# Patient Record
Sex: FEMALE | Race: WHITE | Employment: OTHER | URBAN - METROPOLITAN AREA
[De-identification: names, ages, dates, MRNs, and addresses within clinical notes are randomized per-mention and may not be internally consistent; named-entity substitution may affect disease eponyms.]

---

## 2017-01-05 PROBLEM — L97.912 NON-PRESSURE ULCER OF RIGHT LOWER EXTREMITY WITH FAT LAYER EXPOSED (HCC): Status: ACTIVE | Noted: 2017-01-05

## 2017-02-08 ENCOUNTER — HOSPITAL ENCOUNTER (OUTPATIENT)
Dept: WOUND CARE | Age: 82
Discharge: HOME | End: 2017-02-08
Payer: MEDICARE

## 2017-02-08 ENCOUNTER — HOSPITAL ENCOUNTER (OUTPATIENT)
Age: 82
Setting detail: SPECIMEN
Discharge: HOME | End: 2017-02-08
Payer: MEDICARE

## 2017-02-08 LAB
INR BLD: 6.1
PROTHROMBIN TIME: 63.3 SEC (ref 9.4–12.6)

## 2017-02-08 PROCEDURE — 11045 DBRDMT SUBQ TISS EACH ADDL: CPT

## 2017-02-08 PROCEDURE — 36415 COLL VENOUS BLD VENIPUNCTURE: CPT

## 2017-02-08 PROCEDURE — 2500000003 HC RX 250 WO HCPCS: Performed by: SURGERY

## 2017-02-08 PROCEDURE — 11042 DBRDMT SUBQ TIS 1ST 20SQCM/<: CPT

## 2017-02-08 PROCEDURE — 85610 PROTHROMBIN TIME: CPT

## 2017-02-08 RX ORDER — LIDOCAINE HYDROCHLORIDE 40 MG/ML
SOLUTION TOPICAL ONCE
Status: COMPLETED | OUTPATIENT
Start: 2017-02-08 | End: 2017-02-08

## 2017-02-08 RX ORDER — NIFEDIPINE 10 MG/1
10 CAPSULE ORAL 2 TIMES DAILY
COMMUNITY
End: 2017-04-10 | Stop reason: SDUPTHER

## 2017-02-08 RX ORDER — NIFEDIPINE 10 MG/1
10 CAPSULE ORAL 2 TIMES DAILY
Qty: 30 CAPSULE | Refills: 5 | Status: ON HOLD | OUTPATIENT
Start: 2017-02-08 | End: 2017-04-28 | Stop reason: HOSPADM

## 2017-02-08 RX ORDER — LIDOCAINE HYDROCHLORIDE 40 MG/ML
4 SOLUTION TOPICAL ONCE
Status: DISCONTINUED | OUTPATIENT
Start: 2017-02-08 | End: 2017-02-09 | Stop reason: HOSPADM

## 2017-02-08 RX ADMIN — LIDOCAINE HYDROCHLORIDE: 40 SOLUTION TOPICAL at 10:05

## 2017-02-13 ENCOUNTER — PRE-PROCEDURE TELEPHONE (OUTPATIENT)
Dept: WOUND CARE | Age: 82
End: 2017-02-13

## 2017-02-17 ENCOUNTER — PRE-PROCEDURE TELEPHONE (OUTPATIENT)
Dept: WOUND CARE | Age: 82
End: 2017-02-17

## 2017-02-17 ENCOUNTER — HOSPITAL ENCOUNTER (OUTPATIENT)
Dept: WOUND CARE | Age: 82
Discharge: HOME | End: 2017-02-17
Payer: MEDICARE

## 2017-02-17 VITALS — SYSTOLIC BLOOD PRESSURE: 132 MMHG | DIASTOLIC BLOOD PRESSURE: 71 MMHG | RESPIRATION RATE: 18 BRPM | HEART RATE: 72 BPM

## 2017-02-17 PROCEDURE — 11045 DBRDMT SUBQ TISS EACH ADDL: CPT

## 2017-02-17 PROCEDURE — 11042 DBRDMT SUBQ TIS 1ST 20SQCM/<: CPT

## 2017-02-17 RX ORDER — CIPROFLOXACIN 500 MG/1
500 TABLET, FILM COATED ORAL 2 TIMES DAILY
Qty: 20 TABLET | Refills: 2 | Status: SHIPPED | OUTPATIENT
Start: 2017-02-17 | End: 2017-02-27

## 2017-02-24 ENCOUNTER — HOSPITAL ENCOUNTER (OUTPATIENT)
Dept: WOUND CARE | Age: 82
Discharge: HOME | End: 2017-02-24
Payer: MEDICARE

## 2017-02-24 VITALS
SYSTOLIC BLOOD PRESSURE: 127 MMHG | WEIGHT: 125 LBS | HEART RATE: 69 BPM | BODY MASS INDEX: 22.15 KG/M2 | HEIGHT: 63 IN | TEMPERATURE: 97.1 F | RESPIRATION RATE: 18 BRPM | DIASTOLIC BLOOD PRESSURE: 65 MMHG

## 2017-02-24 PROCEDURE — 99214 OFFICE O/P EST MOD 30 MIN: CPT

## 2017-02-24 PROCEDURE — 6370000000 HC RX 637 (ALT 250 FOR IP): Performed by: SURGERY

## 2017-02-24 RX ORDER — HYDROCODONE BITARTRATE AND ACETAMINOPHEN 5; 325 MG/1; MG/1
1 TABLET ORAL EVERY 6 HOURS PRN
Qty: 20 TABLET | Refills: 0 | Status: SHIPPED | OUTPATIENT
Start: 2017-02-24 | End: 2017-03-03

## 2017-02-24 RX ADMIN — LIDOCAINE HYDROCHLORIDE: 20 JELLY TOPICAL at 12:02

## 2017-02-24 ASSESSMENT — PAIN DESCRIPTION - PAIN TYPE: TYPE: CHRONIC PAIN

## 2017-02-24 ASSESSMENT — PAIN SCALES - GENERAL: PAINLEVEL_OUTOF10: 9

## 2017-02-24 ASSESSMENT — PAIN DESCRIPTION - LOCATION: LOCATION: ANKLE

## 2017-02-24 ASSESSMENT — PAIN DESCRIPTION - FREQUENCY: FREQUENCY: INTERMITTENT

## 2017-02-24 ASSESSMENT — PAIN DESCRIPTION - DESCRIPTORS: DESCRIPTORS: ACHING;BURNING;TENDER

## 2017-02-24 ASSESSMENT — PAIN DESCRIPTION - ONSET: ONSET: ON-GOING

## 2017-02-24 ASSESSMENT — PAIN DESCRIPTION - PROGRESSION: CLINICAL_PROGRESSION: NOT CHANGED

## 2017-02-24 ASSESSMENT — PAIN DESCRIPTION - ORIENTATION: ORIENTATION: RIGHT

## 2017-03-03 ENCOUNTER — HOSPITAL ENCOUNTER (OUTPATIENT)
Dept: WOUND CARE | Age: 82
Discharge: HOME | End: 2017-03-03
Payer: MEDICARE

## 2017-03-03 VITALS — HEART RATE: 66 BPM | SYSTOLIC BLOOD PRESSURE: 126 MMHG | RESPIRATION RATE: 18 BRPM | DIASTOLIC BLOOD PRESSURE: 66 MMHG

## 2017-03-03 PROCEDURE — 11042 DBRDMT SUBQ TIS 1ST 20SQCM/<: CPT | Performed by: SURGERY

## 2017-03-03 PROCEDURE — 11045 DBRDMT SUBQ TISS EACH ADDL: CPT

## 2017-03-03 PROCEDURE — 6370000000 HC RX 637 (ALT 250 FOR IP): Performed by: SURGERY

## 2017-03-03 PROCEDURE — G0463 HOSPITAL OUTPT CLINIC VISIT: HCPCS | Performed by: SURGERY

## 2017-03-03 RX ADMIN — LIDOCAINE HYDROCHLORIDE: 20 JELLY TOPICAL at 10:50

## 2017-03-07 ENCOUNTER — PRE-PROCEDURE TELEPHONE (OUTPATIENT)
Dept: WOUND CARE | Age: 82
End: 2017-03-07

## 2017-03-08 ENCOUNTER — ANESTHESIA EVENT (OUTPATIENT)
Dept: OPERATING ROOM | Age: 82
End: 2017-03-08
Payer: MEDICARE

## 2017-03-09 ENCOUNTER — HOSPITAL ENCOUNTER (OUTPATIENT)
Age: 82
Setting detail: OUTPATIENT SURGERY
Discharge: HOME | End: 2017-03-09
Attending: SURGERY | Admitting: SURGERY
Payer: MEDICARE

## 2017-03-09 ENCOUNTER — ANESTHESIA (OUTPATIENT)
Dept: OPERATING ROOM | Age: 82
End: 2017-03-09
Payer: MEDICARE

## 2017-03-09 VITALS
WEIGHT: 132 LBS | RESPIRATION RATE: 16 BRPM | HEART RATE: 70 BPM | OXYGEN SATURATION: 99 % | DIASTOLIC BLOOD PRESSURE: 77 MMHG | SYSTOLIC BLOOD PRESSURE: 145 MMHG | HEIGHT: 63 IN | BODY MASS INDEX: 23.39 KG/M2 | TEMPERATURE: 97.6 F

## 2017-03-09 VITALS — DIASTOLIC BLOOD PRESSURE: 51 MMHG | OXYGEN SATURATION: 100 % | SYSTOLIC BLOOD PRESSURE: 99 MMHG

## 2017-03-09 LAB
ANION GAP SERPL CALCULATED.3IONS-SCNC: 9 MMOL/L (ref 9–17)
BUN BLDV-MCNC: 24 MG/DL (ref 8–23)
BUN/CREAT BLD: ABNORMAL (ref 9–20)
CALCIUM SERPL-MCNC: 9.6 MG/DL (ref 8.6–10.4)
CHLORIDE BLD-SCNC: 103 MMOL/L (ref 98–107)
CO2: 32 MMOL/L (ref 20–31)
CREAT SERPL-MCNC: 0.87 MG/DL (ref 0.5–0.9)
GFR AFRICAN AMERICAN: >60 ML/MIN
GFR NON-AFRICAN AMERICAN: >60 ML/MIN
GFR SERPL CREATININE-BSD FRML MDRD: ABNORMAL ML/MIN/{1.73_M2}
GFR SERPL CREATININE-BSD FRML MDRD: ABNORMAL ML/MIN/{1.73_M2}
GLUCOSE BLD-MCNC: 116 MG/DL (ref 70–99)
HCT VFR BLD CALC: 35.2 % (ref 36–46)
HEMOGLOBIN: 11.6 G/DL (ref 12–16)
INR BLD: 1.2
MCH RBC QN AUTO: 29.7 PG (ref 26–34)
MCHC RBC AUTO-ENTMCNC: 32.9 G/DL (ref 31–37)
MCV RBC AUTO: 90.5 FL (ref 80–100)
PDW BLD-RTO: 15.9 % (ref 12.5–15.4)
PLATELET # BLD: 229 K/UL (ref 140–450)
PMV BLD AUTO: 8.5 FL (ref 6–12)
POTASSIUM SERPL-SCNC: 3.7 MMOL/L (ref 3.7–5.3)
PROTHROMBIN TIME: 13.5 SEC (ref 9.4–12.6)
RBC # BLD: 3.89 M/UL (ref 4–5.2)
SODIUM BLD-SCNC: 144 MMOL/L (ref 135–144)
WBC # BLD: 5.3 K/UL (ref 3.5–11)

## 2017-03-09 PROCEDURE — 6360000002 HC RX W HCPCS: Performed by: NURSE ANESTHETIST, CERTIFIED REGISTERED

## 2017-03-09 PROCEDURE — 3600000002 HC SURGERY LEVEL 2 BASE: Performed by: SURGERY

## 2017-03-09 PROCEDURE — 80048 BASIC METABOLIC PNL TOTAL CA: CPT

## 2017-03-09 PROCEDURE — 85610 PROTHROMBIN TIME: CPT

## 2017-03-09 PROCEDURE — 6360000002 HC RX W HCPCS: Performed by: SURGERY

## 2017-03-09 PROCEDURE — 2580000003 HC RX 258: Performed by: SURGERY

## 2017-03-09 PROCEDURE — 3700000001 HC ADD 15 MINUTES (ANESTHESIA): Performed by: SURGERY

## 2017-03-09 PROCEDURE — 7100000010 HC PHASE II RECOVERY - FIRST 15 MIN

## 2017-03-09 PROCEDURE — 2500000003 HC RX 250 WO HCPCS: Performed by: SURGERY

## 2017-03-09 PROCEDURE — 7100000011 HC PHASE II RECOVERY - ADDTL 15 MIN

## 2017-03-09 PROCEDURE — 2500000003 HC RX 250 WO HCPCS: Performed by: ANESTHESIOLOGY

## 2017-03-09 PROCEDURE — 6370000000 HC RX 637 (ALT 250 FOR IP): Performed by: SURGERY

## 2017-03-09 PROCEDURE — 93005 ELECTROCARDIOGRAM TRACING: CPT

## 2017-03-09 PROCEDURE — 85027 COMPLETE CBC AUTOMATED: CPT

## 2017-03-09 PROCEDURE — 2500000003 HC RX 250 WO HCPCS: Performed by: NURSE ANESTHETIST, CERTIFIED REGISTERED

## 2017-03-09 PROCEDURE — 3600000012 HC SURGERY LEVEL 2 ADDTL 15MIN: Performed by: SURGERY

## 2017-03-09 PROCEDURE — 3700000000 HC ANESTHESIA ATTENDED CARE: Performed by: SURGERY

## 2017-03-09 RX ORDER — LIDOCAINE HYDROCHLORIDE 10 MG/ML
INJECTION, SOLUTION EPIDURAL; INFILTRATION; INTRACAUDAL; PERINEURAL PRN
Status: DISCONTINUED | OUTPATIENT
Start: 2017-03-09 | End: 2017-03-10 | Stop reason: HOSPADM

## 2017-03-09 RX ORDER — MEPERIDINE HYDROCHLORIDE 50 MG/ML
12.5 INJECTION INTRAMUSCULAR; INTRAVENOUS; SUBCUTANEOUS EVERY 5 MIN PRN
Status: DISCONTINUED | OUTPATIENT
Start: 2017-03-09 | End: 2017-03-10 | Stop reason: HOSPADM

## 2017-03-09 RX ORDER — SODIUM CHLORIDE, SODIUM LACTATE, POTASSIUM CHLORIDE, CALCIUM CHLORIDE 600; 310; 30; 20 MG/100ML; MG/100ML; MG/100ML; MG/100ML
INJECTION, SOLUTION INTRAVENOUS CONTINUOUS
Status: DISCONTINUED | OUTPATIENT
Start: 2017-03-09 | End: 2017-03-10 | Stop reason: HOSPADM

## 2017-03-09 RX ORDER — PROPOFOL 10 MG/ML
INJECTION, EMULSION INTRAVENOUS PRN
Status: DISCONTINUED | OUTPATIENT
Start: 2017-03-09 | End: 2017-03-09 | Stop reason: SDUPTHER

## 2017-03-09 RX ORDER — HYDROCODONE BITARTRATE AND ACETAMINOPHEN 5; 325 MG/1; MG/1
1 TABLET ORAL EVERY 6 HOURS PRN
Qty: 20 TABLET | Refills: 0 | OUTPATIENT
Start: 2017-03-09 | End: 2017-03-16

## 2017-03-09 RX ORDER — FENTANYL CITRATE 50 UG/ML
INJECTION, SOLUTION INTRAMUSCULAR; INTRAVENOUS PRN
Status: DISCONTINUED | OUTPATIENT
Start: 2017-03-09 | End: 2017-03-09 | Stop reason: SDUPTHER

## 2017-03-09 RX ORDER — MIDAZOLAM HYDROCHLORIDE 1 MG/ML
INJECTION INTRAMUSCULAR; INTRAVENOUS PRN
Status: DISCONTINUED | OUTPATIENT
Start: 2017-03-09 | End: 2017-03-09 | Stop reason: SDUPTHER

## 2017-03-09 RX ORDER — SODIUM CHLORIDE 9 MG/ML
INJECTION, SOLUTION INTRAVENOUS CONTINUOUS
Status: DISCONTINUED | OUTPATIENT
Start: 2017-03-09 | End: 2017-03-10 | Stop reason: HOSPADM

## 2017-03-09 RX ORDER — HYDROCODONE BITARTRATE AND ACETAMINOPHEN 5; 325 MG/1; MG/1
1 TABLET ORAL EVERY 6 HOURS PRN
Qty: 20 TABLET | Refills: 0 | OUTPATIENT
Start: 2017-03-09 | End: 2017-03-09

## 2017-03-09 RX ORDER — LIDOCAINE HYDROCHLORIDE 10 MG/ML
1 INJECTION, SOLUTION EPIDURAL; INFILTRATION; INTRACAUDAL; PERINEURAL
Status: COMPLETED | OUTPATIENT
Start: 2017-03-09 | End: 2017-03-09

## 2017-03-09 RX ORDER — LIDOCAINE HYDROCHLORIDE 10 MG/ML
INJECTION, SOLUTION EPIDURAL; INFILTRATION; INTRACAUDAL; PERINEURAL PRN
Status: DISCONTINUED | OUTPATIENT
Start: 2017-03-09 | End: 2017-03-09 | Stop reason: SDUPTHER

## 2017-03-09 RX ADMIN — Medication 2 G: at 08:11

## 2017-03-09 RX ADMIN — SODIUM CHLORIDE: 9 INJECTION, SOLUTION INTRAVENOUS at 07:57

## 2017-03-09 RX ADMIN — SODIUM CHLORIDE: 9 INJECTION, SOLUTION INTRAVENOUS at 07:33

## 2017-03-09 RX ADMIN — LIDOCAINE HYDROCHLORIDE 0.4 ML: 10 INJECTION, SOLUTION INFILTRATION; PERINEURAL at 07:31

## 2017-03-09 RX ADMIN — LIDOCAINE HYDROCHLORIDE 40 MG: 10 INJECTION, SOLUTION EPIDURAL; INFILTRATION; INTRACAUDAL; PERINEURAL at 08:08

## 2017-03-09 RX ADMIN — PROPOFOL 120 MG: 10 INJECTION, EMULSION INTRAVENOUS at 08:08

## 2017-03-09 RX ADMIN — FENTANYL CITRATE 25 MCG: 50 INJECTION, SOLUTION INTRAMUSCULAR; INTRAVENOUS at 08:08

## 2017-03-09 RX ADMIN — FENTANYL CITRATE 12.5 MCG: 50 INJECTION, SOLUTION INTRAMUSCULAR; INTRAVENOUS at 08:21

## 2017-03-09 RX ADMIN — MIDAZOLAM 0.5 MG: 1 INJECTION INTRAMUSCULAR; INTRAVENOUS at 08:07

## 2017-03-10 ENCOUNTER — PRE-PROCEDURE TELEPHONE (OUTPATIENT)
Dept: WOUND CARE | Age: 82
End: 2017-03-10

## 2017-03-10 LAB
EKG ATRIAL RATE: 357 BPM
EKG Q-T INTERVAL: 406 MS
EKG QRS DURATION: 84 MS
EKG QTC CALCULATION (BAZETT): 438 MS
EKG R AXIS: 83 DEGREES
EKG T AXIS: 72 DEGREES
EKG VENTRICULAR RATE: 70 BPM

## 2017-03-16 ENCOUNTER — HOSPITAL ENCOUNTER (OUTPATIENT)
Dept: WOUND CARE | Age: 82
Discharge: HOME | End: 2017-03-16
Payer: MEDICARE

## 2017-03-17 ENCOUNTER — HOSPITAL ENCOUNTER (OUTPATIENT)
Age: 82
Discharge: HOME | End: 2017-03-17
Payer: MEDICARE

## 2017-03-17 ENCOUNTER — HOSPITAL ENCOUNTER (OUTPATIENT)
Dept: WOUND CARE | Age: 82
Discharge: HOME | End: 2017-03-17
Payer: MEDICARE

## 2017-03-17 VITALS
RESPIRATION RATE: 16 BRPM | SYSTOLIC BLOOD PRESSURE: 122 MMHG | DIASTOLIC BLOOD PRESSURE: 75 MMHG | TEMPERATURE: 97.7 F | HEART RATE: 72 BPM

## 2017-03-17 DIAGNOSIS — L97.912 NON-PRESSURE ULCER OF RIGHT LOWER EXTREMITY WITH FAT LAYER EXPOSED (HCC): Primary | ICD-10-CM

## 2017-03-17 DIAGNOSIS — L97.912 NON-PRESSURE ULCER OF RIGHT LOWER EXTREMITY WITH FAT LAYER EXPOSED (HCC): ICD-10-CM

## 2017-03-17 LAB
ALBUMIN SERPL-MCNC: 3.4 G/DL (ref 3.5–5.2)
ALBUMIN/GLOBULIN RATIO: 1 (ref 1–2.5)
ALP BLD-CCNC: 77 U/L (ref 35–104)
ALT SERPL-CCNC: 18 U/L (ref 5–33)
ANION GAP SERPL CALCULATED.3IONS-SCNC: 11 MMOL/L (ref 9–17)
AST SERPL-CCNC: 26 U/L
BILIRUB SERPL-MCNC: 0.43 MG/DL (ref 0.3–1.2)
BUN BLDV-MCNC: 18 MG/DL (ref 8–23)
BUN/CREAT BLD: ABNORMAL (ref 9–20)
CALCIUM SERPL-MCNC: 9.6 MG/DL (ref 8.6–10.4)
CHLORIDE BLD-SCNC: 101 MMOL/L (ref 98–107)
CO2: 29 MMOL/L (ref 20–31)
CREAT SERPL-MCNC: 0.77 MG/DL (ref 0.5–0.9)
ESTIMATED AVERAGE GLUCOSE: 131 MG/DL
GFR AFRICAN AMERICAN: >60 ML/MIN
GFR NON-AFRICAN AMERICAN: >60 ML/MIN
GFR SERPL CREATININE-BSD FRML MDRD: ABNORMAL ML/MIN/{1.73_M2}
GFR SERPL CREATININE-BSD FRML MDRD: ABNORMAL ML/MIN/{1.73_M2}
GLUCOSE BLD-MCNC: 78 MG/DL (ref 70–99)
HBA1C MFR BLD: 6.2 % (ref 4–6)
POTASSIUM SERPL-SCNC: 4.3 MMOL/L (ref 3.7–5.3)
PREALBUMIN: 15.9 MG/DL (ref 20–40)
SODIUM BLD-SCNC: 141 MMOL/L (ref 135–144)
TOTAL PROTEIN: 6.7 G/DL (ref 6.4–8.3)

## 2017-03-17 PROCEDURE — 99213 OFFICE O/P EST LOW 20 MIN: CPT

## 2017-03-17 PROCEDURE — 2500000003 HC RX 250 WO HCPCS: Performed by: SURGERY

## 2017-03-17 PROCEDURE — 80053 COMPREHEN METABOLIC PANEL: CPT

## 2017-03-17 PROCEDURE — 83036 HEMOGLOBIN GLYCOSYLATED A1C: CPT

## 2017-03-17 PROCEDURE — 36415 COLL VENOUS BLD VENIPUNCTURE: CPT

## 2017-03-17 PROCEDURE — 84134 ASSAY OF PREALBUMIN: CPT

## 2017-03-17 RX ORDER — LIDOCAINE HYDROCHLORIDE 40 MG/ML
SOLUTION TOPICAL ONCE
Status: COMPLETED | OUTPATIENT
Start: 2017-03-17 | End: 2017-03-17

## 2017-03-17 RX ADMIN — LIDOCAINE HYDROCHLORIDE: 40 SOLUTION TOPICAL at 12:07

## 2017-03-21 ENCOUNTER — PRE-PROCEDURE TELEPHONE (OUTPATIENT)
Dept: WOUND CARE | Age: 82
End: 2017-03-21

## 2017-03-22 ENCOUNTER — HOSPITAL ENCOUNTER (OUTPATIENT)
Dept: VASCULAR LAB | Age: 82
Discharge: HOME | End: 2017-03-22
Payer: MEDICARE

## 2017-03-22 DIAGNOSIS — L97.912 NON-PRESSURE ULCER OF RIGHT LOWER EXTREMITY WITH FAT LAYER EXPOSED (HCC): ICD-10-CM

## 2017-03-24 ENCOUNTER — HOSPITAL ENCOUNTER (OUTPATIENT)
Age: 82
Discharge: HOME | End: 2017-03-24
Payer: MEDICARE

## 2017-03-24 ENCOUNTER — HOSPITAL ENCOUNTER (OUTPATIENT)
Dept: WOUND CARE | Age: 82
Discharge: HOME | End: 2017-03-24
Payer: MEDICARE

## 2017-03-24 VITALS — TEMPERATURE: 97.9 F | HEART RATE: 74 BPM | DIASTOLIC BLOOD PRESSURE: 70 MMHG | SYSTOLIC BLOOD PRESSURE: 126 MMHG

## 2017-03-24 LAB
INR BLD: 3.6
PROTHROMBIN TIME: 38.5 SEC (ref 9.7–11.6)

## 2017-03-24 PROCEDURE — 36415 COLL VENOUS BLD VENIPUNCTURE: CPT

## 2017-03-24 PROCEDURE — 6370000000 HC RX 637 (ALT 250 FOR IP): Performed by: SURGERY

## 2017-03-24 PROCEDURE — 11042 DBRDMT SUBQ TIS 1ST 20SQCM/<: CPT

## 2017-03-24 PROCEDURE — 85610 PROTHROMBIN TIME: CPT

## 2017-03-24 RX ADMIN — LIDOCAINE HYDROCHLORIDE: 20 JELLY TOPICAL at 09:30

## 2017-03-24 ASSESSMENT — PAIN SCALES - GENERAL: PAINLEVEL_OUTOF10: 7

## 2017-03-30 ENCOUNTER — HOSPITAL ENCOUNTER (OUTPATIENT)
Dept: CARDIAC CATH/INVASIVE PROCEDURES | Age: 82
Discharge: HOME | End: 2017-03-30
Payer: MEDICARE

## 2017-03-30 VITALS
HEART RATE: 76 BPM | TEMPERATURE: 97.3 F | DIASTOLIC BLOOD PRESSURE: 54 MMHG | OXYGEN SATURATION: 95 % | HEIGHT: 63 IN | RESPIRATION RATE: 26 BRPM | WEIGHT: 125 LBS | BODY MASS INDEX: 22.15 KG/M2 | SYSTOLIC BLOOD PRESSURE: 128 MMHG

## 2017-03-30 LAB
HCT VFR BLD CALC: 39.2 % (ref 36–46)
HEMOGLOBIN: 12.2 G/DL (ref 12–16)
MCH RBC QN AUTO: 28.7 PG (ref 26–34)
MCHC RBC AUTO-ENTMCNC: 31.1 G/DL (ref 31–37)
MCV RBC AUTO: 92.2 FL (ref 80–100)
PDW BLD-RTO: 17 % (ref 12.5–15.4)
PLATELET # BLD: 212 K/UL (ref 140–450)
PMV BLD AUTO: 8.8 FL (ref 6–12)
POC INR: 1.2
PROTHROMBIN TIME, POC: 14.1 SEC (ref 10.4–14.2)
RBC # BLD: 4.25 M/UL (ref 4–5.2)
WBC # BLD: 7.1 K/UL (ref 3.5–11)

## 2017-03-30 PROCEDURE — 85610 PROTHROMBIN TIME: CPT

## 2017-03-30 PROCEDURE — 75710 ARTERY X-RAYS ARM/LEG: CPT

## 2017-03-30 PROCEDURE — 6370000000 HC RX 637 (ALT 250 FOR IP)

## 2017-03-30 PROCEDURE — C1894 INTRO/SHEATH, NON-LASER: HCPCS

## 2017-03-30 PROCEDURE — 7100000010 HC PHASE II RECOVERY - FIRST 15 MIN

## 2017-03-30 PROCEDURE — 2709999900 HC NON-CHARGEABLE SUPPLY

## 2017-03-30 PROCEDURE — 37224 HC FEM POP TERRITORY PLASTY: CPT

## 2017-03-30 PROCEDURE — C1725 CATH, TRANSLUMIN NON-LASER: HCPCS

## 2017-03-30 PROCEDURE — C1760 CLOSURE DEV, VASC: HCPCS

## 2017-03-30 PROCEDURE — 75625 CONTRAST EXAM ABDOMINL AORTA: CPT

## 2017-03-30 PROCEDURE — C1887 CATHETER, GUIDING: HCPCS

## 2017-03-30 PROCEDURE — C1769 GUIDE WIRE: HCPCS

## 2017-03-30 PROCEDURE — 85027 COMPLETE CBC AUTOMATED: CPT

## 2017-03-30 PROCEDURE — 7100000011 HC PHASE II RECOVERY - ADDTL 15 MIN

## 2017-03-30 PROCEDURE — 6360000002 HC RX W HCPCS

## 2017-03-30 RX ORDER — CLOPIDOGREL BISULFATE 75 MG/1
75 TABLET ORAL DAILY
Qty: 30 TABLET | Refills: 3 | Status: SHIPPED | OUTPATIENT
Start: 2017-03-30 | End: 2017-12-01 | Stop reason: ALTCHOICE

## 2017-03-30 RX ORDER — SODIUM CHLORIDE 9 MG/ML
INJECTION, SOLUTION INTRAVENOUS CONTINUOUS
Status: DISCONTINUED | OUTPATIENT
Start: 2017-03-30 | End: 2017-03-31 | Stop reason: HOSPADM

## 2017-03-30 RX ADMIN — SODIUM CHLORIDE: 9 INJECTION, SOLUTION INTRAVENOUS at 13:21

## 2017-03-30 ASSESSMENT — PAIN SCALES - GENERAL: PAINLEVEL_OUTOF10: 0

## 2017-03-31 ENCOUNTER — HOSPITAL ENCOUNTER (OUTPATIENT)
Dept: WOUND CARE | Age: 82
Discharge: HOME | End: 2017-03-31
Payer: MEDICARE

## 2017-03-31 PROCEDURE — 6360000002 HC RX W HCPCS

## 2017-04-03 ENCOUNTER — HOSPITAL ENCOUNTER (OUTPATIENT)
Age: 82
Discharge: HOME | End: 2017-04-03
Payer: MEDICARE

## 2017-04-03 LAB
INR BLD: 1.1
PROTHROMBIN TIME: 11.6 SEC (ref 9.7–11.6)

## 2017-04-03 PROCEDURE — 85610 PROTHROMBIN TIME: CPT

## 2017-04-03 PROCEDURE — 36415 COLL VENOUS BLD VENIPUNCTURE: CPT

## 2017-04-07 ENCOUNTER — HOSPITAL ENCOUNTER (OUTPATIENT)
Dept: WOUND CARE | Age: 82
Discharge: HOME | End: 2017-04-07
Payer: MEDICARE

## 2017-04-07 PROCEDURE — 6370000000 HC RX 637 (ALT 250 FOR IP)

## 2017-04-07 PROCEDURE — 11042 DBRDMT SUBQ TIS 1ST 20SQCM/<: CPT

## 2017-04-07 PROCEDURE — 11045 DBRDMT SUBQ TISS EACH ADDL: CPT

## 2017-04-07 RX ADMIN — LIDOCAINE HYDROCHLORIDE: 20 JELLY TOPICAL at 10:12

## 2017-04-10 ENCOUNTER — HOSPITAL ENCOUNTER (OUTPATIENT)
Dept: PHARMACY | Age: 82
Setting detail: THERAPIES SERIES
Discharge: HOME | End: 2017-04-10
Payer: MEDICARE

## 2017-04-10 DIAGNOSIS — I48.20 CHRONIC A-FIB (HCC): ICD-10-CM

## 2017-04-10 LAB
INR BLD: 2.5
PROTIME: 30.2 SECONDS

## 2017-04-10 PROCEDURE — 85610 PROTHROMBIN TIME: CPT

## 2017-04-10 PROCEDURE — G0463 HOSPITAL OUTPT CLINIC VISIT: HCPCS

## 2017-04-17 ENCOUNTER — APPOINTMENT (OUTPATIENT)
Dept: PHARMACY | Age: 82
End: 2017-04-17
Payer: MEDICARE

## 2017-04-20 ENCOUNTER — HOSPITAL ENCOUNTER (OUTPATIENT)
Dept: WOUND CARE | Age: 82
Discharge: HOME | End: 2017-04-20
Payer: MEDICARE

## 2017-04-20 VITALS
HEART RATE: 68 BPM | RESPIRATION RATE: 20 BRPM | TEMPERATURE: 99 F | SYSTOLIC BLOOD PRESSURE: 128 MMHG | DIASTOLIC BLOOD PRESSURE: 78 MMHG

## 2017-04-20 DIAGNOSIS — L97.912 NON-PRESSURE ULCER OF RIGHT LOWER EXTREMITY WITH FAT LAYER EXPOSED (HCC): Primary | ICD-10-CM

## 2017-04-20 PROCEDURE — 97597 DBRDMT OPN WND 1ST 20 CM/<: CPT | Performed by: SURGERY

## 2017-04-20 PROCEDURE — 97597 DBRDMT OPN WND 1ST 20 CM/<: CPT

## 2017-04-20 PROCEDURE — 97598 DBRDMT OPN WND ADDL 20CM/<: CPT

## 2017-04-20 PROCEDURE — 97598 DBRDMT OPN WND ADDL 20CM/<: CPT | Performed by: SURGERY

## 2017-04-20 PROCEDURE — 6370000000 HC RX 637 (ALT 250 FOR IP): Performed by: SURGERY

## 2017-04-20 RX ADMIN — LIDOCAINE HYDROCHLORIDE: 20 JELLY TOPICAL at 10:24

## 2017-04-25 ENCOUNTER — PRE-PROCEDURE TELEPHONE (OUTPATIENT)
Dept: WOUND CARE | Age: 82
End: 2017-04-25

## 2017-04-27 ENCOUNTER — ANESTHESIA EVENT (OUTPATIENT)
Dept: OPERATING ROOM | Age: 82
DRG: 581 | End: 2017-04-27
Payer: MEDICARE

## 2017-04-27 RX ORDER — PRAVASTATIN SODIUM 80 MG/1
20 TABLET ORAL DAILY
COMMUNITY

## 2017-04-27 RX ORDER — HYDROCODONE BITARTRATE AND ACETAMINOPHEN 5; 325 MG/1; MG/1
1 TABLET ORAL EVERY 6 HOURS PRN
COMMUNITY
End: 2017-08-25 | Stop reason: ALTCHOICE

## 2017-04-28 ENCOUNTER — HOSPITAL ENCOUNTER (INPATIENT)
Age: 82
LOS: 1 days | Discharge: HOME HEALTH | DRG: 581 | End: 2017-04-29
Attending: SURGERY | Admitting: SURGERY
Payer: MEDICARE

## 2017-04-28 ENCOUNTER — ANESTHESIA (OUTPATIENT)
Dept: OPERATING ROOM | Age: 82
DRG: 581 | End: 2017-04-28
Payer: MEDICARE

## 2017-04-28 VITALS — OXYGEN SATURATION: 100 % | SYSTOLIC BLOOD PRESSURE: 121 MMHG | DIASTOLIC BLOOD PRESSURE: 78 MMHG | TEMPERATURE: 94.6 F

## 2017-04-28 LAB
ANION GAP: 17 MMOL/L (ref 8–16)
GLUCOSE BLD-MCNC: 116 MG/DL (ref 74–106)
POC BUN: 17 MG/DL (ref 6–20)
POC CHLORIDE: 99 MMOL/L (ref 98–110)
POC CREATININE: 0.8 MG/DL (ref 0.6–1.4)
POC HEMATOCRIT: 41 % (ref 36–46)
POC HEMOGLOBIN: 13.9 GM/DL (ref 12–16)
POC INR: 1.2
POC IONIZED CALCIUM: 1.28 MMOL/L (ref 1.13–1.33)
POC POTASSIUM: 4.1 MMOL/L (ref 3.5–5.1)
POC SODIUM: 141 MMOL/L (ref 136–145)
POC TCO2: 30 MMOL/L (ref 20–31)
PROTHROMBIN TIME, POC: 14.7 SEC (ref 10.4–14.2)

## 2017-04-28 PROCEDURE — 3600000002 HC SURGERY LEVEL 2 BASE: Performed by: SURGERY

## 2017-04-28 PROCEDURE — 80047 BASIC METABLC PNL IONIZED CA: CPT

## 2017-04-28 PROCEDURE — 3700000000 HC ANESTHESIA ATTENDED CARE: Performed by: SURGERY

## 2017-04-28 PROCEDURE — 7100000001 HC PACU RECOVERY - ADDTL 15 MIN: Performed by: SURGERY

## 2017-04-28 PROCEDURE — 6360000002 HC RX W HCPCS: Performed by: ANESTHESIOLOGY

## 2017-04-28 PROCEDURE — 3600000012 HC SURGERY LEVEL 2 ADDTL 15MIN: Performed by: SURGERY

## 2017-04-28 PROCEDURE — 2580000003 HC RX 258: Performed by: ANESTHESIOLOGY

## 2017-04-28 PROCEDURE — 6360000002 HC RX W HCPCS: Performed by: NURSE ANESTHETIST, CERTIFIED REGISTERED

## 2017-04-28 PROCEDURE — A6550 NEG PRES WOUND THER DRSG SET: HCPCS | Performed by: SURGERY

## 2017-04-28 PROCEDURE — C1781 MESH (IMPLANTABLE): HCPCS | Performed by: SURGERY

## 2017-04-28 PROCEDURE — 85014 HEMATOCRIT: CPT

## 2017-04-28 PROCEDURE — 3700000001 HC ADD 15 MINUTES (ANESTHESIA): Performed by: SURGERY

## 2017-04-28 PROCEDURE — 2580000003 HC RX 258: Performed by: SURGERY

## 2017-04-28 PROCEDURE — 85610 PROTHROMBIN TIME: CPT

## 2017-04-28 PROCEDURE — 6370000000 HC RX 637 (ALT 250 FOR IP): Performed by: SURGERY

## 2017-04-28 PROCEDURE — 0JDN0ZZ EXTRACTION OF RIGHT LOWER LEG SUBCUTANEOUS TISSUE AND FASCIA, OPEN APPROACH: ICD-10-PCS | Performed by: SURGERY

## 2017-04-28 PROCEDURE — 2500000003 HC RX 250 WO HCPCS: Performed by: ANESTHESIOLOGY

## 2017-04-28 PROCEDURE — 2500000003 HC RX 250 WO HCPCS: Performed by: NURSE ANESTHETIST, CERTIFIED REGISTERED

## 2017-04-28 PROCEDURE — 1200000000 HC SEMI PRIVATE

## 2017-04-28 PROCEDURE — 7100000000 HC PACU RECOVERY - FIRST 15 MIN: Performed by: SURGERY

## 2017-04-28 DEVICE — INTEGRA® MESHED BILAYER WOUND MATRIX 4 IN*10 IN (10 CM*25 CM)
Type: IMPLANTABLE DEVICE | Site: LEG | Status: FUNCTIONAL
Brand: INTEGRA®

## 2017-04-28 RX ORDER — SODIUM CHLORIDE, SODIUM LACTATE, POTASSIUM CHLORIDE, CALCIUM CHLORIDE 600; 310; 30; 20 MG/100ML; MG/100ML; MG/100ML; MG/100ML
INJECTION, SOLUTION INTRAVENOUS CONTINUOUS
Status: DISCONTINUED | OUTPATIENT
Start: 2017-04-28 | End: 2017-04-28

## 2017-04-28 RX ORDER — ONDANSETRON 2 MG/ML
4 INJECTION INTRAMUSCULAR; INTRAVENOUS EVERY 6 HOURS PRN
Status: DISCONTINUED | OUTPATIENT
Start: 2017-04-28 | End: 2017-04-29 | Stop reason: HOSPADM

## 2017-04-28 RX ORDER — HYDROCODONE BITARTRATE AND ACETAMINOPHEN 5; 325 MG/1; MG/1
1 TABLET ORAL EVERY 4 HOURS PRN
Status: DISCONTINUED | OUTPATIENT
Start: 2017-04-28 | End: 2017-04-29 | Stop reason: HOSPADM

## 2017-04-28 RX ORDER — DOCUSATE SODIUM 100 MG/1
100 CAPSULE, LIQUID FILLED ORAL DAILY
Status: DISCONTINUED | OUTPATIENT
Start: 2017-04-28 | End: 2017-04-29 | Stop reason: HOSPADM

## 2017-04-28 RX ORDER — MAGNESIUM HYDROXIDE 1200 MG/15ML
LIQUID ORAL CONTINUOUS PRN
Status: DISCONTINUED | OUTPATIENT
Start: 2017-04-28 | End: 2017-04-28 | Stop reason: HOSPADM

## 2017-04-28 RX ORDER — CEFAZOLIN SODIUM 1 G/3ML
INJECTION, POWDER, FOR SOLUTION INTRAMUSCULAR; INTRAVENOUS PRN
Status: DISCONTINUED | OUTPATIENT
Start: 2017-04-28 | End: 2017-04-28 | Stop reason: SDUPTHER

## 2017-04-28 RX ORDER — LIDOCAINE HYDROCHLORIDE 10 MG/ML
INJECTION, SOLUTION EPIDURAL; INFILTRATION; INTRACAUDAL; PERINEURAL PRN
Status: DISCONTINUED | OUTPATIENT
Start: 2017-04-28 | End: 2017-04-28 | Stop reason: SDUPTHER

## 2017-04-28 RX ORDER — MORPHINE SULFATE 2 MG/ML
2 INJECTION, SOLUTION INTRAMUSCULAR; INTRAVENOUS
Status: DISCONTINUED | OUTPATIENT
Start: 2017-04-28 | End: 2017-04-29 | Stop reason: HOSPADM

## 2017-04-28 RX ORDER — FENTANYL CITRATE 50 UG/ML
INJECTION, SOLUTION INTRAMUSCULAR; INTRAVENOUS PRN
Status: DISCONTINUED | OUTPATIENT
Start: 2017-04-28 | End: 2017-04-28 | Stop reason: SDUPTHER

## 2017-04-28 RX ORDER — ACETAMINOPHEN 325 MG/1
650 TABLET ORAL EVERY 4 HOURS PRN
Status: DISCONTINUED | OUTPATIENT
Start: 2017-04-28 | End: 2017-04-29 | Stop reason: HOSPADM

## 2017-04-28 RX ORDER — ONDANSETRON 2 MG/ML
INJECTION INTRAMUSCULAR; INTRAVENOUS PRN
Status: DISCONTINUED | OUTPATIENT
Start: 2017-04-28 | End: 2017-04-28 | Stop reason: SDUPTHER

## 2017-04-28 RX ORDER — MORPHINE SULFATE 4 MG/ML
4 INJECTION, SOLUTION INTRAMUSCULAR; INTRAVENOUS
Status: DISCONTINUED | OUTPATIENT
Start: 2017-04-28 | End: 2017-04-29 | Stop reason: HOSPADM

## 2017-04-28 RX ORDER — DILTIAZEM HYDROCHLORIDE 180 MG/1
180 CAPSULE, COATED, EXTENDED RELEASE ORAL DAILY
Status: DISCONTINUED | OUTPATIENT
Start: 2017-04-28 | End: 2017-04-29 | Stop reason: HOSPADM

## 2017-04-28 RX ORDER — LISINOPRIL 20 MG/1
20 TABLET ORAL DAILY
Status: DISCONTINUED | OUTPATIENT
Start: 2017-04-28 | End: 2017-04-29 | Stop reason: HOSPADM

## 2017-04-28 RX ORDER — FENTANYL CITRATE 50 UG/ML
25 INJECTION, SOLUTION INTRAMUSCULAR; INTRAVENOUS EVERY 5 MIN PRN
Status: DISCONTINUED | OUTPATIENT
Start: 2017-04-28 | End: 2017-04-28 | Stop reason: HOSPADM

## 2017-04-28 RX ORDER — SODIUM CHLORIDE 0.9 % (FLUSH) 0.9 %
10 SYRINGE (ML) INJECTION PRN
Status: DISCONTINUED | OUTPATIENT
Start: 2017-04-28 | End: 2017-04-29 | Stop reason: HOSPADM

## 2017-04-28 RX ORDER — ONDANSETRON 2 MG/ML
4 INJECTION INTRAMUSCULAR; INTRAVENOUS
Status: DISCONTINUED | OUTPATIENT
Start: 2017-04-28 | End: 2017-04-28 | Stop reason: HOSPADM

## 2017-04-28 RX ORDER — PRAVASTATIN SODIUM 20 MG
80 TABLET ORAL NIGHTLY
Status: DISCONTINUED | OUTPATIENT
Start: 2017-04-28 | End: 2017-04-29 | Stop reason: HOSPADM

## 2017-04-28 RX ORDER — METOPROLOL TARTRATE 50 MG/1
50 TABLET, FILM COATED ORAL 2 TIMES DAILY
Status: DISCONTINUED | OUTPATIENT
Start: 2017-04-28 | End: 2017-04-29 | Stop reason: HOSPADM

## 2017-04-28 RX ORDER — OXYCODONE HYDROCHLORIDE AND ACETAMINOPHEN 5; 325 MG/1; MG/1
1 TABLET ORAL PRN
Status: DISCONTINUED | OUTPATIENT
Start: 2017-04-28 | End: 2017-04-28 | Stop reason: HOSPADM

## 2017-04-28 RX ORDER — LABETALOL HYDROCHLORIDE 5 MG/ML
5 INJECTION, SOLUTION INTRAVENOUS EVERY 10 MIN PRN
Status: DISCONTINUED | OUTPATIENT
Start: 2017-04-28 | End: 2017-04-28 | Stop reason: HOSPADM

## 2017-04-28 RX ORDER — LIDOCAINE HYDROCHLORIDE 10 MG/ML
1 INJECTION, SOLUTION EPIDURAL; INFILTRATION; INTRACAUDAL; PERINEURAL
Status: COMPLETED | OUTPATIENT
Start: 2017-04-28 | End: 2017-04-28

## 2017-04-28 RX ORDER — ASPIRIN 81 MG/1
81 TABLET, CHEWABLE ORAL DAILY
Status: DISCONTINUED | OUTPATIENT
Start: 2017-04-28 | End: 2017-04-29 | Stop reason: HOSPADM

## 2017-04-28 RX ORDER — SODIUM CHLORIDE, SODIUM LACTATE, POTASSIUM CHLORIDE, CALCIUM CHLORIDE 600; 310; 30; 20 MG/100ML; MG/100ML; MG/100ML; MG/100ML
INJECTION, SOLUTION INTRAVENOUS CONTINUOUS
Status: DISCONTINUED | OUTPATIENT
Start: 2017-04-28 | End: 2017-04-28 | Stop reason: SDUPTHER

## 2017-04-28 RX ORDER — OXYCODONE HYDROCHLORIDE AND ACETAMINOPHEN 5; 325 MG/1; MG/1
2 TABLET ORAL PRN
Status: DISCONTINUED | OUTPATIENT
Start: 2017-04-28 | End: 2017-04-28 | Stop reason: HOSPADM

## 2017-04-28 RX ORDER — SODIUM CHLORIDE 0.9 % (FLUSH) 0.9 %
10 SYRINGE (ML) INJECTION EVERY 12 HOURS SCHEDULED
Status: DISCONTINUED | OUTPATIENT
Start: 2017-04-28 | End: 2017-04-29 | Stop reason: HOSPADM

## 2017-04-28 RX ORDER — MORPHINE SULFATE 2 MG/ML
2 INJECTION, SOLUTION INTRAMUSCULAR; INTRAVENOUS EVERY 5 MIN PRN
Status: DISCONTINUED | OUTPATIENT
Start: 2017-04-28 | End: 2017-04-28 | Stop reason: HOSPADM

## 2017-04-28 RX ORDER — PROPOFOL 10 MG/ML
INJECTION, EMULSION INTRAVENOUS PRN
Status: DISCONTINUED | OUTPATIENT
Start: 2017-04-28 | End: 2017-04-28 | Stop reason: SDUPTHER

## 2017-04-28 RX ORDER — DIPHENHYDRAMINE HYDROCHLORIDE 50 MG/ML
12.5 INJECTION INTRAMUSCULAR; INTRAVENOUS
Status: DISCONTINUED | OUTPATIENT
Start: 2017-04-28 | End: 2017-04-28 | Stop reason: HOSPADM

## 2017-04-28 RX ORDER — HYDROCODONE BITARTRATE AND ACETAMINOPHEN 5; 325 MG/1; MG/1
2 TABLET ORAL EVERY 4 HOURS PRN
Status: DISCONTINUED | OUTPATIENT
Start: 2017-04-28 | End: 2017-04-29 | Stop reason: HOSPADM

## 2017-04-28 RX ORDER — LATANOPROST 50 UG/ML
1 SOLUTION/ DROPS OPHTHALMIC NIGHTLY
Status: DISCONTINUED | OUTPATIENT
Start: 2017-04-28 | End: 2017-04-29 | Stop reason: HOSPADM

## 2017-04-28 RX ORDER — WARFARIN SODIUM 5 MG/1
5 TABLET ORAL DAILY
Status: DISCONTINUED | OUTPATIENT
Start: 2017-04-28 | End: 2017-04-29 | Stop reason: HOSPADM

## 2017-04-28 RX ADMIN — WARFARIN SODIUM 5 MG: 5 TABLET ORAL at 21:19

## 2017-04-28 RX ADMIN — FENTANYL CITRATE 25 MCG: 50 INJECTION INTRAMUSCULAR; INTRAVENOUS at 15:07

## 2017-04-28 RX ADMIN — HYDROCODONE BITARTRATE AND ACETAMINOPHEN 1 TABLET: 5; 325 TABLET ORAL at 21:25

## 2017-04-28 RX ADMIN — Medication 10 ML: at 21:22

## 2017-04-28 RX ADMIN — LIDOCAINE HYDROCHLORIDE 0.4 ML: 10 INJECTION, SOLUTION INFILTRATION; PERINEURAL at 13:57

## 2017-04-28 RX ADMIN — FENTANYL CITRATE 25 MCG: 50 INJECTION INTRAMUSCULAR; INTRAVENOUS at 16:52

## 2017-04-28 RX ADMIN — DOCUSATE SODIUM 100 MG: 100 CAPSULE ORAL at 21:19

## 2017-04-28 RX ADMIN — CEFAZOLIN 2000 MG: 1 INJECTION, POWDER, FOR SOLUTION INTRAMUSCULAR; INTRAVENOUS at 14:59

## 2017-04-28 RX ADMIN — FENTANYL CITRATE 25 MCG: 50 INJECTION INTRAMUSCULAR; INTRAVENOUS at 15:02

## 2017-04-28 RX ADMIN — ASPIRIN 81 MG: 81 TABLET, CHEWABLE ORAL at 21:19

## 2017-04-28 RX ADMIN — SODIUM CHLORIDE, POTASSIUM CHLORIDE, SODIUM LACTATE AND CALCIUM CHLORIDE: 600; 310; 30; 20 INJECTION, SOLUTION INTRAVENOUS at 13:57

## 2017-04-28 RX ADMIN — PRAVASTATIN SODIUM 80 MG: 20 TABLET ORAL at 21:19

## 2017-04-28 RX ADMIN — LATANOPROST 1 DROP: 50 SOLUTION OPHTHALMIC at 21:20

## 2017-04-28 RX ADMIN — PROPOFOL 100 MG: 10 INJECTION, EMULSION INTRAVENOUS at 14:52

## 2017-04-28 RX ADMIN — LIDOCAINE HYDROCHLORIDE 50 MG: 10 INJECTION, SOLUTION EPIDURAL; INFILTRATION; INTRACAUDAL; PERINEURAL at 14:52

## 2017-04-28 RX ADMIN — METOPROLOL TARTRATE 50 MG: 50 TABLET, FILM COATED ORAL at 21:19

## 2017-04-28 RX ADMIN — SODIUM CHLORIDE, POTASSIUM CHLORIDE, SODIUM LACTATE AND CALCIUM CHLORIDE: 600; 310; 30; 20 INJECTION, SOLUTION INTRAVENOUS at 15:35

## 2017-04-28 RX ADMIN — ONDANSETRON 4 MG: 2 INJECTION, SOLUTION INTRAMUSCULAR; INTRAVENOUS at 15:46

## 2017-04-28 RX ADMIN — FENTANYL CITRATE 25 MCG: 50 INJECTION INTRAMUSCULAR; INTRAVENOUS at 16:47

## 2017-04-28 RX ADMIN — FENTANYL CITRATE 25 MCG: 50 INJECTION INTRAMUSCULAR; INTRAVENOUS at 15:50

## 2017-04-28 RX ADMIN — LISINOPRIL 20 MG: 20 TABLET ORAL at 21:19

## 2017-04-28 ASSESSMENT — PAIN DESCRIPTION - FREQUENCY: FREQUENCY: CONTINUOUS

## 2017-04-28 ASSESSMENT — PAIN SCALES - GENERAL
PAINLEVEL_OUTOF10: 0
PAINLEVEL_OUTOF10: 3
PAINLEVEL_OUTOF10: 4
PAINLEVEL_OUTOF10: 6
PAINLEVEL_OUTOF10: 5
PAINLEVEL_OUTOF10: 3
PAINLEVEL_OUTOF10: 4
PAINLEVEL_OUTOF10: 4

## 2017-04-28 ASSESSMENT — PAIN DESCRIPTION - ONSET: ONSET: ON-GOING

## 2017-04-28 ASSESSMENT — PAIN DESCRIPTION - ORIENTATION: ORIENTATION: RIGHT

## 2017-04-28 ASSESSMENT — PAIN DESCRIPTION - PROGRESSION: CLINICAL_PROGRESSION: NOT CHANGED

## 2017-04-28 ASSESSMENT — PAIN DESCRIPTION - DESCRIPTORS: DESCRIPTORS: SHARP;SORE;DISCOMFORT

## 2017-04-28 ASSESSMENT — PAIN SCALES - WONG BAKER
WONGBAKER_NUMERICALRESPONSE: 0

## 2017-04-28 ASSESSMENT — PAIN - FUNCTIONAL ASSESSMENT: PAIN_FUNCTIONAL_ASSESSMENT: 0-10

## 2017-04-28 ASSESSMENT — PAIN DESCRIPTION - PAIN TYPE: TYPE: ACUTE PAIN;SURGICAL PAIN

## 2017-04-28 ASSESSMENT — PAIN DESCRIPTION - LOCATION: LOCATION: LEG

## 2017-04-29 VITALS
DIASTOLIC BLOOD PRESSURE: 85 MMHG | SYSTOLIC BLOOD PRESSURE: 137 MMHG | OXYGEN SATURATION: 100 % | HEART RATE: 86 BPM | BODY MASS INDEX: 23.05 KG/M2 | HEIGHT: 63 IN | WEIGHT: 130.07 LBS | RESPIRATION RATE: 19 BRPM | TEMPERATURE: 97.6 F

## 2017-04-29 LAB
ABSOLUTE EOS #: 0.2 K/UL (ref 0–0.4)
ABSOLUTE LYMPH #: 1.3 K/UL (ref 1–4.8)
ABSOLUTE MONO #: 0.6 K/UL (ref 0.1–1.2)
ANION GAP SERPL CALCULATED.3IONS-SCNC: 7 MMOL/L (ref 9–17)
BASOPHILS # BLD: 1 %
BASOPHILS ABSOLUTE: 0 K/UL (ref 0–0.2)
BUN BLDV-MCNC: 14 MG/DL (ref 8–23)
BUN/CREAT BLD: ABNORMAL (ref 9–20)
CALCIUM SERPL-MCNC: 8.8 MG/DL (ref 8.6–10.4)
CHLORIDE BLD-SCNC: 103 MMOL/L (ref 98–107)
CO2: 30 MMOL/L (ref 20–31)
CREAT SERPL-MCNC: 0.61 MG/DL (ref 0.5–0.9)
DIFFERENTIAL TYPE: ABNORMAL
EOSINOPHILS RELATIVE PERCENT: 3 %
GFR AFRICAN AMERICAN: >60 ML/MIN
GFR NON-AFRICAN AMERICAN: >60 ML/MIN
GFR SERPL CREATININE-BSD FRML MDRD: ABNORMAL ML/MIN/{1.73_M2}
GFR SERPL CREATININE-BSD FRML MDRD: ABNORMAL ML/MIN/{1.73_M2}
GLUCOSE BLD-MCNC: 87 MG/DL (ref 70–99)
HCT VFR BLD CALC: 32.7 % (ref 36–46)
HEMOGLOBIN: 10.4 G/DL (ref 12–16)
LYMPHOCYTES # BLD: 26 %
MCH RBC QN AUTO: 28 PG (ref 26–34)
MCHC RBC AUTO-ENTMCNC: 31.6 G/DL (ref 31–37)
MCV RBC AUTO: 88.4 FL (ref 80–100)
MONOCYTES # BLD: 12 %
PDW BLD-RTO: 16.7 % (ref 12.5–15.4)
PLATELET # BLD: 184 K/UL (ref 140–450)
PLATELET ESTIMATE: ABNORMAL
PMV BLD AUTO: 8.1 FL (ref 6–12)
POTASSIUM SERPL-SCNC: 4.6 MMOL/L (ref 3.7–5.3)
RBC # BLD: 3.7 M/UL (ref 4–5.2)
RBC # BLD: ABNORMAL 10*6/UL
SEG NEUTROPHILS: 58 %
SEGMENTED NEUTROPHILS ABSOLUTE COUNT: 3.1 K/UL (ref 1.8–7.7)
SODIUM BLD-SCNC: 140 MMOL/L (ref 135–144)
WBC # BLD: 5.3 K/UL (ref 3.5–11)
WBC # BLD: ABNORMAL 10*3/UL

## 2017-04-29 PROCEDURE — 36415 COLL VENOUS BLD VENIPUNCTURE: CPT

## 2017-04-29 PROCEDURE — 2580000003 HC RX 258: Performed by: SURGERY

## 2017-04-29 PROCEDURE — 80048 BASIC METABOLIC PNL TOTAL CA: CPT

## 2017-04-29 PROCEDURE — 6370000000 HC RX 637 (ALT 250 FOR IP): Performed by: SURGERY

## 2017-04-29 PROCEDURE — 85025 COMPLETE CBC W/AUTO DIFF WBC: CPT

## 2017-04-29 RX ORDER — DOCUSATE SODIUM 100 MG/1
100 CAPSULE, LIQUID FILLED ORAL 2 TIMES DAILY PRN
Qty: 30 CAPSULE | Refills: 0 | Status: SHIPPED | OUTPATIENT
Start: 2017-04-29 | End: 2017-08-14 | Stop reason: ALTCHOICE

## 2017-04-29 RX ORDER — OXYCODONE HYDROCHLORIDE AND ACETAMINOPHEN 5; 325 MG/1; MG/1
TABLET ORAL
Qty: 20 TABLET | Refills: 0 | Status: SHIPPED | OUTPATIENT
Start: 2017-04-29 | End: 2017-08-14 | Stop reason: ALTCHOICE

## 2017-04-29 RX ADMIN — WARFARIN SODIUM 5 MG: 5 TABLET ORAL at 17:54

## 2017-04-29 RX ADMIN — DILTIAZEM HYDROCHLORIDE 180 MG: 180 CAPSULE, COATED, EXTENDED RELEASE ORAL at 09:23

## 2017-04-29 RX ADMIN — DOCUSATE SODIUM 100 MG: 100 CAPSULE ORAL at 09:23

## 2017-04-29 RX ADMIN — Medication 10 ML: at 09:23

## 2017-04-29 RX ADMIN — ASPIRIN 81 MG: 81 TABLET, CHEWABLE ORAL at 09:23

## 2017-04-29 RX ADMIN — METOPROLOL TARTRATE 50 MG: 50 TABLET, FILM COATED ORAL at 09:23

## 2017-04-29 RX ADMIN — LISINOPRIL 20 MG: 20 TABLET ORAL at 09:23

## 2017-04-29 ASSESSMENT — PAIN DESCRIPTION - PAIN TYPE: TYPE: ACUTE PAIN;SURGICAL PAIN

## 2017-04-29 ASSESSMENT — PAIN DESCRIPTION - PROGRESSION

## 2017-04-29 ASSESSMENT — PAIN DESCRIPTION - ONSET: ONSET: ON-GOING

## 2017-04-29 ASSESSMENT — PAIN DESCRIPTION - DESCRIPTORS: DESCRIPTORS: DISCOMFORT;SORE

## 2017-04-29 ASSESSMENT — PAIN DESCRIPTION - LOCATION: LOCATION: LEG

## 2017-04-29 ASSESSMENT — PAIN DESCRIPTION - ORIENTATION: ORIENTATION: RIGHT

## 2017-04-29 ASSESSMENT — PAIN DESCRIPTION - FREQUENCY: FREQUENCY: CONTINUOUS

## 2017-04-29 ASSESSMENT — PAIN SCALES - GENERAL: PAINLEVEL_OUTOF10: 6

## 2017-05-05 ENCOUNTER — HOSPITAL ENCOUNTER (OUTPATIENT)
Dept: WOUND CARE | Age: 82
Discharge: HOME | End: 2017-05-05
Payer: MEDICARE

## 2017-05-05 VITALS
HEART RATE: 77 BPM | RESPIRATION RATE: 16 BRPM | DIASTOLIC BLOOD PRESSURE: 77 MMHG | SYSTOLIC BLOOD PRESSURE: 118 MMHG | TEMPERATURE: 98.2 F

## 2017-05-05 PROCEDURE — 99214 OFFICE O/P EST MOD 30 MIN: CPT

## 2017-05-10 ENCOUNTER — PRE-PROCEDURE TELEPHONE (OUTPATIENT)
Dept: WOUND CARE | Age: 82
End: 2017-05-10

## 2017-05-12 ENCOUNTER — HOSPITAL ENCOUNTER (OUTPATIENT)
Dept: WOUND CARE | Age: 82
Discharge: HOME | End: 2017-05-12
Payer: MEDICARE

## 2017-05-12 VITALS
WEIGHT: 125 LBS | DIASTOLIC BLOOD PRESSURE: 76 MMHG | BODY MASS INDEX: 22.15 KG/M2 | HEIGHT: 63 IN | HEART RATE: 64 BPM | SYSTOLIC BLOOD PRESSURE: 127 MMHG | RESPIRATION RATE: 16 BRPM | TEMPERATURE: 87.1 F

## 2017-05-12 PROCEDURE — 99214 OFFICE O/P EST MOD 30 MIN: CPT

## 2017-05-12 PROCEDURE — 99204 OFFICE O/P NEW MOD 45 MIN: CPT

## 2017-05-12 ASSESSMENT — PAIN SCALES - GENERAL: PAINLEVEL_OUTOF10: 0

## 2017-05-12 ASSESSMENT — PAIN SCALES - WONG BAKER: WONGBAKER_NUMERICALRESPONSE: 0

## 2017-05-17 ENCOUNTER — APPOINTMENT (OUTPATIENT)
Dept: PHARMACY | Age: 82
End: 2017-05-17
Payer: MEDICARE

## 2017-05-18 ENCOUNTER — HOSPITAL ENCOUNTER (OUTPATIENT)
Dept: WOUND CARE | Age: 82
Discharge: HOME | End: 2017-05-18
Payer: MEDICARE

## 2017-05-18 VITALS
DIASTOLIC BLOOD PRESSURE: 78 MMHG | SYSTOLIC BLOOD PRESSURE: 136 MMHG | TEMPERATURE: 97.9 F | RESPIRATION RATE: 16 BRPM | HEART RATE: 77 BPM

## 2017-05-18 PROCEDURE — 99214 OFFICE O/P EST MOD 30 MIN: CPT

## 2017-05-19 ENCOUNTER — HOSPITAL ENCOUNTER (OUTPATIENT)
Dept: PHARMACY | Age: 82
Setting detail: THERAPIES SERIES
Discharge: HOME | End: 2017-05-19
Payer: MEDICARE

## 2017-05-19 DIAGNOSIS — I48.20 CHRONIC A-FIB (HCC): ICD-10-CM

## 2017-05-19 LAB
INR BLD: 1.5
PROTIME: 17.9 SECONDS

## 2017-05-19 PROCEDURE — 99211 OFF/OP EST MAY X REQ PHY/QHP: CPT

## 2017-05-19 PROCEDURE — 85610 PROTHROMBIN TIME: CPT

## 2017-05-26 ENCOUNTER — HOSPITAL ENCOUNTER (OUTPATIENT)
Dept: WOUND CARE | Age: 82
Discharge: HOME | End: 2017-05-26
Payer: MEDICARE

## 2017-05-26 VITALS
SYSTOLIC BLOOD PRESSURE: 115 MMHG | TEMPERATURE: 97.1 F | RESPIRATION RATE: 16 BRPM | HEART RATE: 75 BPM | DIASTOLIC BLOOD PRESSURE: 72 MMHG

## 2017-05-26 PROCEDURE — 97597 DBRDMT OPN WND 1ST 20 CM/<: CPT

## 2017-05-26 ASSESSMENT — PAIN SCALES - GENERAL: PAINLEVEL_OUTOF10: 0

## 2017-06-02 ENCOUNTER — HOSPITAL ENCOUNTER (OUTPATIENT)
Dept: WOUND CARE | Age: 82
Discharge: HOME | End: 2017-06-02
Payer: MEDICARE

## 2017-06-02 VITALS
DIASTOLIC BLOOD PRESSURE: 66 MMHG | RESPIRATION RATE: 14 BRPM | HEIGHT: 63 IN | SYSTOLIC BLOOD PRESSURE: 108 MMHG | WEIGHT: 125 LBS | HEART RATE: 76 BPM | TEMPERATURE: 96.8 F | BODY MASS INDEX: 22.15 KG/M2

## 2017-06-02 PROCEDURE — 99214 OFFICE O/P EST MOD 30 MIN: CPT

## 2017-06-02 PROCEDURE — 99204 OFFICE O/P NEW MOD 45 MIN: CPT

## 2017-06-02 ASSESSMENT — PAIN SCALES - GENERAL: PAINLEVEL_OUTOF10: 0

## 2017-06-02 ASSESSMENT — PAIN SCALES - WONG BAKER: WONGBAKER_NUMERICALRESPONSE: 0

## 2017-06-06 ENCOUNTER — PRE-PROCEDURE TELEPHONE (OUTPATIENT)
Dept: WOUND CARE | Age: 82
End: 2017-06-06

## 2017-06-09 ENCOUNTER — PRE-PROCEDURE TELEPHONE (OUTPATIENT)
Dept: WOUND CARE | Age: 82
End: 2017-06-09

## 2017-06-09 ENCOUNTER — HOSPITAL ENCOUNTER (OUTPATIENT)
Dept: WOUND CARE | Age: 82
Discharge: HOME | End: 2017-06-09
Payer: MEDICARE

## 2017-06-09 VITALS
TEMPERATURE: 97.4 F | SYSTOLIC BLOOD PRESSURE: 149 MMHG | HEART RATE: 95 BPM | DIASTOLIC BLOOD PRESSURE: 89 MMHG | BODY MASS INDEX: 22.15 KG/M2 | RESPIRATION RATE: 16 BRPM | HEIGHT: 63 IN | WEIGHT: 125 LBS

## 2017-06-09 PROCEDURE — 99214 OFFICE O/P EST MOD 30 MIN: CPT

## 2017-06-09 ASSESSMENT — PAIN SCALES - WONG BAKER: WONGBAKER_NUMERICALRESPONSE: 0

## 2017-06-09 ASSESSMENT — PAIN SCALES - GENERAL: PAINLEVEL_OUTOF10: 0

## 2017-06-16 ENCOUNTER — HOSPITAL ENCOUNTER (OUTPATIENT)
Dept: WOUND CARE | Age: 82
Discharge: HOME | End: 2017-06-16
Payer: MEDICARE

## 2017-06-16 PROCEDURE — 99214 OFFICE O/P EST MOD 30 MIN: CPT

## 2017-06-19 ENCOUNTER — PRE-PROCEDURE TELEPHONE (OUTPATIENT)
Dept: WOUND CARE | Age: 82
End: 2017-06-19

## 2017-06-23 ENCOUNTER — HOSPITAL ENCOUNTER (OUTPATIENT)
Dept: WOUND CARE | Age: 82
Discharge: HOME | End: 2017-06-23
Payer: MEDICARE

## 2017-06-23 VITALS
SYSTOLIC BLOOD PRESSURE: 147 MMHG | TEMPERATURE: 98.6 F | HEART RATE: 78 BPM | DIASTOLIC BLOOD PRESSURE: 80 MMHG | RESPIRATION RATE: 16 BRPM

## 2017-06-23 PROCEDURE — 99214 OFFICE O/P EST MOD 30 MIN: CPT

## 2017-07-03 ENCOUNTER — HOSPITAL ENCOUNTER (OUTPATIENT)
Dept: WOUND CARE | Age: 82
Discharge: HOME | End: 2017-07-03
Payer: MEDICARE

## 2017-07-03 ENCOUNTER — PRE-PROCEDURE TELEPHONE (OUTPATIENT)
Dept: WOUND CARE | Age: 82
End: 2017-07-03

## 2017-07-03 VITALS
DIASTOLIC BLOOD PRESSURE: 83 MMHG | TEMPERATURE: 97.7 F | BODY MASS INDEX: 22.15 KG/M2 | HEIGHT: 63 IN | RESPIRATION RATE: 16 BRPM | HEART RATE: 64 BPM | SYSTOLIC BLOOD PRESSURE: 136 MMHG | WEIGHT: 125 LBS

## 2017-07-03 PROCEDURE — 99213 OFFICE O/P EST LOW 20 MIN: CPT

## 2017-07-03 PROCEDURE — 6370000000 HC RX 637 (ALT 250 FOR IP): Performed by: SURGERY

## 2017-07-03 RX ORDER — OXYCODONE HYDROCHLORIDE AND ACETAMINOPHEN 5; 325 MG/1; MG/1
1 TABLET ORAL EVERY 6 HOURS PRN
Qty: 40 TABLET | Refills: 0 | Status: SHIPPED | OUTPATIENT
Start: 2017-07-03 | End: 2017-08-14 | Stop reason: ALTCHOICE

## 2017-07-03 RX ADMIN — LIDOCAINE HYDROCHLORIDE: 20 JELLY TOPICAL at 14:39

## 2017-07-03 ASSESSMENT — PAIN SCALES - GENERAL: PAINLEVEL_OUTOF10: 0

## 2017-07-03 ASSESSMENT — PAIN SCALES - WONG BAKER: WONGBAKER_NUMERICALRESPONSE: 0

## 2017-07-14 ENCOUNTER — HOSPITAL ENCOUNTER (OUTPATIENT)
Dept: WOUND CARE | Age: 82
Discharge: HOME | End: 2017-07-14
Payer: MEDICARE

## 2017-07-14 VITALS
SYSTOLIC BLOOD PRESSURE: 117 MMHG | DIASTOLIC BLOOD PRESSURE: 69 MMHG | RESPIRATION RATE: 16 BRPM | TEMPERATURE: 96.8 F | HEART RATE: 75 BPM

## 2017-07-14 PROCEDURE — 6370000000 HC RX 637 (ALT 250 FOR IP): Performed by: SURGERY

## 2017-07-14 PROCEDURE — 99213 OFFICE O/P EST LOW 20 MIN: CPT

## 2017-07-14 RX ADMIN — LIDOCAINE HYDROCHLORIDE 5 ML: 20 JELLY TOPICAL at 11:06

## 2017-07-14 ASSESSMENT — PAIN SCALES - GENERAL: PAINLEVEL_OUTOF10: 4

## 2017-07-21 ENCOUNTER — HOSPITAL ENCOUNTER (OUTPATIENT)
Dept: WOUND CARE | Age: 82
Discharge: HOME | End: 2017-07-21
Payer: MEDICARE

## 2017-07-21 VITALS
HEART RATE: 97 BPM | DIASTOLIC BLOOD PRESSURE: 74 MMHG | TEMPERATURE: 97 F | RESPIRATION RATE: 16 BRPM | SYSTOLIC BLOOD PRESSURE: 126 MMHG

## 2017-07-21 PROCEDURE — 6370000000 HC RX 637 (ALT 250 FOR IP): Performed by: SURGERY

## 2017-07-21 PROCEDURE — 11042 DBRDMT SUBQ TIS 1ST 20SQCM/<: CPT

## 2017-07-21 RX ADMIN — LIDOCAINE HYDROCHLORIDE 5 ML: 20 JELLY TOPICAL at 09:57

## 2017-07-21 ASSESSMENT — PAIN SCALES - GENERAL: PAINLEVEL_OUTOF10: 2

## 2017-08-02 ENCOUNTER — TELEPHONE (OUTPATIENT)
Dept: PHARMACY | Age: 82
End: 2017-08-02

## 2017-08-04 ENCOUNTER — HOSPITAL ENCOUNTER (OUTPATIENT)
Dept: WOUND CARE | Age: 82
Discharge: HOME | End: 2017-08-04
Payer: MEDICARE

## 2017-08-04 ENCOUNTER — PRE-PROCEDURE TELEPHONE (OUTPATIENT)
Dept: WOUND CARE | Age: 82
End: 2017-08-04

## 2017-08-04 VITALS
WEIGHT: 125 LBS | HEART RATE: 61 BPM | TEMPERATURE: 97.4 F | BODY MASS INDEX: 22.15 KG/M2 | DIASTOLIC BLOOD PRESSURE: 85 MMHG | RESPIRATION RATE: 18 BRPM | SYSTOLIC BLOOD PRESSURE: 144 MMHG | HEIGHT: 63 IN

## 2017-08-04 PROCEDURE — 99213 OFFICE O/P EST LOW 20 MIN: CPT

## 2017-08-04 PROCEDURE — 6370000000 HC RX 637 (ALT 250 FOR IP): Performed by: SURGERY

## 2017-08-04 RX ADMIN — LIDOCAINE HYDROCHLORIDE: 20 JELLY TOPICAL at 11:33

## 2017-08-04 ASSESSMENT — PAIN SCALES - GENERAL: PAINLEVEL_OUTOF10: 0

## 2017-08-07 ENCOUNTER — TELEPHONE (OUTPATIENT)
Dept: PHARMACY | Age: 82
End: 2017-08-07

## 2017-08-10 ENCOUNTER — PRE-PROCEDURE TELEPHONE (OUTPATIENT)
Dept: WOUND CARE | Age: 82
End: 2017-08-10

## 2017-08-14 ENCOUNTER — HOSPITAL ENCOUNTER (OUTPATIENT)
Dept: PHARMACY | Age: 82
Setting detail: THERAPIES SERIES
Discharge: HOME | End: 2017-08-14
Payer: MEDICARE

## 2017-08-14 DIAGNOSIS — I48.20 CHRONIC A-FIB (HCC): ICD-10-CM

## 2017-08-14 LAB
INR BLD: 3.7
PROTIME: 43.9 SECONDS

## 2017-08-14 PROCEDURE — 85610 PROTHROMBIN TIME: CPT

## 2017-08-14 PROCEDURE — 99211 OFF/OP EST MAY X REQ PHY/QHP: CPT

## 2017-08-14 RX ORDER — CALCIUM CARBONATE 500(1250)
500 TABLET ORAL DAILY
COMMUNITY
End: 2018-05-31

## 2017-08-14 RX ORDER — FUROSEMIDE 20 MG/1
20 TABLET ORAL DAILY PRN
Status: ON HOLD | COMMUNITY
End: 2018-12-11 | Stop reason: HOSPADM

## 2017-08-18 ENCOUNTER — HOSPITAL ENCOUNTER (OUTPATIENT)
Dept: WOUND CARE | Age: 82
Discharge: HOME | End: 2017-08-18
Payer: MEDICARE

## 2017-08-21 ENCOUNTER — HOSPITAL ENCOUNTER (OUTPATIENT)
Dept: PHARMACY | Age: 82
Setting detail: THERAPIES SERIES
Discharge: HOME | End: 2017-08-21
Payer: MEDICARE

## 2017-08-21 LAB
INR BLD: 2.5
PROTIME: 30.4 SECONDS

## 2017-08-21 PROCEDURE — 85610 PROTHROMBIN TIME: CPT

## 2017-08-21 PROCEDURE — 99211 OFF/OP EST MAY X REQ PHY/QHP: CPT

## 2017-08-25 ENCOUNTER — HOSPITAL ENCOUNTER (OUTPATIENT)
Dept: WOUND CARE | Age: 82
Discharge: HOME | End: 2017-08-25
Payer: MEDICARE

## 2017-08-25 VITALS
HEART RATE: 60 BPM | SYSTOLIC BLOOD PRESSURE: 151 MMHG | RESPIRATION RATE: 18 BRPM | DIASTOLIC BLOOD PRESSURE: 87 MMHG | TEMPERATURE: 96.6 F

## 2017-08-25 PROCEDURE — 6370000000 HC RX 637 (ALT 250 FOR IP): Performed by: SURGERY

## 2017-08-25 PROCEDURE — 99213 OFFICE O/P EST LOW 20 MIN: CPT

## 2017-08-25 RX ADMIN — LIDOCAINE HYDROCHLORIDE: 20 JELLY TOPICAL at 11:21

## 2017-08-25 ASSESSMENT — PAIN SCALES - GENERAL: PAINLEVEL_OUTOF10: 0

## 2017-09-05 ENCOUNTER — HOSPITAL ENCOUNTER (OUTPATIENT)
Dept: PHARMACY | Age: 82
Setting detail: THERAPIES SERIES
Discharge: HOME | End: 2017-09-05
Payer: MEDICARE

## 2017-09-05 DIAGNOSIS — I48.20 CHRONIC A-FIB (HCC): ICD-10-CM

## 2017-09-05 LAB
INR BLD: 2.8
PROTIME: 33.4 SECONDS

## 2017-09-05 PROCEDURE — 99211 OFF/OP EST MAY X REQ PHY/QHP: CPT

## 2017-09-05 PROCEDURE — 85610 PROTHROMBIN TIME: CPT

## 2017-09-08 ENCOUNTER — HOSPITAL ENCOUNTER (OUTPATIENT)
Dept: WOUND CARE | Age: 82
Discharge: HOME | End: 2017-09-08
Payer: MEDICARE

## 2017-09-08 VITALS
HEART RATE: 70 BPM | SYSTOLIC BLOOD PRESSURE: 140 MMHG | TEMPERATURE: 96.6 F | DIASTOLIC BLOOD PRESSURE: 78 MMHG | RESPIRATION RATE: 18 BRPM

## 2017-09-08 PROCEDURE — 6370000000 HC RX 637 (ALT 250 FOR IP): Performed by: SURGERY

## 2017-09-08 PROCEDURE — 99214 OFFICE O/P EST MOD 30 MIN: CPT

## 2017-09-08 RX ADMIN — LIDOCAINE HYDROCHLORIDE 5 ML: 20 JELLY TOPICAL at 11:32

## 2017-09-25 ENCOUNTER — PRE-PROCEDURE TELEPHONE (OUTPATIENT)
Dept: WOUND CARE | Age: 82
End: 2017-09-25

## 2017-09-25 ENCOUNTER — HOSPITAL ENCOUNTER (OUTPATIENT)
Dept: PHARMACY | Age: 82
Setting detail: THERAPIES SERIES
Discharge: HOME | End: 2017-09-25
Payer: MEDICARE

## 2017-09-25 LAB
INR BLD: 2.2
PROTIME: 26.5 SECONDS

## 2017-09-25 PROCEDURE — 85610 PROTHROMBIN TIME: CPT

## 2017-09-25 PROCEDURE — 99211 OFF/OP EST MAY X REQ PHY/QHP: CPT

## 2017-09-29 ENCOUNTER — HOSPITAL ENCOUNTER (OUTPATIENT)
Dept: WOUND CARE | Age: 82
Discharge: HOME | End: 2017-09-29
Payer: MEDICARE

## 2017-09-29 VITALS
WEIGHT: 125 LBS | SYSTOLIC BLOOD PRESSURE: 131 MMHG | TEMPERATURE: 96.8 F | DIASTOLIC BLOOD PRESSURE: 78 MMHG | RESPIRATION RATE: 16 BRPM | HEART RATE: 74 BPM | HEIGHT: 63 IN | BODY MASS INDEX: 22.15 KG/M2

## 2017-09-29 DIAGNOSIS — L97.912 NON-PRESSURE ULCER OF RIGHT LOWER EXTREMITY WITH FAT LAYER EXPOSED (HCC): Primary | ICD-10-CM

## 2017-09-29 PROCEDURE — 6370000000 HC RX 637 (ALT 250 FOR IP): Performed by: SURGERY

## 2017-09-29 PROCEDURE — 29580 STRAPPING UNNA BOOT: CPT

## 2017-09-29 RX ADMIN — LIDOCAINE HYDROCHLORIDE: 20 JELLY TOPICAL at 10:51

## 2017-09-29 ASSESSMENT — PAIN SCALES - GENERAL: PAINLEVEL_OUTOF10: 0

## 2017-10-06 ENCOUNTER — HOSPITAL ENCOUNTER (OUTPATIENT)
Dept: WOUND CARE | Age: 82
Discharge: HOME | End: 2017-10-06
Payer: MEDICARE

## 2017-10-06 VITALS
BODY MASS INDEX: 22.15 KG/M2 | DIASTOLIC BLOOD PRESSURE: 72 MMHG | HEIGHT: 63 IN | HEART RATE: 76 BPM | RESPIRATION RATE: 16 BRPM | TEMPERATURE: 95.2 F | WEIGHT: 125 LBS | SYSTOLIC BLOOD PRESSURE: 133 MMHG

## 2017-10-06 PROCEDURE — 11042 DBRDMT SUBQ TIS 1ST 20SQCM/<: CPT

## 2017-10-06 PROCEDURE — 6370000000 HC RX 637 (ALT 250 FOR IP): Performed by: SURGERY

## 2017-10-06 RX ADMIN — LIDOCAINE HYDROCHLORIDE: 20 JELLY TOPICAL at 10:29

## 2017-10-06 ASSESSMENT — PAIN SCALES - WONG BAKER: WONGBAKER_NUMERICALRESPONSE: 0

## 2017-10-06 ASSESSMENT — PAIN SCALES - GENERAL: PAINLEVEL_OUTOF10: 0

## 2017-10-06 NOTE — PLAN OF CARE
Problem: Skin Integrity:  Goal: Absence of new skin breakdown  Absence of new skin breakdown   Outcome: Ongoing  See lda's, wound continues with improvement. [place] unna boot applied and instructions reinforced.  Continue to assess skin integrity with each wound care visit

## 2017-10-06 NOTE — PROGRESS NOTES
9/29/2017 10:26 AM   Dressing/Treatment Other (Comment) 9/29/2017 10:26 AM   Wound Cleansed Wound cleanser 10/6/2017 10:19 AM   Wound Length (cm) 0 cm 10/6/2017 10:19 AM   Wound Width (cm) 0 cm 10/6/2017 10:19 AM   Wound Depth (cm)  0 10/6/2017 10:19 AM   Calculated Wound Size (cm^2) (l*w) 0 cm^2 10/6/2017 10:19 AM   Change in Wound Size % (l*w) 100 10/6/2017 10:19 AM   Wound Assessment Clean;Dry; Intact 9/29/2017 10:26 AM   Margins Attached edges 9/29/2017 10:26 AM   Donna-wound Assessment Pink 9/29/2017 10:26 AM   Non-staged Wound Description Partial thickness 9/29/2017 10:26 AM   [place]%Wound Bed 100 9/29/2017 10:26 AM   Drainage Amount None 10/6/2017 10:19 AM   Drainage Description Serosanguinous 9/29/2017 10:26 AM   Odor None 9/29/2017 10:26 AM   Debridement per physician None 9/8/2017  4:32 PM   Time out N/A 9/8/2017  4:32 PM   Number of days:27       Wound 09/08/17 Other (Comment) [facility] #3 9/8/17 R lower leg lateral to wound #1 proximal (Active)   Dressing Changed Changed/New 9/29/2017 10:26 AM   Dressing/Treatment Other (Comment) 9/29/2017 10:26 AM   Wound Cleansed Wound cleanser 10/6/2017 10:19 AM   Wound Length (cm) 0 cm 10/6/2017 10:19 AM   Wound Width (cm) 0 cm 10/6/2017 10:19 AM   Wound Depth (cm)  0 10/6/2017 10:19 AM   Calculated Wound Size (cm^2) (l*w) 0 cm^2 10/6/2017 10:19 AM   Change in Wound Size % (l*w) 100 10/6/2017 10:19 AM   Wound Assessment Clean;Dry; Intact 9/29/2017 10:26 AM   Margins Attached edges 9/29/2017 10:26 AM   Donna-wound Assessment Pale;Fragile 9/29/2017 10:26 AM   Non-staged Wound Description Partial thickness 9/8/2017  4:32 PM   [place]%Wound Bed 100 9/29/2017 10:26 AM   Drainage Amount None 10/6/2017 10:19 AM   Drainage Description Serosanguinous 9/29/2017 10:26 AM   Odor None 9/29/2017 10:26 AM   Debridement per physician None 9/8/2017  4:32 PM   Time out N/A 9/8/2017  4:32 PM   Number of days:27       Sharp debridement with curette performed. Granulation appears beefy red.   Lesions from the tape have healed. Assessment:   3. 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 Place in [facility]  2.  FU in 1 week     Electronically signed by Aria Mulligan MD on 10/6/2017 at 10:42 AM

## 2017-10-10 ENCOUNTER — HOSPITAL ENCOUNTER (OUTPATIENT)
Age: 82
Discharge: HOME | End: 2017-10-10
Payer: MEDICARE

## 2017-10-10 LAB
ALBUMIN SERPL-MCNC: 3.8 G/DL (ref 3.5–5.2)
ALBUMIN/GLOBULIN RATIO: ABNORMAL (ref 1–2.5)
ALP BLD-CCNC: 77 U/L (ref 35–104)
ALT SERPL-CCNC: 22 U/L (ref 5–33)
ANION GAP SERPL CALCULATED.3IONS-SCNC: 11 MMOL/L (ref 9–17)
AST SERPL-CCNC: 34 U/L
BILIRUB SERPL-MCNC: 0.63 MG/DL (ref 0.3–1.2)
BUN BLDV-MCNC: 16 MG/DL (ref 8–23)
BUN/CREAT BLD: 21 (ref 9–20)
CALCIUM SERPL-MCNC: 9.8 MG/DL (ref 8.6–10.4)
CHLORIDE BLD-SCNC: 104 MMOL/L (ref 98–107)
CHOLESTEROL, FASTING: 160 MG/DL
CHOLESTEROL/HDL RATIO: 2.3
CO2: 28 MMOL/L (ref 20–31)
CREAT SERPL-MCNC: 0.77 MG/DL (ref 0.5–0.9)
ESTIMATED AVERAGE GLUCOSE: 123 MG/DL
GFR AFRICAN AMERICAN: >60 ML/MIN
GFR NON-AFRICAN AMERICAN: >60 ML/MIN
GFR SERPL CREATININE-BSD FRML MDRD: ABNORMAL ML/MIN/{1.73_M2}
GFR SERPL CREATININE-BSD FRML MDRD: ABNORMAL ML/MIN/{1.73_M2}
GLUCOSE FASTING: 105 MG/DL (ref 70–99)
HBA1C MFR BLD: 5.9 % (ref 4–6)
HCT VFR BLD CALC: 41.9 % (ref 36–46)
HDLC SERPL-MCNC: 71 MG/DL
HEMOGLOBIN: 13.7 G/DL (ref 12–16)
LDL CHOLESTEROL: 69 MG/DL (ref 0–130)
MCH RBC QN AUTO: 29.2 PG (ref 26–34)
MCHC RBC AUTO-ENTMCNC: 32.7 G/DL (ref 31–37)
MCV RBC AUTO: 89.4 FL (ref 80–100)
PDW BLD-RTO: 16.1 % (ref 11.5–14.5)
PLATELET # BLD: 180 K/UL (ref 130–400)
PMV BLD AUTO: ABNORMAL FL (ref 6–12)
POTASSIUM SERPL-SCNC: 4.2 MMOL/L (ref 3.7–5.3)
RBC # BLD: 4.68 M/UL (ref 4–5.2)
SODIUM BLD-SCNC: 143 MMOL/L (ref 135–144)
TOTAL PROTEIN: 7.3 G/DL (ref 6.4–8.3)
TRIGLYCERIDE, FASTING: 98 MG/DL
VLDLC SERPL CALC-MCNC: NORMAL MG/DL (ref 1–30)
WBC # BLD: 5.6 K/UL (ref 3.5–11)

## 2017-10-10 PROCEDURE — 85027 COMPLETE CBC AUTOMATED: CPT

## 2017-10-10 PROCEDURE — 80061 LIPID PANEL: CPT

## 2017-10-10 PROCEDURE — 36415 COLL VENOUS BLD VENIPUNCTURE: CPT

## 2017-10-10 PROCEDURE — 80053 COMPREHEN METABOLIC PANEL: CPT

## 2017-10-10 PROCEDURE — 83036 HEMOGLOBIN GLYCOSYLATED A1C: CPT

## 2017-10-13 ENCOUNTER — HOSPITAL ENCOUNTER (OUTPATIENT)
Dept: WOUND CARE | Age: 82
Discharge: HOME | End: 2017-10-13
Payer: MEDICARE

## 2017-10-13 VITALS
WEIGHT: 125 LBS | SYSTOLIC BLOOD PRESSURE: 136 MMHG | BODY MASS INDEX: 22.15 KG/M2 | DIASTOLIC BLOOD PRESSURE: 72 MMHG | HEIGHT: 63 IN | RESPIRATION RATE: 18 BRPM | HEART RATE: 56 BPM

## 2017-10-13 PROCEDURE — 11042 DBRDMT SUBQ TIS 1ST 20SQCM/<: CPT

## 2017-10-13 PROCEDURE — 6370000000 HC RX 637 (ALT 250 FOR IP): Performed by: SURGERY

## 2017-10-13 RX ADMIN — LIDOCAINE HYDROCHLORIDE: 20 JELLY TOPICAL at 10:17

## 2017-10-13 ASSESSMENT — PAIN DESCRIPTION - ORIENTATION: ORIENTATION: RIGHT

## 2017-10-13 ASSESSMENT — PAIN DESCRIPTION - ONSET: ONSET: ON-GOING

## 2017-10-13 ASSESSMENT — PAIN DESCRIPTION - DESCRIPTORS: DESCRIPTORS: ACHING;BURNING

## 2017-10-13 ASSESSMENT — PAIN SCALES - GENERAL: PAINLEVEL_OUTOF10: 2

## 2017-10-13 ASSESSMENT — PAIN DESCRIPTION - PAIN TYPE: TYPE: CHRONIC PAIN

## 2017-10-13 ASSESSMENT — PAIN DESCRIPTION - PROGRESSION: CLINICAL_PROGRESSION: NOT CHANGED

## 2017-10-13 ASSESSMENT — PAIN DESCRIPTION - FREQUENCY: FREQUENCY: INTERMITTENT

## 2017-10-13 ASSESSMENT — PAIN DESCRIPTION - LOCATION: LOCATION: ANKLE

## 2017-10-13 NOTE — PROGRESS NOTES
Vascular Surgery   Progress Note    10/13/2017 10:29 AM  Subjective:   Admit Date: 10/13/2017  PCP: Saeed Clemente MD  Interval History:   No new issues. Patient is s/p angio with angioplasty of focal popliteal stenosis. S/p debridement and placement of integra with vac. Wound continues to contact from 18 to 11cm. Beefy red base. Patient tolerated the Unna boot well. Swelling is very well controlled. Diet:       Medications:   Scheduled Meds:  Continuous Infusions:      Labs:     No results for input(s): INR in the last 72 hours. Objective:   Vitals:   /72   Pulse 56   Resp 18   Ht 5' 3\" (1.6 m)   Wt 125 lb (56.7 kg)   BMI 22.14 kg/m²   General appearance: alert, cooperative and no distress  Mental Status: oriented to person, place and time with normal affect    Wound Care Documentation:  Wound 01/05/17 [facility] Wd #1 Right Posterior lower leg (Active)   Dressing Status Reinforced 5/18/2017  2:12 PM   Dressing Changed Changed/New 10/6/2017 10:44 AM   Dressing/Treatment Other (Comment) 10/6/2017 10:44 AM   Wound Cleansed Wound cleanser 10/13/2017 10:09 AM   Wound Length (cm) 3.8 cm 10/13/2017 10:09 AM   Wound Width (cm) 2.9 cm 10/13/2017 10:09 AM   Wound Depth (cm)  0.1 10/13/2017 10:09 AM   Calculated Wound Size (cm^2) (l*w) 11.02 cm^2 10/13/2017 10:09 AM   Change in Wound Size % (l*w) 72.45 10/13/2017 10:09 AM   Wound Assessment Dry; Intact;Granulation tissue;Fibrin 10/13/2017 10:09 AM   Margins Epibole (rolled edges) 4/20/2017 10:08 AM   Donna-wound Assessment Intact; Pink 10/13/2017 10:09 AM   [place]%Wound Bed 95 10/13/2017 10:09 AM   Red%Wound Bed 95 10/6/2017 10:19 AM   Yellow%Wound Bed 5 10/13/2017 10:09 AM   Black%Wound Bed 10 4/7/2017  9:59 AM   Drainage Amount Moderate 10/13/2017 10:09 AM   Drainage Description Serosanguinous 10/13/2017 10:09 AM   Odor None 10/13/2017 10:09 AM   Debridement per physician None 9/8/2017 11:18 AM   Time out N/A 9/8/2017 11:18 AM   Procedural Pain 0 8/4/2017 11:44 AM   Post procedural Pain 0 8/4/2017 11:44 AM   Number of days: 281         Assessment:   1. 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 Place in [facility]  2. FU in 1 week  3. Will order Circ-aid compression for after wound is healed.      Electronically signed by Patricia Poole MD on 10/13/2017 at 10:29 AM

## 2017-10-19 ENCOUNTER — HOSPITAL ENCOUNTER (OUTPATIENT)
Dept: WOUND CARE | Age: 82
Discharge: HOME | End: 2017-10-19
Payer: MEDICARE

## 2017-10-19 VITALS
TEMPERATURE: 97.5 F | HEART RATE: 75 BPM | DIASTOLIC BLOOD PRESSURE: 82 MMHG | SYSTOLIC BLOOD PRESSURE: 149 MMHG | RESPIRATION RATE: 18 BRPM

## 2017-10-19 DIAGNOSIS — L97.912 NON-PRESSURE ULCER OF RIGHT LOWER EXTREMITY WITH FAT LAYER EXPOSED (HCC): Primary | ICD-10-CM

## 2017-10-19 PROCEDURE — 11042 DBRDMT SUBQ TIS 1ST 20SQCM/<: CPT

## 2017-10-19 PROCEDURE — 6370000000 HC RX 637 (ALT 250 FOR IP): Performed by: SURGERY

## 2017-10-19 RX ADMIN — LIDOCAINE HYDROCHLORIDE: 20 JELLY TOPICAL at 15:10

## 2017-10-19 NOTE — PLAN OF CARE
Problem: Falls - Risk of:  Goal: Will remain free from falls  Will remain free from falls   Outcome: Met This Shift  CALL BELL PLACED AT PATIENT SIDE, NURSE REMAINED IN PATIENT CARE AREA DURING VISIT, NO FALLS DURING OUTPATIENT WOUND CARE CENTER VISIT, ASSISTED X 1 TO CHAIR SIDE/TRANSFERS AS STAND BY GUIDANCE.  PT USING WHEELCHAIR FOR TRANSPORTATION

## 2017-10-19 NOTE — PROGRESS NOTES
Vascular Surgery   Progress Note    10/19/2017 4:00 PM  Subjective:   Admit Date: 10/19/2017  PCP: Janina Stafford MD  Interval History:   No new issues. Patient is s/p angio with angioplasty of focal popliteal stenosis. S/p debridement and placement of integra with vac. Wound continues to contact from 11 to 6cm. Beefy red base. Patient tolerated the Unna boot well. Swelling is very well controlled. Discussed long term compression therapy and patient is agreeable. Diet:       Medications:   Scheduled Meds:  Continuous Infusions:      Labs:     No results for input(s): INR in the last 72 hours. Objective:   Vitals:   BP (!) 149/82   Pulse 75   Temp 97.5 °F (36.4 °C) (Oral)   Resp 18   General appearance: alert, cooperative and no distress  Mental Status: oriented to person, place and time with normal affect    Wound Care Documentation:  Wound 01/05/17 [address] [place] [address],[address] Wd #1 Right Posterior lower leg (Active)   Dressing Status Reinforced 5/18/2017  2:12 PM   Dressing Changed Changed/New 10/19/2017  3:41 PM   Dressing/Treatment Other (Comment) 10/19/2017  3:41 PM   Wound Cleansed Wound cleanser 10/19/2017  3:01 PM   Wound Length (cm) 3 cm 10/19/2017  3:01 PM   Wound Width (cm) 2.3 cm 10/19/2017  3:01 PM   Wound Depth (cm)  0.1 10/19/2017  3:01 PM   Calculated Wound Size (cm^2) (l*w) 6.9 cm^2 10/19/2017  3:01 PM   Change in Wound Size % (l*w) 82.75 10/19/2017  3:01 PM   Wound Assessment Dry; Intact;Granulation tissue;Fibrin;Epithelialization 10/19/2017  3:01 PM   Margins Epibole (rolled edges) 4/20/2017 10:08 AM   Donna-wound Assessment Intact;Pink;Dry 10/19/2017  3:01 PM   [place]%Wound Bed 100 10/19/2017  3:01 PM   Red%Wound Bed 95 10/6/2017 10:19 AM   Yellow%Wound Bed 5 10/13/2017 10:09 AM   Black%Wound Bed 10 4/7/2017  9:59 AM   Drainage Amount Moderate 10/19/2017  3:01 PM   Drainage Description [place];Yellow 10/19/2017  3:01 PM   Odor None 10/19/2017  3:01 PM   Debridement per physician None 10/13/2017 10:09 AM Time out N/A 10/13/2017 10:09 AM   Procedural Pain 0 8/4/2017 11:44 AM   Post procedural Pain 0 8/4/2017 11:44 AM   Number of days: 287     Sharp debridement with a curette. Base is very healthy. Assessment:   3. 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 Place in [facility]  2. FU in 1 week  3. Will order Circ-aid compression for after wound is healed.      Electronically signed by Aria Mulligan MD on 10/19/2017 at 4:00 PM

## 2017-10-19 NOTE — PLAN OF CARE
Problem: Skin Integrity:  Goal: Absence of new skin breakdown  Absence of new skin breakdown   Outcome: Ongoing  Wound continues to improve per measurements and appearance. Unna boot again this week for dressing and reiterated care of. Continue to assess skin integrity with each wound care visit.  See lda's

## 2017-10-23 ENCOUNTER — HOSPITAL ENCOUNTER (OUTPATIENT)
Dept: PHARMACY | Age: 82
Setting detail: THERAPIES SERIES
Discharge: HOME | End: 2017-10-23
Payer: MEDICARE

## 2017-10-23 DIAGNOSIS — I48.20 CHRONIC A-FIB (HCC): ICD-10-CM

## 2017-10-23 LAB
INR BLD: 2.5
PROTIME: 29.9 SECONDS

## 2017-10-23 PROCEDURE — 85610 PROTHROMBIN TIME: CPT

## 2017-10-23 PROCEDURE — 99211 OFF/OP EST MAY X REQ PHY/QHP: CPT

## 2017-10-27 ENCOUNTER — HOSPITAL ENCOUNTER (OUTPATIENT)
Dept: WOUND CARE | Age: 82
Discharge: HOME | End: 2017-10-27
Payer: MEDICARE

## 2017-10-27 VITALS
BODY MASS INDEX: 22.15 KG/M2 | TEMPERATURE: 98.1 F | WEIGHT: 125 LBS | HEART RATE: 75 BPM | SYSTOLIC BLOOD PRESSURE: 135 MMHG | HEIGHT: 63 IN | RESPIRATION RATE: 16 BRPM | DIASTOLIC BLOOD PRESSURE: 75 MMHG

## 2017-10-27 DIAGNOSIS — I83.019 VENOUS ULCER OF RIGHT LEG (HCC): ICD-10-CM

## 2017-10-27 DIAGNOSIS — L97.919 VENOUS ULCER OF RIGHT LEG (HCC): ICD-10-CM

## 2017-10-27 PROCEDURE — 29580 STRAPPING UNNA BOOT: CPT

## 2017-10-27 ASSESSMENT — PAIN DESCRIPTION - LOCATION: LOCATION: ANKLE

## 2017-10-27 ASSESSMENT — PAIN DESCRIPTION - PAIN TYPE: TYPE: CHRONIC PAIN

## 2017-10-27 ASSESSMENT — PAIN SCALES - GENERAL: PAINLEVEL_OUTOF10: 0

## 2017-10-27 ASSESSMENT — PAIN DESCRIPTION - ORIENTATION: ORIENTATION: RIGHT

## 2017-10-27 NOTE — PLAN OF CARE
Problem: Falls - Risk of:  Goal: Will remain free from falls  Will remain free from falls   Outcome: Ongoing  NO FALLS DURING OUTPATIENT WOUND CARE CENTER VISIT    Problem: Skin Integrity:  Goal: Absence of new skin breakdown  Absence of new skin breakdown   Outcome: Ongoing  Wound continues to get smaller and edema is down

## 2017-10-31 ENCOUNTER — HOSPITAL ENCOUNTER (OUTPATIENT)
Dept: WOUND CARE | Age: 82
Discharge: HOME | End: 2017-10-31
Payer: MEDICARE

## 2017-10-31 VITALS
HEART RATE: 76 BPM | TEMPERATURE: 96.8 F | SYSTOLIC BLOOD PRESSURE: 122 MMHG | WEIGHT: 125 LBS | BODY MASS INDEX: 22.15 KG/M2 | DIASTOLIC BLOOD PRESSURE: 84 MMHG | HEIGHT: 63 IN | RESPIRATION RATE: 16 BRPM

## 2017-10-31 PROCEDURE — 29580 STRAPPING UNNA BOOT: CPT

## 2017-10-31 ASSESSMENT — PAIN SCALES - WONG BAKER: WONGBAKER_NUMERICALRESPONSE: 0

## 2017-10-31 NOTE — PLAN OF CARE
Problem: Falls - Risk of:  Goal: Will remain free from falls  Will remain free from falls   Outcome: Met This Shift      Problem: Skin Integrity:  Goal: Absence of new skin breakdown  Absence of new skin breakdown   Outcome: Ongoing  RIGHT LOWER LEG FIBRACOL CALAMINE UNNABOOT APPLIED TO KEEP CLEAN AND DRY FOR 10 DAYS.  OLERATED WELL

## 2017-10-31 NOTE — PLAN OF CARE
Problem: Skin Integrity:  Goal: Absence of new skin breakdown  Absence of new skin breakdown   Outcome: Ongoing  RIGHT LOWE LEG UNNABOOT REMOVED, LEG WASHED AND FIBRACOL AND NEW CALAMINE UNNABOOT APPLIED. TO KEEP CLEAN DRY AND INTADCT FOR 10 DAYS AND RTC 11/10/17 TO SEE DR Edu Regan. PT VERBALIZES UNDERSTANDING OF ALL AND AGREES TO COOPERATE WITH TREATMENT PLAN.

## 2017-11-10 ENCOUNTER — HOSPITAL ENCOUNTER (OUTPATIENT)
Dept: WOUND CARE | Age: 82
Discharge: HOME | End: 2017-11-10
Payer: MEDICARE

## 2017-11-10 VITALS
HEIGHT: 63 IN | BODY MASS INDEX: 22.15 KG/M2 | HEART RATE: 71 BPM | RESPIRATION RATE: 18 BRPM | SYSTOLIC BLOOD PRESSURE: 132 MMHG | TEMPERATURE: 97.6 F | WEIGHT: 125 LBS | DIASTOLIC BLOOD PRESSURE: 75 MMHG

## 2017-11-10 DIAGNOSIS — L97.919 VENOUS ULCER OF RIGHT LEG (HCC): Primary | ICD-10-CM

## 2017-11-10 DIAGNOSIS — I83.019 VENOUS ULCER OF RIGHT LEG (HCC): Primary | ICD-10-CM

## 2017-11-10 PROCEDURE — 6370000000 HC RX 637 (ALT 250 FOR IP): Performed by: SURGERY

## 2017-11-10 PROCEDURE — 29580 STRAPPING UNNA BOOT: CPT

## 2017-11-10 RX ADMIN — LIDOCAINE HYDROCHLORIDE: 20 JELLY TOPICAL at 10:11

## 2017-11-10 ASSESSMENT — PAIN SCALES - GENERAL: PAINLEVEL_OUTOF10: 0

## 2017-11-13 NOTE — PROGRESS NOTES
Pt already has new jobst stockings at home , states unable to remove them. Medi Bulter was ordered last week. Confirmed with patient and [facility] [employer] that product shipped today as overnight delivery. Pt had an out of pocket of $25 which she agreed to pay.  Electronically signed by Mike French RN on 11/13/2017 at 10:19 AM

## 2017-11-17 ENCOUNTER — HOSPITAL ENCOUNTER (OUTPATIENT)
Dept: WOUND CARE | Age: 82
Discharge: HOME | End: 2017-11-17
Payer: MEDICARE

## 2017-11-17 VITALS
RESPIRATION RATE: 16 BRPM | TEMPERATURE: 98.4 F | BODY MASS INDEX: 22.15 KG/M2 | HEART RATE: 64 BPM | WEIGHT: 125 LBS | DIASTOLIC BLOOD PRESSURE: 56 MMHG | HEIGHT: 63 IN | SYSTOLIC BLOOD PRESSURE: 108 MMHG

## 2017-11-17 DIAGNOSIS — I83.019 VENOUS ULCER OF RIGHT LEG (HCC): Primary | ICD-10-CM

## 2017-11-17 DIAGNOSIS — L97.919 VENOUS ULCER OF RIGHT LEG (HCC): Primary | ICD-10-CM

## 2017-11-17 PROCEDURE — 6370000000 HC RX 637 (ALT 250 FOR IP): Performed by: SURGERY

## 2017-11-17 PROCEDURE — 29580 STRAPPING UNNA BOOT: CPT

## 2017-11-17 RX ADMIN — LIDOCAINE HYDROCHLORIDE: 20 JELLY TOPICAL at 10:31

## 2017-11-17 ASSESSMENT — PAIN SCALES - WONG BAKER: WONGBAKER_NUMERICALRESPONSE: 0

## 2017-11-17 ASSESSMENT — PAIN SCALES - GENERAL: PAINLEVEL_OUTOF10: 0

## 2017-11-17 NOTE — PROGRESS NOTES
Vascular Surgery   Progress Note    11/17/2017 11:14 AM  Subjective:   Admit Date: 11/17/2017  PCP: Felipa Lerner MD  Interval History:   No new issues. Patient is s/p angio with angioplasty of focal popliteal stenosis. S/p debridement and placement of integra with vac. Wound continues to contact from 0.75 sq cm to 0.3cm. Beefy red base. Patient tolerated the Unna boot well. Swelling is very well controlled. Diet:       Medications:     Labs:     No results for input(s): INR in the last 72 hours. Objective:   Vitals:   BP (!) 108/56   Pulse 64   Temp 98.4 °F (36.9 °C)   Resp 16   Ht 5' 3\" (1.6 m)   Wt 125 lb (56.7 kg)   BMI 22.14 kg/m²   General appearance: alert, cooperative and no distress  Mental Status: oriented to person, place and time with normal affect    Wound Care Documentation:  Wound 01/05/17 [address] [place] [address],[address] Wd #1 Right Posterior lower leg (Active)   Dressing Changed Changed/New 11/10/2017 11:47 AM   Dressing/Treatment Other (Comment) 11/10/2017 11:47 AM   Wound Cleansed Wound cleanser 11/10/2017 10:01 AM   Wound Length (cm) 1.5 cm 11/17/2017 10:19 AM   Wound Width (cm) 0.2 cm 11/17/2017 10:19 AM   Wound Depth (cm)  01 11/17/2017 10:19 AM   Calculated Wound Size (cm^2) (l*w) 0.3 cm^2 11/17/2017 10:19 AM   Change in Wound Size % (l*w) 99.25 11/17/2017 10:19 AM   Wound Assessment Clean;Dry; Intact 11/10/2017 10:01 AM   Drainage Amount Small 11/10/2017 10:01 AM   Drainage Description Serosanguinous; Yellow 11/10/2017 10:01 AM   Odor None 11/10/2017 10:01 AM   Donna-wound Assessment Clean;Dry; Intact; Pink 11/10/2017 10:01 AM   [place]%Wound Bed 100 11/10/2017 10:01 AM   [place]ellow%Wound Bed 50 10/31/2017  2:40 PM   Debridement per physician None 11/10/2017 10:40 AM   Time out N/A 11/10/2017 10:40 AM   Procedural Pain 0 10/19/2017  3:41 PM   Post procedural Pain 0 10/19/2017  3:41 PM   Number of days: 65         Assessment:   3. 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 Place in [facility]  2.  FU for unna boot change in 1 week       Electronically signed by Edyta Vale MD on 11/17/2017 at 11:14 AM

## 2017-11-17 NOTE — PLAN OF CARE
Problem: Skin Integrity:  Goal: Absence of new skin breakdown  Absence of new skin breakdown   Outcome: Ongoing  Wound near healed ,very small,continues to improve.  Continue to assess skin integrity with each wound care visit, unnaboot applied and instructions reinforced

## 2017-11-20 ENCOUNTER — HOSPITAL ENCOUNTER (OUTPATIENT)
Dept: PHARMACY | Age: 82
Setting detail: THERAPIES SERIES
Discharge: HOME | End: 2017-11-20
Payer: MEDICARE

## 2017-11-20 DIAGNOSIS — I48.20 CHRONIC A-FIB (HCC): ICD-10-CM

## 2017-11-20 LAB
INR BLD: 3.4
PROTIME: 41.3 SECONDS

## 2017-11-20 PROCEDURE — 85610 PROTHROMBIN TIME: CPT

## 2017-11-20 PROCEDURE — 99211 OFF/OP EST MAY X REQ PHY/QHP: CPT

## 2017-11-20 NOTE — PROGRESS NOTES
Patient states compliant all of the time with regimen. No thromboembolic side effects noted. Patient notes some bruising on her left elbow and under her left eye. No significant med or dietary changes. No significant recent illness or disease state changes. PT/INR done in office per protocol. INR is 3.4 which is supratherapeutic with no identified reason. Warfarin regimen will be held x1, 2.5mg x 1, then usual regimen. Will retest in 2 weeks to assess. Patient unable to return in 1 week. Patient understands dosing directions and information discussed. Dosing schedule and follow up appointment given to patient. Progress note routed to referring physicians office.

## 2017-11-22 ENCOUNTER — HOSPITAL ENCOUNTER (OUTPATIENT)
Dept: WOUND CARE | Age: 82
Discharge: HOME | End: 2017-11-22
Payer: MEDICARE

## 2017-11-22 VITALS
RESPIRATION RATE: 20 BRPM | TEMPERATURE: 94.6 F | DIASTOLIC BLOOD PRESSURE: 76 MMHG | HEART RATE: 98 BPM | SYSTOLIC BLOOD PRESSURE: 146 MMHG

## 2017-11-22 PROCEDURE — 29580 STRAPPING UNNA BOOT: CPT

## 2017-11-22 NOTE — PLAN OF CARE
Problem: Skin Integrity:  Goal: Absence of new skin breakdown  Absence of new skin breakdown   Outcome: Ongoing  This is nurse visit- continue plan of care. Wound appears healed- dry, intact, no drainage- per order ,promogran to wound bed and zinc unna boot applied. Instructions re-emphasized. Will assess skin integrity each wound care visit.

## 2017-12-01 ENCOUNTER — HOSPITAL ENCOUNTER (OUTPATIENT)
Dept: WOUND CARE | Age: 82
Discharge: HOME | End: 2017-12-01
Payer: MEDICARE

## 2017-12-01 VITALS
HEIGHT: 63 IN | SYSTOLIC BLOOD PRESSURE: 148 MMHG | DIASTOLIC BLOOD PRESSURE: 75 MMHG | HEART RATE: 67 BPM | BODY MASS INDEX: 22.15 KG/M2 | RESPIRATION RATE: 18 BRPM | TEMPERATURE: 97.3 F | WEIGHT: 125 LBS

## 2017-12-01 DIAGNOSIS — I83.019 VENOUS ULCER OF RIGHT LEG (HCC): Primary | ICD-10-CM

## 2017-12-01 DIAGNOSIS — L97.919 VENOUS ULCER OF RIGHT LEG (HCC): Primary | ICD-10-CM

## 2017-12-01 PROCEDURE — 99212 OFFICE O/P EST SF 10 MIN: CPT

## 2017-12-01 ASSESSMENT — PAIN SCALES - GENERAL: PAINLEVEL_OUTOF10: 0

## 2017-12-01 NOTE — PROGRESS NOTES
Vascular Surgery   Progress Note    12/1/2017 11:07 AM  Subjective:   Admit Date: 12/1/2017  PCP: Janina Stafford MD  Interval History:   No new issues. Patient is s/p angio with angioplasty of focal popliteal stenosis. S/p debridement and placement of integra with vac. Right leg wound now completely healed. Left leg with a small skin tear, no exposed subQ tissue. Diet:       Medications:     Labs:     No results for input(s): INR in the last 72 hours. Objective:   Vitals:   BP (!) 148/75   Pulse 67   Temp 97.3 °F (36.3 °C) (Oral)   Resp 18   Ht 5' 3\" (1.6 m)   Wt 125 lb (56.7 kg)   BMI 22.14 kg/m²   General appearance: alert, cooperative and no distress  Mental Status: oriented to person, place and time with normal affect    Wound Care Documentation:  Wound 01/05/17 [address] [place] [address],[address] Wd #1 Right Posterior lower leg (Active)   Dressing Status Reinforced 5/18/2017  2:12 PM   Dressing Changed Changed/New 11/22/2017 10:29 AM   Dressing/Treatment Other (Comment) 11/22/2017 10:29 AM   Wound Cleansed Wound cleanser 12/1/2017 10:34 AM   Wound Length (cm) 0 cm 12/1/2017 10:34 AM   Wound Width (cm) 0 cm 12/1/2017 10:34 AM   Wound Depth (cm)  0 12/1/2017 10:34 AM   Calculated Wound Size (cm^2) (l*w) 0 cm^2 12/1/2017 10:34 AM   Change in Wound Size % (l*w) 100 12/1/2017 10:34 AM   Wound Assessment Clean;Dry; Intact 11/10/2017 10:01 AM   Drainage Amount None 12/1/2017 10:34 AM   Drainage Description Serosanguinous; Yellow 11/10/2017 10:01 AM   Odor None 11/10/2017 10:01 AM   Margins Epibole (rolled edges) 4/20/2017 10:08 AM   Donna-wound Assessment Clean;Dry; Intact; Pink 11/10/2017 10:01 AM   [place]%Wound Bed 100 11/10/2017 10:01 AM   Red%Wound Bed 100 9/8/2017 11:18 AM   Yellow%Wound Bed 50 10/31/2017  2:40 PM   Black%Wound Bed 10 4/7/2017  9:59 AM   Debridement per physician None 11/17/2017 11:00 AM   Time out N/A 11/17/2017 11:00 AM   Procedural Pain 0 10/19/2017  3:41 PM   Post procedural Pain 0 10/19/2017  3:41 PM

## 2017-12-01 NOTE — PROGRESS NOTES
Wound to R posterior lower leg completely healed today. L lower leg edema noted. Pt states thinks she scratched her lower leg while asleep by accident. Very small superficial skin tear noted posterior lateral lower leg. Not opened as a wound per observation this writer and Dr. Padilla Sommer. This writer present as Dr. Padilla Sommer discussed in detail purpose of wearing compression stockings. Informed ok to wear 24 hours at a time every other day while she is waiting for medi 2 in 1 to arrive from rep. This writer applied compression stockings BLE. Stockings fit well, pt denies complaints. Other than unable to remove herself. Plans for family/friend to help remove stockings every other night. [facility] will be giving 2 in 1 as a free product as patient had already paid out of pocket for a grover, which was not aiding her properly as a donning and doffing aid. Pt instructions given per this writer about returning grover to [facility] and encouraged to ask for reimbursement of her out of pocket. Pt and pt friend Juan Miguel Rivas present and verbalize understanding to all topics discussed today. Pt encouraged to follow up if there are any new wounds. She is discharged healed from [facility] today.

## 2017-12-01 NOTE — PLAN OF CARE
Problem: Falls - Risk of:  Goal: Will remain free from falls  Will remain free from falls   Outcome: Met This Shift  Friend at side t/o . Pt with fair gait and uses cane for assistive device for safety.  No falls/injury this visit

## 2017-12-01 NOTE — PLAN OF CARE
Problem: Skin Integrity:  Goal: Absence of new skin breakdown  Absence of new skin breakdown   Outcome: Met This Shift  Wound healed- no dressing needed- compression stockings applied.  No return needed

## 2017-12-01 NOTE — PLAN OF CARE
Small 1x1 spot to right posterior low leg - area purplish and slightly torn-dry and intact with no drainage. Open to air.  Pt states she scratch it at night possibly with her other toe nail

## 2017-12-04 ENCOUNTER — HOSPITAL ENCOUNTER (OUTPATIENT)
Dept: PHARMACY | Age: 82
Setting detail: THERAPIES SERIES
Discharge: HOME | End: 2017-12-04
Payer: MEDICARE

## 2017-12-04 DIAGNOSIS — I48.20 CHRONIC A-FIB (HCC): ICD-10-CM

## 2017-12-04 LAB
INR BLD: 3.2
PROTIME: 39 SECONDS

## 2017-12-04 PROCEDURE — 99211 OFF/OP EST MAY X REQ PHY/QHP: CPT

## 2017-12-04 PROCEDURE — 85610 PROTHROMBIN TIME: CPT

## 2017-12-04 NOTE — PROGRESS NOTES
Patient states compliant all of the time with regimen. No bleeding or thromboembolic side effects noted. No significant med or dietary changes. No significant recent illness or disease state changes. PT/INR done in office per protocol. INR is 3.2 which is just above goal.     Warfarin regimen will be decreased to target 25mg - 5mg MWF and 2.5mg AOD. Will retest in 4 weeks. Patient understands dosing directions and information discussed. Dosing schedule and follow up appointment given to patient. Progress note routed to referring physicians office.

## 2018-01-02 ENCOUNTER — HOSPITAL ENCOUNTER (OUTPATIENT)
Dept: PHARMACY | Age: 83
Setting detail: THERAPIES SERIES
Discharge: HOME | End: 2018-01-02
Payer: MEDICARE

## 2018-01-02 DIAGNOSIS — I48.20 CHRONIC A-FIB (HCC): ICD-10-CM

## 2018-01-02 LAB
INR BLD: 2.3
PROTIME: 27.1 SECONDS

## 2018-01-02 PROCEDURE — 85610 PROTHROMBIN TIME: CPT

## 2018-01-02 PROCEDURE — 99211 OFF/OP EST MAY X REQ PHY/QHP: CPT

## 2018-01-05 ENCOUNTER — HOSPITAL ENCOUNTER (OUTPATIENT)
Dept: WOUND CARE | Age: 83
Discharge: HOME | End: 2018-01-05
Payer: MEDICARE

## 2018-01-05 VITALS — BODY MASS INDEX: 22.15 KG/M2 | TEMPERATURE: 95.8 F | HEIGHT: 63 IN | RESPIRATION RATE: 20 BRPM | WEIGHT: 125 LBS

## 2018-01-05 DIAGNOSIS — I83.029 VENOUS ULCER OF LEFT LEG (HCC): ICD-10-CM

## 2018-01-05 DIAGNOSIS — L97.929 VENOUS ULCER OF LEFT LEG (HCC): ICD-10-CM

## 2018-01-05 PROBLEM — L97.919 VENOUS ULCER OF RIGHT LEG (HCC): Status: RESOLVED | Noted: 2017-10-27 | Resolved: 2018-01-05

## 2018-01-05 PROBLEM — I83.019 VENOUS ULCER OF RIGHT LEG (HCC): Status: RESOLVED | Noted: 2017-10-27 | Resolved: 2018-01-05

## 2018-01-05 PROCEDURE — 6370000000 HC RX 637 (ALT 250 FOR IP): Performed by: SURGERY

## 2018-01-05 PROCEDURE — 99214 OFFICE O/P EST MOD 30 MIN: CPT

## 2018-01-05 PROCEDURE — 97597 DBRDMT OPN WND 1ST 20 CM/<: CPT

## 2018-01-05 RX ADMIN — LIDOCAINE HYDROCHLORIDE: 20 JELLY TOPICAL at 08:42

## 2018-01-05 ASSESSMENT — PAIN DESCRIPTION - LOCATION: LOCATION: LEG

## 2018-01-05 ASSESSMENT — PAIN DESCRIPTION - ORIENTATION: ORIENTATION: LEFT

## 2018-01-05 ASSESSMENT — PAIN SCALES - GENERAL: PAINLEVEL_OUTOF10: 8

## 2018-01-05 ASSESSMENT — PAIN DESCRIPTION - FREQUENCY: FREQUENCY: INTERMITTENT

## 2018-01-05 ASSESSMENT — PAIN DESCRIPTION - ONSET: ONSET: ON-GOING

## 2018-01-05 ASSESSMENT — PAIN DESCRIPTION - PROGRESSION: CLINICAL_PROGRESSION: GRADUALLY WORSENING

## 2018-01-05 ASSESSMENT — PAIN DESCRIPTION - PAIN TYPE: TYPE: CHRONIC PAIN

## 2018-01-05 ASSESSMENT — PAIN DESCRIPTION - DESCRIPTORS: DESCRIPTORS: ACHING;TENDER

## 2018-01-05 NOTE — PROGRESS NOTES
Vascular Surgery   Progress Note    1/5/2018 8:55 AM  Subjective:   Admit Date: 1/5/2018  PCP: Viv Rico MD  Interval History: This is a 80 yr old female with a history of large right leg ulceration. She healed this wound about a month ago and now presents for a posterior LEFT calf wound. She has had this for several weeks when she noticed drainage on her bed sheets. She was given compression stockings on her discharge but has not been complaint. She states the stockings are too difficult to put on. She is wearing her right leg compression stocking but the edema in her left leg is poorly controlled. Diet:      Medications:   Scheduled Meds:  Continuous Infusions:      Labs:   CBC: No results for input(s): WBC, HGB, PLT in the last 72 hours. BMP:  No results for input(s): NA, K, CL, CO2, BUN, CREATININE, GLUCOSE in the last 72 hours. Hepatic: No results for input(s): AST, ALT, ALB, BILITOT, ALKPHOS in the last 72 hours. Troponin: Invalid input(s): TROPONIN  BNP: No results for input(s): BNP in the last 72 hours. Lipids: No results for input(s): CHOL, HDL in the last 72 hours.     Invalid input(s): LDLCALCU  INR:   Recent Labs      01/02/18   0950   INR  2.3       Objective:   Vitals: Temp 95.8 °F (35.4 °C) (Oral)   Resp 20   Ht 5' 3\" (1.6 m)   Wt 125 lb (56.7 kg)   BMI 22.14 kg/m²   General appearance: alert, cooperative and no distress  Mental Status: oriented to person, place and time with normal affect  Neck: good carotid pulses, no JVD  Lungs: clear to auscultation bilaterally, normal effort  Heart: regular rate and rhythm, no murmur,  Abdomen: soft, non-tender, non-distended, bowel sounds present all four quadrants, no masses, hepatomegaly, splenomegaly or aortic enlargement  Extremities: no edema, erythema or tenderness in the calves  Skin: no gross lesions, rashes, or induration    Wound Care Documentation:  Wound 01/05/18 [facility] Wound # 2 Left Posterior Low Leg  (Venous/Blister- 12/2017) (Active)   Wound Cleansed Rinsed/Irrigated with saline 1/5/2018  8:37 AM   Wound Length (cm) 1.4 cm 1/5/2018  8:37 AM   Wound Width (cm) 1.5 cm 1/5/2018  8:37 AM   Wound Depth (cm)  0.1 1/5/2018  8:37 AM   Calculated Wound Size (cm^2) (l*w) 2.1 cm^2 1/5/2018  8:37 AM   Wound Assessment Dry;Clean; Intact;Fragile;Fibrin 1/5/2018  8:37 AM   Drainage Amount Moderate 1/5/2018  8:37 AM   Drainage Description Serosanguinous 1/5/2018  8:37 AM   Odor None 1/5/2018  8:37 AM   Donna-wound Assessment Clean;Dry;Fragile; Intact 1/5/2018  8:37 AM   [place]%Wound Bed 5 1/5/2018  8:37 AM   [place]%Wound Bed 95 1/5/2018  8:37 AM   Number of days: 0     Addendum:    Procedure Note:    Debridement: Excisional    Description of procedure: The wound was locally anesthetized with 2% lidocaine gel. A silver curette was used to perform an excisional debridement of the subcutaneous tissue. Fibrinous exudate was removed     Percent of wounds debrided: 100    Total Surface Area: 2.1    Bleeding Minimal    Hemostasis not needed    Response: treatment well tolerated. Assessment:   3. 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  Place in [facility] and follow up in 1 week      Electronically signed by Jhonny Jauregui MD on 1/5/2018 at 8:55 AM

## 2018-01-09 ENCOUNTER — PRE-PROCEDURE TELEPHONE (OUTPATIENT)
Dept: WOUND CARE | Age: 83
End: 2018-01-09

## 2018-01-09 NOTE — PROGRESS NOTES
Pt asked this writer to contact [facility] regarding returning the [address] as she did not do it yet. . This writer contacted patient today after calling [facility], they state will not accept returns after 30 days. Pt informed. Also informed patient that for new compression stockings (with a closed toe), will be ordered on Friday from [address] [place] [address] as we need to obtain new measurements for the length of her leg. Pt given [facility] medical rep Hina's card at last visit. Pt verbalized understanding. Voices no concerns.  Electronically signed by Mary Tang RN on 1/9/2018 at 10:16 AM

## 2018-01-12 ENCOUNTER — HOSPITAL ENCOUNTER (OUTPATIENT)
Dept: WOUND CARE | Age: 83
Discharge: HOME | End: 2018-01-12
Payer: MEDICARE

## 2018-01-12 VITALS
SYSTOLIC BLOOD PRESSURE: 133 MMHG | HEIGHT: 63 IN | DIASTOLIC BLOOD PRESSURE: 59 MMHG | BODY MASS INDEX: 22.15 KG/M2 | WEIGHT: 125 LBS | RESPIRATION RATE: 18 BRPM | HEART RATE: 67 BPM

## 2018-01-12 DIAGNOSIS — I83.029 VENOUS ULCER OF LEFT LEG (HCC): Primary | ICD-10-CM

## 2018-01-12 DIAGNOSIS — L97.929 VENOUS ULCER OF LEFT LEG (HCC): Primary | ICD-10-CM

## 2018-01-12 PROCEDURE — 97597 DBRDMT OPN WND 1ST 20 CM/<: CPT

## 2018-01-12 PROCEDURE — 29580 STRAPPING UNNA BOOT: CPT

## 2018-01-12 PROCEDURE — 6370000000 HC RX 637 (ALT 250 FOR IP): Performed by: SURGERY

## 2018-01-12 RX ADMIN — LIDOCAINE HYDROCHLORIDE: 20 JELLY TOPICAL at 08:28

## 2018-01-12 ASSESSMENT — PAIN DESCRIPTION - LOCATION: LOCATION: LEG

## 2018-01-12 ASSESSMENT — PAIN DESCRIPTION - FREQUENCY: FREQUENCY: INTERMITTENT

## 2018-01-12 ASSESSMENT — PAIN DESCRIPTION - DESCRIPTORS: DESCRIPTORS: SORE

## 2018-01-12 ASSESSMENT — PAIN DESCRIPTION - PAIN TYPE: TYPE: CHRONIC PAIN

## 2018-01-12 ASSESSMENT — PAIN SCALES - GENERAL: PAINLEVEL_OUTOF10: 5

## 2018-01-12 ASSESSMENT — PAIN DESCRIPTION - PROGRESSION: CLINICAL_PROGRESSION: GRADUALLY IMPROVING

## 2018-01-12 ASSESSMENT — PAIN DESCRIPTION - ONSET: ONSET: ON-GOING

## 2018-01-12 ASSESSMENT — PAIN DESCRIPTION - ORIENTATION: ORIENTATION: LEFT

## 2018-01-19 ENCOUNTER — HOSPITAL ENCOUNTER (OUTPATIENT)
Dept: WOUND CARE | Age: 83
Discharge: HOME | End: 2018-01-19
Payer: MEDICARE

## 2018-01-19 VITALS
HEIGHT: 63 IN | DIASTOLIC BLOOD PRESSURE: 74 MMHG | HEART RATE: 67 BPM | BODY MASS INDEX: 22.15 KG/M2 | RESPIRATION RATE: 18 BRPM | WEIGHT: 125 LBS | SYSTOLIC BLOOD PRESSURE: 139 MMHG

## 2018-01-19 PROCEDURE — 6370000000 HC RX 637 (ALT 250 FOR IP): Performed by: SURGERY

## 2018-01-19 PROCEDURE — 97597 DBRDMT OPN WND 1ST 20 CM/<: CPT

## 2018-01-19 RX ORDER — OXYCODONE HYDROCHLORIDE AND ACETAMINOPHEN 5; 325 MG/1; MG/1
1 TABLET ORAL EVERY 4 HOURS PRN
COMMUNITY
End: 2018-11-08 | Stop reason: ALTCHOICE

## 2018-01-19 RX ORDER — DOXYCYCLINE HYCLATE 100 MG
100 TABLET ORAL 2 TIMES DAILY
Qty: 20 TABLET | Refills: 0 | Status: SHIPPED | OUTPATIENT
Start: 2018-01-19 | End: 2018-01-29

## 2018-01-19 RX ORDER — LIDOCAINE HYDROCHLORIDE 40 MG/ML
SOLUTION TOPICAL ONCE
Status: COMPLETED | OUTPATIENT
Start: 2018-01-19 | End: 2018-01-19

## 2018-01-19 RX ADMIN — LIDOCAINE HYDROCHLORIDE: 40 SOLUTION TOPICAL at 09:56

## 2018-01-19 ASSESSMENT — PAIN DESCRIPTION - ORIENTATION: ORIENTATION: LEFT

## 2018-01-19 ASSESSMENT — PAIN DESCRIPTION - PROGRESSION: CLINICAL_PROGRESSION: GRADUALLY WORSENING

## 2018-01-19 ASSESSMENT — PAIN DESCRIPTION - DESCRIPTORS: DESCRIPTORS: SORE

## 2018-01-19 ASSESSMENT — PAIN DESCRIPTION - ONSET: ONSET: ON-GOING

## 2018-01-19 ASSESSMENT — PAIN SCALES - GENERAL: PAINLEVEL_OUTOF10: 8

## 2018-01-19 ASSESSMENT — PAIN DESCRIPTION - PAIN TYPE: TYPE: CHRONIC PAIN

## 2018-01-19 ASSESSMENT — PAIN DESCRIPTION - FREQUENCY: FREQUENCY: INTERMITTENT

## 2018-01-19 ASSESSMENT — PAIN DESCRIPTION - LOCATION: LOCATION: LEG

## 2018-01-19 NOTE — PLAN OF CARE
Problem: Falls - Risk of:   Intervention: Assess potential safety hazards  NO FALLS DURING OUTPATIENT WOUND CARE CENTER VISIT  Intervention: Manage a safe environment  NO FALLS DURING [address] VISIT

## 2018-01-19 NOTE — PROGRESS NOTES
Vascular Surgery   Progress Note    1/19/2018 10:14 AM  Subjective:   Admit Date: 1/19/2018  PCP: Juany Phillips MD  Interval History: This is a 80 yr old female with a history of large right leg ulceration. She healed this wound about a month ago and now presents for a posterior LEFT calf wound. She has had this for several weeks when she noticed drainage on her bed sheets. Wound with thick fibrinous layer. Swelling well controlled with unna boot. Patient has a lot of pain in the area and has been taking Percocet. This was unexpected as she did not have similar pain with the other wound on the right leg. Wound will be cultured and antibiotics started empirically. Diet:      Medications:   Scheduled Meds:  Continuous Infusions:      Labs:         Objective:   Vitals: /74   Pulse 67   Resp 18   Ht 5' 3\" (1.6 m)   Wt 125 lb (56.7 kg)   BMI 22.14 kg/m²   General appearance: alert, cooperative and no distress  Mental Status: oriented to person, place and time with normal affect  Neck: good carotid pulses, no JVD  Lungs: clear to auscultation bilaterally, normal effort  Heart: regular rate and rhythm, no murmur,    Wound Care Documentation:  Wound 01/05/18 [facility] Wound # 2 Left Posterior Low Leg  (Venous/Blister- 12/2017) (Active)   Dressing Changed Changed/New 1/12/2018  8:22 AM   Dressing/Treatment Other (Comment) 1/12/2018  8:22 AM   Wound Cleansed Wound cleanser 1/19/2018  9:47 AM   Wound Length (cm) 0.8 cm 1/19/2018  9:47 AM   Wound Width (cm) 0.1 cm 1/19/2018  9:47 AM   Wound Depth (cm)  0.2 1/19/2018  9:47 AM   Calculated Wound Size (cm^2) (l*w) 0.08 cm^2 1/19/2018  9:47 AM   Change in Wound Size % (l*w) 96.19 1/19/2018  9:47 AM   Wound Assessment Dry;Clean; Intact;Fragile;Fibrin;Red 1/19/2018  9:47 AM   Drainage Amount Moderate 1/19/2018  9:47 AM   Drainage Description Serosanguinous 1/19/2018  9:47 AM   Odor Mild 1/19/2018  9:47 AM   Donna-wound Assessment Clean;Dry;Fragile; Intact

## 2018-01-22 ENCOUNTER — PRE-PROCEDURE TELEPHONE (OUTPATIENT)
Dept: WOUND CARE | Age: 83
End: 2018-01-22

## 2018-01-22 NOTE — PROGRESS NOTES
Pt called to [facility] did not receive the order for antibiotic. This writer called [facility] [employer] on [address], called in verbal order for doxy per Dr. Church Double order in epic. Faxed copy of order to [facility] as well. Pt called back to inform.  Electronically signed by Ritchie Martinez RN on 1/22/2018 at 3:25 PM

## 2018-01-26 ENCOUNTER — HOSPITAL ENCOUNTER (OUTPATIENT)
Dept: VASCULAR LAB | Age: 83
Discharge: HOME | End: 2018-01-26
Payer: MEDICARE

## 2018-01-26 ENCOUNTER — HOSPITAL ENCOUNTER (OUTPATIENT)
Dept: WOUND CARE | Age: 83
Discharge: HOME | End: 2018-01-26
Payer: MEDICARE

## 2018-01-26 VITALS
DIASTOLIC BLOOD PRESSURE: 81 MMHG | RESPIRATION RATE: 20 BRPM | HEIGHT: 63 IN | BODY MASS INDEX: 22.15 KG/M2 | HEART RATE: 62 BPM | SYSTOLIC BLOOD PRESSURE: 133 MMHG | TEMPERATURE: 96.3 F | WEIGHT: 125 LBS

## 2018-01-26 DIAGNOSIS — I83.029 VENOUS ULCER OF LEFT LEG (HCC): ICD-10-CM

## 2018-01-26 DIAGNOSIS — L97.929 VENOUS ULCER OF LEFT LEG (HCC): ICD-10-CM

## 2018-01-26 DIAGNOSIS — I83.029 VENOUS ULCER OF LEFT LEG (HCC): Primary | ICD-10-CM

## 2018-01-26 DIAGNOSIS — L97.929 VENOUS ULCER OF LEFT LEG (HCC): Primary | ICD-10-CM

## 2018-01-26 PROCEDURE — 6370000000 HC RX 637 (ALT 250 FOR IP): Performed by: SURGERY

## 2018-01-26 PROCEDURE — 93922 UPR/L XTREMITY ART 2 LEVELS: CPT

## 2018-01-26 PROCEDURE — 11042 DBRDMT SUBQ TIS 1ST 20SQCM/<: CPT

## 2018-01-26 RX ADMIN — LIDOCAINE HYDROCHLORIDE: 20 JELLY TOPICAL at 09:26

## 2018-01-26 ASSESSMENT — PAIN DESCRIPTION - PROGRESSION: CLINICAL_PROGRESSION: NOT CHANGED

## 2018-01-26 ASSESSMENT — PAIN SCALES - GENERAL: PAINLEVEL_OUTOF10: 3

## 2018-01-26 ASSESSMENT — PAIN DESCRIPTION - ONSET: ONSET: ON-GOING

## 2018-01-26 ASSESSMENT — PAIN DESCRIPTION - PAIN TYPE: TYPE: CHRONIC PAIN

## 2018-01-26 ASSESSMENT — PAIN DESCRIPTION - FREQUENCY: FREQUENCY: INTERMITTENT

## 2018-01-26 ASSESSMENT — PAIN DESCRIPTION - LOCATION: LOCATION: LEG

## 2018-01-26 ASSESSMENT — PAIN DESCRIPTION - ORIENTATION: ORIENTATION: LEFT

## 2018-01-26 ASSESSMENT — PAIN DESCRIPTION - DESCRIPTORS: DESCRIPTORS: ACHING;SORE

## 2018-01-26 NOTE — PLAN OF CARE
Problem: Pain:  Goal: Pain level will decrease  Pain level will decrease   Outcome: Ongoing  Pt reports less pain this week. Coby Iglesias 0/10 today as opposed to last week 8/10.

## 2018-01-30 ENCOUNTER — HOSPITAL ENCOUNTER (OUTPATIENT)
Dept: PHARMACY | Age: 83
Setting detail: THERAPIES SERIES
Discharge: HOME | End: 2018-01-30
Payer: MEDICARE

## 2018-01-30 ENCOUNTER — HOSPITAL ENCOUNTER (OUTPATIENT)
Age: 83
Discharge: HOME | End: 2018-01-30
Payer: MEDICARE

## 2018-01-30 ENCOUNTER — PRE-PROCEDURE TELEPHONE (OUTPATIENT)
Dept: WOUND CARE | Age: 83
End: 2018-01-30

## 2018-01-30 DIAGNOSIS — I83.029 VENOUS ULCER OF LEFT LEG (HCC): ICD-10-CM

## 2018-01-30 DIAGNOSIS — L97.929 VENOUS ULCER OF LEFT LEG (HCC): ICD-10-CM

## 2018-01-30 DIAGNOSIS — I48.20 CHRONIC A-FIB (HCC): ICD-10-CM

## 2018-01-30 LAB
ALBUMIN SERPL-MCNC: 3.8 G/DL (ref 3.5–5.2)
ALBUMIN/GLOBULIN RATIO: ABNORMAL (ref 1–2.5)
ALP BLD-CCNC: 74 U/L (ref 35–104)
ALT SERPL-CCNC: 20 U/L (ref 5–33)
ANION GAP SERPL CALCULATED.3IONS-SCNC: 11 MMOL/L (ref 9–17)
AST SERPL-CCNC: 27 U/L
BILIRUB SERPL-MCNC: 0.68 MG/DL (ref 0.3–1.2)
BUN BLDV-MCNC: 20 MG/DL (ref 8–23)
BUN/CREAT BLD: 28 (ref 9–20)
CALCIUM SERPL-MCNC: 9.8 MG/DL (ref 8.6–10.4)
CHLORIDE BLD-SCNC: 101 MMOL/L (ref 98–107)
CO2: 30 MMOL/L (ref 20–31)
CREAT SERPL-MCNC: 0.72 MG/DL (ref 0.5–0.9)
GFR AFRICAN AMERICAN: >60 ML/MIN
GFR NON-AFRICAN AMERICAN: >60 ML/MIN
GFR SERPL CREATININE-BSD FRML MDRD: ABNORMAL ML/MIN/{1.73_M2}
GFR SERPL CREATININE-BSD FRML MDRD: ABNORMAL ML/MIN/{1.73_M2}
GLUCOSE BLD-MCNC: 106 MG/DL (ref 70–99)
INR BLD: 2.4
POTASSIUM SERPL-SCNC: 3.9 MMOL/L (ref 3.7–5.3)
PROTIME: 29.1 SECONDS
SODIUM BLD-SCNC: 142 MMOL/L (ref 135–144)
TOTAL PROTEIN: 7.2 G/DL (ref 6.4–8.3)

## 2018-01-30 PROCEDURE — 85610 PROTHROMBIN TIME: CPT

## 2018-01-30 PROCEDURE — 36415 COLL VENOUS BLD VENIPUNCTURE: CPT

## 2018-01-30 PROCEDURE — 80053 COMPREHEN METABOLIC PANEL: CPT

## 2018-01-30 PROCEDURE — 99211 OFF/OP EST MAY X REQ PHY/QHP: CPT

## 2018-02-02 ENCOUNTER — HOSPITAL ENCOUNTER (OUTPATIENT)
Dept: WOUND CARE | Age: 83
Discharge: HOME | End: 2018-02-02
Payer: MEDICARE

## 2018-02-02 VITALS — TEMPERATURE: 95.4 F | RESPIRATION RATE: 18 BRPM

## 2018-02-02 PROCEDURE — 6370000000 HC RX 637 (ALT 250 FOR IP): Performed by: SURGERY

## 2018-02-02 PROCEDURE — 97597 DBRDMT OPN WND 1ST 20 CM/<: CPT

## 2018-02-02 PROCEDURE — 11042 DBRDMT SUBQ TIS 1ST 20SQCM/<: CPT

## 2018-02-02 PROCEDURE — 11056 PARNG/CUTG B9 HYPRKR LES 2-4: CPT

## 2018-02-02 RX ADMIN — LIDOCAINE HYDROCHLORIDE: 20 JELLY TOPICAL at 10:07

## 2018-02-02 ASSESSMENT — PAIN DESCRIPTION - PAIN TYPE: TYPE: CHRONIC PAIN

## 2018-02-02 ASSESSMENT — PAIN DESCRIPTION - LOCATION: LOCATION: LEG

## 2018-02-02 ASSESSMENT — PAIN DESCRIPTION - ORIENTATION: ORIENTATION: LEFT

## 2018-02-02 ASSESSMENT — PAIN DESCRIPTION - DESCRIPTORS: DESCRIPTORS: ACHING;SORE

## 2018-02-02 ASSESSMENT — PAIN DESCRIPTION - ONSET: ONSET: ON-GOING

## 2018-02-02 ASSESSMENT — PAIN SCALES - GENERAL: PAINLEVEL_OUTOF10: 3

## 2018-02-02 ASSESSMENT — PAIN DESCRIPTION - FREQUENCY: FREQUENCY: INTERMITTENT

## 2018-02-02 NOTE — PROGRESS NOTES
Vascular Surgery   Progress Note    2/2/2018 11:24 AM  Subjective:   Admit Date: 2/2/2018  PCP: Juany Phillips MD  Interval History: This is a 80 yr old female with a history of large right leg ulceration. She healed this wound about a month ago and now presents for a posterior LEFT calf wound. She has had this for several weeks when she noticed drainage on her bed sheets. Wound with thick fibrinous layer. Swelling well controlled with unna boot. Atypical wound location is essentially unchanged. Medications:   Scheduled Meds:  Continuous Infusions:      Labs:         Objective:   Vitals: Temp 95.4 °F (35.2 °C) (Oral)   Resp 18   General appearance: alert, cooperative and no distress  Mental Status: oriented to person, place and time with normal affect  Neck: good carotid pulses, no JVD  Lungs: clear to auscultation bilaterally, normal effort  Heart: regular rate and rhythm, no murmur,  Wound Care Documentation:  Wound 01/05/18 [facility] Wound # 2 Left Posterior Low Leg  (Venous/Blister- 12/2017) (Active)   Dressing Changed Changed/New 1/19/2018  9:47 AM   Dressing/Treatment Other (Comment) 1/19/2018  9:47 AM   Wound Cleansed Wound cleanser 2/2/2018  9:59 AM   Wound Length (cm) 1 cm 2/2/2018  9:59 AM   Wound Width (cm) 1.3 cm 2/2/2018  9:59 AM   Wound Depth (cm)  0.2 2/2/2018  9:59 AM   Calculated Wound Size (cm^2) (l*w) 1.3 cm^2 2/2/2018  9:59 AM   Change in Wound Size % (l*w) 38.1 2/2/2018  9:59 AM   Wound Assessment Dry;Clean; Intact; Fibrin;Red;Painful 2/2/2018  9:59 AM   Drainage Amount Small 2/2/2018  9:59 AM   Drainage Description Serosanguinous; Yellow 2/2/2018  9:59 AM   Odor None 2/2/2018  9:59 AM   Donna-wound Assessment Dry; Intact 2/2/2018  9:59 AM   [place]%Wound Bed 5 1/5/2018  8:37 AM   Yellow%Wound Bed 100 2/2/2018  9:59 AM   Debridement per physician Subcutaneous 1/26/2018  9:30 AM   Time out Yes 1/26/2018  9:30 AM   Procedural Pain 0 1/26/2018  9:30 AM   Post procedural Pain 0 1/26/2018  9:30 AM   Number of days: 28     Procedure Note:    Debridement: Excisional    Description of procedure: The wound was locally anesthetized with 2% lidocaine gel. Both a silver and green curette were used to remove fibrinous tissue until there was minimal bleeding at the base of the wound. Wound appears to extend through the subcutaneous fat into the fascia. Percent of wounds debrided: 100    Total Surface Area: 1.2 sq cm    Bleeding Minimal    Hemostasis not needed    Response: treatment well tolerated. Assessment:   3. 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 Apply voltec and place in unna boot and follow up in 1 week  2. Meidcations reviewed, no interactions that would cause chronic wounds  3.  Will discuss other options to promote wound granulation      Electronically signed by Olu Michelle MD on 2/2/2018 at 11:24 AM

## 2018-02-09 ENCOUNTER — HOSPITAL ENCOUNTER (OUTPATIENT)
Dept: WOUND CARE | Age: 83
Discharge: HOME | End: 2018-02-09
Payer: MEDICARE

## 2018-02-09 VITALS
SYSTOLIC BLOOD PRESSURE: 141 MMHG | RESPIRATION RATE: 16 BRPM | HEART RATE: 73 BPM | DIASTOLIC BLOOD PRESSURE: 72 MMHG | TEMPERATURE: 98.4 F

## 2018-02-09 DIAGNOSIS — I83.029 VENOUS ULCER OF LEFT LEG (HCC): Primary | ICD-10-CM

## 2018-02-09 DIAGNOSIS — L97.929 VENOUS ULCER OF LEFT LEG (HCC): Primary | ICD-10-CM

## 2018-02-09 PROCEDURE — 99214 OFFICE O/P EST MOD 30 MIN: CPT

## 2018-02-09 NOTE — PROGRESS NOTES
Pain 0 1/26/2018  9:30 AM   Number of days: 35         Procedure Note:    No debridement      Assessment:   1. 80 yr old female with new left posterior calf venous ulcer    Patient Active Problem List:     Non-pressure ulcer of right lower extremity with fat layer exposed ([address]     Chronic a-fib (HCC)     Venous ulcer of left leg ([address]      Plan:   1. Apply voltec and place compression stocking for 1 week. 2. Meidcations reviewed, There is a rare complication of warfarin to cause calcifphylaxis after long term use. I called Dr. Esperanza Ferreira her cardiologist and recommended to change her warfarin (for afib) to another oral anticoagulant.           Electronically signed by Sen Madden MD on 2/9/2018 at 10:38 AM

## 2018-02-13 ENCOUNTER — PRE-PROCEDURE TELEPHONE (OUTPATIENT)
Dept: WOUND CARE | Age: 83
End: 2018-02-13

## 2018-02-16 ENCOUNTER — HOSPITAL ENCOUNTER (OUTPATIENT)
Dept: WOUND CARE | Age: 83
Discharge: HOME | End: 2018-02-16
Payer: MEDICARE

## 2018-02-16 VITALS
SYSTOLIC BLOOD PRESSURE: 139 MMHG | RESPIRATION RATE: 16 BRPM | DIASTOLIC BLOOD PRESSURE: 73 MMHG | HEART RATE: 64 BPM | HEIGHT: 64 IN | WEIGHT: 130 LBS | BODY MASS INDEX: 22.2 KG/M2 | TEMPERATURE: 97.3 F

## 2018-02-16 DIAGNOSIS — L97.929 VENOUS ULCER OF LEFT LEG (HCC): Primary | ICD-10-CM

## 2018-02-16 DIAGNOSIS — I83.029 VENOUS ULCER OF LEFT LEG (HCC): Primary | ICD-10-CM

## 2018-02-16 PROCEDURE — 97597 DBRDMT OPN WND 1ST 20 CM/<: CPT

## 2018-02-16 PROCEDURE — 6370000000 HC RX 637 (ALT 250 FOR IP): Performed by: SURGERY

## 2018-02-16 RX ADMIN — LIDOCAINE HYDROCHLORIDE: 20 JELLY TOPICAL at 09:32

## 2018-02-16 ASSESSMENT — PAIN DESCRIPTION - LOCATION: LOCATION: LEG

## 2018-02-16 ASSESSMENT — PAIN SCALES - GENERAL: PAINLEVEL_OUTOF10: 2

## 2018-02-16 ASSESSMENT — PAIN DESCRIPTION - FREQUENCY: FREQUENCY: INTERMITTENT

## 2018-02-16 ASSESSMENT — PAIN DESCRIPTION - PAIN TYPE: TYPE: CHRONIC PAIN

## 2018-02-16 ASSESSMENT — PAIN DESCRIPTION - DESCRIPTORS: DESCRIPTORS: ACHING

## 2018-02-16 NOTE — PROGRESS NOTES
Vascular Surgery   Progress Note    2/16/2018 9:48 AM  Subjective:   Admit Date: 2/16/2018  PCP: Adeel Baptiste MD  Interval History: This is a 80 yr old female with a history of large right leg ulceration. She healed this wound about a month ago and now presents for a posterior LEFT calf wound. She has had this for several weeks when she noticed drainage on her bed sheets. Wound with thick fibrinous layer, but base appears healthier. Patient has been wearing her OTC compression stockings. There is increased edema by 3cm bilaterally.       Medications:   Scheduled Meds:  Continuous Infusions:      Labs:         Objective:   Vitals: /73   Pulse 64   Temp 97.3 °F (36.3 °C) (Oral)   Resp 16   Ht 5' 3.5\" (1.613 m)   Wt 130 lb (59 kg)   BMI 22.67 kg/m²   General appearance: alert, cooperative and no distress  Mental Status: oriented to person, place and time with normal affect  Neck: good carotid pulses, no JVD  Lungs: clear to auscultation bilaterally, normal effort  Heart: regular rate and rhythm, no murmur,    Wound Care Documentation:  Wound 01/05/18 [facility] Wound # 2 Left Posterior Low Leg  (Venous/Blister- 12/2017) (Active)   Wound Type Wound 2/16/2018  9:06 AM   Dressing Status Old drainage 2/16/2018  9:06 AM   Dressing Changed Changed/New 2/16/2018  9:06 AM   Dressing/Treatment Other (Comment) 2/9/2018  9:49 AM   Wound Cleansed Rinsed/Irrigated with saline 2/16/2018  9:06 AM   Wound Length (cm) 1.4 cm 2/16/2018  9:42 AM   Wound Width (cm) 1.4 cm 2/16/2018  9:42 AM   Wound Depth (cm)  0.3 2/16/2018  9:42 AM   Calculated Wound Size (cm^2) (l*w) 1.96 cm^2 2/16/2018  9:42 AM   Change in Wound Size % (l*w) 6.67 2/16/2018  9:42 AM   Wound Assessment Fibrin 2/16/2018  9:06 AM   Drainage Amount Moderate 2/16/2018  9:06 AM   Drainage Description Yellow 2/16/2018  9:06 AM   Odor Mild 2/16/2018  9:06 AM   Donna-wound Assessment Red 2/16/2018  9:06 AM   Non-staged Wound Description Full thickness 2/16/2018

## 2018-02-19 ENCOUNTER — PRE-PROCEDURE TELEPHONE (OUTPATIENT)
Dept: WOUND CARE | Age: 83
End: 2018-02-19

## 2018-02-20 DIAGNOSIS — I48.20 CHRONIC A-FIB (HCC): ICD-10-CM

## 2018-02-23 ENCOUNTER — HOSPITAL ENCOUNTER (OUTPATIENT)
Dept: PHARMACY | Age: 83
Setting detail: THERAPIES SERIES
Discharge: HOME | End: 2018-02-23
Payer: MEDICARE

## 2018-02-23 ENCOUNTER — HOSPITAL ENCOUNTER (OUTPATIENT)
Dept: WOUND CARE | Age: 83
Discharge: HOME | End: 2018-02-23
Payer: MEDICARE

## 2018-02-23 VITALS
TEMPERATURE: 97.7 F | DIASTOLIC BLOOD PRESSURE: 79 MMHG | SYSTOLIC BLOOD PRESSURE: 151 MMHG | WEIGHT: 130 LBS | HEIGHT: 64 IN | HEART RATE: 52 BPM | BODY MASS INDEX: 22.2 KG/M2 | RESPIRATION RATE: 24 BRPM

## 2018-02-23 DIAGNOSIS — I48.20 CHRONIC A-FIB (HCC): ICD-10-CM

## 2018-02-23 DIAGNOSIS — L97.929 VENOUS ULCER OF LEFT LEG (HCC): Primary | ICD-10-CM

## 2018-02-23 DIAGNOSIS — I83.029 VENOUS ULCER OF LEFT LEG (HCC): Primary | ICD-10-CM

## 2018-02-23 PROBLEM — L97.912 NON-PRESSURE ULCER OF RIGHT LOWER EXTREMITY WITH FAT LAYER EXPOSED (HCC): Status: RESOLVED | Noted: 2017-01-05 | Resolved: 2018-02-23

## 2018-02-23 LAB
INR BLD: 1.7
PROTIME: 20 SECONDS

## 2018-02-23 PROCEDURE — 85610 PROTHROMBIN TIME: CPT

## 2018-02-23 PROCEDURE — 6370000000 HC RX 637 (ALT 250 FOR IP): Performed by: SURGERY

## 2018-02-23 PROCEDURE — 97597 DBRDMT OPN WND 1ST 20 CM/<: CPT

## 2018-02-23 PROCEDURE — 99211 OFF/OP EST MAY X REQ PHY/QHP: CPT

## 2018-02-23 RX ADMIN — LIDOCAINE HYDROCHLORIDE: 20 JELLY TOPICAL at 11:13

## 2018-02-23 ASSESSMENT — PAIN SCALES - GENERAL: PAINLEVEL_OUTOF10: 0

## 2018-02-23 ASSESSMENT — PAIN SCALES - WONG BAKER: WONGBAKER_NUMERICALRESPONSE: 0

## 2018-02-23 NOTE — PROGRESS NOTES
Patient presents today for INR test to ensure safe transition to Eliquis 2.5mg BID. There is a rare complication of warfarin to cause calcifphylaxis after long term use which is why the patient is transitioning anticoagulants. Patient states she held 2 warfarin doses as instructed. No bleeding or thromboembolic side effects noted. No significant med or dietary changes. No significant recent illness or disease state changes. PT/INR done in office per protocol. INR is 1.7 which is appropriate for transition to Elquis (INR must be <2.0). .     Warfarin regimen will be discontinued at this point. Patient understands to start taking Eliquis this evening - 1 tablet. She will begin twice daily tomorrow. Counseling points included:  1. Importance of compliance  2. Dose and directions, including missed doses and timing of medication  3. Side effects - increase risk of bleeding & bruising, nausea  4. Potential interactions   A. Avoid concomitant use with strong CY inhibitors (Ketoconazole, Ritonavir, Clarithromycin, Diltiazem, Erythromycin, Fluconazole, Verapamil) or strong CY inducers (Carbamazepine, Phenytoin, Rifampin, Lisa's Wort)   B. Grapefruit juice may increase levels/effects of Eliquis - use caution  5. Signs and symptoms of VTE and stroke reviewed  6. Contact prescriber when:   A. Changes made to other medications   B. Signs or symptoms of VTE or stroke   C. Uncontrolled bleeding or unusual bruising    Material provided to patient include: apixaban patient information & follow up appointment. Patient has has received first 30 days of medication free from . Patient's cost for subsequent fills Eliquis is anticipated to be determined based on benefit inquiry. Next appointment scheduled for May 2018  Labs to be obtained at next appointment include: SCr, Hb    Progress note routed to referring physicians office.  Patient acknowledges working in consult agreement with

## 2018-02-27 ENCOUNTER — APPOINTMENT (OUTPATIENT)
Dept: PHARMACY | Age: 83
End: 2018-02-27
Payer: MEDICARE

## 2018-02-28 NOTE — PLAN OF CARE
Problem: Falls - Risk of:  Goal: Will remain free from falls  Will remain free from falls  Outcome: Ongoing  No falls reported, no falls during visit. Pt ambulates without assistance. Steady gait.

## 2018-03-06 ENCOUNTER — TELEPHONE (OUTPATIENT)
Dept: PHARMACY | Age: 83
End: 2018-03-06

## 2018-03-06 DIAGNOSIS — I48.20 CHRONIC A-FIB (HCC): ICD-10-CM

## 2018-03-08 ENCOUNTER — HOSPITAL ENCOUNTER (OUTPATIENT)
Dept: WOUND CARE | Age: 83
Discharge: HOME | End: 2018-03-08
Payer: MEDICARE

## 2018-03-08 VITALS
RESPIRATION RATE: 20 BRPM | WEIGHT: 130 LBS | BODY MASS INDEX: 23.04 KG/M2 | DIASTOLIC BLOOD PRESSURE: 86 MMHG | HEIGHT: 63 IN | HEART RATE: 65 BPM | SYSTOLIC BLOOD PRESSURE: 156 MMHG | TEMPERATURE: 97.5 F

## 2018-03-08 PROCEDURE — 6370000000 HC RX 637 (ALT 250 FOR IP): Performed by: SURGERY

## 2018-03-08 PROCEDURE — 97597 DBRDMT OPN WND 1ST 20 CM/<: CPT

## 2018-03-08 RX ADMIN — LIDOCAINE HYDROCHLORIDE: 20 JELLY TOPICAL at 13:04

## 2018-03-16 ENCOUNTER — HOSPITAL ENCOUNTER (OUTPATIENT)
Dept: WOUND CARE | Age: 83
Discharge: HOME | End: 2018-03-16
Payer: MEDICARE

## 2018-03-16 VITALS
DIASTOLIC BLOOD PRESSURE: 76 MMHG | SYSTOLIC BLOOD PRESSURE: 140 MMHG | RESPIRATION RATE: 16 BRPM | TEMPERATURE: 97.3 F | HEART RATE: 61 BPM

## 2018-03-16 DIAGNOSIS — I83.029 VENOUS ULCER OF LEFT LEG (HCC): Primary | ICD-10-CM

## 2018-03-16 DIAGNOSIS — L97.929 VENOUS ULCER OF LEFT LEG (HCC): Primary | ICD-10-CM

## 2018-03-16 PROCEDURE — 29580 STRAPPING UNNA BOOT: CPT

## 2018-03-16 ASSESSMENT — PAIN DESCRIPTION - ORIENTATION: ORIENTATION: LEFT

## 2018-03-16 ASSESSMENT — PAIN DESCRIPTION - PAIN TYPE: TYPE: CHRONIC PAIN

## 2018-03-16 ASSESSMENT — PAIN DESCRIPTION - DESCRIPTORS: DESCRIPTORS: ACHING

## 2018-03-16 ASSESSMENT — PAIN SCALES - GENERAL: PAINLEVEL_OUTOF10: 2

## 2018-03-16 ASSESSMENT — PAIN SCALES - WONG BAKER: WONGBAKER_NUMERICALRESPONSE: 2

## 2018-03-16 ASSESSMENT — PAIN DESCRIPTION - FREQUENCY: FREQUENCY: INTERMITTENT

## 2018-03-16 ASSESSMENT — PAIN DESCRIPTION - LOCATION: LOCATION: LEG

## 2018-03-23 ENCOUNTER — HOSPITAL ENCOUNTER (OUTPATIENT)
Dept: WOUND CARE | Age: 83
Discharge: HOME | End: 2018-03-23
Payer: MEDICARE

## 2018-03-23 VITALS
HEART RATE: 60 BPM | TEMPERATURE: 97.7 F | RESPIRATION RATE: 18 BRPM | DIASTOLIC BLOOD PRESSURE: 75 MMHG | WEIGHT: 130 LBS | HEIGHT: 63 IN | SYSTOLIC BLOOD PRESSURE: 133 MMHG | BODY MASS INDEX: 23.04 KG/M2

## 2018-03-23 DIAGNOSIS — L97.929 VENOUS ULCER OF LEFT LEG (HCC): Primary | ICD-10-CM

## 2018-03-23 DIAGNOSIS — I83.029 VENOUS ULCER OF LEFT LEG (HCC): Primary | ICD-10-CM

## 2018-03-23 PROCEDURE — 6370000000 HC RX 637 (ALT 250 FOR IP): Performed by: SURGERY

## 2018-03-23 PROCEDURE — 11042 DBRDMT SUBQ TIS 1ST 20SQCM/<: CPT

## 2018-03-23 NOTE — PROGRESS NOTES
Vascular Surgery   Progress Note    3/23/2018 11:13 AM  Subjective:   Admit Date: 3/23/2018  PCP: Jeanna Bates MD  Interval History: This is a 80 yr old female with a history of large right leg ulceration. She healed this wound about a month ago and now presents for a posterior LEFT calf wound. She has had this for several weeks when she noticed drainage on her bed sheets. Swelling well controlled. Granulation tissue has grown in danielle and made the ulcer level with the skin. Medications:   Scheduled Meds:  Continuous Infusions:      Labs:         Objective:   Vitals: /75   Pulse 60   Temp 97.7 °F (36.5 °C) (Oral)   Resp 18   Ht 5' 3\" (1.6 m)   Wt 130 lb (59 kg)   BMI 23.03 kg/m²   General appearance: alert, cooperative and no distress  Mental Status: oriented to person, place and time with normal affect    Wound Care Documentation:  Negative Pressure Wound Therapy Leg Right; Lower; Posterior (Active)   Number of days: 329       Wound 01/05/18 [address] [facility] [facility] Wound # 2 Left Posterior Low Leg  (Venous/Blister- 12/2017) (Active)   Wound Type Wound 3/23/2018 10:37 AM   Wound Venous 3/23/2018 10:37 AM   Dressing Status Old drainage 3/23/2018 10:37 AM   Dressing Changed Changed/New 3/23/2018 10:37 AM   Dressing/Treatment Other (Comment) 3/8/2018  1:54 PM   Wound Cleansed Rinsed/Irrigated with saline 3/8/2018  1:03 PM   Wound Length (cm) 1.3 cm 3/8/2018  1:03 PM   Wound Width (cm) 1.1 cm 3/8/2018  1:03 PM   Wound Depth (cm)  .2 3/8/2018  1:03 PM   Calculated Wound Size (cm^2) (l*w) 1.43 cm^2 3/8/2018  1:03 PM   Change in Wound Size % (l*w) 31.9 3/8/2018  1:03 PM   Wound Assessment Fibrin 3/8/2018  1:03 PM   Drainage Amount Moderate 3/23/2018 10:37 AM   Drainage Description Yellow 3/23/2018 10:37 AM   Odor None 3/23/2018 10:37 AM   Margins Attached edges 2/23/2018 10:59 AM   Donna-wound Assessment Fragile;Pink 3/23/2018 10:37 AM   Red%Wound Bed 10 3/8/2018  1:03 PM   Yellow%Wound Bed 90 3/8/2018  1:03 PM Debridement per physician Subcutaneous 3/8/2018  1:17 PM   Time out Yes 3/8/2018  1:17 PM   Procedural Pain 0 3/8/2018  1:17 PM   Post procedural Pain 0 3/8/2018  1:17 PM   Number of days: 77       Procedure Note:    Debridement: Excisional    Description of procedure: The wound was locally anesthetized with 2% lidocaine gel. Curette was used to debride the granulation tissue as well as the skin edges. Wound appears healthy with no signs of infection. Minimal drainage. Percent of wounds debrided: 100    Total Surface Area: 1.1    Bleeding Minimal    Hemostasis not needed    Response: treatment well tolerated. Assessment:   3. 80 yr old female with new left posterior calf venous ulcer / warfarin induced calcific ulceration    Patient Active Problem List:     Non-pressure ulcer of right lower extremity with fat layer exposed ([address]     Chronic a-fib ([address]     Venous ulcer of left leg ([address]      Plan:   1. Apply unna boot. 2.   Follow up in 1 week.       Electronically signed by Shirley Louis MD on 3/23/2018 at 11:13 AM

## 2018-03-30 ENCOUNTER — HOSPITAL ENCOUNTER (OUTPATIENT)
Dept: WOUND CARE | Age: 83
Discharge: HOME | End: 2018-03-30
Payer: MEDICARE

## 2018-03-30 VITALS
HEART RATE: 68 BPM | SYSTOLIC BLOOD PRESSURE: 152 MMHG | TEMPERATURE: 96.7 F | RESPIRATION RATE: 20 BRPM | DIASTOLIC BLOOD PRESSURE: 78 MMHG

## 2018-03-30 DIAGNOSIS — L97.922 NON-PRESSURE CHRONIC ULCER OF LEFT LOWER LEG WITH FAT LAYER EXPOSED (HCC): ICD-10-CM

## 2018-03-30 DIAGNOSIS — I83.029 VENOUS ULCER OF LEFT LEG (HCC): Primary | ICD-10-CM

## 2018-03-30 DIAGNOSIS — L97.929 VENOUS ULCER OF LEFT LEG (HCC): Primary | ICD-10-CM

## 2018-03-30 PROCEDURE — 6370000000 HC RX 637 (ALT 250 FOR IP): Performed by: SURGERY

## 2018-03-30 PROCEDURE — 97597 DBRDMT OPN WND 1ST 20 CM/<: CPT

## 2018-03-30 RX ADMIN — LIDOCAINE HYDROCHLORIDE: 20 JELLY TOPICAL at 08:43

## 2018-03-30 NOTE — PROGRESS NOTES
Vascular Surgery   Progress Note    3/30/2018 8:50 AM  Subjective:   Admit Date: 3/30/2018  PCP: Nathanael Rapp MD  Interval History: This is a 80 yr old female with a calciphylactic lesion on the left posterior calf related to prolonged warfarin use. Swelling well controlled. Granulation tissue has grown in danielle and made the ulcer level with the skin. Overall dimensions are essentially unchanged at 1.2sq cm. Medications:   Scheduled Meds:  Continuous Infusions:      Labs:         Objective:   Vitals: BP (!) 152/78   Pulse 68   Temp 96.7 °F (35.9 °C) (Oral)   Resp 20   General appearance: alert, cooperative and no distress  Mental Status: oriented to person, place and time with normal affect    Wound Care Documentation:  Negative Pressure Wound Therapy Leg Right; Lower; Posterior (Active)   Number of days: 336       Wound 01/05/18 [facility] Wound # 2 Left Posterior Low Leg  (Venous/Blister- 12/2017) (Active)   Wound Type Wound 3/23/2018 10:37 AM   Wound Venous 3/23/2018 10:37 AM   Dressing Status Old drainage 3/30/2018  8:40 AM   Dressing Changed Changed/New 3/23/2018 10:37 AM   Dressing/Treatment Other (Comment) 3/8/2018  1:54 PM   Wound Cleansed Rinsed/Irrigated with saline 3/30/2018  8:40 AM   Wound Length (cm) 1.2 cm 3/30/2018  8:47 AM   Wound Width (cm) 1 cm 3/30/2018  8:47 AM   Wound Depth (cm)  0.2 3/30/2018  8:47 AM   Calculated Wound Size (cm^2) (l*w) 1.2 cm^2 3/30/2018  8:47 AM   Change in Wound Size % (l*w) 42.86 3/30/2018  8:47 AM   Wound Assessment Fibrin 3/8/2018  1:03 PM   Drainage Amount Moderate 3/30/2018  8:40 AM   Drainage Description Yellow 3/30/2018  8:40 AM   Odor None 3/30/2018  8:40 AM   Margins Attached edges 3/30/2018  8:40 AM   Donna-wound Assessment Maceration 3/30/2018  8:40 AM   Non-staged Wound Description Full thickness 3/30/2018  8:40 AM   [place]%Wound Bed 90 3/30/2018  8:40 AM   Red%Wound Bed 10 3/8/2018  1:03 PM   Yellow%Wound Bed 10 3/30/2018  8:40 AM   Black%Wound Bed 0 2/16/2018  9:06 AM   Purple%Wound Bed 0 2/16/2018  9:06 AM   Debridement per physician Subcutaneous 3/30/2018  8:47 AM   Time out Yes 3/30/2018  8:47 AM   Procedural Pain 0 3/30/2018  8:47 AM   Post procedural Pain 0 3/30/2018  8:47 AM   Number of days: 83         Procedure Note:    Debridement: Selective    Description of procedure: The wound was locally anesthetized with 2% lidocaine gel. Curette was used to debride the granulation tissue as well as the skin edges. Wound appears healthy with no signs of infection. Minimal drainage. Percent of wounds debrided: 100    Total Surface Area: 1.2    Bleeding Minimal    Hemostasis not needed    Response: treatment well tolerated. Assessment:   3. 80 yr old female with new left posterior calf venous ulcer / warfarin induced calcific ulceration    Patient Active Problem List:     Non-pressure ulcer of right lower extremity with fat layer exposed ([address]     Chronic a-fib ([address]     Venous ulcer of left leg ([address]      Plan:   1. Apply unna boot with medihoney  2. Follow up in 1 week.       Electronically signed by Maria E Marquez MD on 3/30/2018 at 8:50 AM

## 2018-04-03 NOTE — PLAN OF CARE
Problem: Wound:  Goal: Will show signs of wound healing; wound closure and no evidence of infection  Will show signs of wound healing; wound closure and no evidence of infection  Outcome: Ongoing  See LDA's . Improving, no s/s  Infection. Problem: Compression therapy:  Goal: Will be free from complications associated with compression therapy  Will be free from complications associated with compression therapy  Outcome: Ongoing  Pt tolerates unna boot well. Denies complaints. Prefers not needing daily dressing changes. Instructions given for home. Pt verbalized understanding. Pt is ambulatory without assistance from staff, walks with a cane (sometimes)    Problem: Falls - Risk of:  Goal: Will remain free from falls  Will remain free from falls  Outcome: Ongoing  No falls reported at home, no falls during this visit. Pt tolerates ambulation. No assistance needed from staff.

## 2018-04-06 ENCOUNTER — HOSPITAL ENCOUNTER (OUTPATIENT)
Dept: WOUND CARE | Age: 83
Discharge: HOME | End: 2018-04-06
Payer: MEDICARE

## 2018-04-06 VITALS
BODY MASS INDEX: 23.04 KG/M2 | WEIGHT: 130 LBS | SYSTOLIC BLOOD PRESSURE: 107 MMHG | DIASTOLIC BLOOD PRESSURE: 60 MMHG | TEMPERATURE: 97.3 F | RESPIRATION RATE: 16 BRPM | HEIGHT: 63 IN | HEART RATE: 55 BPM

## 2018-04-06 DIAGNOSIS — L97.922 NON-PRESSURE CHRONIC ULCER OF LEFT LOWER LEG WITH FAT LAYER EXPOSED (HCC): Primary | ICD-10-CM

## 2018-04-06 PROCEDURE — 6370000000 HC RX 637 (ALT 250 FOR IP): Performed by: SURGERY

## 2018-04-06 PROCEDURE — 11042 DBRDMT SUBQ TIS 1ST 20SQCM/<: CPT

## 2018-04-06 RX ADMIN — LIDOCAINE HYDROCHLORIDE: 20 JELLY TOPICAL at 10:50

## 2018-04-06 ASSESSMENT — PAIN SCALES - GENERAL: PAINLEVEL_OUTOF10: 0

## 2018-04-13 ENCOUNTER — HOSPITAL ENCOUNTER (OUTPATIENT)
Dept: WOUND CARE | Age: 83
Discharge: HOME | End: 2018-04-13
Payer: MEDICARE

## 2018-04-13 VITALS
SYSTOLIC BLOOD PRESSURE: 114 MMHG | TEMPERATURE: 97.3 F | HEART RATE: 60 BPM | RESPIRATION RATE: 16 BRPM | DIASTOLIC BLOOD PRESSURE: 73 MMHG | WEIGHT: 130 LBS | BODY MASS INDEX: 23.04 KG/M2 | HEIGHT: 63 IN

## 2018-04-13 PROCEDURE — 11042 DBRDMT SUBQ TIS 1ST 20SQCM/<: CPT

## 2018-04-13 PROCEDURE — 6370000000 HC RX 637 (ALT 250 FOR IP): Performed by: SURGERY

## 2018-04-13 RX ADMIN — LIDOCAINE HYDROCHLORIDE: 20 JELLY TOPICAL at 11:23

## 2018-04-13 ASSESSMENT — PAIN DESCRIPTION - DESCRIPTORS: DESCRIPTORS: ACHING

## 2018-04-13 ASSESSMENT — PAIN DESCRIPTION - LOCATION: LOCATION: LEG

## 2018-04-13 ASSESSMENT — PAIN DESCRIPTION - ORIENTATION: ORIENTATION: LEFT

## 2018-04-13 ASSESSMENT — PAIN SCALES - GENERAL: PAINLEVEL_OUTOF10: 4

## 2018-04-13 ASSESSMENT — PAIN DESCRIPTION - FREQUENCY: FREQUENCY: INTERMITTENT

## 2018-04-13 ASSESSMENT — PAIN DESCRIPTION - PAIN TYPE: TYPE: ACUTE PAIN;CHRONIC PAIN

## 2018-04-20 ENCOUNTER — HOSPITAL ENCOUNTER (OUTPATIENT)
Age: 83
Discharge: HOME | End: 2018-04-20
Payer: MEDICARE

## 2018-04-20 ENCOUNTER — HOSPITAL ENCOUNTER (OUTPATIENT)
Dept: WOUND CARE | Age: 83
Discharge: HOME | End: 2018-04-20
Payer: MEDICARE

## 2018-04-20 VITALS
RESPIRATION RATE: 16 BRPM | SYSTOLIC BLOOD PRESSURE: 152 MMHG | HEART RATE: 59 BPM | TEMPERATURE: 97.7 F | DIASTOLIC BLOOD PRESSURE: 84 MMHG

## 2018-04-20 DIAGNOSIS — I83.029 VENOUS ULCER OF LEFT LEG (HCC): Primary | ICD-10-CM

## 2018-04-20 DIAGNOSIS — L97.922 NON-PRESSURE CHRONIC ULCER OF LEFT LOWER LEG WITH FAT LAYER EXPOSED (HCC): ICD-10-CM

## 2018-04-20 DIAGNOSIS — L97.929 VENOUS ULCER OF LEFT LEG (HCC): Primary | ICD-10-CM

## 2018-04-20 LAB
ABSOLUTE EOS #: 0.1 K/UL (ref 0–0.4)
ABSOLUTE IMMATURE GRANULOCYTE: ABNORMAL K/UL (ref 0–0.3)
ABSOLUTE LYMPH #: 1.3 K/UL (ref 1–4.8)
ABSOLUTE MONO #: 0.5 K/UL (ref 0.2–0.8)
ALBUMIN SERPL-MCNC: 3.8 G/DL (ref 3.5–5.2)
ALBUMIN/GLOBULIN RATIO: ABNORMAL (ref 1–2.5)
ALP BLD-CCNC: 86 U/L (ref 35–104)
ALT SERPL-CCNC: 27 U/L (ref 5–33)
AMPHETAMINE SCREEN URINE: NEGATIVE
ANION GAP SERPL CALCULATED.3IONS-SCNC: 8 MMOL/L (ref 9–17)
AST SERPL-CCNC: 28 U/L
BARBITURATE SCREEN URINE: NEGATIVE
BASOPHILS # BLD: 1 % (ref 0–2)
BASOPHILS ABSOLUTE: 0 K/UL (ref 0–0.2)
BENZODIAZEPINE SCREEN, URINE: NEGATIVE
BILIRUB SERPL-MCNC: 0.6 MG/DL (ref 0.3–1.2)
BUN BLDV-MCNC: 18 MG/DL (ref 8–23)
BUN/CREAT BLD: 27 (ref 9–20)
BUPRENORPHINE URINE: ABNORMAL
CALCIUM SERPL-MCNC: 9.3 MG/DL (ref 8.6–10.4)
CANNABINOID SCREEN URINE: NEGATIVE
CHLORIDE BLD-SCNC: 105 MMOL/L (ref 98–107)
CO2: 31 MMOL/L (ref 20–31)
COCAINE METABOLITE, URINE: NEGATIVE
CREAT SERPL-MCNC: 0.66 MG/DL (ref 0.5–0.9)
DIFFERENTIAL TYPE: ABNORMAL
EOSINOPHILS RELATIVE PERCENT: 3 % (ref 1–4)
GFR AFRICAN AMERICAN: >60 ML/MIN
GFR NON-AFRICAN AMERICAN: >60 ML/MIN
GFR SERPL CREATININE-BSD FRML MDRD: ABNORMAL ML/MIN/{1.73_M2}
GFR SERPL CREATININE-BSD FRML MDRD: ABNORMAL ML/MIN/{1.73_M2}
GLUCOSE BLD-MCNC: 101 MG/DL (ref 70–99)
HCT VFR BLD CALC: 41.6 % (ref 36–46)
HEMOGLOBIN: 13.6 G/DL (ref 12–16)
IMMATURE GRANULOCYTES: ABNORMAL %
LYMPHOCYTES # BLD: 24 % (ref 24–44)
MCH RBC QN AUTO: 29.1 PG (ref 26–34)
MCHC RBC AUTO-ENTMCNC: 32.6 G/DL (ref 31–37)
MCV RBC AUTO: 89 FL (ref 80–100)
MDMA URINE: ABNORMAL
METHADONE SCREEN, URINE: NEGATIVE
METHAMPHETAMINE, URINE: ABNORMAL
MONOCYTES # BLD: 10 % (ref 1–7)
NRBC AUTOMATED: ABNORMAL PER 100 WBC
OPIATES, URINE: NEGATIVE
OXYCODONE SCREEN URINE: POSITIVE
PDW BLD-RTO: 16.9 % (ref 11.5–14.5)
PHENCYCLIDINE, URINE: NEGATIVE
PLATELET # BLD: 188 K/UL (ref 130–400)
PLATELET ESTIMATE: ABNORMAL
PMV BLD AUTO: ABNORMAL FL (ref 6–12)
POTASSIUM SERPL-SCNC: 4.5 MMOL/L (ref 3.7–5.3)
PROPOXYPHENE, URINE: ABNORMAL
RBC # BLD: 4.68 M/UL (ref 4–5.2)
RBC # BLD: ABNORMAL 10*6/UL
SEG NEUTROPHILS: 62 % (ref 36–66)
SEGMENTED NEUTROPHILS ABSOLUTE COUNT: 3.5 K/UL (ref 1.8–7.7)
SODIUM BLD-SCNC: 144 MMOL/L (ref 135–144)
TEST INFORMATION: ABNORMAL
THYROXINE, FREE: 1.15 NG/DL (ref 0.93–1.7)
TOTAL PROTEIN: 7 G/DL (ref 6.4–8.3)
TRICYCLIC ANTIDEPRESSANTS, UR: ABNORMAL
TSH SERPL DL<=0.05 MIU/L-ACNC: 2.84 MIU/L (ref 0.3–5)
WBC # BLD: 5.4 K/UL (ref 3.5–11)
WBC # BLD: ABNORMAL 10*3/UL

## 2018-04-20 PROCEDURE — 80053 COMPREHEN METABOLIC PANEL: CPT

## 2018-04-20 PROCEDURE — 84443 ASSAY THYROID STIM HORMONE: CPT

## 2018-04-20 PROCEDURE — 36415 COLL VENOUS BLD VENIPUNCTURE: CPT

## 2018-04-20 PROCEDURE — 85025 COMPLETE CBC W/AUTO DIFF WBC: CPT

## 2018-04-20 PROCEDURE — 80307 DRUG TEST PRSMV CHEM ANLYZR: CPT

## 2018-04-20 PROCEDURE — 84439 ASSAY OF FREE THYROXINE: CPT

## 2018-04-20 PROCEDURE — 6370000000 HC RX 637 (ALT 250 FOR IP): Performed by: SURGERY

## 2018-04-20 PROCEDURE — 97597 DBRDMT OPN WND 1ST 20 CM/<: CPT

## 2018-04-20 RX ADMIN — LIDOCAINE HYDROCHLORIDE: 20 JELLY TOPICAL at 10:41

## 2018-04-27 ENCOUNTER — HOSPITAL ENCOUNTER (OUTPATIENT)
Dept: WOUND CARE | Age: 83
Discharge: HOME | End: 2018-04-27
Payer: MEDICARE

## 2018-04-27 VITALS
HEIGHT: 63 IN | RESPIRATION RATE: 24 BRPM | TEMPERATURE: 97.7 F | DIASTOLIC BLOOD PRESSURE: 68 MMHG | HEART RATE: 77 BPM | WEIGHT: 130 LBS | BODY MASS INDEX: 23.04 KG/M2 | SYSTOLIC BLOOD PRESSURE: 131 MMHG

## 2018-04-27 DIAGNOSIS — L97.922 NON-PRESSURE CHRONIC ULCER OF LEFT LOWER LEG WITH FAT LAYER EXPOSED (HCC): ICD-10-CM

## 2018-04-27 DIAGNOSIS — I83.029 VENOUS ULCER OF LEFT LEG (HCC): Primary | ICD-10-CM

## 2018-04-27 DIAGNOSIS — L97.929 VENOUS ULCER OF LEFT LEG (HCC): Primary | ICD-10-CM

## 2018-04-27 PROCEDURE — 29580 STRAPPING UNNA BOOT: CPT

## 2018-04-27 PROCEDURE — 6370000000 HC RX 637 (ALT 250 FOR IP): Performed by: SURGERY

## 2018-04-27 RX ADMIN — LIDOCAINE HYDROCHLORIDE: 20 JELLY TOPICAL at 10:15

## 2018-04-27 ASSESSMENT — PAIN DESCRIPTION - ORIENTATION: ORIENTATION: LEFT

## 2018-04-27 ASSESSMENT — PAIN SCALES - WONG BAKER: WONGBAKER_NUMERICALRESPONSE: 0

## 2018-04-27 ASSESSMENT — PAIN DESCRIPTION - LOCATION: LOCATION: LEG

## 2018-04-27 ASSESSMENT — PAIN DESCRIPTION - DESCRIPTORS: DESCRIPTORS: ACHING

## 2018-04-27 ASSESSMENT — PAIN DESCRIPTION - PAIN TYPE: TYPE: CHRONIC PAIN

## 2018-05-04 ENCOUNTER — HOSPITAL ENCOUNTER (OUTPATIENT)
Dept: WOUND CARE | Age: 83
Discharge: HOME | End: 2018-05-04
Payer: MEDICARE

## 2018-05-04 VITALS
RESPIRATION RATE: 19 BRPM | HEART RATE: 61 BPM | TEMPERATURE: 97.4 F | DIASTOLIC BLOOD PRESSURE: 79 MMHG | SYSTOLIC BLOOD PRESSURE: 141 MMHG

## 2018-05-04 DIAGNOSIS — L97.922 NON-PRESSURE CHRONIC ULCER OF LEFT LOWER LEG WITH FAT LAYER EXPOSED (HCC): Primary | ICD-10-CM

## 2018-05-04 PROCEDURE — 6370000000 HC RX 637 (ALT 250 FOR IP): Performed by: SURGERY

## 2018-05-04 PROCEDURE — 29580 STRAPPING UNNA BOOT: CPT

## 2018-05-04 RX ADMIN — LIDOCAINE HYDROCHLORIDE 1 TUBE: 20 JELLY TOPICAL at 11:33

## 2018-05-11 ENCOUNTER — HOSPITAL ENCOUNTER (OUTPATIENT)
Dept: WOUND CARE | Age: 83
Discharge: HOME | End: 2018-05-11
Payer: MEDICARE

## 2018-05-11 VITALS
SYSTOLIC BLOOD PRESSURE: 135 MMHG | DIASTOLIC BLOOD PRESSURE: 73 MMHG | TEMPERATURE: 97.3 F | HEART RATE: 62 BPM | RESPIRATION RATE: 18 BRPM

## 2018-05-11 DIAGNOSIS — L97.922 NON-PRESSURE CHRONIC ULCER OF LEFT LOWER LEG WITH FAT LAYER EXPOSED (HCC): Primary | ICD-10-CM

## 2018-05-11 DIAGNOSIS — I83.029 VENOUS ULCER OF LEFT LEG (HCC): ICD-10-CM

## 2018-05-11 DIAGNOSIS — L97.929 VENOUS ULCER OF LEFT LEG (HCC): ICD-10-CM

## 2018-05-11 PROCEDURE — 29580 STRAPPING UNNA BOOT: CPT

## 2018-05-17 ENCOUNTER — HOSPITAL ENCOUNTER (OUTPATIENT)
Dept: WOUND CARE | Age: 83
Discharge: HOME | End: 2018-05-17
Payer: MEDICARE

## 2018-05-17 VITALS
RESPIRATION RATE: 18 BRPM | WEIGHT: 130 LBS | HEIGHT: 63 IN | BODY MASS INDEX: 23.04 KG/M2 | SYSTOLIC BLOOD PRESSURE: 145 MMHG | DIASTOLIC BLOOD PRESSURE: 75 MMHG | TEMPERATURE: 97.3 F | HEART RATE: 62 BPM

## 2018-05-17 DIAGNOSIS — L97.922 NON-PRESSURE CHRONIC ULCER OF LEFT LOWER LEG WITH FAT LAYER EXPOSED (HCC): Primary | ICD-10-CM

## 2018-05-17 DIAGNOSIS — I83.029 VENOUS ULCER OF LEFT LEG (HCC): ICD-10-CM

## 2018-05-17 DIAGNOSIS — L97.929 VENOUS ULCER OF LEFT LEG (HCC): ICD-10-CM

## 2018-05-17 PROCEDURE — 6370000000 HC RX 637 (ALT 250 FOR IP): Performed by: SURGERY

## 2018-05-17 PROCEDURE — 97597 DBRDMT OPN WND 1ST 20 CM/<: CPT

## 2018-05-17 RX ADMIN — LIDOCAINE HYDROCHLORIDE 1 TUBE: 20 JELLY TOPICAL at 10:34

## 2018-05-23 ENCOUNTER — APPOINTMENT (OUTPATIENT)
Dept: PHARMACY | Age: 83
End: 2018-05-23
Payer: MEDICARE

## 2018-05-25 ENCOUNTER — HOSPITAL ENCOUNTER (OUTPATIENT)
Dept: WOUND CARE | Age: 83
Discharge: HOME | End: 2018-05-25
Payer: MEDICARE

## 2018-05-25 VITALS
WEIGHT: 130 LBS | SYSTOLIC BLOOD PRESSURE: 118 MMHG | HEART RATE: 65 BPM | DIASTOLIC BLOOD PRESSURE: 62 MMHG | BODY MASS INDEX: 23.04 KG/M2 | TEMPERATURE: 97.3 F | RESPIRATION RATE: 19 BRPM | HEIGHT: 63 IN

## 2018-05-25 DIAGNOSIS — L97.922 NON-PRESSURE CHRONIC ULCER OF LEFT LOWER LEG WITH FAT LAYER EXPOSED (HCC): Primary | ICD-10-CM

## 2018-05-25 PROCEDURE — 99211 OFF/OP EST MAY X REQ PHY/QHP: CPT

## 2018-05-25 ASSESSMENT — PAIN SCALES - WONG BAKER: WONGBAKER_NUMERICALRESPONSE: 0

## 2018-05-25 ASSESSMENT — PAIN SCALES - GENERAL: PAINLEVEL_OUTOF10: 0

## 2018-05-30 ENCOUNTER — HOSPITAL ENCOUNTER (OUTPATIENT)
Dept: PHARMACY | Age: 83
Setting detail: THERAPIES SERIES
Discharge: HOME | End: 2018-05-30
Payer: MEDICARE

## 2018-05-30 DIAGNOSIS — I48.20 CHRONIC A-FIB (HCC): ICD-10-CM

## 2018-05-30 PROCEDURE — 99211 OFF/OP EST MAY X REQ PHY/QHP: CPT

## 2018-05-31 RX ORDER — OYSTER SHELL CALCIUM WITH VITAMIN D 500; 200 MG/1; [IU]/1
1 TABLET, FILM COATED ORAL DAILY
COMMUNITY

## 2018-08-14 ENCOUNTER — HOSPITAL ENCOUNTER (OUTPATIENT)
Age: 83
Discharge: HOME | End: 2018-08-14
Payer: MEDICARE

## 2018-08-14 LAB
ANION GAP SERPL CALCULATED.3IONS-SCNC: 8 MMOL/L (ref 9–17)
BUN BLDV-MCNC: 14 MG/DL (ref 8–23)
BUN/CREAT BLD: 21 (ref 9–20)
CALCIUM SERPL-MCNC: 9.8 MG/DL (ref 8.6–10.4)
CHLORIDE BLD-SCNC: 105 MMOL/L (ref 98–107)
CHOLESTEROL, FASTING: 149 MG/DL
CHOLESTEROL/HDL RATIO: 2.2
CO2: 30 MMOL/L (ref 20–31)
CREAT SERPL-MCNC: 0.67 MG/DL (ref 0.5–0.9)
GFR AFRICAN AMERICAN: >60 ML/MIN
GFR NON-AFRICAN AMERICAN: >60 ML/MIN
GFR SERPL CREATININE-BSD FRML MDRD: ABNORMAL ML/MIN/{1.73_M2}
GFR SERPL CREATININE-BSD FRML MDRD: ABNORMAL ML/MIN/{1.73_M2}
GLUCOSE FASTING: 111 MG/DL (ref 70–99)
HDLC SERPL-MCNC: 68 MG/DL
LDL CHOLESTEROL: 69 MG/DL (ref 0–130)
POTASSIUM SERPL-SCNC: 4.8 MMOL/L (ref 3.7–5.3)
SODIUM BLD-SCNC: 143 MMOL/L (ref 135–144)
TRIGLYCERIDE, FASTING: 58 MG/DL
VLDLC SERPL CALC-MCNC: NORMAL MG/DL (ref 1–30)

## 2018-08-14 PROCEDURE — 80048 BASIC METABOLIC PNL TOTAL CA: CPT

## 2018-08-14 PROCEDURE — 36415 COLL VENOUS BLD VENIPUNCTURE: CPT

## 2018-08-14 PROCEDURE — 80061 LIPID PANEL: CPT

## 2018-11-08 ENCOUNTER — HOSPITAL ENCOUNTER (OUTPATIENT)
Dept: PHARMACY | Age: 83
Setting detail: THERAPIES SERIES
Discharge: HOME | End: 2018-11-08
Payer: MEDICARE

## 2018-11-08 DIAGNOSIS — I48.20 CHRONIC A-FIB (HCC): ICD-10-CM

## 2018-11-08 PROCEDURE — 99211 OFF/OP EST MAY X REQ PHY/QHP: CPT

## 2018-11-08 RX ORDER — MULTIVIT WITH MINERALS/LUTEIN
1 TABLET ORAL DAILY
Status: ON HOLD | COMMUNITY
End: 2018-12-11 | Stop reason: HOSPADM

## 2018-11-08 RX ORDER — HYDROCODONE BITARTRATE AND ACETAMINOPHEN 5; 325 MG/1; MG/1
1 TABLET ORAL EVERY 6 HOURS PRN
Status: ON HOLD | COMMUNITY
End: 2018-12-11 | Stop reason: HOSPADM

## 2018-11-08 RX ORDER — TIMOLOL MALEATE 5 MG/ML
1 SOLUTION/ DROPS OPHTHALMIC DAILY
COMMUNITY
End: 2019-11-11 | Stop reason: ALTCHOICE

## 2018-12-08 ENCOUNTER — APPOINTMENT (OUTPATIENT)
Dept: GENERAL RADIOLOGY | Age: 83
DRG: 291 | End: 2018-12-08
Payer: MEDICARE

## 2018-12-08 ENCOUNTER — HOSPITAL ENCOUNTER (INPATIENT)
Age: 83
LOS: 3 days | Discharge: HOME HEALTH | DRG: 291 | End: 2018-12-11
Attending: EMERGENCY MEDICINE | Admitting: INTERNAL MEDICINE
Payer: MEDICARE

## 2018-12-08 DIAGNOSIS — I50.9 CONGESTIVE HEART FAILURE, UNSPECIFIED HF CHRONICITY, UNSPECIFIED HEART FAILURE TYPE (HCC): Primary | ICD-10-CM

## 2018-12-08 DIAGNOSIS — I50.33 ACUTE ON CHRONIC DIASTOLIC CONGESTIVE HEART FAILURE (HCC): ICD-10-CM

## 2018-12-08 DIAGNOSIS — R73.9 HYPERGLYCEMIA: ICD-10-CM

## 2018-12-08 PROBLEM — I10 ESSENTIAL HYPERTENSION: Status: ACTIVE | Noted: 2018-12-08

## 2018-12-08 PROBLEM — R06.02 SHORTNESS OF BREATH: Status: ACTIVE | Noted: 2018-12-08

## 2018-12-08 LAB
-: ABNORMAL
ALBUMIN SERPL-MCNC: 3.9 G/DL (ref 3.5–5.2)
ALBUMIN/GLOBULIN RATIO: ABNORMAL (ref 1–2.5)
ALP BLD-CCNC: 77 U/L (ref 35–104)
ALT SERPL-CCNC: 34 U/L (ref 5–33)
AMORPHOUS: ABNORMAL
ANION GAP SERPL CALCULATED.3IONS-SCNC: 15 MMOL/L (ref 9–17)
AST SERPL-CCNC: 35 U/L
BACTERIA: ABNORMAL
BILIRUB SERPL-MCNC: 0.67 MG/DL (ref 0.3–1.2)
BILIRUBIN URINE: NEGATIVE
BNP INTERPRETATION: ABNORMAL
BUN BLDV-MCNC: 25 MG/DL (ref 8–23)
BUN/CREAT BLD: 41 (ref 9–20)
CALCIUM SERPL-MCNC: 10.1 MG/DL (ref 8.6–10.4)
CASTS UA: ABNORMAL /LPF
CHLORIDE BLD-SCNC: 99 MMOL/L (ref 98–107)
CO2: 29 MMOL/L (ref 20–31)
COLOR: YELLOW
COMMENT UA: ABNORMAL
CREAT SERPL-MCNC: 0.61 MG/DL (ref 0.5–0.9)
CRYSTALS, UA: ABNORMAL /HPF
EKG ATRIAL RATE: 59 BPM
EKG P AXIS: 57 DEGREES
EKG P-R INTERVAL: 218 MS
EKG Q-T INTERVAL: 426 MS
EKG QRS DURATION: 76 MS
EKG QTC CALCULATION (BAZETT): 421 MS
EKG R AXIS: 94 DEGREES
EKG T AXIS: 97 DEGREES
EKG VENTRICULAR RATE: 59 BPM
EPITHELIAL CELLS UA: ABNORMAL /HPF (ref 0–5)
GFR AFRICAN AMERICAN: >60 ML/MIN
GFR NON-AFRICAN AMERICAN: >60 ML/MIN
GFR SERPL CREATININE-BSD FRML MDRD: ABNORMAL ML/MIN/{1.73_M2}
GFR SERPL CREATININE-BSD FRML MDRD: ABNORMAL ML/MIN/{1.73_M2}
GLUCOSE BLD-MCNC: 263 MG/DL (ref 70–99)
GLUCOSE URINE: ABNORMAL
HCT VFR BLD CALC: 47.6 % (ref 36–46)
HEMOGLOBIN: 15.8 G/DL (ref 12–16)
KETONES, URINE: NEGATIVE
LEUKOCYTE ESTERASE, URINE: NEGATIVE
MCH RBC QN AUTO: 32.7 PG (ref 26–34)
MCHC RBC AUTO-ENTMCNC: 33.2 G/DL (ref 31–37)
MCV RBC AUTO: 98.6 FL (ref 80–100)
MUCUS: ABNORMAL
NITRITE, URINE: NEGATIVE
NRBC AUTOMATED: ABNORMAL PER 100 WBC
OTHER OBSERVATIONS UA: ABNORMAL
PDW BLD-RTO: 15.4 % (ref 11.5–14.5)
PH UA: 6 (ref 5–8)
PLATELET # BLD: 156 K/UL (ref 130–400)
PMV BLD AUTO: 9.6 FL (ref 6–12)
POTASSIUM SERPL-SCNC: 5.6 MMOL/L (ref 3.7–5.3)
PRO-BNP: 1419 PG/ML
PROTEIN UA: ABNORMAL
RBC # BLD: 4.83 M/UL (ref 4–5.2)
RBC UA: ABNORMAL /HPF (ref 0–2)
RENAL EPITHELIAL, UA: ABNORMAL /HPF
SODIUM BLD-SCNC: 143 MMOL/L (ref 135–144)
SPECIFIC GRAVITY UA: 1.02 (ref 1–1.03)
TOTAL PROTEIN: 7.3 G/DL (ref 6.4–8.3)
TRICHOMONAS: ABNORMAL
TROPONIN INTERP: NORMAL
TROPONIN INTERP: NORMAL
TROPONIN T: <0.03 NG/ML
TROPONIN T: <0.03 NG/ML
TURBIDITY: ABNORMAL
URINE HGB: ABNORMAL
UROBILINOGEN, URINE: NORMAL
WBC # BLD: 9.5 K/UL (ref 3.5–11)
WBC UA: ABNORMAL /HPF (ref 0–5)
YEAST: ABNORMAL

## 2018-12-08 PROCEDURE — 93005 ELECTROCARDIOGRAM TRACING: CPT

## 2018-12-08 PROCEDURE — 2580000003 HC RX 258: Performed by: INTERNAL MEDICINE

## 2018-12-08 PROCEDURE — 81001 URINALYSIS AUTO W/SCOPE: CPT

## 2018-12-08 PROCEDURE — 36415 COLL VENOUS BLD VENIPUNCTURE: CPT

## 2018-12-08 PROCEDURE — 99285 EMERGENCY DEPT VISIT HI MDM: CPT

## 2018-12-08 PROCEDURE — 6370000000 HC RX 637 (ALT 250 FOR IP): Performed by: INTERNAL MEDICINE

## 2018-12-08 PROCEDURE — 84484 ASSAY OF TROPONIN QUANT: CPT

## 2018-12-08 PROCEDURE — 71046 X-RAY EXAM CHEST 2 VIEWS: CPT

## 2018-12-08 PROCEDURE — 99222 1ST HOSP IP/OBS MODERATE 55: CPT | Performed by: INTERNAL MEDICINE

## 2018-12-08 PROCEDURE — 2060000000 HC ICU INTERMEDIATE R&B

## 2018-12-08 PROCEDURE — 85027 COMPLETE CBC AUTOMATED: CPT

## 2018-12-08 PROCEDURE — 80053 COMPREHEN METABOLIC PANEL: CPT

## 2018-12-08 PROCEDURE — 83880 ASSAY OF NATRIURETIC PEPTIDE: CPT

## 2018-12-08 PROCEDURE — 6360000002 HC RX W HCPCS: Performed by: EMERGENCY MEDICINE

## 2018-12-08 RX ORDER — NICOTINE 21 MG/24HR
1 PATCH, TRANSDERMAL 24 HOURS TRANSDERMAL DAILY PRN
Status: DISCONTINUED | OUTPATIENT
Start: 2018-12-08 | End: 2018-12-11 | Stop reason: HOSPADM

## 2018-12-08 RX ORDER — BISACODYL 10 MG
10 SUPPOSITORY, RECTAL RECTAL DAILY PRN
Status: DISCONTINUED | OUTPATIENT
Start: 2018-12-08 | End: 2018-12-11 | Stop reason: HOSPADM

## 2018-12-08 RX ORDER — PRAVASTATIN SODIUM 40 MG
80 TABLET ORAL DAILY
Status: DISCONTINUED | OUTPATIENT
Start: 2018-12-08 | End: 2018-12-11 | Stop reason: HOSPADM

## 2018-12-08 RX ORDER — FUROSEMIDE 10 MG/ML
40 INJECTION INTRAMUSCULAR; INTRAVENOUS 2 TIMES DAILY
Status: DISCONTINUED | OUTPATIENT
Start: 2018-12-09 | End: 2018-12-10

## 2018-12-08 RX ORDER — FUROSEMIDE 10 MG/ML
40 INJECTION INTRAMUSCULAR; INTRAVENOUS 2 TIMES DAILY
Status: DISCONTINUED | OUTPATIENT
Start: 2018-12-08 | End: 2018-12-08

## 2018-12-08 RX ORDER — ONDANSETRON 2 MG/ML
4 INJECTION INTRAMUSCULAR; INTRAVENOUS EVERY 6 HOURS PRN
Status: DISCONTINUED | OUTPATIENT
Start: 2018-12-08 | End: 2018-12-11 | Stop reason: HOSPADM

## 2018-12-08 RX ORDER — DILTIAZEM HYDROCHLORIDE 180 MG/1
180 CAPSULE, COATED, EXTENDED RELEASE ORAL DAILY
Status: DISCONTINUED | OUTPATIENT
Start: 2018-12-08 | End: 2018-12-09

## 2018-12-08 RX ORDER — SODIUM CHLORIDE 0.9 % (FLUSH) 0.9 %
10 SYRINGE (ML) INJECTION EVERY 12 HOURS SCHEDULED
Status: DISCONTINUED | OUTPATIENT
Start: 2018-12-08 | End: 2018-12-11 | Stop reason: HOSPADM

## 2018-12-08 RX ORDER — SODIUM CHLORIDE 0.9 % (FLUSH) 0.9 %
10 SYRINGE (ML) INJECTION PRN
Status: DISCONTINUED | OUTPATIENT
Start: 2018-12-08 | End: 2018-12-11 | Stop reason: HOSPADM

## 2018-12-08 RX ORDER — FUROSEMIDE 10 MG/ML
40 INJECTION INTRAMUSCULAR; INTRAVENOUS ONCE
Status: COMPLETED | OUTPATIENT
Start: 2018-12-08 | End: 2018-12-08

## 2018-12-08 RX ORDER — ACETAMINOPHEN 325 MG/1
650 TABLET ORAL EVERY 4 HOURS PRN
Status: DISCONTINUED | OUTPATIENT
Start: 2018-12-08 | End: 2018-12-11 | Stop reason: HOSPADM

## 2018-12-08 RX ORDER — METOPROLOL TARTRATE 50 MG/1
50 TABLET, FILM COATED ORAL 2 TIMES DAILY
Status: DISCONTINUED | OUTPATIENT
Start: 2018-12-08 | End: 2018-12-09

## 2018-12-08 RX ORDER — ONDANSETRON 4 MG/1
4 TABLET, ORALLY DISINTEGRATING ORAL EVERY 6 HOURS PRN
Status: DISCONTINUED | OUTPATIENT
Start: 2018-12-08 | End: 2018-12-11 | Stop reason: HOSPADM

## 2018-12-08 RX ORDER — LISINOPRIL 20 MG/1
20 TABLET ORAL DAILY
Status: DISCONTINUED | OUTPATIENT
Start: 2018-12-08 | End: 2018-12-11 | Stop reason: HOSPADM

## 2018-12-08 RX ORDER — PANTOPRAZOLE SODIUM 40 MG/1
80 TABLET, DELAYED RELEASE ORAL DAILY
COMMUNITY
End: 2019-11-11 | Stop reason: ALTCHOICE

## 2018-12-08 RX ORDER — ALBUTEROL SULFATE 90 UG/1
2 AEROSOL, METERED RESPIRATORY (INHALATION) EVERY 6 HOURS PRN
COMMUNITY
End: 2019-11-11 | Stop reason: ALTCHOICE

## 2018-12-08 RX ORDER — DOCUSATE SODIUM 100 MG/1
100 CAPSULE, LIQUID FILLED ORAL 2 TIMES DAILY
Status: DISCONTINUED | OUTPATIENT
Start: 2018-12-08 | End: 2018-12-11 | Stop reason: HOSPADM

## 2018-12-08 RX ORDER — ONDANSETRON 2 MG/ML
4 INJECTION INTRAMUSCULAR; INTRAVENOUS EVERY 6 HOURS PRN
Status: DISCONTINUED | OUTPATIENT
Start: 2018-12-08 | End: 2018-12-08 | Stop reason: SDUPTHER

## 2018-12-08 RX ADMIN — APIXABAN 2.5 MG: 2.5 TABLET, FILM COATED ORAL at 20:29

## 2018-12-08 RX ADMIN — Medication 10 ML: at 20:29

## 2018-12-08 RX ADMIN — METOPROLOL TARTRATE 50 MG: 50 TABLET ORAL at 20:29

## 2018-12-08 RX ADMIN — DILTIAZEM HYDROCHLORIDE 180 MG: 180 CAPSULE, COATED, EXTENDED RELEASE ORAL at 20:29

## 2018-12-08 RX ADMIN — FUROSEMIDE 40 MG: 10 INJECTION, SOLUTION INTRAMUSCULAR; INTRAVENOUS at 16:40

## 2018-12-08 RX ADMIN — PRAVASTATIN SODIUM 80 MG: 40 TABLET ORAL at 20:29

## 2018-12-08 NOTE — H&P
[facility]    HISTORY AND PHYSICAL EXAMINATION            Date:   12/8/2018  Patient name:  Wilfrid Cowan  Date of admission:  12/8/2018 12:45 PM  MRN:   [id number]  Account:  [de-identified]  YOB: 1924  PCP:    Omar Jean-Baptiste MD  Room:   [place] [address]  Code Status:    Prior    Chief Complaint:     Chief Complaint   Patient presents with    Shortness of Breath       History Obtained From:     patient, electronic medical record    History of Present Illness: The patient is a 80 y.o. female with PMH significant for a fib on Eliquis, HTN, HLD, CHF who presented with shortness of breath. Patient states symptoms were going on for the past 2 days. Denies any chest pain, nausea/vomiting, abdominal pain. Symptoms got worse causing her to come to ER. In ER placed on NC, no home oxygen use per patient. Labs demonstrating BNP of 1419, troponin negative, CXR concerning for congestion. Admitted for CHF exacerbation.      Past Medical History:     Past Medical History:   Diagnosis Date    Acute dermatitis     Arthritis     Asthma     Atrial fibrillation (HCC)     Bursitis     CHF (congestive heart failure) (HCC)     Hearing aid worn     Hyperlipidemia     Hypertension     Lumbar disc disease     Wears glasses     Wound of right leg         Past Surgical History:     Past Surgical History:   Procedure Laterality Date    BACK SURGERY      DEBRIDEMENT Left 03/09/2017    rt. leg    OTHER SURGICAL HISTORY  03/30/2017    Right leg Angio    OTHER SURGICAL HISTORY  03/30/2017    Right leg angio    NM INCIS/DRAIN THIGH/KNEE ABSCESS,DEEP Right 3/9/2017    DEBRIDEMENT CALF WOUND  performed by Vee Sifuentes MD at [facility] INCIS/DRAIN THIGH/KNEE ABSCESS,DEEP Right 4/28/2017    RIGHT LOWER EXTREMITY DEBRIDEMENT, INTEGRA APPLICATION, PREVENA VAC APPLICATION LOWR RIGHT LEG performed by Vee Sifuentes MD at [place] [address] GFR Staging NOT REPORTED    Brain Natriuretic Peptide    Collection Time: 12/08/18 12:54 PM   Result Value Ref Range    Pro-BNP 1,419 (H) <300 pg/mL    BNP Interpretation         Troponin    Collection Time: 12/08/18 12:54 PM   Result Value Ref Range    Troponin T <0.03 <0.03 ng/mL    Troponin Interp         Urinalysis, reflex to microscopic    Collection Time: 12/08/18  4:58 PM   Result Value Ref Range    Color, UA YELLOW YEL    Turbidity UA SLIGHTLY CLOUDY (A) CLEAR    Glucose, Ur TRACE (A) NEG    Bilirubin Urine NEGATIVE NEG    Ketones, Urine NEGATIVE NEG    Specific Gravity, UA 1.020 1.005 - 1.030    Urine Hgb 3+ (A) NEG    pH, UA 6.0 5.0 - 8.0    Protein, UA 2+ (A) NEG    Urobilinogen, Urine Normal NORM    Nitrite, Urine NEGATIVE NEG    Leukocyte Esterase, Urine NEGATIVE NEG    Urinalysis Comments NOT REPORTED    Microscopic Urinalysis    Collection Time: 12/08/18  4:58 PM   Result Value Ref Range    -          WBC, UA 0 TO 2 0 - 5 /HPF    RBC, UA 20 TO 50 0 - 2 /HPF    Casts UA 10 TO 20 /LPF    Crystals UA NOT REPORTED NONE /HPF    Epithelial Cells UA 0 TO 2 0 - 5 /HPF    Renal Epithelial, Urine NOT REPORTED 0 /HPF    Bacteria, UA NOT REPORTED NONE    Mucus, UA 1+ (A) NONE    Trichomonas, UA NOT REPORTED NONE    Amorphous, UA NOT REPORTED NONE    Other Observations UA NOT REPORTED NREQ    Yeast, UA NOT REPORTED NONE       Imaging/Diagnostics:    Xr Chest Standard (2 Vw)    Result Date: 12/8/2018  EXAMINATION: TWO VIEWS OF THE CHEST 12/8/2018 1:34 pm COMPARISON: Chest radiograph dated 03/15/2007. HISTORY: ORDERING SYSTEM PROVIDED HISTORY: Shortness of breath TECHNOLOGIST PROVIDED HISTORY: Shortness of breath Ordering Physician Provided Reason for Exam: Pt c/o increased SOB. PA/LAT upright Acuity: Acute Type of Exam: Initial FINDINGS: Cardiomegaly is obscured by bibasilar airspace disease. Moderate bilateral pleural effusions. Findings can be related to moderate CHF.   Superimposed pneumonia at the lung bases

## 2018-12-08 NOTE — ED PROVIDER NOTES
excluded. ------------------------- NURSING NOTES AND VITALS REVIEWED ---------------------------   The nursing notes within the ED encounter and vital signs as below have been reviewed. BP (!) 136/57   Pulse 56   Temp 94.6 °F (34.8 °C)   Resp 20   Ht 5' 3.5\" (1.613 m)   Wt 120 lb (54.4 kg)   SpO2 94%   BMI 20.92 kg/m²   Oxygen Saturation Interpretation: Abnormal      ---------------------------------------------------PHYSICAL EXAM--------------------------------------      Constitutional/General: Alert and oriented x3, well appearing, non toxic in NAD  Head: NC/AT  Eyes: PERRL, EOMI  Mouth: Oropharynx clear, handling secretions, no trismus  Neck: Supple, full ROM,   Pulmonary: Lungs some rales bilaterally heard best anteriorly more prominent on the right than on the left. Cardiovascular:  Regular rate and rhythm, no murmurs, gallops, or rubs. 2+ distal pulses  Abdomen: Soft, non tender, non distended,   Extremities: Moves all extremities x 4. Warm and well perfused  Skin: warm and dry without rash  Neurologic: GCS 15,  Psych: Normal Affect      ------------------------------ ED COURSE/MEDICAL DECISION MAKING----------------------  Medications   furosemide (LASIX) injection 40 mg (not administered)         Medical Decision Making:    Very nice elderly female with congestive heart failure with saturations of 83% on room air. Case was discussed with Kwadwo Goodrich, patient being admitted further evaluation and treatment. W    Counseling: The emergency provider has spoken with the patient and discussed todays results, in addition to providing specific details for the plan of care and counseling regarding the diagnosis and prognosis. Questions are answered at this time and they are agreeable with the plan.      --------------------------------- IMPRESSION AND DISPOSITION ---------------------------------    IMPRESSION  1.  Congestive heart failure, unspecified HF chronicity, unspecified heart failure type [facility])        DISPOSITION  Disposition: Admit to telemetry  Patient condition is stable              Giuliana Pan MD  12/08/18 [id number]

## 2018-12-09 ENCOUNTER — APPOINTMENT (OUTPATIENT)
Dept: GENERAL RADIOLOGY | Age: 83
DRG: 291 | End: 2018-12-09
Payer: MEDICARE

## 2018-12-09 LAB
ANION GAP SERPL CALCULATED.3IONS-SCNC: 8 MMOL/L (ref 9–17)
BNP INTERPRETATION: ABNORMAL
BUN BLDV-MCNC: 25 MG/DL (ref 8–23)
BUN/CREAT BLD: 32 (ref 9–20)
CALCIUM SERPL-MCNC: 9.7 MG/DL (ref 8.6–10.4)
CHLORIDE BLD-SCNC: 102 MMOL/L (ref 98–107)
CHOLESTEROL/HDL RATIO: 2.1
CHOLESTEROL: 141 MG/DL
CO2: 36 MMOL/L (ref 20–31)
CREAT SERPL-MCNC: 0.77 MG/DL (ref 0.5–0.9)
GFR AFRICAN AMERICAN: >60 ML/MIN
GFR NON-AFRICAN AMERICAN: >60 ML/MIN
GFR SERPL CREATININE-BSD FRML MDRD: ABNORMAL ML/MIN/{1.73_M2}
GFR SERPL CREATININE-BSD FRML MDRD: ABNORMAL ML/MIN/{1.73_M2}
GLUCOSE BLD-MCNC: 114 MG/DL (ref 70–99)
HCT VFR BLD CALC: 43.7 % (ref 36–46)
HDLC SERPL-MCNC: 68 MG/DL
HEMOGLOBIN: 14.5 G/DL (ref 12–16)
LDL CHOLESTEROL: 61 MG/DL (ref 0–130)
MAGNESIUM: 1.9 MG/DL (ref 1.6–2.6)
MCH RBC QN AUTO: 32.6 PG (ref 26–34)
MCHC RBC AUTO-ENTMCNC: 33.2 G/DL (ref 31–37)
MCV RBC AUTO: 98.1 FL (ref 80–100)
NRBC AUTOMATED: ABNORMAL PER 100 WBC
PDW BLD-RTO: 15.1 % (ref 11.5–14.5)
PLATELET # BLD: 128 K/UL (ref 130–400)
PMV BLD AUTO: 9.1 FL (ref 6–12)
POTASSIUM SERPL-SCNC: 4.6 MMOL/L (ref 3.7–5.3)
PRO-BNP: 1087 PG/ML
RBC # BLD: 4.45 M/UL (ref 4–5.2)
SODIUM BLD-SCNC: 146 MMOL/L (ref 135–144)
TRIGL SERPL-MCNC: 58 MG/DL
TROPONIN INTERP: NORMAL
TROPONIN INTERP: NORMAL
TROPONIN T: <0.03 NG/ML
TROPONIN T: <0.03 NG/ML
TSH SERPL DL<=0.05 MIU/L-ACNC: 1.41 MIU/L (ref 0.3–5)
VLDLC SERPL CALC-MCNC: NORMAL MG/DL (ref 1–30)
WBC # BLD: 7.6 K/UL (ref 3.5–11)

## 2018-12-09 PROCEDURE — 36415 COLL VENOUS BLD VENIPUNCTURE: CPT

## 2018-12-09 PROCEDURE — 2580000003 HC RX 258: Performed by: INTERNAL MEDICINE

## 2018-12-09 PROCEDURE — 83880 ASSAY OF NATRIURETIC PEPTIDE: CPT

## 2018-12-09 PROCEDURE — 71046 X-RAY EXAM CHEST 2 VIEWS: CPT

## 2018-12-09 PROCEDURE — 84443 ASSAY THYROID STIM HORMONE: CPT

## 2018-12-09 PROCEDURE — 83735 ASSAY OF MAGNESIUM: CPT

## 2018-12-09 PROCEDURE — 6370000000 HC RX 637 (ALT 250 FOR IP): Performed by: INTERNAL MEDICINE

## 2018-12-09 PROCEDURE — 85027 COMPLETE CBC AUTOMATED: CPT

## 2018-12-09 PROCEDURE — 80048 BASIC METABOLIC PNL TOTAL CA: CPT

## 2018-12-09 PROCEDURE — 6360000002 HC RX W HCPCS: Performed by: INTERNAL MEDICINE

## 2018-12-09 PROCEDURE — 84484 ASSAY OF TROPONIN QUANT: CPT

## 2018-12-09 PROCEDURE — 2060000000 HC ICU INTERMEDIATE R&B

## 2018-12-09 PROCEDURE — 99232 SBSQ HOSP IP/OBS MODERATE 35: CPT | Performed by: INTERNAL MEDICINE

## 2018-12-09 PROCEDURE — 80061 LIPID PANEL: CPT

## 2018-12-09 RX ORDER — DILTIAZEM HYDROCHLORIDE 180 MG/1
180 CAPSULE, COATED, EXTENDED RELEASE ORAL DAILY
Status: DISCONTINUED | OUTPATIENT
Start: 2018-12-10 | End: 2018-12-11 | Stop reason: HOSPADM

## 2018-12-09 RX ADMIN — Medication 10 ML: at 21:03

## 2018-12-09 RX ADMIN — PRAVASTATIN SODIUM 80 MG: 40 TABLET ORAL at 09:03

## 2018-12-09 RX ADMIN — APIXABAN 2.5 MG: 2.5 TABLET, FILM COATED ORAL at 21:03

## 2018-12-09 RX ADMIN — DOCUSATE SODIUM 100 MG: 100 CAPSULE, LIQUID FILLED ORAL at 09:03

## 2018-12-09 RX ADMIN — FUROSEMIDE 40 MG: 10 INJECTION, SOLUTION INTRAMUSCULAR; INTRAVENOUS at 17:56

## 2018-12-09 RX ADMIN — FUROSEMIDE 40 MG: 10 INJECTION, SOLUTION INTRAMUSCULAR; INTRAVENOUS at 09:03

## 2018-12-09 RX ADMIN — LISINOPRIL 20 MG: 20 TABLET ORAL at 09:03

## 2018-12-09 RX ADMIN — Medication 10 ML: at 09:03

## 2018-12-09 RX ADMIN — METOPROLOL TARTRATE 25 MG: 25 TABLET ORAL at 21:03

## 2018-12-09 RX ADMIN — APIXABAN 2.5 MG: 2.5 TABLET, FILM COATED ORAL at 09:03

## 2018-12-09 ASSESSMENT — PAIN SCALES - GENERAL: PAINLEVEL_OUTOF10: 0

## 2018-12-09 NOTE — CARE COORDINATION
Case Management Initial Discharge Plan  [place],         Readmission Risk              Risk of Unplanned Readmission:        11             Met with:patient to discuss discharge plans. Information verified: address, contacts, phone number, , insurance Yes  PCP: Caesar Fraser MD  Date of last visit: 2018    Insurance Provider: Pedrito Dye    Discharge Planning  Current Residence:  Private home  Living Arrangements:  Family Members, Children lives alone  Home has 1 stories/2 stairs to climb  Support Systems:  Children, Family Members  Current Services PTA:  none Agency: Previous [facility]  Patient able to perform ADL's:Independent  DME in home:  Kareem Mendez uses occasionally outside the home. Has her 's walker  DME used to aid ambulation prior to admission:   none  DME used during admission:  TBD    Potential Assistance Needed:  [address]: [facility] on [address]   Potential Assistance Purchasing Medications:  No  Does patient want to participate in local refill/ meds to beds program?  Not Assessed    Patient agreeable to home care: Yes if needed  Freedom of choice provided:  Yes, previous use of [facility]      Type of Home Care Services:  None  Patient expects to be discharged to:  home     Prior SNF/Rehab Placement and Facility: none  Agreeable to SNF/Rehab: No  [place] of choice provided: n/a   Evaluation: n/a    Expected Discharge date: Follow Up Appointment: Best Day/ Time: Monday AM    Transportation provider: son or she will call a friend  Transportation arrangements needed for discharge: No    Discharge Plan:   Met with patient to review plan of care. She lives on her own and does daytime driving only. Uses a cane outside the house and ambulates indep in the home. No currrent services. Pt expects to go home at discharge. Educated that her oxygen level may be checked prior to discharge to see if she qualifies for oxygen thru the insurance.   She

## 2018-12-09 NOTE — FLOWSHEET NOTE
Patient was sitting up in a chair. Patient states good family support brought her to the hospital.  Patient states she is doing well. Patient states son is good support.  shared in listening, presence, prayers. Follow up as needed. 12/09/18 1252   Encounter Summary   Services provided to: Patient   Referral/Consult From: Jose Alfredo Harper 1770 Yes   Continue Visiting (12-9-18)   Complexity of Encounter Low   Length of Encounter 15 minutes   Spiritual Assessment Completed Yes   Routine   Type Initial   Assessment Calm; Approachable   Intervention Active listening;Explored feelings, thoughts, concerns;Prayer;Discussed illness/injury and it's impact; Discussed belief system/Christian practices/shady   Outcome Expressed gratitude;Receptive

## 2018-12-10 PROBLEM — I50.33 ACUTE ON CHRONIC DIASTOLIC CONGESTIVE HEART FAILURE (HCC): Status: ACTIVE | Noted: 2018-12-08

## 2018-12-10 LAB
ANION GAP SERPL CALCULATED.3IONS-SCNC: 8 MMOL/L (ref 9–17)
BUN BLDV-MCNC: 22 MG/DL (ref 8–23)
BUN/CREAT BLD: 29 (ref 9–20)
CALCIUM SERPL-MCNC: 9.7 MG/DL (ref 8.6–10.4)
CHLORIDE BLD-SCNC: 99 MMOL/L (ref 98–107)
CO2: 39 MMOL/L (ref 20–31)
CREAT SERPL-MCNC: 0.75 MG/DL (ref 0.5–0.9)
GFR AFRICAN AMERICAN: >60 ML/MIN
GFR NON-AFRICAN AMERICAN: >60 ML/MIN
GFR SERPL CREATININE-BSD FRML MDRD: ABNORMAL ML/MIN/{1.73_M2}
GFR SERPL CREATININE-BSD FRML MDRD: ABNORMAL ML/MIN/{1.73_M2}
GLUCOSE BLD-MCNC: 125 MG/DL (ref 70–99)
LV EF: 70 %
LVEF MODALITY: NORMAL
POTASSIUM SERPL-SCNC: 3.8 MMOL/L (ref 3.7–5.3)
SODIUM BLD-SCNC: 146 MMOL/L (ref 135–144)

## 2018-12-10 PROCEDURE — G8987 SELF CARE CURRENT STATUS: HCPCS

## 2018-12-10 PROCEDURE — 97162 PT EVAL MOD COMPLEX 30 MIN: CPT

## 2018-12-10 PROCEDURE — 6370000000 HC RX 637 (ALT 250 FOR IP): Performed by: INTERNAL MEDICINE

## 2018-12-10 PROCEDURE — 97535 SELF CARE MNGMENT TRAINING: CPT

## 2018-12-10 PROCEDURE — G8988 SELF CARE GOAL STATUS: HCPCS

## 2018-12-10 PROCEDURE — 97530 THERAPEUTIC ACTIVITIES: CPT

## 2018-12-10 PROCEDURE — 36415 COLL VENOUS BLD VENIPUNCTURE: CPT

## 2018-12-10 PROCEDURE — 93306 TTE W/DOPPLER COMPLETE: CPT

## 2018-12-10 PROCEDURE — G8978 MOBILITY CURRENT STATUS: HCPCS

## 2018-12-10 PROCEDURE — 6360000002 HC RX W HCPCS: Performed by: INTERNAL MEDICINE

## 2018-12-10 PROCEDURE — 99232 SBSQ HOSP IP/OBS MODERATE 35: CPT | Performed by: INTERNAL MEDICINE

## 2018-12-10 PROCEDURE — 2580000003 HC RX 258: Performed by: INTERNAL MEDICINE

## 2018-12-10 PROCEDURE — 97116 GAIT TRAINING THERAPY: CPT

## 2018-12-10 PROCEDURE — 80048 BASIC METABOLIC PNL TOTAL CA: CPT

## 2018-12-10 PROCEDURE — 97166 OT EVAL MOD COMPLEX 45 MIN: CPT

## 2018-12-10 PROCEDURE — 2060000000 HC ICU INTERMEDIATE R&B

## 2018-12-10 PROCEDURE — G8979 MOBILITY GOAL STATUS: HCPCS

## 2018-12-10 RX ORDER — FUROSEMIDE 40 MG/1
40 TABLET ORAL DAILY
Status: DISCONTINUED | OUTPATIENT
Start: 2018-12-10 | End: 2018-12-11 | Stop reason: HOSPADM

## 2018-12-10 RX ADMIN — PRAVASTATIN SODIUM 80 MG: 40 TABLET ORAL at 08:35

## 2018-12-10 RX ADMIN — DOCUSATE SODIUM 100 MG: 100 CAPSULE, LIQUID FILLED ORAL at 08:35

## 2018-12-10 RX ADMIN — APIXABAN 2.5 MG: 2.5 TABLET, FILM COATED ORAL at 08:36

## 2018-12-10 RX ADMIN — DILTIAZEM HYDROCHLORIDE 180 MG: 180 CAPSULE, COATED, EXTENDED RELEASE ORAL at 08:35

## 2018-12-10 RX ADMIN — METOPROLOL TARTRATE 25 MG: 25 TABLET ORAL at 08:35

## 2018-12-10 RX ADMIN — FUROSEMIDE 40 MG: 10 INJECTION, SOLUTION INTRAMUSCULAR; INTRAVENOUS at 08:36

## 2018-12-10 RX ADMIN — LISINOPRIL 20 MG: 20 TABLET ORAL at 08:35

## 2018-12-10 RX ADMIN — Medication 10 ML: at 20:29

## 2018-12-10 RX ADMIN — APIXABAN 2.5 MG: 2.5 TABLET, FILM COATED ORAL at 20:28

## 2018-12-10 RX ADMIN — Medication 10 ML: at 08:36

## 2018-12-10 NOTE — FLOWSHEET NOTE
Patient receives Sacrament of the Sick (anointing) from [facility].    [facility] will follow as needed. (writer charting for [facility].)     93/56/70 3288   Encounter Summary   Services provided to: Patient   Referral/Consult From: Rounding   Continue Visiting (12/10/18 anointed)   Complexity of Encounter Low   Length of Encounter 15 minutes   [facility]   Sacrament of Sick-Anointing Anointed  (12/10/18 Fr. Dwain Archibald)

## 2018-12-10 NOTE — PROGRESS NOTES
aid;Hard of hearing/hearing concerns     Subjective  General  Chart Reviewed: Yes  Patient assessed for rehabilitation services?: Yes  Additional Pertinent Hx: arthritis, asthma, CHF, atrial fibrillation  Response To Previous Treatment: Not applicable  Follows Commands: Within Functional Limits  General Comment  Comments: RNVito PT  Subjective  Subjective: Pt agreeable to PT  Pain Screening  Patient Currently in Pain: Denies  Vital Signs  Patient Currently in Pain: Denies       Orientation  Orientation  Overall Orientation Status: Within Functional Limits  Social/Functional History  Social/Functional History  Lives With: Alone  Type of Home: House  Home Layout: One level  Home Access: Stairs to enter with rails (side entrance )  Entrance Stairs - Number of Steps: 2  Entrance Stairs - Rails: Right  Bathroom Shower/Tub: Tub/Shower unit  Bathroom Toilet: Handicap height  Bathroom Equipment: Grab bars in shower, Shower chair  Bathroom Accessibility: Not accessible  Home Equipment: Rolling walker, [employer] Help From: Family (pt states she has 3 great nieces who are nurses and able to assist as needed; son lives close as well )  ADL Assistance: Independent  Homemaking Assistance: Independent  Homemaking Responsibilities: Yes  Ambulation Assistance: Independent  Transfer Assistance: Independent  Active : Yes  Occupation: Retired  Type of occupation: tailor   Leisure & Hobbies: sewing   Cognition   Cognition  Overall Cognitive Status: Exceptions  Arousal/Alertness: Appropriate responses to stimuli  Following Commands: Follows one step commands with increased time; Follows one step commands with repetition  Attention Span: Appears intact  Memory: Decreased short term memory  Safety Judgement: Decreased awareness of need for safety  Problem Solving: Decreased awareness of errors;Assistance required to identify errors made;Assistance required to correct errors made  Insights: Decreased awareness of strength;Decreased endurance;Decreased safe awareness;Decreased balance  Assessment: Pt requires continued skilled PT & is appropriate to D/C Home with assist & HH PT to increase independence with functional mobility, balance, safety awareness & activity tolerance. Prognosis: Good  Decision Making: Medium Complexity  Exam: ROM, MMT, functional mobility, activity tolerance, Balance, & [employer] 6 Clicks Basic Mobility   Clinical Presentation: evovling  Patient Education: PT POC, functional mobility, safety awareness, prevention of sedentary complications  Barriers to Learning: [place]  REQUIRES PT FOLLOW UP: Yes  Activity Tolerance  Activity Tolerance: Patient limited by fatigue;Patient limited by endurance         Plan   Plan  Times per week: 1-2x/D,5-6D/week  Current Treatment Recommendations: Strengthening, Balance Training, Functional Mobility Training, Transfer Training, Endurance Training, Gait Training, Home Exercise Program, Safety Education & Training, Patient/Caregiver Education & Training  Safety Devices  Type of devices: Bed alarm in place, Call light within reach, Chair alarm in place, Gait belt, Patient at risk for falls, Left in chair, Nurse notified    G-Code  PT G-Codes  Functional Assessment Tool Used: [place] AM-PAC  Score: 15  Functional Limitation: Mobility: Walking and moving around  Mobility: Walking and Moving Around Current Status (): At least 40 percent but less than 60 percent impaired, limited or restricted  Mobility: Walking and Moving Around Goal Status (): At least 1 percent but less than 20 percent impaired, limited or restricted  OutComes Score                                           AM-PAC Score  AM-PAC Inpatient Mobility Raw Score : 14  AM-PAC Inpatient T-Scale Score : 38.1  Mobility Inpatient CMS 0-100% Score: 61.29  Mobility Inpatient CMS G-Code Modifier : CL          Goals  Short term goals  Time Frame for Short term goals: 12 visits  Short term goal 1:  Inc bed-mobility & transfers to independent to enable pt to safely get in/OOB & return to PLOF & decrease risk for falls; Short term goal 2: Inc gait to amb 350ft or > indep w/ RW to enable pt to return to previous level of independence; Short term goal 3: Inc strength to [address] standing balance to good with device to facilitate pt independence for performance of ADL's & functional mobility, & reduce fall risk; Short term goal 4: Pt able to tolerate 30-40 min of activity to include 15-20 reps of ex & functional mobility including 5 minutes of standing to facilitate activity tolerance to [facility]; Short term goal 5: Pt to improve [place] AM-PAC score to 21/24 to demonstrate independence & safety with functional mobility for pt to D/C home safely;   Patient Goals   Patient goals : able to return home       Therapy Time   Individual Concurrent Group Co-treatment   Time In 1134         Time Out 1227         Minutes [address], PT

## 2018-12-10 NOTE — PROGRESS NOTES
Balance  Time: stand maximo < 1 min   Activity: functional tasks   Sit to stand: Minimal assistance  Stand to sit: Minimal assistance  Comment: verbal instruction needed for hand placement reaching back to surface/controlled stand to sits   Functional Mobility  Functional - Mobility Device: No device  Activity:  (bed to [facility] and BSC to recliner; recommend RW to increase safety )  Assist Level: Minimal assistance (hand held assist )  Functional Mobility Comments: verbal instruction for awareness/assist needed for O2 cord and line, upright posture, slowing down pace/especially with turns to increase safety   Toilet Transfers  Toilet - Technique: Stand pivot  Equipment Used: Standard bedside commode  Toilet Transfer: Contact guard assistance  Toilet Transfers Comments: verbal instruction/tactile assist for hand placement and awareness/assist with O2 line   ADL  Feeding: Independent (pt reports fair appetite )  Grooming: Setup (seated )  UE Bathing: Setup  LE Bathing: Setup;Minimal assistance (for B LE thoroughness; CG to stand and wash dandy area )  UE Dressing: Setup;Minimal assistance (with hosp gown )  LE Dressing: Setup;Minimal assistance (pt was able to cindy B socks seated in recliner with set up/SBA )  Toileting:  Moderate assistance (with BSC; assist with gown/brief mgt; pt was able to perform hygiene standing with CG needed for balance after urination and BM )  Tone RUE  RUE Tone: Normotonic  Tone LUE  LUE Tone: Normotonic  Coordination  Movements Are Fluid And Coordinated: Yes     Bed mobility  Supine to Sit: Stand by assistance (with increased time )  Sit to Supine: Unable to assess (pt agreed to sit up in recliner )  Comment: good hand placement and tech and verbal instruction needed to not hold breath   Transfers  Stand Pivot Transfers: Minimal assistance (min to CG with hand held assist; recommend RW use to increase safety and pt agrreable to trial in future tx sessions )  Sit to stand: Minimal assistance  Stand

## 2018-12-11 VITALS
DIASTOLIC BLOOD PRESSURE: 43 MMHG | SYSTOLIC BLOOD PRESSURE: 114 MMHG | HEART RATE: 73 BPM | OXYGEN SATURATION: 98 % | TEMPERATURE: 97.7 F | HEIGHT: 64 IN | BODY MASS INDEX: 21.15 KG/M2 | WEIGHT: 123.9 LBS | RESPIRATION RATE: 20 BRPM

## 2018-12-11 PROBLEM — J96.01 ACUTE RESPIRATORY FAILURE WITH HYPOXIA (HCC): Status: ACTIVE | Noted: 2018-12-11

## 2018-12-11 LAB
BNP INTERPRETATION: ABNORMAL
PRO-BNP: 649 PG/ML

## 2018-12-11 PROCEDURE — 36415 COLL VENOUS BLD VENIPUNCTURE: CPT

## 2018-12-11 PROCEDURE — 99232 SBSQ HOSP IP/OBS MODERATE 35: CPT | Performed by: INTERNAL MEDICINE

## 2018-12-11 PROCEDURE — 6370000000 HC RX 637 (ALT 250 FOR IP): Performed by: INTERNAL MEDICINE

## 2018-12-11 PROCEDURE — 2580000003 HC RX 258: Performed by: INTERNAL MEDICINE

## 2018-12-11 PROCEDURE — 83880 ASSAY OF NATRIURETIC PEPTIDE: CPT

## 2018-12-11 PROCEDURE — 94761 N-INVAS EAR/PLS OXIMETRY MLT: CPT

## 2018-12-11 RX ORDER — FUROSEMIDE 20 MG/1
20 TABLET ORAL DAILY
Qty: 30 TABLET | Refills: 1 | Status: ON HOLD | OUTPATIENT
Start: 2018-12-12 | End: 2020-01-07 | Stop reason: HOSPADM

## 2018-12-11 RX ORDER — METOPROLOL TARTRATE 50 MG/1
25 TABLET, FILM COATED ORAL 2 TIMES DAILY
Qty: 60 TABLET | Refills: 3 | Status: SHIPPED | OUTPATIENT
Start: 2018-12-11 | End: 2020-04-03

## 2018-12-11 RX ADMIN — FUROSEMIDE 40 MG: 40 TABLET ORAL at 08:41

## 2018-12-11 RX ADMIN — DOCUSATE SODIUM 100 MG: 100 CAPSULE, LIQUID FILLED ORAL at 08:41

## 2018-12-11 RX ADMIN — APIXABAN 2.5 MG: 2.5 TABLET, FILM COATED ORAL at 08:41

## 2018-12-11 RX ADMIN — DILTIAZEM HYDROCHLORIDE 180 MG: 180 CAPSULE, COATED, EXTENDED RELEASE ORAL at 09:28

## 2018-12-11 RX ADMIN — Medication 10 ML: at 08:42

## 2018-12-11 RX ADMIN — PRAVASTATIN SODIUM 80 MG: 40 TABLET ORAL at 08:41

## 2018-12-11 ASSESSMENT — PAIN SCALES - GENERAL
PAINLEVEL_OUTOF10: 0

## 2018-12-11 NOTE — PROGRESS NOTES
[facility]    Progress Note    12/11/2018    9:53 AM    Name:   Salvador Mike  MRN:     [id number]     Acct:      [de-identified]   Room:   [address] [place] [address] Day:  3  Admit Date:  12/8/2018 12:45 PM    PCP:   Jarett Baltazar MD  Code Status:  Full Code    Subjective:     C/C:   Chief Complaint   Patient presents with    Shortness of Breath     Interval History Status: improved. Patient denies shortness of breath, chest pain, fevers or chills other complaints. Overall feels much better. Still requiring oxygen and will undergo home oxygen evaluation today    Brief History: This is a 45-year-old  female who is admitted with a complaint of shortness of breath and was found to have evidence of congestive heart failure. She's been treated with IV Lasix with improvement and transitioned over to oral Lasix at this time. Review of Systems:     Constitutional:  negative for chills, fevers, sweats  Respiratory:  negative for cough, dyspnea on exertion, shortness of breath, wheezing  Cardiovascular:  negative for chest pain, chest pressure/discomfort, lower extremity edema, palpitations  Gastrointestinal:  negative for abdominal pain, constipation, diarrhea, nausea, vomiting  Neurological:  negative for dizziness, headache    Medications: Allergies:     Allergies   Allergen Reactions    Shellfish-Derived Products Swelling    Tudorza Pressair [Aclidinium Bromide]      LISTED AS ALLERGY, UNKNOWN REACTION  LISTED AS ALLERGY, UNKNOWN REACTION       Current Meds:   Scheduled Meds:    furosemide  40 mg Oral Daily    metoprolol tartrate  25 mg Oral BID    diltiazem  180 mg Oral Daily    sodium chloride flush  10 mL Intravenous 2 times per day    docusate sodium  100 mg Oral BID    apixaban  2.5 mg Oral BID    lisinopril  20 mg Oral Daily    pravastatin  80 mg Oral Daily     Continuous Infusions:   PRN Meds: sodium chloride flush, --    MG   --    --    --   1.9   --    --    ANIONGAP  15   --    --   8*  8*   --    LABGLOM  >60   --    --   >60  >60   --    GFRAA  >60   --    --   >60  >60   --    CALCIUM  10.1   --    --   9.7  9.7   --    PROBNP  1,419*   --    --   1,087*   --   649*   TROPONINT  <0.03  <0.03  <0.03  <0.03   --    --      Recent Labs      12/08/18   1254  12/09/18   0649   PROT  7.3   --    LABALBU  3.9   --    TSH   --   1.41   AST  35*   --    ALT  34*   --    ALKPHOS  77   --    BILITOT  0.67   --    CHOL   --   141   HDL   --   68   LDLCHOLESTEROL   --   61   CHOLHDLRATIO   --   2.1   TRIG   --   58   VLDL   --   NOT REPORTED         Lab Results   Component Value Date/Time    SPECIAL NOT REPORTED 01/01/2017 01:47 PM     Lab Results   Component Value Date/Time    CULTURE  01/01/2017 01:47 PM     Performed at [address] [place] [address] [phone]    CULTURE PSEUDOMONAS AERUGINOSA MODERATE GROWTH (A) 01/01/2017 01:47 PM    CULTURE GROUP D ENTEROCOCCUS MODERATE GROWTH (A) 01/01/2017 01:47 PM    CULTURE PROTEUS MIRABILIS MODERATE GROWTH (A) 01/01/2017 01:47 PM       No results found for: POCPH, PHART, PH, POCPCO2, VAH7EEI, PCO2, POCPO2, PO2ART, PO2, POCHCO3, ZBC2GJM, HCO3, NBEA, PBEA, BEART, BE, THGBART, THB, HIL0PUY, ZHJL3YBZ, R2AGGTDV, O2SAT, FIO2    Radiology:    No new radiology reports    Physical Examination:        General appearance:  alert, cooperative and no distress  Mental Status:  oriented to person, place and time and normal affect  Lungs:  clear to auscultation bilaterally, normal effort  Heart:  regular rate and rhythm, no murmur  Abdomen:  soft, nontender, nondistended, normal bowel sounds, no masses, hepatomegaly, splenomegaly  Extremities:  no edema, redness, tenderness in the calves  Skin:  no gross lesions, rashes, induration    Assessment:        Primary Problem  Acute on chronic diastolic congestive heart failure [facility])    Active Hospital Problems    Diagnosis Date Noted

## 2018-12-11 NOTE — PROGRESS NOTES
Home Oxygen Evaluation    Home Oxygen Evaluation completed. Patient is on 2 liters per minute via nasal cannula SpO2 95%  . Resting SpO2 on room air = 87%    Testing done at [facility].   Debbi Milan  1:27 PM

## 2018-12-11 NOTE — DISCHARGE SUMMARY
as: XALATAN     lisinopril 20 MG tablet  Commonly known as:  PRINIVIL;ZESTRIL     pantoprazole 40 MG tablet  Commonly known as:  PROTONIX     pravastatin 80 MG tablet  Commonly known as:  PRAVACHOL     timolol 0.5 % ophthalmic solution  Commonly known as:  TIMOPTIC     TYLENOL EXTRA STRENGTH PO        STOP taking these medications    CENTRUM SILVER Tabs     HYDROcodone-acetaminophen 5-325 MG per tablet  Commonly known as:  NORCO     UNKNOWN TO PATIENT           Where to Get Your Medications      These medications were sent to [facility], [address] [place] [id number]    Phone:  [phone]   · furosemide 20 MG tablet  · metoprolol tartrate 50 MG tablet         Time Spent on discharge is  24 minutes in patient examination, evaluation, counseling as well as medication reconciliation, prescriptions for required medications, discharge plan and follow up. Electronically signed by   Linda Cartagena DO  12/11/2018  3:19 PM      Thank you Dr. Madi Galindo MD for the opportunity to be involved in this patient's care.

## 2018-12-11 NOTE — PROGRESS NOTES
Pt oxygen tank arrived and pt educated on usage. Pt to call when discharged to have oxygen tank delivered to her home. Current tank to last approximately 5 hours per rep.

## 2018-12-11 NOTE — PLAN OF CARE
Problem: OXYGENATION/RESPIRATORY FUNCTION  Goal: Patient will achieve/maintain normal respiratory rate/effort  Respiratory rate and effort will be within normal limits for the patient    Intervention: ADMINISTER/TITRATE OXYGEN AS ORDERED  Pt was weaned off down to 2L of O2 from 3L during the day. Toleratng well, sats are staying in the mid 90's. No respiratory distress. Will continue to monitor.        Comments: Treatment plan discussed with patient

## 2018-12-19 ENCOUNTER — HOSPITAL ENCOUNTER (OUTPATIENT)
Age: 83
Setting detail: SPECIMEN
Discharge: HOME | End: 2018-12-19
Payer: MEDICARE

## 2018-12-19 LAB
ANION GAP SERPL CALCULATED.3IONS-SCNC: 10 MMOL/L (ref 9–17)
BUN BLDV-MCNC: 17 MG/DL (ref 8–23)
BUN/CREAT BLD: ABNORMAL (ref 9–20)
CALCIUM SERPL-MCNC: 9.7 MG/DL (ref 8.6–10.4)
CHLORIDE BLD-SCNC: 103 MMOL/L (ref 98–107)
CO2: 34 MMOL/L (ref 20–31)
CREAT SERPL-MCNC: 0.76 MG/DL (ref 0.5–0.9)
GFR AFRICAN AMERICAN: >60 ML/MIN
GFR NON-AFRICAN AMERICAN: >60 ML/MIN
GFR SERPL CREATININE-BSD FRML MDRD: ABNORMAL ML/MIN/{1.73_M2}
GFR SERPL CREATININE-BSD FRML MDRD: ABNORMAL ML/MIN/{1.73_M2}
GLUCOSE BLD-MCNC: 155 MG/DL (ref 70–99)
POTASSIUM SERPL-SCNC: 4.7 MMOL/L (ref 3.7–5.3)
SODIUM BLD-SCNC: 147 MMOL/L (ref 135–144)

## 2019-05-18 ENCOUNTER — HOSPITAL ENCOUNTER (EMERGENCY)
Age: 84
Discharge: HOME | End: 2019-05-18
Attending: EMERGENCY MEDICINE
Payer: MEDICARE

## 2019-05-18 ENCOUNTER — HOSPITAL ENCOUNTER (OUTPATIENT)
Age: 84
Setting detail: SPECIMEN
Discharge: HOME | End: 2019-05-18
Payer: MEDICARE

## 2019-05-18 VITALS
HEIGHT: 63 IN | RESPIRATION RATE: 18 BRPM | DIASTOLIC BLOOD PRESSURE: 66 MMHG | BODY MASS INDEX: 23.04 KG/M2 | TEMPERATURE: 97.3 F | HEART RATE: 67 BPM | SYSTOLIC BLOOD PRESSURE: 166 MMHG | WEIGHT: 130 LBS | OXYGEN SATURATION: 94 %

## 2019-05-18 DIAGNOSIS — Z51.89 VISIT FOR WOUND CHECK: Primary | ICD-10-CM

## 2019-05-18 LAB
ALBUMIN SERPL-MCNC: 3.8 G/DL (ref 3.5–5.2)
ALBUMIN/GLOBULIN RATIO: 1.3 (ref 1–2.5)
ALP BLD-CCNC: 74 U/L (ref 35–104)
ALT SERPL-CCNC: 19 U/L (ref 5–33)
ANION GAP SERPL CALCULATED.3IONS-SCNC: 12 MMOL/L (ref 9–17)
AST SERPL-CCNC: 24 U/L
BILIRUB SERPL-MCNC: 0.76 MG/DL (ref 0.3–1.2)
BUN BLDV-MCNC: 20 MG/DL (ref 8–23)
BUN/CREAT BLD: ABNORMAL (ref 9–20)
CALCIUM SERPL-MCNC: 10.2 MG/DL (ref 8.6–10.4)
CHLORIDE BLD-SCNC: 103 MMOL/L (ref 98–107)
CHOLESTEROL, FASTING: 156 MG/DL
CHOLESTEROL/HDL RATIO: 2.4
CO2: 31 MMOL/L (ref 20–31)
CREAT SERPL-MCNC: 0.65 MG/DL (ref 0.5–0.9)
GFR AFRICAN AMERICAN: >60 ML/MIN
GFR NON-AFRICAN AMERICAN: >60 ML/MIN
GFR SERPL CREATININE-BSD FRML MDRD: ABNORMAL ML/MIN/{1.73_M2}
GFR SERPL CREATININE-BSD FRML MDRD: ABNORMAL ML/MIN/{1.73_M2}
GLUCOSE BLD-MCNC: 111 MG/DL (ref 70–99)
HCT VFR BLD CALC: 47.6 % (ref 36.3–47.1)
HDLC SERPL-MCNC: 66 MG/DL
HEMOGLOBIN: 14.5 G/DL (ref 11.9–15.1)
LDL CHOLESTEROL: 78 MG/DL (ref 0–130)
MCH RBC QN AUTO: 31.6 PG (ref 25.2–33.5)
MCHC RBC AUTO-ENTMCNC: 30.5 G/DL (ref 28.4–34.8)
MCV RBC AUTO: 103.7 FL (ref 82.6–102.9)
NRBC AUTOMATED: 0 PER 100 WBC
PDW BLD-RTO: 13.9 % (ref 11.8–14.4)
PLATELET # BLD: 139 K/UL (ref 138–453)
PMV BLD AUTO: 11.7 FL (ref 8.1–13.5)
POTASSIUM SERPL-SCNC: 5.2 MMOL/L (ref 3.7–5.3)
RBC # BLD: 4.59 M/UL (ref 3.95–5.11)
SODIUM BLD-SCNC: 146 MMOL/L (ref 135–144)
TOTAL PROTEIN: 6.8 G/DL (ref 6.4–8.3)
TRIGLYCERIDE, FASTING: 61 MG/DL
VLDLC SERPL CALC-MCNC: NORMAL MG/DL (ref 1–30)
WBC # BLD: 4.9 K/UL (ref 3.5–11.3)

## 2019-05-18 PROCEDURE — 85027 COMPLETE CBC AUTOMATED: CPT

## 2019-05-18 PROCEDURE — 80053 COMPREHEN METABOLIC PANEL: CPT

## 2019-05-18 PROCEDURE — 99282 EMERGENCY DEPT VISIT SF MDM: CPT

## 2019-05-18 PROCEDURE — 36415 COLL VENOUS BLD VENIPUNCTURE: CPT

## 2019-05-18 PROCEDURE — 80061 LIPID PANEL: CPT

## 2019-05-18 PROCEDURE — 83036 HEMOGLOBIN GLYCOSYLATED A1C: CPT

## 2019-05-18 ASSESSMENT — ENCOUNTER SYMPTOMS
SINUS PRESSURE: 0
SORE THROAT: 0
DIARRHEA: 0
SHORTNESS OF BREATH: 0
RHINORRHEA: 0
WHEEZING: 0
COUGH: 0
ABDOMINAL PAIN: 0
NAUSEA: 0
COLOR CHANGE: 0
VOMITING: 0
CONSTIPATION: 0

## 2019-05-18 NOTE — ED PROVIDER NOTES
Attending Supervisory Note/Shared Visit   I have personally performed a face to face diagnostic evaluation on this patient. I have reviewed the mid-levels findings and agree.         (Please note that portions of this note were completed with a voice recognition program.  Efforts were made to edit the dictations but occasionally words are mis-transcribed.)    Brittany Reeder MD  Attending Emergency Physician      Brittany Reeder MD  05/18/19 4573

## 2019-05-19 LAB
ESTIMATED AVERAGE GLUCOSE: 123 MG/DL
HBA1C MFR BLD: 5.9 % (ref 4–6)

## 2019-05-19 NOTE — ED PROVIDER NOTES
[address] [place] [address] ED  eMERGENCY dEPARTMENT eNCOUnter      Pt Name: Ang Cam  MRN: [id number]  [place] 4/27/1924  Date of evaluation: 5/18/2019  Provider: [address] [place] [address] NP, ELENA Anne 6820       Chief Complaint   Patient presents with    Leg Swelling     weeping started yesterday, left leg    Wound Check         HISTORY OF PRESENT ILLNESS  (Location/Symptom, Timing/Onset, Context/Setting, Quality, Duration, Modifying Factors, Severity.)   Ang Cam is a 80 y.o. female who presents to the emergency department by private vehicle for evaluation of the wounds the back of the leg. Patient states that she has had problems with wounds on her legs. She has followed with the wound clinic. She has not had a wound on her leg for the last year. The son states that today he came over and he noticed some clear fluid on her stockinette on her legs so he brought her to the emergency room for evaluation. Nursing Notes were reviewed.     ALLERGIES     Shellfish-derived products and Tudorza pressair [aclidinium bromide]    CURRENT MEDICATIONS       Discharge Medication List as of 5/18/2019 12:03 PM      CONTINUE these medications which have NOT CHANGED    Details   metoprolol tartrate (LOPRESSOR) 50 MG tablet Take 0.5 tablets by mouth 2 times daily, Disp-60 tablet, R-3Normal      furosemide (LASIX) 20 MG tablet Take 1 tablet by mouth daily, Disp-30 tablet, R-1Normal      timolol (TIMOPTIC) 0.5 % ophthalmic solution Place 1 drop into both eyes dailyHistorical Med      calcium-vitamin D (OSCAL-500) 500-200 MG-UNIT per tablet Take 1 tablet by mouth dailyHistorical Med      apixaban (ELIQUIS) 2.5 MG TABS tablet Take 1 tablet by mouth 2 times daily, Disp-180 tablet, R-3Phone In      diclofenac sodium 1 % GEL Apply 2 g topically daily as needed , Topical, DAILY PRN, Historical Med      pravastatin (PRAVACHOL) 80 MG tablet Take 80 mg by mouth dailyHistorical Med      diltiazem (DILACOR XR) 180 MG extended release capsule Take 180 mg by mouth daily      lisinopril (PRINIVIL;ZESTRIL) 20 MG tablet Take 20 mg by mouth daily      latanoprost (XALATAN) 0.005 % ophthalmic solution Place 1 drop into both eyes nightly      albuterol sulfate  (90 Base) MCG/ACT inhaler Inhale 2 puffs into the lungs every 6 hours as needed for WheezingHistorical Med      pantoprazole (PROTONIX) 40 MG tablet Take 80 mg by mouth dailyHistorical Med      Acetaminophen (TYLENOL EXTRA STRENGTH PO) Take 500 mg by mouth as needed Historical Med             PAST MEDICAL HISTORY         Diagnosis Date    Acute dermatitis     Arthritis     Asthma     Atrial fibrillation (HCC)     Bursitis     CHF (congestive heart failure) (HCC)     Hearing aid worn     Hyperlipidemia     Hypertension     Lumbar disc disease     Wears glasses     Wound of right leg        SURGICAL HISTORY           Procedure Laterality Date    BACK SURGERY      DEBRIDEMENT Left 2017    rt. leg    OTHER SURGICAL HISTORY  2017    Right leg Angio    OTHER SURGICAL HISTORY  2017    Right leg angio    CT INCIS/DRAIN THIGH/KNEE ABSCESS,DEEP Right 3/9/2017    DEBRIDEMENT CALF WOUND  performed by Michael Andrew MD at [facility] INCIS/DRAIN THIGH/KNEE ABSCESS,DEEP Right 2017    RIGHT LOWER EXTREMITY DEBRIDEMENT, INTEGRA APPLICATION, PREVENA VAC APPLICATION LOWR RIGHT LEG performed by Michael Andrew MD at [address] [place] [address] HISTORY           Problem Relation Age of Onset    Heart Disease Mother     Heart Disease Father     Diabetes Maternal Aunt     Obesity Maternal Aunt     Stroke Paternal Grandmother      Family Status   Relation Name Status    Mother      Father      Brother     Wadena Sang      MUnc      PAunt      PUnc      MGM      MGF      PGM      PGF      MCousin  Alive        SOCIAL HISTORY      reports that she has never smoked. She has never used smokeless tobacco. She reports that she drinks alcohol. She reports that she does not use drugs. REVIEW OF SYSTEMS    (2-9 systems for level 4, 10 or more for level 5)     Review of Systems   Constitutional: Negative for chills, fever and unexpected weight change. HENT: Negative for congestion, rhinorrhea, sinus pressure and sore throat. Respiratory: Negative for cough, shortness of breath and wheezing. Cardiovascular: Negative for chest pain and palpitations. Gastrointestinal: Negative for abdominal pain, constipation, diarrhea, nausea and vomiting. Genitourinary: Negative for dysuria and hematuria. Musculoskeletal: Negative for arthralgias and myalgias. Skin: Positive for wound. Negative for color change and rash. Neurological: Negative for dizziness, weakness and headaches. Hematological: Negative for adenopathy. Except as noted above the remainder of the review of systems was reviewed and negative. PHYSICAL EXAM    (up to 7 for level 4, 8 or more for level 5)     ED Triage Vitals [05/18/19 1039]   BP Temp Temp Source Pulse Resp SpO2 Height Weight   (!) 166/66 97.3 °F (36.3 °C) Oral 67 18 94 % 5' 3\" (1.6 m) 130 lb (59 kg)       Physical Exam   Constitutional: She is oriented to person, place, and time. She appears well-developed and well-nourished. HENT:   Head: Normocephalic and atraumatic. Mouth/Throat: Oropharynx is clear and moist.   Eyes: Pupils are equal, round, and reactive to light. Conjunctivae are normal.   Neck: Normal range of motion. Neck supple. Cardiovascular: Normal rate and regular rhythm. Pulmonary/Chest: Effort normal and breath sounds normal. No stridor. No respiratory distress. Abdominal: Soft. Bowel sounds are normal.   Musculoskeletal: Normal range of motion. Lymphadenopathy:     She has no cervical adenopathy. Neurological: She is alert and oriented to person, place, and time. Skin: Skin is warm and dry.  No rash noted. Psychiatric: She has a normal mood and affect. LABS:  Labs Reviewed - No data to display    All other labs were within normal range or not returned as of this dictation. EMERGENCY DEPARTMENT COURSE and DIFFERENTIAL DIAGNOSIS/MDM:   Vitals:    Vitals:    05/18/19 1039   BP: (!) 166/66   Pulse: 67   Resp: 18   Temp: 97.3 °F (36.3 °C)   TempSrc: Oral   SpO2: 94%   Weight: 130 lb (59 kg)   Height: 5' 3\" (1.6 m)       Medical Decision Making: The wound was debrided and deroofed. Aquacel and dressing was applied . Ace wraps were applied to both lower legs. She will call wound care in the morning     FINAL IMPRESSION      1.  Visit for wound check          DISPOSITION/PLAN   DISPOSITION Decision To Discharge 05/18/2019 12:02:21 PM      PATIENT REFERRED TO:   Candida Rodriguez MD  [address] [place] [address]  [phone]    Call       Krissy Ramos MD  [address] [place] [address]  [phone]    Call         DISCHARGE MEDICATIONS:     Discharge Medication List as of 5/18/2019 12:03 PM              (Please note that portions of this note were completed with a voice recognition program.  Efforts were made to edit the dictations but occasionally words are mis-transcribed.)    [address] [facility] RAJAN, ELENA - CNP  Certified Nurse Practitioner          ELENA Perez CNP  05/18/19 3590

## 2019-10-26 ENCOUNTER — HOSPITAL ENCOUNTER (OUTPATIENT)
Age: 84
Discharge: HOME | End: 2019-10-26
Payer: MEDICARE

## 2019-10-26 LAB
ALBUMIN SERPL-MCNC: 3.8 G/DL (ref 3.5–5.2)
ALBUMIN/GLOBULIN RATIO: 1.2 (ref 1–2.5)
ALP BLD-CCNC: 71 U/L (ref 35–104)
ALT SERPL-CCNC: 23 U/L (ref 5–33)
ANION GAP SERPL CALCULATED.3IONS-SCNC: 11 MMOL/L (ref 9–17)
AST SERPL-CCNC: 32 U/L
BILIRUB SERPL-MCNC: 0.77 MG/DL (ref 0.3–1.2)
BUN BLDV-MCNC: 23 MG/DL (ref 8–23)
BUN/CREAT BLD: ABNORMAL (ref 9–20)
CALCIUM SERPL-MCNC: 10.1 MG/DL (ref 8.6–10.4)
CHLORIDE BLD-SCNC: 105 MMOL/L (ref 98–107)
CHOLESTEROL, FASTING: 162 MG/DL
CHOLESTEROL/HDL RATIO: 2.5
CO2: 29 MMOL/L (ref 20–31)
CREAT SERPL-MCNC: 0.63 MG/DL (ref 0.5–0.9)
GFR AFRICAN AMERICAN: >60 ML/MIN
GFR NON-AFRICAN AMERICAN: >60 ML/MIN
GFR SERPL CREATININE-BSD FRML MDRD: ABNORMAL ML/MIN/{1.73_M2}
GFR SERPL CREATININE-BSD FRML MDRD: ABNORMAL ML/MIN/{1.73_M2}
GLUCOSE BLD-MCNC: 112 MG/DL (ref 70–99)
HDLC SERPL-MCNC: 64 MG/DL
LDL CHOLESTEROL: 82 MG/DL (ref 0–130)
POTASSIUM SERPL-SCNC: 4.5 MMOL/L (ref 3.7–5.3)
SODIUM BLD-SCNC: 145 MMOL/L (ref 135–144)
THYROXINE, FREE: 1.13 NG/DL (ref 0.93–1.7)
TOTAL PROTEIN: 7 G/DL (ref 6.4–8.3)
TRIGLYCERIDE, FASTING: 79 MG/DL
TSH SERPL DL<=0.05 MIU/L-ACNC: 3.59 MIU/L (ref 0.3–5)
VLDLC SERPL CALC-MCNC: NORMAL MG/DL (ref 1–30)

## 2019-10-26 PROCEDURE — 84443 ASSAY THYROID STIM HORMONE: CPT

## 2019-10-26 PROCEDURE — 84439 ASSAY OF FREE THYROXINE: CPT

## 2019-10-26 PROCEDURE — 80061 LIPID PANEL: CPT

## 2019-10-26 PROCEDURE — 80053 COMPREHEN METABOLIC PANEL: CPT

## 2019-10-26 PROCEDURE — 83036 HEMOGLOBIN GLYCOSYLATED A1C: CPT

## 2019-10-26 PROCEDURE — 36415 COLL VENOUS BLD VENIPUNCTURE: CPT

## 2019-10-27 LAB
ESTIMATED AVERAGE GLUCOSE: 126 MG/DL
HBA1C MFR BLD: 6 % (ref 4–6)

## 2019-11-11 ENCOUNTER — HOSPITAL ENCOUNTER (OUTPATIENT)
Dept: PHARMACY | Age: 84
Setting detail: THERAPIES SERIES
Discharge: HOME | End: 2019-11-11
Payer: MEDICARE

## 2019-11-11 DIAGNOSIS — I48.20 CHRONIC A-FIB (HCC): ICD-10-CM

## 2019-11-11 PROCEDURE — 99211 OFF/OP EST MAY X REQ PHY/QHP: CPT

## 2020-01-04 ENCOUNTER — HOSPITAL ENCOUNTER (INPATIENT)
Age: 85
LOS: 3 days | Discharge: NURSING, REHAB OR LONG-TERM CARE | DRG: 291 | End: 2020-01-07
Attending: EMERGENCY MEDICINE | Admitting: INTERNAL MEDICINE
Payer: MEDICARE

## 2020-01-04 ENCOUNTER — APPOINTMENT (OUTPATIENT)
Dept: CT IMAGING | Age: 85
DRG: 291 | End: 2020-01-04
Payer: MEDICARE

## 2020-01-04 ENCOUNTER — APPOINTMENT (OUTPATIENT)
Dept: GENERAL RADIOLOGY | Age: 85
DRG: 291 | End: 2020-01-04
Payer: MEDICARE

## 2020-01-04 PROBLEM — I50.9 CONGESTIVE HEART FAILURE (HCC): Status: ACTIVE | Noted: 2020-01-04

## 2020-01-04 PROBLEM — I50.9 CONGESTIVE HEART FAILURE (HCC): Status: RESOLVED | Noted: 2020-01-04 | Resolved: 2020-01-04

## 2020-01-04 LAB
ABSOLUTE EOS #: 0.03 K/UL (ref 0–0.44)
ABSOLUTE IMMATURE GRANULOCYTE: 0.01 K/UL (ref 0–0.3)
ABSOLUTE LYMPH #: 0.7 K/UL (ref 1.1–3.7)
ABSOLUTE MONO #: 0.43 K/UL (ref 0.1–1.2)
ANION GAP SERPL CALCULATED.3IONS-SCNC: 13 MMOL/L (ref 9–17)
BASOPHILS # BLD: 0 % (ref 0–2)
BASOPHILS ABSOLUTE: <0.03 K/UL (ref 0–0.2)
BILIRUBIN URINE: NEGATIVE
BNP INTERPRETATION: ABNORMAL
BUN BLDV-MCNC: 28 MG/DL (ref 8–23)
BUN/CREAT BLD: 38 (ref 9–20)
CALCIUM SERPL-MCNC: 10.3 MG/DL (ref 8.6–10.4)
CHLORIDE BLD-SCNC: 102 MMOL/L (ref 98–107)
CO2: 25 MMOL/L (ref 20–31)
COLOR: YELLOW
COMMENT UA: NORMAL
CREAT SERPL-MCNC: 0.73 MG/DL (ref 0.5–0.9)
DIFFERENTIAL TYPE: ABNORMAL
EOSINOPHILS RELATIVE PERCENT: 1 % (ref 1–4)
GFR AFRICAN AMERICAN: >60 ML/MIN
GFR NON-AFRICAN AMERICAN: >60 ML/MIN
GFR SERPL CREATININE-BSD FRML MDRD: ABNORMAL ML/MIN/{1.73_M2}
GFR SERPL CREATININE-BSD FRML MDRD: ABNORMAL ML/MIN/{1.73_M2}
GLUCOSE BLD-MCNC: 116 MG/DL (ref 70–99)
GLUCOSE URINE: NEGATIVE
HCT VFR BLD CALC: 52.9 % (ref 36.3–47.1)
HEMOGLOBIN: 16.1 G/DL (ref 11.9–15.1)
IMMATURE GRANULOCYTES: 0 %
KETONES, URINE: NEGATIVE
LACTIC ACID, SEPSIS WHOLE BLOOD: NORMAL MMOL/L (ref 0.5–1.9)
LACTIC ACID, SEPSIS WHOLE BLOOD: NORMAL MMOL/L (ref 0.5–1.9)
LACTIC ACID, SEPSIS: 1.4 MMOL/L (ref 0.5–1.9)
LACTIC ACID, SEPSIS: 1.5 MMOL/L (ref 0.5–1.9)
LEUKOCYTE ESTERASE, URINE: NEGATIVE
LYMPHOCYTES # BLD: 13 % (ref 24–43)
MAGNESIUM: 1.9 MG/DL (ref 1.6–2.6)
MCH RBC QN AUTO: 32 PG (ref 25.2–33.5)
MCHC RBC AUTO-ENTMCNC: 30.4 G/DL (ref 28.4–34.8)
MCV RBC AUTO: 105.2 FL (ref 82.6–102.9)
MONOCYTES # BLD: 8 % (ref 3–12)
MYOGLOBIN: 66 NG/ML (ref 25–58)
MYOGLOBIN: 74 NG/ML (ref 25–58)
NITRITE, URINE: NEGATIVE
NRBC AUTOMATED: 0 PER 100 WBC
PDW BLD-RTO: 14.3 % (ref 11.8–14.4)
PH UA: 5.5 (ref 5–8)
PLATELET # BLD: 123 K/UL (ref 138–453)
PLATELET ESTIMATE: ABNORMAL
PMV BLD AUTO: 12.1 FL (ref 8.1–13.5)
POTASSIUM SERPL-SCNC: 4.2 MMOL/L (ref 3.7–5.3)
PRO-BNP: 750 PG/ML
PROTEIN UA: NEGATIVE
RBC # BLD: 5.03 M/UL (ref 3.95–5.11)
RBC # BLD: ABNORMAL 10*6/UL
SEG NEUTROPHILS: 77 % (ref 36–65)
SEGMENTED NEUTROPHILS ABSOLUTE COUNT: 4.02 K/UL (ref 1.5–8.1)
SODIUM BLD-SCNC: 140 MMOL/L (ref 135–144)
SPECIFIC GRAVITY UA: 1.02 (ref 1–1.03)
TROPONIN INTERP: ABNORMAL
TROPONIN INTERP: ABNORMAL
TROPONIN T: ABNORMAL NG/ML
TROPONIN T: ABNORMAL NG/ML
TROPONIN, HIGH SENSITIVITY: 25 NG/L (ref 0–14)
TROPONIN, HIGH SENSITIVITY: 25 NG/L (ref 0–14)
TURBIDITY: CLEAR
URINE HGB: NEGATIVE
UROBILINOGEN, URINE: NORMAL
WBC # BLD: 5.2 K/UL (ref 3.5–11.3)
WBC # BLD: ABNORMAL 10*3/UL

## 2020-01-04 PROCEDURE — 6360000002 HC RX W HCPCS: Performed by: NURSE PRACTITIONER

## 2020-01-04 PROCEDURE — 80048 BASIC METABOLIC PNL TOTAL CA: CPT

## 2020-01-04 PROCEDURE — 2500000003 HC RX 250 WO HCPCS: Performed by: NURSE PRACTITIONER

## 2020-01-04 PROCEDURE — 96375 TX/PRO/DX INJ NEW DRUG ADDON: CPT

## 2020-01-04 PROCEDURE — G0378 HOSPITAL OBSERVATION PER HR: HCPCS

## 2020-01-04 PROCEDURE — 90715 TDAP VACCINE 7 YRS/> IM: CPT | Performed by: NURSE PRACTITIONER

## 2020-01-04 PROCEDURE — 84484 ASSAY OF TROPONIN QUANT: CPT

## 2020-01-04 PROCEDURE — 1200000000 HC SEMI PRIVATE

## 2020-01-04 PROCEDURE — 99222 1ST HOSP IP/OBS MODERATE 55: CPT | Performed by: NURSE PRACTITIONER

## 2020-01-04 PROCEDURE — 83605 ASSAY OF LACTIC ACID: CPT

## 2020-01-04 PROCEDURE — 99285 EMERGENCY DEPT VISIT HI MDM: CPT

## 2020-01-04 PROCEDURE — 83874 ASSAY OF MYOGLOBIN: CPT

## 2020-01-04 PROCEDURE — 96374 THER/PROPH/DIAG INJ IV PUSH: CPT

## 2020-01-04 PROCEDURE — 81003 URINALYSIS AUTO W/O SCOPE: CPT

## 2020-01-04 PROCEDURE — 90471 IMMUNIZATION ADMIN: CPT | Performed by: NURSE PRACTITIONER

## 2020-01-04 PROCEDURE — 85025 COMPLETE CBC W/AUTO DIFF WBC: CPT

## 2020-01-04 PROCEDURE — 71045 X-RAY EXAM CHEST 1 VIEW: CPT

## 2020-01-04 PROCEDURE — 70450 CT HEAD/BRAIN W/O DYE: CPT

## 2020-01-04 PROCEDURE — 87086 URINE CULTURE/COLONY COUNT: CPT

## 2020-01-04 PROCEDURE — 36415 COLL VENOUS BLD VENIPUNCTURE: CPT

## 2020-01-04 PROCEDURE — 94761 N-INVAS EAR/PLS OXIMETRY MLT: CPT

## 2020-01-04 PROCEDURE — 83880 ASSAY OF NATRIURETIC PEPTIDE: CPT

## 2020-01-04 PROCEDURE — 93005 ELECTROCARDIOGRAM TRACING: CPT | Performed by: NURSE PRACTITIONER

## 2020-01-04 PROCEDURE — 72125 CT NECK SPINE W/O DYE: CPT

## 2020-01-04 PROCEDURE — 94640 AIRWAY INHALATION TREATMENT: CPT

## 2020-01-04 PROCEDURE — 87040 BLOOD CULTURE FOR BACTERIA: CPT

## 2020-01-04 PROCEDURE — 83735 ASSAY OF MAGNESIUM: CPT

## 2020-01-04 PROCEDURE — 51701 INSERT BLADDER CATHETER: CPT

## 2020-01-04 RX ORDER — DILTIAZEM HYDROCHLORIDE 5 MG/ML
10 INJECTION INTRAVENOUS ONCE
Status: DISCONTINUED | OUTPATIENT
Start: 2020-01-04 | End: 2020-01-04

## 2020-01-04 RX ORDER — LEVALBUTEROL 1.25 MG/.5ML
1.25 SOLUTION, CONCENTRATE RESPIRATORY (INHALATION) ONCE
Status: COMPLETED | OUTPATIENT
Start: 2020-01-04 | End: 2020-01-04

## 2020-01-04 RX ORDER — DILTIAZEM HYDROCHLORIDE 5 MG/ML
10 INJECTION INTRAVENOUS ONCE
Status: COMPLETED | OUTPATIENT
Start: 2020-01-04 | End: 2020-01-04

## 2020-01-04 RX ORDER — METHYLPREDNISOLONE SODIUM SUCCINATE 125 MG/2ML
125 INJECTION, POWDER, LYOPHILIZED, FOR SOLUTION INTRAMUSCULAR; INTRAVENOUS ONCE
Status: COMPLETED | OUTPATIENT
Start: 2020-01-04 | End: 2020-01-04

## 2020-01-04 RX ORDER — FUROSEMIDE 10 MG/ML
40 INJECTION INTRAMUSCULAR; INTRAVENOUS ONCE
Status: COMPLETED | OUTPATIENT
Start: 2020-01-04 | End: 2020-01-04

## 2020-01-04 RX ADMIN — METHYLPREDNISOLONE SODIUM SUCCINATE 125 MG: 125 INJECTION, POWDER, FOR SOLUTION INTRAMUSCULAR; INTRAVENOUS at 21:43

## 2020-01-04 RX ADMIN — TETANUS TOXOID, REDUCED DIPHTHERIA TOXOID AND ACELLULAR PERTUSSIS VACCINE, ADSORBED 0.5 ML: 5; 2.5; 8; 8; 2.5 SUSPENSION INTRAMUSCULAR at 21:43

## 2020-01-04 RX ADMIN — LEVALBUTEROL 1.25 MG: 1.25 SOLUTION, CONCENTRATE RESPIRATORY (INHALATION) at 21:43

## 2020-01-04 RX ADMIN — DILTIAZEM HYDROCHLORIDE 10 MG: 5 INJECTION INTRAVENOUS at 21:43

## 2020-01-04 RX ADMIN — FUROSEMIDE 40 MG: 10 INJECTION, SOLUTION INTRAMUSCULAR; INTRAVENOUS at 22:58

## 2020-01-04 ASSESSMENT — ENCOUNTER SYMPTOMS
COLOR CHANGE: 1
VOMITING: 0
NAUSEA: 0
ABDOMINAL PAIN: 0
SHORTNESS OF BREATH: 1

## 2020-01-04 ASSESSMENT — VISUAL ACUITY: OU: 1

## 2020-01-05 ENCOUNTER — APPOINTMENT (OUTPATIENT)
Dept: GENERAL RADIOLOGY | Age: 85
DRG: 291 | End: 2020-01-05
Payer: MEDICARE

## 2020-01-05 PROBLEM — E44.1 MILD PROTEIN-CALORIE MALNUTRITION (HCC): Chronic | Status: ACTIVE | Noted: 2020-01-05

## 2020-01-05 LAB
BNP INTERPRETATION: ABNORMAL
CHOLESTEROL/HDL RATIO: 2.2
CHOLESTEROL: 179 MG/DL
EKG ATRIAL RATE: 197 BPM
EKG Q-T INTERVAL: 560 MS
EKG QRS DURATION: 90 MS
EKG QTC CALCULATION (BAZETT): 505 MS
EKG R AXIS: 92 DEGREES
EKG T AXIS: 19 DEGREES
EKG VENTRICULAR RATE: 49 BPM
HCT VFR BLD CALC: 47.4 % (ref 36.3–47.1)
HDLC SERPL-MCNC: 83 MG/DL
HEMOGLOBIN: 15.2 G/DL (ref 11.9–15.1)
LDL CHOLESTEROL: 85 MG/DL (ref 0–130)
MAGNESIUM: 1.7 MG/DL (ref 1.6–2.6)
MCH RBC QN AUTO: 32.8 PG (ref 25.2–33.5)
MCHC RBC AUTO-ENTMCNC: 32.1 G/DL (ref 28.4–34.8)
MCV RBC AUTO: 102.2 FL (ref 82.6–102.9)
NRBC AUTOMATED: ABNORMAL PER 100 WBC
PDW BLD-RTO: 14.1 % (ref 11.8–14.4)
PLATELET # BLD: 110 K/UL (ref 138–453)
PMV BLD AUTO: 11.7 FL (ref 8.1–13.5)
PRO-BNP: 919 PG/ML
RBC # BLD: 4.64 M/UL (ref 3.95–5.11)
TRIGL SERPL-MCNC: 57 MG/DL
TSH SERPL DL<=0.05 MIU/L-ACNC: 2.39 MIU/L (ref 0.3–5)
VLDLC SERPL CALC-MCNC: NORMAL MG/DL (ref 1–30)
WBC # BLD: 4.1 K/UL (ref 3.5–11.3)

## 2020-01-05 PROCEDURE — 97110 THERAPEUTIC EXERCISES: CPT

## 2020-01-05 PROCEDURE — 97116 GAIT TRAINING THERAPY: CPT

## 2020-01-05 PROCEDURE — 1200000000 HC SEMI PRIVATE

## 2020-01-05 PROCEDURE — 36415 COLL VENOUS BLD VENIPUNCTURE: CPT

## 2020-01-05 PROCEDURE — 71045 X-RAY EXAM CHEST 1 VIEW: CPT

## 2020-01-05 PROCEDURE — 97162 PT EVAL MOD COMPLEX 30 MIN: CPT

## 2020-01-05 PROCEDURE — 6370000000 HC RX 637 (ALT 250 FOR IP): Performed by: NURSE PRACTITIONER

## 2020-01-05 PROCEDURE — 97530 THERAPEUTIC ACTIVITIES: CPT

## 2020-01-05 PROCEDURE — 6360000002 HC RX W HCPCS: Performed by: NURSE PRACTITIONER

## 2020-01-05 PROCEDURE — 85027 COMPLETE CBC AUTOMATED: CPT

## 2020-01-05 PROCEDURE — 99232 SBSQ HOSP IP/OBS MODERATE 35: CPT | Performed by: INTERNAL MEDICINE

## 2020-01-05 PROCEDURE — 83735 ASSAY OF MAGNESIUM: CPT

## 2020-01-05 PROCEDURE — 84443 ASSAY THYROID STIM HORMONE: CPT

## 2020-01-05 PROCEDURE — 83880 ASSAY OF NATRIURETIC PEPTIDE: CPT

## 2020-01-05 PROCEDURE — 96375 TX/PRO/DX INJ NEW DRUG ADDON: CPT

## 2020-01-05 PROCEDURE — 80061 LIPID PANEL: CPT

## 2020-01-05 PROCEDURE — 96376 TX/PRO/DX INJ SAME DRUG ADON: CPT

## 2020-01-05 PROCEDURE — 2580000003 HC RX 258: Performed by: NURSE PRACTITIONER

## 2020-01-05 PROCEDURE — G0378 HOSPITAL OBSERVATION PER HR: HCPCS

## 2020-01-05 PROCEDURE — 2500000003 HC RX 250 WO HCPCS: Performed by: NURSE PRACTITIONER

## 2020-01-05 RX ORDER — LISINOPRIL 20 MG/1
20 TABLET ORAL DAILY
Status: DISCONTINUED | OUTPATIENT
Start: 2020-01-05 | End: 2020-01-07 | Stop reason: HOSPADM

## 2020-01-05 RX ORDER — LATANOPROST 50 UG/ML
1 SOLUTION/ DROPS OPHTHALMIC NIGHTLY
Status: DISCONTINUED | OUTPATIENT
Start: 2020-01-05 | End: 2020-01-05

## 2020-01-05 RX ORDER — PRAVASTATIN SODIUM 40 MG
80 TABLET ORAL DAILY
Status: DISCONTINUED | OUTPATIENT
Start: 2020-01-05 | End: 2020-01-05

## 2020-01-05 RX ORDER — MAGNESIUM SULFATE 1 G/100ML
1 INJECTION INTRAVENOUS PRN
Status: DISCONTINUED | OUTPATIENT
Start: 2020-01-05 | End: 2020-01-07 | Stop reason: HOSPADM

## 2020-01-05 RX ORDER — TIMOLOL MALEATE 5 MG/ML
1 SOLUTION/ DROPS OPHTHALMIC DAILY
COMMUNITY

## 2020-01-05 RX ORDER — ACETAMINOPHEN 325 MG/1
650 TABLET ORAL EVERY 4 HOURS PRN
Status: DISCONTINUED | OUTPATIENT
Start: 2020-01-05 | End: 2020-01-07 | Stop reason: HOSPADM

## 2020-01-05 RX ORDER — METOPROLOL TARTRATE 50 MG/1
50 TABLET, FILM COATED ORAL 2 TIMES DAILY
Status: DISCONTINUED | OUTPATIENT
Start: 2020-01-05 | End: 2020-01-05

## 2020-01-05 RX ORDER — ALBUTEROL SULFATE 2.5 MG/3ML
2.5 SOLUTION RESPIRATORY (INHALATION) EVERY 4 HOURS PRN
Status: DISCONTINUED | OUTPATIENT
Start: 2020-01-05 | End: 2020-01-07 | Stop reason: HOSPADM

## 2020-01-05 RX ORDER — ONDANSETRON 2 MG/ML
4 INJECTION INTRAMUSCULAR; INTRAVENOUS EVERY 6 HOURS PRN
Status: DISCONTINUED | OUTPATIENT
Start: 2020-01-05 | End: 2020-01-05

## 2020-01-05 RX ORDER — METOPROLOL TARTRATE 5 MG/5ML
5 INJECTION INTRAVENOUS EVERY 4 HOURS PRN
Status: DISCONTINUED | OUTPATIENT
Start: 2020-01-05 | End: 2020-01-07 | Stop reason: HOSPADM

## 2020-01-05 RX ORDER — DORZOLAMIDE HCL 20 MG/ML
1 SOLUTION/ DROPS OPHTHALMIC 2 TIMES DAILY
Status: DISCONTINUED | OUTPATIENT
Start: 2020-01-05 | End: 2020-01-07 | Stop reason: HOSPADM

## 2020-01-05 RX ORDER — POTASSIUM CHLORIDE 20 MEQ/1
40 TABLET, EXTENDED RELEASE ORAL PRN
Status: DISCONTINUED | OUTPATIENT
Start: 2020-01-05 | End: 2020-01-07 | Stop reason: HOSPADM

## 2020-01-05 RX ORDER — DILTIAZEM HYDROCHLORIDE 180 MG/1
180 CAPSULE, COATED, EXTENDED RELEASE ORAL DAILY
Status: DISCONTINUED | OUTPATIENT
Start: 2020-01-05 | End: 2020-01-07 | Stop reason: HOSPADM

## 2020-01-05 RX ORDER — METOPROLOL TARTRATE 50 MG/1
50 TABLET, FILM COATED ORAL 2 TIMES DAILY
Status: DISCONTINUED | OUTPATIENT
Start: 2020-01-05 | End: 2020-01-07 | Stop reason: HOSPADM

## 2020-01-05 RX ORDER — SODIUM CHLORIDE 0.9 % (FLUSH) 0.9 %
10 SYRINGE (ML) INJECTION PRN
Status: DISCONTINUED | OUTPATIENT
Start: 2020-01-05 | End: 2020-01-07 | Stop reason: HOSPADM

## 2020-01-05 RX ORDER — ONDANSETRON 4 MG/1
4 TABLET, ORALLY DISINTEGRATING ORAL EVERY 6 HOURS PRN
Status: DISCONTINUED | OUTPATIENT
Start: 2020-01-05 | End: 2020-01-07 | Stop reason: HOSPADM

## 2020-01-05 RX ORDER — DORZOLAMIDE HCL 20 MG/ML
1 SOLUTION/ DROPS OPHTHALMIC 2 TIMES DAILY
Status: DISCONTINUED | OUTPATIENT
Start: 2020-01-05 | End: 2020-01-05

## 2020-01-05 RX ORDER — TIMOLOL MALEATE 5 MG/ML
1 SOLUTION/ DROPS OPHTHALMIC DAILY
Status: DISCONTINUED | OUTPATIENT
Start: 2020-01-05 | End: 2020-01-07 | Stop reason: HOSPADM

## 2020-01-05 RX ORDER — PRAVASTATIN SODIUM 20 MG
20 TABLET ORAL DAILY
Status: DISCONTINUED | OUTPATIENT
Start: 2020-01-05 | End: 2020-01-07 | Stop reason: HOSPADM

## 2020-01-05 RX ORDER — FUROSEMIDE 10 MG/ML
40 INJECTION INTRAMUSCULAR; INTRAVENOUS 2 TIMES DAILY
Status: DISCONTINUED | OUTPATIENT
Start: 2020-01-05 | End: 2020-01-06

## 2020-01-05 RX ORDER — NICOTINE 21 MG/24HR
1 PATCH, TRANSDERMAL 24 HOURS TRANSDERMAL DAILY PRN
Status: DISCONTINUED | OUTPATIENT
Start: 2020-01-05 | End: 2020-01-07 | Stop reason: HOSPADM

## 2020-01-05 RX ORDER — CALCIUM CARBONATE/VITAMIN D3 600 MG-10
1 TABLET ORAL DAILY
Status: DISCONTINUED | OUTPATIENT
Start: 2020-01-05 | End: 2020-01-06 | Stop reason: SDUPTHER

## 2020-01-05 RX ORDER — SODIUM CHLORIDE 0.9 % (FLUSH) 0.9 %
10 SYRINGE (ML) INJECTION EVERY 12 HOURS SCHEDULED
Status: DISCONTINUED | OUTPATIENT
Start: 2020-01-05 | End: 2020-01-07 | Stop reason: HOSPADM

## 2020-01-05 RX ORDER — POTASSIUM CHLORIDE 7.45 MG/ML
10 INJECTION INTRAVENOUS PRN
Status: DISCONTINUED | OUTPATIENT
Start: 2020-01-05 | End: 2020-01-07 | Stop reason: HOSPADM

## 2020-01-05 RX ORDER — ONDANSETRON 2 MG/ML
4 INJECTION INTRAMUSCULAR; INTRAVENOUS EVERY 6 HOURS PRN
Status: DISCONTINUED | OUTPATIENT
Start: 2020-01-05 | End: 2020-01-07 | Stop reason: HOSPADM

## 2020-01-05 RX ORDER — DORZOLAMIDE HCL 20 MG/ML
1 SOLUTION/ DROPS OPHTHALMIC 2 TIMES DAILY
COMMUNITY

## 2020-01-05 RX ADMIN — TIMOLOL MALEATE 1 DROP: 5 SOLUTION OPHTHALMIC at 08:07

## 2020-01-05 RX ADMIN — METOPROLOL TARTRATE 50 MG: 50 TABLET ORAL at 01:57

## 2020-01-05 RX ADMIN — METOPROLOL TARTRATE 50 MG: 50 TABLET ORAL at 08:06

## 2020-01-05 RX ADMIN — METOPROLOL TARTRATE 5 MG: 5 INJECTION INTRAVENOUS at 10:50

## 2020-01-05 RX ADMIN — Medication 10 ML: at 20:49

## 2020-01-05 RX ADMIN — LISINOPRIL 20 MG: 20 TABLET ORAL at 08:07

## 2020-01-05 RX ADMIN — DORZOLAMIDE HYDROCHLORIDE 1 DROP: 20 SOLUTION/ DROPS OPHTHALMIC at 20:49

## 2020-01-05 RX ADMIN — DORZOLAMIDE HYDROCHLORIDE 1 DROP: 20 SOLUTION/ DROPS OPHTHALMIC at 02:11

## 2020-01-05 RX ADMIN — APIXABAN 2.5 MG: 2.5 TABLET, FILM COATED ORAL at 01:43

## 2020-01-05 RX ADMIN — APIXABAN 2.5 MG: 2.5 TABLET, FILM COATED ORAL at 20:48

## 2020-01-05 RX ADMIN — APIXABAN 2.5 MG: 2.5 TABLET, FILM COATED ORAL at 08:06

## 2020-01-05 RX ADMIN — FUROSEMIDE 40 MG: 10 INJECTION, SOLUTION INTRAMUSCULAR; INTRAVENOUS at 08:06

## 2020-01-05 RX ADMIN — DILTIAZEM HYDROCHLORIDE 180 MG: 180 CAPSULE, COATED, EXTENDED RELEASE ORAL at 08:06

## 2020-01-05 RX ADMIN — Medication 1 TABLET: at 08:07

## 2020-01-05 RX ADMIN — DORZOLAMIDE HYDROCHLORIDE 1 DROP: 20 SOLUTION/ DROPS OPHTHALMIC at 08:07

## 2020-01-05 RX ADMIN — PRAVASTATIN SODIUM 20 MG: 20 TABLET ORAL at 08:06

## 2020-01-05 RX ADMIN — FUROSEMIDE 40 MG: 10 INJECTION, SOLUTION INTRAMUSCULAR; INTRAVENOUS at 17:42

## 2020-01-05 ASSESSMENT — ENCOUNTER SYMPTOMS
WHEEZING: 0
BLOOD IN STOOL: 0
COUGH: 0
SHORTNESS OF BREATH: 1
ABDOMINAL PAIN: 0
VOMITING: 0
DIARRHEA: 0
CONSTIPATION: 0
NAUSEA: 0

## 2020-01-05 ASSESSMENT — PAIN SCALES - GENERAL
PAINLEVEL_OUTOF10: 0
PAINLEVEL_OUTOF10: 0

## 2020-01-05 NOTE — PLAN OF CARE
Problem: Falls - Risk of:  Goal: Will remain free from falls  Description  Will remain free from falls  Outcome: Ongoing  Note:   Siderails up x 2  Hourly rounding  Call light in reach  Instructed to call for assist before attempting out of bed.   Remains free from falls and accidental injury at this time   Floor free from obstacles  Bed is locked and in lowest position  Adequate lighting provided  Bed alarm on, [place] star and Stay with Me signs posted  Goal: Absence of physical injury  Description  Absence of physical injury  Outcome: Ongoing     Problem: OXYGENATION/RESPIRATORY FUNCTION  Goal: Patient will maintain patent airway  Outcome: Ongoing  Goal: Patient will achieve/maintain normal respiratory rate/effort  Description  Respiratory rate and effort will be within normal limits for the patient  Outcome: Ongoing     Problem: HEMODYNAMIC STATUS  Goal: Patient has stable vital signs and fluid balance  Outcome: Ongoing     Problem: FLUID AND ELECTROLYTE IMBALANCE  Goal: Fluid and electrolyte balance are achieved/maintained  Outcome: Ongoing     Problem: ACTIVITY INTOLERANCE/IMPAIRED MOBILITY  Goal: Mobility/activity is maintained at optimum level for patient  Outcome: Ongoing

## 2020-01-05 NOTE — ED NOTES
Pt presents with c/o fall and SOB. Pt is brought in by her son. Pts son states pt fell today when walking around the house, states she usually walks with a cane and lost her balance at 1400 this afternoon causing her to fall backwards and hit the back of her head. Pt and son states there was a lot of bleeding. Small cut noted to the back left of pts head. Pt takes Eliquis for A Fib. History of CHF. Son states he took pt to the bank today and noticed pt appeared to be SOB. Denies LOC from fall. A+Ox4.      Sylvia Madison RN  01/04/20 0316

## 2020-01-05 NOTE — PROGRESS NOTES
[facility]    Progress Note    1/5/2020    4:15 PM    Name:   Cortney Pascal  MRN:     [id number]     [place]:      [de-identified]   Room:   2003/2003-02  IP Day:  1  Admit Date:  1/4/2020  8:59 PM    PCP:   Zachariah Sal MD  Code Status:  Full Code    Subjective:     C/C:   Chief Complaint   Patient presents with   [facility]    Shortness of Breath     Interval History Status: improved. Less sob today  No cp/n/v    Brief History:     Per my PIERO:  \"Edyta Howell is a 80 y.o. Non-/non  female who presents with Fall and Shortness of Breath   and is admitted to the hospital for the management of Acute on chronic diastolic congestive heart failure ([facility] [address] The patient was reportedly brought to the emergency room by her son. ER records indicate he took her to run errands and noted she was short of breath. Later in the afternoon they returned home and the patient fell backward and hit her head, the patient son also reported increased confusion to the ER staff. She sustained a small laceration. Patient denies loss of consciousness. She does endorse shortness of breath, but denies chest pain or cough. Denies additional symptomology or complaint. No additional modifying factors. She has past medical history that includes hypertension, dyslipidemia, CHF, and atrial fibrillation. Eliquis for anticoagulation.     , high-sensitivity troponin 25 & 25, WBC 5.2     Chest x-ray shows stable small right greater than left layering pleural effusions with bibasilar opacity that may reflect atelectasis or pneumonia. \"    Review of Systems:     Constitutional:  negative for chills, fevers, sweats  Respiratory:  negative for cough, wheezing  Cardiovascular:  negative for chest pain, chest pressure/discomfort, palpitations  Gastrointestinal:  negative for abdominal pain, constipation, diarrhea, nausea, vomiting  Neurological:  negative for dizziness, 24 hours) at 1/5/2020 1615  Last data filed at 1/5/2020 1515  Gross per 24 hour   Intake --   Output 2800 ml   Net -2800 ml       Labs:  Hematology:  Recent Labs     01/04/20 2135 01/05/20  0546   WBC 5.2 4.1   RBC 5.03 4.64   HGB 16.1* 15.2*   HCT 52.9* 47.4*   .2* 102.2   MCH 32.0 32.8   MCHC 30.4 32.1   RDW 14.3 14.1   * 110*   MPV 12.1 11.7     Chemistry:  Recent Labs     01/04/20 2135 01/04/20 2328 01/05/20  0546     --   --    K 4.2  --   --      --   --    CO2 25  --   --    GLUCOSE 116*  --   --    BUN 28*  --   --    CREATININE 0.73  --   --    MG 1.9  --  1.7   ANIONGAP 13  --   --    LABGLOM >60  --   --    GFRAA >60  --   --    CALCIUM 10.3  --   --    PROBNP 750*  --  919*   TROPHS 25* 25*  --    MYOGLOBIN 74* 66*  --      Recent Labs     01/05/20  0546   TSH 2.39   CHOL 179   HDL 83   LDLCHOLESTEROL 85   CHOLHDLRATIO 2.2   TRIG 57   VLDL NOT REPORTED     ABG:No results found for: POCPH, PHART, PH, POCPCO2, WKD3ODJ, PCO2, POCPO2, PO2ART, PO2, POCHCO3, GUH3SHV, HCO3, NBEA, PBEA, BEART, BE, THGBART, THB, OLQ6HRY, IWJJ7KWO, Y9RECMOR, O2SAT, FIO2  Lab Results   Component Value Date/Time    SPECIAL RAC 12ML 01/04/2020 09:42 PM     Lab Results   Component Value Date/Time    CULTURE NO GROWTH 12 HOURS 01/04/2020 09:42 PM       Radiology:  Ct Head Wo Contrast    Result Date: 1/4/2020  No acute intracranial abnormality. Ct Cervical Spine Wo Contrast    Result Date: 1/4/2020  No acute abnormality of the cervical spine. Xr Chest Portable    Result Date: 1/5/2020  Stable chest with effusions and airspace disease. Xr Chest Portable    Result Date: 1/4/2020  Stable small right greater left layering pleural effusions with bibasilar opacity that may reflect atelectasis or pneumonia if the patient has fever or leukocytosis.        Physical Examination:        General appearance:  alert, cooperative and no distress  Mental Status:  oriented to person, place and time and normal affect  Lungs:  Bull at bases bilaterally, normal effort  Heart:  regular rate and irreg irreg rhythm, no murmur  Abdomen:  soft, nontender, nondistended, normal bowel sounds, no masses, hepatomegaly, splenomegaly  Extremities:  no redness, tenderness in the calves; 1+ ble edema  Skin:  no gross lesions, rashes, induration    Assessment:        Hospital Problems           Last Modified POA    * (Principal) Acute on chronic diastolic congestive heart failure ([address] 1/4/2020 Yes    Chronic a-fib 1/4/2020 Yes    Essential hypertension 1/4/2020 Yes    Acute respiratory failure with hypoxia ([address] 1/5/2020 Yes    Fall at home, initial encounter 1/4/2020 Yes    Chronic anticoagulation 1/4/2020 Yes    Mild protein-calorie malnutrition ([address] (Chronic) 1/5/2020 Yes          Plan:        Check echo  Diuresis, recheck cxr in am: cxr has been abnormal for months, pneumonia less likely with unchanged xray and lack of infectious symptoms; I think a lot of this is chronic    Mk TOVAR Blood, DO  1/5/2020  4:15 PM

## 2020-01-05 NOTE — ED PROVIDER NOTES
[address] [place] [address] ED  EMERGENCY DEPARTMENT ENCOUNTER      Pt Name: Mei Bettencourt  MRN: [id number]  [place] 4/27/1924  Date of evaluation: 1/4/2020  Provider: ELENA David CNP    CHIEF COMPLAINT       Chief Complaint   Patient presents with    Fall    Shortness of Breath         [facility]  (Location/Symptom, Timing/Onset, Context/Setting, Quality, Duration, Modifying Factors, Severity.)   Mei Bettencourt is a 80 y.o. female who presents to the emergency department for evaluation of head injury after she fell today, shortness of breath, and increased confusion. She was brought to emergency by her son. Son states that she took the patient to the bank earlier and she exhibited increased shortness of breath. Son states that when he was over house around 2:00 this afternoon the patient fell backwards hit the back of her head on the floor causing a cut back of her head and there was a lot of bleeding initially. She is on Eliquis for atrial fibrillation. Also has a history of CHF. Son states patient did not lose consciousness after she fell. He states that he then took the patient to his house to keep an eye on her for a while but states her breathing worsened so he brought her to emergency department. Nursing Notes were reviewed.     PASTMEDICAL HISTORY     Past Medical History:   Diagnosis Date    Acute dermatitis     Arthritis     Asthma     Atrial fibrillation (HCC)     Bursitis     CHF (congestive heart failure) (HCC)     Hearing aid worn     Hyperlipidemia     Hypertension     Lumbar disc disease     Wears glasses     Wound of right leg          SURGICAL HISTORY       Past Surgical History:   Procedure Laterality Date    BACK SURGERY      DEBRIDEMENT Left 03/09/2017    rt. leg    OTHER SURGICAL HISTORY  03/30/2017    Right leg Angio    OTHER SURGICAL HISTORY  03/30/2017    Right leg angio    ID INCIS/DRAIN THIGH/KNEE ABSCESS,DEEP Right 3/9/2017    DEBRIDEMENT CALF Drug use: No    Sexual activity: None   Lifestyle    Physical activity:     Days per week: None     Minutes per session: None    Stress: None   Relationships    Social connections:     Talks on phone: None     Gets together: None     Attends Yazidism service: None     Active member of club or organization: None     Attends meetings of clubs or organizations: None     Relationship status: None    Intimate partner violence:     Fear of current or ex partner: None     Emotionally abused: None     Physically abused: None     Forced sexual activity: None   Other Topics Concern    None   Social History Narrative    None         REVIEW OF SYSTEMS    (2-9 systems for level 4, 10 or more for level 5)     Review of Systems   Constitutional: Positive for activity change. Respiratory: Positive for shortness of breath. Cardiovascular: Negative for chest pain. Gastrointestinal: Negative for abdominal pain, nausea and vomiting. Musculoskeletal: Negative for neck pain. Skin: Positive for color change and wound. Neurological: Negative for headaches. Psychiatric/Behavioral: Positive for confusion. All other systems reviewed and are negative. Except as noted above the remainder of the review of systems was reviewed and negative. PHYSICAL EXAM    (up to 7 for level 4, 8 or more for level 5)     ED Triage Vitals   BP Temp Temp src Pulse Resp SpO2 Height Weight   -- -- -- -- -- -- -- --       Physical Exam  Constitutional:       Appearance: Normal appearance. She is well-developed and well-groomed. HENT:      Head: Normocephalic. Laceration present. Comments: There is a small superficial laceration noted to the back of the head. There is no bleeding present. There is a soft scab noted. There is a contusion noted around the laceration.      Right Ear: Hearing and external ear normal.      Left Ear: Hearing and external ear normal.      Nose: Nose normal.      Mouth/Throat:      Mouth: Mucous infarct. There is no evidence of hydrocephalus. Mild diffuse volume loss with mild chronic white matter microvascular ischemic changes. Chronic lacunar infarcts in the bilateral basal ganglia. ORBITS: The visualized portion of the orbits demonstrate no acute abnormality. SINUSES: The visualized paranasal sinuses and mastoid air cells demonstrate no acute abnormality. SOFT TISSUES/SKULL:  No acute abnormality of the visualized skull or soft tissues. No acute intracranial abnormality. Ct Cervical Spine Wo Contrast    Result Date: 1/4/2020  EXAMINATION: CT OF THE CERVICAL SPINE WITHOUT CONTRAST 1/4/2020 9:37 pm TECHNIQUE: CT of the cervical spine was performed without the administration of intravenous contrast. Multiplanar reformatted images are provided for review. Dose modulation, iterative reconstruction, and/or weight based adjustment of the mA/kV was utilized to reduce the radiation dose to as low as reasonably achievable. COMPARISON: Radiographs 05/28/2010 HISTORY: ORDERING SYSTEM PROVIDED HISTORY: fall TECHNOLOGIST PROVIDED HISTORY: fall Initial encounter. FINDINGS: BONES/ALIGNMENT: There is no acute fracture or suspect osseous lesion. Vertebral body heights are maintained. There is unchanged minimal degenerative anterolisthesis of C3 on C4, C7 on T1, and T1 and T2 and minimal degenerative retrolisthesis of C4 on C5, C5 and C6, and C6 on C7 not substantially changed. DEGENERATIVE CHANGES: Moderate to severe multilevel facet arthropathy greatest at C3-4. Severe C4-5 through C6-7 degenerative disc disease. Moderate hypertrophic degenerative tissue along the dorsal aspect of the dens causing moderate spinal canal stenosis. SOFT TISSUES: No acute paraspinal soft tissue abnormality. Partially imaged right greater left layering pleural effusions. No acute abnormality of the cervical spine.      Xr Chest Portable    Result Date: 1/4/2020  EXAMINATION: ONE XRAY VIEW OF THE CHEST 1/4/2020 9:41 pm COMPARISON: 10/09/2018 HISTORY: ORDERING SYSTEM PROVIDED HISTORY: SOB TECHNOLOGIST PROVIDED HISTORY: SOB Reason for Exam: SOB Acuity: Acute Type of Exam: Initial Relevant Medical/Surgical History: CHF, asthma FINDINGS: Small right greater left layering pleural effusions with bibasilar opacity similar to prior study. No pneumothorax. Cardiac and mediastinal contours stable. No acute osseous abnormality. Prior upper lumbar vertebroplasty. Stable small right greater left layering pleural effusions with bibasilar opacity that may reflect atelectasis or pneumonia if the patient has fever or leukocytosis. Interpretation per the Radiologist below, if available at the time of this note:    CT HEAD WO CONTRAST   Final Result   No acute intracranial abnormality. CT Cervical Spine WO Contrast   Final Result   No acute abnormality of the cervical spine. XR CHEST PORTABLE   Final Result   Stable small right greater left layering pleural effusions with bibasilar   opacity that may reflect atelectasis or pneumonia if the patient has fever or   leukocytosis.                EDBEDSIDE ULTRASOUND:   Performed by Dai Edwards - dorene    LABS:  Labs Reviewed   CBC WITH AUTO DIFFERENTIAL - Abnormal; Notable for the following components:       Result Value    Hemoglobin 16.1 (*)     Hematocrit 52.9 (*)     .2 (*)     Platelets 695 (*)     Seg Neutrophils 77 (*)     Lymphocytes 13 (*)     Absolute Lymph # 0.70 (*)     All other components within normal limits   BASIC METABOLIC PANEL - Abnormal; Notable for the following components:    Glucose 116 (*)     BUN 28 (*)     Bun/Cre Ratio 38 (*)     All other components within normal limits   TROP/MYOGLOBIN - Abnormal; Notable for the following components:    Troponin, High Sensitivity 25 (*)     Myoglobin 74 (*)     All other components within normal limits   BRAIN NATRIURETIC PEPTIDE - Abnormal; Notable for the following components:    Pro- (*)     All other components within normal limits   CULTURE BLOOD #1   CULTURE BLOOD #1   CULTURE, URINE CATHETER   LACTATE, SEPSIS   MAGNESIUM   URINALYSIS   LACTATE, SEPSIS   TROP/MYOGLOBIN       All other labs were within normal range or not returned as of this dictation. EMERGENCY DEPARTMENT COURSE andDIFFERENTIAL DIAGNOSIS/MDM:   Patient was evaluated in conjunction attending physician. Labs reviewed. WBC 5.2. Lactic acid 1.4.  0.25. . Chest x-ray per radiologist shows Stable small right greater left layering pleural effusions with bibasilar opacity that may reflect atelectasis or pneumonia if the patient has fever or leukocytosis. CT of head and cervical spine per radiology shows no acute findings. Patient's neurological exam is normal.  An indwelling Lacey catheter was placed in the ED by the nurse. Urinalysis does not show infection. Urine was sent for culture. EEG showed atrial fibrillation. Patient had heart rate of 100-130 upon arrival.  Patient had bilateral wheezing and rales in her lung bases. She was given 40 mg of IV Lasix, IV steroids and Xopenex treatment. She was given 10 mg bolus of Cardizem and her heart rate improved into the 60s. Pressure stable 147/77. She is afebrile. I discussed imaging and test results with the patient and patient's son and he was informed the patient will be admitted to the hospital for further evaluation. ED Course as of Jan 04 2250   Sat Jan 04, 2020 2249 [facility] [EB]      ED Course User Index  [EB] Maryanne Yusuf, APRN - CNP           CRITICAL CARE TIME     Due to the high probability of sudden and clinically significant deterioration in the patient's condition she required highest level of my preparedness to intervene urgently.  I provided critical care time including documentation time, medication orders and management, reevaluation, vital sign assessment, ordering and reviewing of of lab tests ordering and reviewing of x-ray studies, and admission orders. Aggregate critical care time is 45 minutes including only time during which I was engaged in work directly related to her care and did not include time spent treating other patients simultaneously. Vitals:    Vitals:    01/04/20 2132 01/04/20 2143 01/04/20 2205 01/04/20 2220   BP: (!) 164/99  109/65 (!) 144/80   Pulse: 123  55 50   Resp: 20   20   SpO2:  96%  91%   Weight: 130 lb (59 kg)      Height: 5' 3\" (1.6 m)            CONSULTS:  IP CONSULT TO HOSPITALIST  IP CONSULT TO DIETITIAN    RES:  Procedures    FINAL IMPRESSION      1.  Acute on chronic congestive heart failure, unspecified heart failure type [facility])          DISPOSITION/PLAN   DISPOSITION Admitted 01/04/2020 10:50:35 PM      PATIENT REFERRED TO:   Mariah Matos MD  [address] [facility] [phone]            DISCHARGE MEDICATIONS:     New Prescriptions    No medications on file     Electronically signed by ELENA Gao 1/4/2020 at 10:50 PM            ELENA Gao CNP  01/04/20 2255

## 2020-01-05 NOTE — PROGRESS NOTES
Physical Therapy    Facility/Department: [facility]  Initial Assessment    NAME: Iva Tinsley  : 1924  MRN: [id number]    Date of Service: 2020  Pt admitted due to increased SOB due to CHF and recent fall with pt hitting back of head - denies LOC. Pt lives alone, but has intermittent assist from son who works full-time. Discharge Recommendations:  Subacute/Skilled Nursing Facility, ECF with PT   PT Equipment Recommendations  Equipment Needed: Yes(If returns directly to home.)  Mobility Devices: Amara Big: Rolling    Assessment   Body structures, Functions, Activity limitations: Decreased functional mobility ; Decreased strength;Decreased endurance;Decreased vision/visual deficit; Decreased coordination;Decreased posture;Decreased ADL status; Decreased high-level IADLs;Decreased ROM; Decreased balance; Increased pain  Prognosis: Good  Decision Making: Medium Complexity  History: advanced age, lives alone  Exam: LE ROM, strength, bed mobility, transfers, gait, balance  Clinical Presentation: evolving due to recent fall and cardiac complications  PT Education: Goals; Functional Mobility Training;PT Role;Transfer Training; Adaptive Device Training;Pressure Relief;Plan of Care;Energy Conservation;Gait Training;Equipment;General Safety  Barriers to Learning: [place]  REQUIRES PT FOLLOW UP: Yes  Activity Tolerance  Activity Tolerance: Patient limited by endurance;Treatment limited secondary to medical complications (free text)       Patient Diagnosis(es): The encounter diagnosis was Acute on chronic congestive heart failure, unspecified heart failure type ([address]      has a past medical history of Acute dermatitis, Arthritis, Asthma, Atrial fibrillation ([address] Bursitis, CHF (congestive heart failure) ([address] Hearing aid worn, Hyperlipidemia, Hypertension, Lumbar disc disease, Wears glasses, and Wound of right leg.   has a past surgical history that includes back surgery; debridement (Left, 2017); pr incis/drain thigh/knee abscess,deep (Right, 3/9/2017); other surgical history (03/30/2017); other surgical history (03/30/2017); and pr incis/drain thigh/knee abscess,deep (Right, 4/28/2017). Restrictions  Restrictions/Precautions  Restrictions/Precautions: General Precautions, Up as Tolerated, Fall Risk, Cardiac  Required Braces or Orthoses?: No  Vision/Hearing  Vision: Impaired  Vision Exceptions: Wears glasses at all times(Also has had recent cataract surgery)  Hearing: Exceptions to WFL(Also has had recent R ear surgery)  Hearing Exceptions: Left hearing aid     Subjective  General  Chart Reviewed: Yes  Patient assessed for rehabilitation services?: Yes  Response To Previous Treatment: Not applicable  Family / Caregiver Present: No  Follows Commands: Within Functional Limits  Pain Screening  Patient Currently in Pain: Denies  Vital Signs  Patient Currently in Pain: Denies       Orientation  Orientation  Overall Orientation Status: Within Functional Limits  Social/Functional History  Social/Functional History  Lives With: Alone  Type of Home: House  Home Layout: One level  Home Access: Stairs to enter with rails  Entrance Stairs - Number of Steps: 2  Entrance Stairs - Rails: Right  Bathroom Shower/Tub: Tub/Shower unit  Bathroom Toilet: Standard  Bathroom Equipment: Tub transfer bench, Hand-held shower, Grab bars in shower  Bathroom Accessibility: Accessible  Home Equipment: Cane(Pt has a bariartic RW that is 21years old and was husbands, but would not likley fit pt.)  Receives Help From: Family  ADL Assistance: Independent  Ambulation Assistance: Independent(Pt reports using cane for community distances.  States she uses NO device in home, but admits she often holds onto furniture.)  Transfer Assistance: Independent  Active : No  Mode of Transportation: Family(son provides )  Occupation: Retired  Cognition        Objective     Observation/Palpation  Posture: Fair    AROM RLE (degrees)  RLE AROM: WFL  AROM LLE (degrees)  LLE AROM : WFL  Strength RLE  Comment: 3+/5 B hip flexion, 4-/5 B knee extn and ankle DF  Tone RLE  RLE Tone: Normotonic  Tone LLE  LLE Tone: Normotonic  Motor Control  Gross Motor?: WFL  Sensation  Overall Sensation Status: WFL  Bed mobility  Bridging: Moderate assistance  Rolling to Left: Moderate assistance  Rolling to Right: Moderate assistance  Supine to Sit: Moderate assistance  Sit to Supine: Moderate assistance  Scooting: Maximal assistance  Transfers  Sit to Stand: Minimal Assistance  Stand to sit: Minimal Assistance  Stand Pivot Transfers: Minimal Assistance  Ambulation  Ambulation?: Yes  Ambulation 1  Surface: level tile  Device: Rolling Walker  Assistance: Minimal assistance  Distance: 2 sidesteps towards edge of bed based on pt tolerance to standing at time of eval  Stairs/Curb  Stairs?: No     Balance  Posture: Fair  Sitting - Static: Fair;+  Sitting - Dynamic: Fair  Standing - Static: Fair  Standing - Dynamic: Fair;-  Exercises  Comments: Pt instructed in B Le supine ROM in all planes with focus on increased LE strength and circulation for ease of gait. Plan   Plan  Times per week: 1-2x/day for 5-6x/wk  Current Treatment Recommendations: Strengthening, Transfer Training, Endurance Training, Neuromuscular Re-education, Patient/Caregiver Education & Training, Positioning, Balance Training, Gait Training, Home Exercise Program, Functional Mobility Training, Stair training  Safety Devices  Type of devices:  All fall risk precautions in place, Gait belt, Bed alarm in place, Patient at risk for falls, Left in bed, Call light within reach  Restraints  Initially in place: No    G-Code       OutComes Score                                                  AM-PAC Score     AM-PAC Inpatient Mobility without Stair Climbing Raw Score : 10 (01/05/20 1358)  AM-PAC Inpatient without Stair Climbing T-Scale Score : 34.07 (01/05/20 1358)  Mobility Inpatient CMS 0-100% Score: 71.66 (01/05/20

## 2020-01-05 NOTE — ED PROVIDER NOTES
eMERGENCY dEPARTMENT eNCOUnter   Attending Attestation     Pt Name: Shahana Avalos  MRN: [id number]  [place] 4/27/1924  Date of evaluation: 1/4/20   Shahana Avalos is a 80 y.o. female with CC: Fall and Shortness of Breath    MDM:       ED Course as of Jan 05 0507   Sat Jan 04, 2020   8050 [facility]. [EB]      ED Course User Index  [EB] Taylor Manning, APRN - CNP       CRITICAL CARE:       Due to the immediate potential for life-threatening deterioration due to respiratory distress shortness of breath HFM with RVR requiring Cardizem drip, I spent 30 minutes providing critical care. This time is excluding time spent performing procedures. EKG: All EKG's are interpreted by the Emergency Department Physician who either signs or Co-signs this chart in the absence of a cardiologist.    Atrial fibrillation ventricular rate of 49, baseline artifact normal axis unremarkable ST segment      RADIOLOGY:All plain film, CT, MRI, and formal ultrasound images (except ED bedside ultrasound) are read by the radiologist, see reports below, unless otherwise noted in MDM or here. CT HEAD WO CONTRAST   Final Result   No acute intracranial abnormality. CT Cervical Spine WO Contrast   Final Result   No acute abnormality of the cervical spine. XR CHEST PORTABLE   Final Result   Stable small right greater left layering pleural effusions with bibasilar   opacity that may reflect atelectasis or pneumonia if the patient has fever or   leukocytosis. XR CHEST STANDARD (2 VW)    (Results Pending)     LABS: All lab results were reviewed by myself, and all abnormals are listed below.   Labs Reviewed   CBC WITH AUTO DIFFERENTIAL - Abnormal; Notable for the following components:       Result Value    Hemoglobin 16.1 (*)     Hematocrit 52.9 (*)     .2 (*)     Platelets 568 (*)     Seg Neutrophils 77 (*)     Lymphocytes 13 (*)     Absolute Lymph # 0.70 (*) All other components within normal limits   BASIC METABOLIC PANEL - Abnormal; Notable for the following components:    Glucose 116 (*)     BUN 28 (*)     Bun/Cre Ratio 38 (*)     All other components within normal limits   TROP/MYOGLOBIN - Abnormal; Notable for the following components:    Troponin, High Sensitivity 25 (*)     Myoglobin 74 (*)     All other components within normal limits   TROP/MYOGLOBIN - Abnormal; Notable for the following components:    Troponin, High Sensitivity 25 (*)     Myoglobin 66 (*)     All other components within normal limits   BRAIN NATRIURETIC PEPTIDE - Abnormal; Notable for the following components:    Pro- (*)     All other components within normal limits   CULTURE BLOOD #1   CULTURE BLOOD #1   CULTURE, URINE CATHETER   LACTATE, SEPSIS   LACTATE, SEPSIS   MAGNESIUM   URINALYSIS   MAGNESIUM   TSH WITHOUT REFLEX   BRAIN NATRIURETIC PEPTIDE   LIPID PANEL   CBC           I personally evaluated and examined the patient in conjunction with the APC and agree with the assessment, treatment plan, and disposition of the patient as recorded by the APC.    Mat Olmstead MD  Attending Emergency Physician          Fabiola Coronado MD  01/05/20 5534

## 2020-01-05 NOTE — CARE COORDINATION
Case Management Initial Discharge Plan  Gina De Jesus,         Readmission Risk              Risk of Unplanned Readmission:        12             Met with:patient and son Mane De Souza to discuss discharge plans. Information verified: address, contacts, phone number, , insurance Yes  PCP: Jacqui Stout MD  Date of last visit:     Insurance Provider: Irving Howard    Discharge Planning  Current Residence:  house  Living Arrangements:  Alone   Home has 1 stories/2 stairs to climb  Support Systems:  Children, Family Members  Current Services PTA:   none Agency:  none  Patient able to perform ADL's:Independent prior to admission  DME in home:  Cane, tub bench  DME used to aid ambulation prior to admission:   cane  DME used during admission:  TBD    Potential Assistance Needed:  Extended [address]: [facility] on [address]    Potential Assistance Purchasing Medications:  No  Does patient want to participate in local refill/ meds to beds program?  No    Patient agreeable to home care: No  [place] of choice provided:  n/a      Type of Home Care Services:  None  Patient expects to be discharged to:  Home or possible SNIF    Prior SNF/Rehab Placement and Facility: none  Agreeable to SNF/Rehab: Yes  [place] of choice provided: yes   Evaluation: yes    Expected Discharge date: Follow Up Appointment: Best Day/ Time:      Transportation provider: son Amy [place] arrangements needed for discharge: Yes    Discharge Plan: Met with pt and her son Mane De Souza at bedside. Pt admitted with CHF. Pt lives alone and has been independent in her home. Mane De Souza assists with transportation and household chores. Pt has history of afib and is on eliquis at home. Discussed PT recommendation for SNF. Pt and son are agreeable. [facility] SNF list given. Mane De Souza lives in [facility] so [facility] may be an option for him. Referral called to  to follow. FORD initiated. Will need precert from Irving Howard. Electronically signed by Chaz Morrell RN on 1/5/20 at 4:58 PM

## 2020-01-05 NOTE — H&P
products and Tudorza pressair [aclidinium bromide]    Social History:     Tobacco:    reports that she has never smoked. She has never used smokeless tobacco.  Alcohol:      reports current alcohol use. Drug Use:  reports no history of drug use. Family History:     Family History   Problem Relation Age of Onset    Heart Disease Mother     Heart Disease Father     Diabetes Maternal Aunt     Obesity Maternal Aunt     Stroke Paternal Grandmother        Review of Systems:     Positive and Negative as described in HPI. Review of Systems   Constitutional: Negative for chills, diaphoresis and fever. HENT: Negative for congestion. Eyes: Negative for visual disturbance. Respiratory: Positive for shortness of breath. Negative for cough and wheezing. Cardiovascular: Negative for chest pain, palpitations and leg swelling. Gastrointestinal: Negative for abdominal pain, blood in stool, constipation, diarrhea, nausea and vomiting. Endocrine: Negative for cold intolerance and heat intolerance. Genitourinary: Negative for difficulty urinating, dysuria, frequency and urgency. Musculoskeletal: Negative for arthralgias and myalgias. Mechanical fall. Skin: Positive for wound. Head laceration. Neurological: Negative for dizziness, weakness, light-headedness, numbness and headaches. Hematological: Bruises/bleeds easily. Psychiatric/Behavioral: The patient is not nervous/anxious. All other systems reviewed and are negative. Physical Exam:   BP (!) 161/82   Pulse 112   Resp 16   Ht 5' 3\" (1.6 m)   Wt 130 lb (59 kg)   SpO2 92%   BMI 23.03 kg/m²   No data recorded. No results for input(s): POCGLU in the last 72 hours. No intake or output data in the 24 hours ending 01/04/20 8411    Physical Exam  Vitals signs and nursing note reviewed. Constitutional:       General: She is not in acute distress. Appearance: She is well-developed. She is not diaphoretic.    HENT: GFR African American >60 >60 mL/min    GFR Comment          GFR Staging NOT REPORTED    Trop/Myoglobin    Collection Time: 01/04/20  9:35 PM   Result Value Ref Range    Troponin, High Sensitivity 25 (H) 0 - 14 ng/L    Troponin T NOT REPORTED <0.03 ng/mL    Troponin Interp NOT REPORTED     Myoglobin 74 (H) 25 - 58 ng/mL   Brain Natriuretic Peptide    Collection Time: 01/04/20  9:35 PM   Result Value Ref Range    Pro- (H) <300 pg/mL    BNP Interpretation Pro-BNP Reference Range:    Magnesium    Collection Time: 01/04/20  9:35 PM   Result Value Ref Range    Magnesium 1.9 1.6 - 2.6 mg/dL   Urinalysis, Routine    Collection Time: 01/04/20 10:10 PM   Result Value Ref Range    Color, UA YELLOW YELLOW    Turbidity UA CLEAR CLEAR    Glucose, Ur NEGATIVE NEGATIVE    Bilirubin Urine NEGATIVE NEGATIVE    Ketones, Urine NEGATIVE NEGATIVE    Specific Gravity, UA 1.020 1.005 - 1.030    Urine Hgb NEGATIVE NEGATIVE    pH, UA 5.5 5.0 - 8.0    Protein, UA NEGATIVE NEGATIVE    Urobilinogen, Urine Normal Normal    Nitrite, Urine NEGATIVE NEGATIVE    Leukocyte Esterase, Urine NEGATIVE NEGATIVE    Urinalysis Comments       Microscopic exam not performed based on chemical results unless requested in original order. Imaging/Diagnostics:  Ct Head Wo Contrast    Result Date: 1/4/2020  No acute intracranial abnormality. Ct Cervical Spine Wo Contrast    Result Date: 1/4/2020  No acute abnormality of the cervical spine. Xr Chest Portable    Result Date: 1/4/2020  Stable small right greater left layering pleural effusions with bibasilar opacity that may reflect atelectasis or pneumonia if the patient has fever or leukocytosis. Above raw data reviewed.      Assessment :      Hospital Problems           Last Modified POA    * (Principal) Acute on chronic diastolic congestive heart failure ([facility] [address] 1/4/2020 Yes    Chronic a-fib 1/4/2020 Yes    Essential hypertension 1/4/2020 Yes    Fall at home, initial encounter 1/4/2020 Yes Chronic anticoagulation 1/4/2020 Yes          Plan:     Patient status inpatient in the  Med/Surge unit. Diuresis. Follow chemistries, check TSH, lipids. Replete electrolytes as needed. Neuro checks q4h. Supplemental oxygen. PRN nebulizer treatments. Fall precautions. Resume selected home medications. Monitor vitals, control BP.  2 view CXR. Cardiac diet, 1800ml fluid restriction. Daily weights. Telemetry. Activity as tolerated with assist.   PT/OT. Plan of care discussed with patient and Vy Swanson. Consultations:   IP CONSULT TO HOSPITALIST  IP CONSULT TO DIETITIAN    Patient is admitted as inpatient status because of co-morbidities listed above, severity of signs and symptoms as outlined, requirement for current medical therapies and most importantly because of direct risk to patient if care not provided in a hospital setting.     ELENA Samano - CNP  1/4/2020  11:51 PM    Copy sent to Dr. Melida Madera MD

## 2020-01-05 NOTE — PROGRESS NOTES
Nutrition Assessment    Type and Reason for Visit: Initial, Consult(Consult for diet education)    Nutrition Recommendations:   1. Continue low sodium diet with 1800 mL fluid restriction  2. Start Ensure Enlive (strawberry) once daily at dinner time  3. Monitor intakes, weight, bowel function, skin integrity    Nutrition Assessment: Patient seen for CHF education and education was provided. However upon visit pt. appeared to be mildly malnourished as evidenced by suboptimal energy intakes, loss of subcutaneous fat and muscle and mild fluid accumulation. Pt. agreeable to having Ensure Enlive (strawberry) once daily at dinner time. Will monitor intakes, weight and follow as needed. Malnutrition Assessment:  · Malnutrition Status: Mild Malnutrition  · Context: Chronic illness  · Findings of the 6 clinical characteristics of malnutrition (Minimum of 2 out of 6 clinical characteristics is required to make the diagnosis of moderate or severe Protein Calorie Malnutrition based on AND/ASPEN Guidelines):  1. Energy Intake-Less than or equal to 75% of estimated energy requirement, Greater than or equal to 1 month    2. Weight Loss-No significant weight loss,    3. Fat Loss-Mild subcutaneous fat loss(expected per age), Orbital  4. Muscle Loss-Mild muscle mass loss(adequate for age),    5. Fluid Accumulation-Mild fluid accumulation, Extremities  6.  Strength-Not measured    Nutrition Risk Level:  Moderate    Nutrient Needs:  · Estimated Daily Total Kcal: 3232-3811 kcal (25-28 kcal/kg admission weight)  · Estimated Daily Protein (g): 61-78 g (1.1-1.4 g/kg admission weight)  · Estimated Daily Total Fluid (ml/day): 1800 mL(fluid restriction)    Nutrition Diagnosis:   · Problem: Inadequate oral intake  · Etiology: related to Early satiety     Signs and symptoms:  as evidenced by Intake 25-50%, Weight loss, Mild loss of subcutaneous fat, Mild muscle loss    Objective Information:  · Nutrition-Focused Physical Findings:

## 2020-01-06 ENCOUNTER — APPOINTMENT (OUTPATIENT)
Dept: GENERAL RADIOLOGY | Age: 85
DRG: 291 | End: 2020-01-06
Payer: MEDICARE

## 2020-01-06 LAB
ANION GAP SERPL CALCULATED.3IONS-SCNC: 10 MMOL/L (ref 9–17)
BUN BLDV-MCNC: 29 MG/DL (ref 8–23)
BUN/CREAT BLD: 42 (ref 9–20)
CALCIUM SERPL-MCNC: 9.7 MG/DL (ref 8.6–10.4)
CHLORIDE BLD-SCNC: 99 MMOL/L (ref 98–107)
CO2: 35 MMOL/L (ref 20–31)
CREAT SERPL-MCNC: 0.69 MG/DL (ref 0.5–0.9)
CULTURE: NO GROWTH
GFR AFRICAN AMERICAN: >60 ML/MIN
GFR NON-AFRICAN AMERICAN: >60 ML/MIN
GFR SERPL CREATININE-BSD FRML MDRD: ABNORMAL ML/MIN/{1.73_M2}
GFR SERPL CREATININE-BSD FRML MDRD: ABNORMAL ML/MIN/{1.73_M2}
GLUCOSE BLD-MCNC: 133 MG/DL (ref 70–99)
LV EF: 65 %
LVEF MODALITY: NORMAL
Lab: NORMAL
POTASSIUM SERPL-SCNC: 3.7 MMOL/L (ref 3.7–5.3)
SODIUM BLD-SCNC: 144 MMOL/L (ref 135–144)
SPECIMEN DESCRIPTION: NORMAL

## 2020-01-06 PROCEDURE — 6370000000 HC RX 637 (ALT 250 FOR IP): Performed by: NURSE PRACTITIONER

## 2020-01-06 PROCEDURE — 6360000002 HC RX W HCPCS: Performed by: NURSE PRACTITIONER

## 2020-01-06 PROCEDURE — 71045 X-RAY EXAM CHEST 1 VIEW: CPT

## 2020-01-06 PROCEDURE — G0378 HOSPITAL OBSERVATION PER HR: HCPCS

## 2020-01-06 PROCEDURE — 6360000002 HC RX W HCPCS: Performed by: FAMILY MEDICINE

## 2020-01-06 PROCEDURE — 99232 SBSQ HOSP IP/OBS MODERATE 35: CPT | Performed by: FAMILY MEDICINE

## 2020-01-06 PROCEDURE — 97166 OT EVAL MOD COMPLEX 45 MIN: CPT

## 2020-01-06 PROCEDURE — 80048 BASIC METABOLIC PNL TOTAL CA: CPT

## 2020-01-06 PROCEDURE — 2580000003 HC RX 258: Performed by: NURSE PRACTITIONER

## 2020-01-06 PROCEDURE — 93306 TTE W/DOPPLER COMPLETE: CPT

## 2020-01-06 PROCEDURE — 1200000000 HC SEMI PRIVATE

## 2020-01-06 PROCEDURE — 97535 SELF CARE MNGMENT TRAINING: CPT

## 2020-01-06 PROCEDURE — 96376 TX/PRO/DX INJ SAME DRUG ADON: CPT

## 2020-01-06 PROCEDURE — 36415 COLL VENOUS BLD VENIPUNCTURE: CPT

## 2020-01-06 PROCEDURE — 97530 THERAPEUTIC ACTIVITIES: CPT

## 2020-01-06 RX ORDER — FUROSEMIDE 10 MG/ML
40 INJECTION INTRAMUSCULAR; INTRAVENOUS 2 TIMES DAILY
Status: COMPLETED | OUTPATIENT
Start: 2020-01-06 | End: 2020-01-06

## 2020-01-06 RX ORDER — FUROSEMIDE 40 MG/1
40 TABLET ORAL DAILY
Status: DISCONTINUED | OUTPATIENT
Start: 2020-01-06 | End: 2020-01-06

## 2020-01-06 RX ORDER — FUROSEMIDE 40 MG/1
40 TABLET ORAL DAILY
Status: DISCONTINUED | OUTPATIENT
Start: 2020-01-07 | End: 2020-01-07 | Stop reason: HOSPADM

## 2020-01-06 RX ORDER — CALCIUM CARBONATE/VITAMIN D3 600 MG-10
1 TABLET ORAL DAILY
Status: DISCONTINUED | OUTPATIENT
Start: 2020-01-07 | End: 2020-01-07 | Stop reason: HOSPADM

## 2020-01-06 RX ADMIN — APIXABAN 2.5 MG: 2.5 TABLET, FILM COATED ORAL at 09:23

## 2020-01-06 RX ADMIN — TIMOLOL MALEATE 1 DROP: 5 SOLUTION OPHTHALMIC at 09:23

## 2020-01-06 RX ADMIN — FUROSEMIDE 40 MG: 10 INJECTION, SOLUTION INTRAMUSCULAR; INTRAVENOUS at 17:22

## 2020-01-06 RX ADMIN — Medication 10 ML: at 21:23

## 2020-01-06 RX ADMIN — Medication 10 ML: at 09:22

## 2020-01-06 RX ADMIN — Medication 10 ML: at 17:22

## 2020-01-06 RX ADMIN — DORZOLAMIDE HYDROCHLORIDE 1 DROP: 20 SOLUTION/ DROPS OPHTHALMIC at 21:23

## 2020-01-06 RX ADMIN — LISINOPRIL 20 MG: 20 TABLET ORAL at 09:23

## 2020-01-06 RX ADMIN — DORZOLAMIDE HYDROCHLORIDE 1 DROP: 20 SOLUTION/ DROPS OPHTHALMIC at 09:23

## 2020-01-06 RX ADMIN — METOPROLOL TARTRATE 50 MG: 50 TABLET ORAL at 09:23

## 2020-01-06 RX ADMIN — APIXABAN 2.5 MG: 2.5 TABLET, FILM COATED ORAL at 21:23

## 2020-01-06 RX ADMIN — Medication 1 TABLET: at 09:23

## 2020-01-06 RX ADMIN — PRAVASTATIN SODIUM 20 MG: 20 TABLET ORAL at 09:23

## 2020-01-06 RX ADMIN — DILTIAZEM HYDROCHLORIDE 180 MG: 180 CAPSULE, COATED, EXTENDED RELEASE ORAL at 09:23

## 2020-01-06 RX ADMIN — FUROSEMIDE 40 MG: 10 INJECTION, SOLUTION INTRAMUSCULAR; INTRAVENOUS at 09:22

## 2020-01-06 ASSESSMENT — ENCOUNTER SYMPTOMS
SHORTNESS OF BREATH: 1
WHEEZING: 0
BLOOD IN STOOL: 0
CONSTIPATION: 0
CHEST TIGHTNESS: 0
COUGH: 0
NAUSEA: 0
VOMITING: 0
ABDOMINAL PAIN: 0
RHINORRHEA: 0
DIARRHEA: 0

## 2020-01-06 ASSESSMENT — PAIN SCALES - GENERAL: PAINLEVEL_OUTOF10: 0

## 2020-01-06 NOTE — CARE COORDINATION
Social work: Call back rec'd from [employer], they are OON- pt responsible for 30% OOP. Call back from [facility], they are in network [facility]) and can accept. Precert pending for [facility]. Pt and Ritchie/son updated agreeable to plan.

## 2020-01-06 NOTE — PROGRESS NOTES
any acute abnormality. Previous echocardiogram showed preserved EF with mild pulmonary hypertension, mild TR with moderately dilated right and left atrium. Patient lives alone. PMH:  Past Medical History:   Diagnosis Date    Acute dermatitis     Arthritis     Asthma     Atrial fibrillation ([address]     Bursitis     CH[facility]congestive heart failure) ([facility])     Hearing aid worn     Hyperlipidemia     Hypertension     Lumbar disc disease     Wears glasses     Wound of right leg       Allergies: Allergies   Allergen Reactions    Shellfish-Derived Products Swelling    Tudorza Pressair [Aclidinium Bromide]      LISTED AS ALLERGY, UNKNOWN REACTION  LISTED AS ALLERGY, UNKNOWN REACTION      Medications :  apixaban, 2.5 mg, Oral, BID  calcium carb-cholecalciferol, 1 tablet, Oral, Daily  diltiazem, 180 mg, Oral, Daily  lisinopril, 20 mg, Oral, Daily  sodium chloride flush, 10 mL, Intravenous, 2 times per day  furosemide, 40 mg, Intravenous, BID  timolol, 1 drop, Both Eyes, Daily  pravastatin, 20 mg, Oral, Daily  metoprolol tartrate, 50 mg, Oral, BID  dorzolamide, 1 drop, Both Eyes, BID        Review of Systems   Review of Systems   Constitutional: Positive for fatigue. Negative for appetite change, fever and unexpected weight change. HENT: Negative for congestion, rhinorrhea and sneezing. Eyes: Negative for visual disturbance. Respiratory: Positive for shortness of breath. Negative for cough, chest tightness and wheezing. Cardiovascular: Negative for chest pain and palpitations. Gastrointestinal: Negative for abdominal pain, blood in stool, constipation, diarrhea, nausea and vomiting. Genitourinary: Negative for dysuria, enuresis, frequency and hematuria. Musculoskeletal: Negative for arthralgias and myalgias. Skin: Negative for rash. Neurological: Negative for dizziness, weakness, light-headedness and headaches. Hematological: Does not bruise/bleed easily.    Psychiatric/Behavioral: Palpations: Abdomen is soft. There is no mass. Tenderness: There is no tenderness. Lymphadenopathy:      Head:      Right side of head: No submandibular adenopathy. Left side of head: No submandibular adenopathy. Cervical: No cervical adenopathy. Skin:     General: Skin is warm. Neurological:      Mental Status: She is alert and oriented to person, place, and time. Motor: No tremor. Psychiatric:         Behavior: Behavior is cooperative. Lower Extremities : No ankle Edema , No calf Tenderness     Laboratory findings:    Recent Labs     01/04/20 2135 01/05/20  0546   WBC 5.2 4.1   HGB 16.1* 15.2*   HCT 52.9* 47.4*   * 110*     Recent Labs     01/04/20 2135 01/05/20  0546 01/06/20  0456     --  144   K 4.2  --  3.7     --  99   CO2 25  --  35*   GLUCOSE 116*  --  133*   BUN 28*  --  29*   CREATININE 0.73  --  0.69   MG 1.9 1.7  --    CALCIUM 10.3  --  9.7     Recent Labs     01/05/20  0546   TSH 2.39   CHOL 179   HDL 83   LDLCHOLESTEROL 85   CHOLHDLRATIO 2.2   TRIG 57   VLDL NOT REPORTED          Specific Gravity, UA   Date Value Ref Range Status   01/04/2020 1.020 1.005 - 1.030 Final     Protein, UA   Date Value Ref Range Status   01/04/2020 NEGATIVE NEGATIVE Final     RBC, UA   Date Value Ref Range Status   12/08/2018 20 TO 50 0 - 2 /HPF Final     Bacteria, UA   Date Value Ref Range Status   12/08/2018 NOT REPORTED NONE Final     Nitrite, Urine   Date Value Ref Range Status   01/04/2020 NEGATIVE NEGATIVE Final     WBC, UA   Date Value Ref Range Status   12/08/2018 0 TO 2 0 - 5 /HPF Final     Leukocyte Esterase, Urine   Date Value Ref Range Status   01/04/2020 NEGATIVE NEGATIVE Final       Imaging / Clinical Data :-   Ct Head Wo Contrast    Result Date: 1/4/2020  No acute intracranial abnormality. Ct Cervical Spine Wo Contrast    Result Date: 1/4/2020  No acute abnormality of the cervical spine.      Xr Chest Portable    Result Date: 1/5/2020  Stable chest with effusions and airspace disease. Xr Chest Portable    Result Date: 1/4/2020  Stable small right greater left layering pleural effusions with bibasilar opacity that may reflect atelectasis or pneumonia if the patient has fever or leukocytosis. Echocardiogram 12/10/2018  Left ventricle is normal in size  Global left ventricular systolic function is hyperdynamic  Estimated ejection fraction is 70 % . Left atrium is moderately dilated. Right atrium is moderately dilated . Aortic leaflet calcification with mild Aortic Stenosis. Mild aortic insufficiency. Mild tricuspid regurgitation. Mild pulmonary hypertension. RVSP is 42 mmHg. Pleural effusion is present.     Clinical Course : gradually improving  Assessment and Plan  :        Acute diastolic CHF -continue IV diuretics, fluid restriction, low-salt diet. Pleural effusion -chronic right pleural effusion . No plan for thoracentesis. Fall with head trauma -no intracranial hemorrhage. Chronic atrial fibrillation -rate controlled. On long-term anticoagulation with Eliquis. Atelectasis -monitor off antibiotics      Continue to monitor vitals , Intake / output ,  Cell count , HGB , Kidney function, oxygenation  as indicated . Plan and updates discussed with patient ,  answers  explained to satisfaction.    Plan discussed with Staff  RN     (Please note that portions of this note were completed with a voice recognition program. Efforts were made to edit the dictations but occasionally words are mis-transcribed.)      Mac Foster MD  1/6/2020

## 2020-01-06 NOTE — PROGRESS NOTES
Occupational Therapy   Occupational Therapy Initial Assessment  Date: 2020   Patient Name: Praveen Aburto  MRN: [id number]     : 1924    RN Romie Manley reports patient is medically stable for therapy treatment this date. Chart reviewed prior to treatment and patient is agreeable for therapy. All lines intact and patient positioned comfortably at end of treatment. All patient needs addressed prior to ending therapy session. Date of Service: 2020    Discharge Recommendations:  Subacute/Skilled Nursing Facility  OT Equipment Recommendations  Equipment Needed: Yes  Mobility Devices: ADL Assistive Devices  ADL Assistive Devices: Emergency Alert System;Long-handled Sponge;Reacher;Long-handled [place]    Assessment   Performance deficits / Impairments: Decreased functional mobility ; Decreased strength;Decreased balance;Decreased ADL status; Decreased safe awareness;Decreased high-level IADLs;Decreased coordination;Decreased vision/visual deficit; Decreased endurance  Assessment: Skilled OT is indicated to maximize overall I and safety in function as able to reduce caregiver assist/burden as able. Prognosis: Fair  Decision Making: Medium Complexity  OT Education: OT Role;Plan of Care;Transfer Training  Patient Education: DC recommendations, benefits of being up OOB, proper bed mob tech, pursed lip breathing, call light use/fall prevention, postural control, EC/WS tech   REQUIRES OT FOLLOW UP: Yes  Activity Tolerance  Activity Tolerance: Patient limited by fatigue  Activity Tolerance: poor plus and pt fatigues easily   Safety Devices  Safety Devices in place: Yes  Type of devices: Call light within reach; Chair alarm in place; Left in chair;Patient at risk for falls;Gait belt;Nurse notified           Patient Diagnosis(es): The encounter diagnosis was Acute on chronic congestive heart failure, unspecified heart failure type ([facility] [address]      has a past medical history of Acute dermatitis, Arthritis, Asthma, House  Home Layout: One level  Home Access: Stairs to enter with rails(through garage )  Entrance Stairs - Number of Steps: 1 plus 1 with HR   Entrance Stairs - Rails: Right  Bathroom Shower/Tub: Tub/Shower unit  Bathroom Toilet: Standard  Bathroom Equipment: Shower chair, Grab bars in shower  Bathroom Accessibility: Accessible  Home Equipment: Namita Avers, Rolling walker  Receives Help From: Family, Friend(s)(pt states she has a supporitve son and friend )  ADL Assistance: Independent  Homemaking Assistance: Independent  Homemaking Responsibilities: Yes  Ambulation Assistance: Independent(with cane )  Transfer Assistance: Independent  Active : No  Patient's  Info: pt states her friend drives her as needed   Mode of Transportation: Family(son provides )  Occupation: Retired  Type of occupation: seamstress   Leisure & Hobbies: sewing   Additional Comments: Pt states she had a fall prior to [place] [address] admission. Objective   Vision: Impaired  Vision Exceptions: Wears glasses at all times(pt states she had previous R eye sx for cataracts )  Hearing: Exceptions to [facility]  Hearing Exceptions: Left hearing aid;Hard of hearing/hearing concerns    Orientation  Overall Orientation Status: Within Functional Limits  Observation/Palpation  Posture: Fair(with RW )  Observation: fragile skin BUE's/bruises, O2 per NC, LUE IV   Edema: BLE's   Balance  Sitting Balance: Contact guard assistance(CG to SBA )  Standing Balance: Minimal assistance(with RW )  Standing Balance  Time: stand maximo < 30 seconds with RW for self care and functional tasks   Functional Mobility  Functional - Mobility Device: Rolling Walker  Activity: (bed to bedside chair )  Assist Level: Minimal assistance  Functional Mobility Comments: Mod verbal instruction/tactile assist for upright posture, RW safety, looking up and scanning room, pursed lip breathing as well as awareness/assist with all lines to increase safety/reduce fall risk.     Toilet Transfers  Toilet some  Perception  Overall Perceptual Status: WFL     Sensation  Overall Sensation Status: Impaired(pt states she has intermittent c/o numbness in B tips of digits )        LUE AROM (degrees)  LUE AROM : WFL  RUE AROM (degrees)  RUE AROM : WFL  LUE Strength  Gross LUE Strength: WFL  LUE Strength Comment: BUE strength grossly 4/5   RUE Strength  Gross RUE Strength: WFL                   Plan   Plan  Times per week: 4-5x/week 1-2x day as maximo   Current Treatment Recommendations: Strengthening, Balance Training, Safety Education & Training, Neuromuscular Re-education, Self-Care / ADL, Equipment Evaluation, Education, & procurement, Endurance Training, Functional Mobility Training, Patient/Caregiver Education & Training, Home Management Training                                                    AM-PAC Score   14          Goals  Short term goals  Time Frame for Short term goals: by discharge, pt to demo   Short term goal 1: bed mob tasks with use of rail as needed to SUP. Short term goal 2: increase in BUE strength by a 1/2 grade to assist with self care tasks. Short term goal 3: UB ADL to set up and LB ADL to min assist with use of AD/AE as needed. Short term goal 4: toileting tasks with use of BSC/grab bar and AD as needed to min assist.    Short term goal 5: ADL transfers and functional mob with AD as needed to SBA level. Long term goals  Long term goal 1: Pt to stand with SUP with AD as needed and maximo > 4 mins as able to reduce fall risk for self care. Long term goal 2: Pt/family to be I with pursed lip breathing, BUE HEP and EC/WS and fall prevention tech with hand outs as needed.     Patient Goals   Patient goals : return home        Therapy Time   Individual Concurrent Group Co-treatment   Time In 0817(plus 10 min chart review )         Time Out 0900         Minutes 43         Timed Code Treatment Minutes: 300 [facility]       Amina Rebolledo, OT

## 2020-01-06 NOTE — DISCHARGE INSTR - COC
left lower leg with fat layer exposed ([facility] [address] L97.922    Chronic a-fib I48.20    Venous ulcer of left leg (HCC) I83.029, L97.929    Acute on chronic diastolic congestive heart failure (HCC) I50.33    Essential hypertension I10    Acute respiratory failure with hypoxia ([facility] [place] [address] J96.01    Fall at home, initial encounter W19. [facility], Y92.009    Chronic anticoagulation Z79.01    Acute on chronic congestive heart failure (HCC) I50.9    Mild protein-calorie malnutrition (HCC) E44.1       Isolation/Infection:   Isolation          No Isolation        Patient Infection Status     None to display          Nurse Assessment:  Last Vital Signs: BP (!) 108/53   Pulse 60   Temp 98.2 °F (36.8 °C) (Oral)   Resp 16   Ht 5' 3\" (1.6 m)   Wt 118 lb 1.6 oz (53.6 kg)   SpO2 98%   BMI 20.92 kg/m²     Last documented pain score (0-10 scale): Pain Level: 0  Last Weight:   Wt Readings from Last 1 Encounters:   01/06/20 118 lb 1.6 oz (53.6 kg)     Mental Status:  oriented and alert    IV Access:  - None    Nursing Mobility/ADLs:  Walking   Assisted  Transfer  Assisted  Bathing  Assisted  Dressing  Assisted  Toileting  Assisted  Feeding  Independent  Med Admin  Independent  Med Delivery   whole    Wound Care Documentation and Therapy:        Elimination:  Continence:   · Bowel:  Yes  · Bladder: Yes  Urinary Catheter: Removal Date 01/06/2020   Colostomy/Ileostomy/Ileal Conduit: No       Date of Last BM: 01/06/2020    Intake/Output Summary (Last 24 hours) at 1/6/2020 1349  Last data filed at 1/6/2020 1123  Gross per 24 hour   Intake --   Output 1300 ml   Net -1300 ml     I/O last 3 completed shifts:  In: -   Out: 1500 [Urine:1500]    Safety Concerns:     History of Falls (last 30 days) and At Risk for Falls    Impairments/Disabilities:      Hearing    Nutrition Therapy:  Current Nutrition Therapy:   - Oral Diet:  Low Sodium (2gm)    Routes of Feeding: Oral  Liquids: No Restrictions  Daily Fluid Restriction: yes - amount 1800  Last Modified Barium Swallow with Video (Video Swallowing Test): not done    Treatments at the Time of Hospital Discharge:   Respiratory Treatments: N/A  Oxygen Therapy:  is not on home oxygen therapy. Ventilator:    - No ventilator support    Rehab Therapies: Physical Therapy and Occupational Therapy  Weight Bearing Status/Restrictions: No weight bearing restirctions  Other Medical Equipment (for information only, NOT a DME order):  walker  Other Treatments: N/A    Patient's personal belongings (please select all that are sent with patient):  Glasses, Hearing Aides left    RN SIGNATURE:  Electronically signed by Timbo Jacob RN on 1/7/20 at 11:27 AM    CASE MANAGEMENT/SOCIAL WORK SECTION    Inpatient Status Date: ***    Readmission Risk Assessment Score:  Readmission Risk              Risk of Unplanned Readmission:        12           Discharging to Facility/ Agency   · Name: [facility]  · Address: [address]  · Phone: [phone]  · Fax: [phone]      / signature: Electronically signed by CHRISTIE Madden on 1/6/20 at 1:49 PM    PHYSICIAN SECTION    Prognosis: Fair    Condition at Discharge: Stable    Rehab Potential (if transferring to Rehab): Fair    Recommended Labs or Other Treatments After Discharge: lasix as advised. Check BMP 3 times weekly for next week then as indicated. Adjust diuretics. Low salt diet . Physician Certification: I certify the above information and transfer of Praveen Aburto  is necessary for the continuing treatment of the diagnosis listed and that she requires [place] for less 30 days.      Update Admission H&P: No change in H&P    PHYSICIAN SIGNATURE:  Electronically signed by Ame Ibrahim MD on 1/7/20 at 12:46 PM

## 2020-01-07 VITALS
HEART RATE: 62 BPM | BODY MASS INDEX: 20.18 KG/M2 | WEIGHT: 113.9 LBS | HEIGHT: 63 IN | OXYGEN SATURATION: 95 % | DIASTOLIC BLOOD PRESSURE: 53 MMHG | SYSTOLIC BLOOD PRESSURE: 96 MMHG | TEMPERATURE: 97.5 F | RESPIRATION RATE: 16 BRPM

## 2020-01-07 LAB
ANION GAP SERPL CALCULATED.3IONS-SCNC: 9 MMOL/L (ref 9–17)
BNP INTERPRETATION: ABNORMAL
BUN BLDV-MCNC: 30 MG/DL (ref 8–23)
BUN/CREAT BLD: 38 (ref 9–20)
CALCIUM SERPL-MCNC: 9.6 MG/DL (ref 8.6–10.4)
CHLORIDE BLD-SCNC: 97 MMOL/L (ref 98–107)
CO2: 35 MMOL/L (ref 20–31)
CREAT SERPL-MCNC: 0.78 MG/DL (ref 0.5–0.9)
GFR AFRICAN AMERICAN: >60 ML/MIN
GFR NON-AFRICAN AMERICAN: >60 ML/MIN
GFR SERPL CREATININE-BSD FRML MDRD: ABNORMAL ML/MIN/{1.73_M2}
GFR SERPL CREATININE-BSD FRML MDRD: ABNORMAL ML/MIN/{1.73_M2}
GLUCOSE BLD-MCNC: 109 MG/DL (ref 70–99)
POTASSIUM SERPL-SCNC: 3.8 MMOL/L (ref 3.7–5.3)
PRO-BNP: 846 PG/ML
SODIUM BLD-SCNC: 141 MMOL/L (ref 135–144)

## 2020-01-07 PROCEDURE — 97116 GAIT TRAINING THERAPY: CPT

## 2020-01-07 PROCEDURE — 83880 ASSAY OF NATRIURETIC PEPTIDE: CPT

## 2020-01-07 PROCEDURE — 2580000003 HC RX 258: Performed by: NURSE PRACTITIONER

## 2020-01-07 PROCEDURE — 6370000000 HC RX 637 (ALT 250 FOR IP): Performed by: FAMILY MEDICINE

## 2020-01-07 PROCEDURE — G0378 HOSPITAL OBSERVATION PER HR: HCPCS

## 2020-01-07 PROCEDURE — 99239 HOSP IP/OBS DSCHRG MGMT >30: CPT | Performed by: FAMILY MEDICINE

## 2020-01-07 PROCEDURE — 6370000000 HC RX 637 (ALT 250 FOR IP): Performed by: NURSE PRACTITIONER

## 2020-01-07 PROCEDURE — 97530 THERAPEUTIC ACTIVITIES: CPT

## 2020-01-07 PROCEDURE — 80048 BASIC METABOLIC PNL TOTAL CA: CPT

## 2020-01-07 PROCEDURE — 97110 THERAPEUTIC EXERCISES: CPT

## 2020-01-07 PROCEDURE — 97535 SELF CARE MNGMENT TRAINING: CPT

## 2020-01-07 PROCEDURE — 36415 COLL VENOUS BLD VENIPUNCTURE: CPT

## 2020-01-07 RX ORDER — FUROSEMIDE 40 MG/1
40 TABLET ORAL DAILY
Qty: 60 TABLET | Refills: 3 | Status: SHIPPED | OUTPATIENT
Start: 2020-01-08 | End: 2020-04-03

## 2020-01-07 RX ADMIN — TIMOLOL MALEATE 1 DROP: 5 SOLUTION OPHTHALMIC at 09:42

## 2020-01-07 RX ADMIN — LISINOPRIL 20 MG: 20 TABLET ORAL at 09:42

## 2020-01-07 RX ADMIN — Medication 1 TABLET: at 09:42

## 2020-01-07 RX ADMIN — DORZOLAMIDE HYDROCHLORIDE 1 DROP: 20 SOLUTION/ DROPS OPHTHALMIC at 09:42

## 2020-01-07 RX ADMIN — PRAVASTATIN SODIUM 20 MG: 20 TABLET ORAL at 09:43

## 2020-01-07 RX ADMIN — Medication 10 ML: at 09:43

## 2020-01-07 RX ADMIN — DILTIAZEM HYDROCHLORIDE 180 MG: 180 CAPSULE, COATED, EXTENDED RELEASE ORAL at 09:42

## 2020-01-07 RX ADMIN — APIXABAN 2.5 MG: 2.5 TABLET, FILM COATED ORAL at 09:43

## 2020-01-07 RX ADMIN — METOPROLOL TARTRATE 50 MG: 50 TABLET ORAL at 09:43

## 2020-01-07 RX ADMIN — FUROSEMIDE 40 MG: 40 TABLET ORAL at 09:43

## 2020-01-07 ASSESSMENT — ENCOUNTER SYMPTOMS
VOMITING: 0
RHINORRHEA: 0
NAUSEA: 0
DIARRHEA: 0
WHEEZING: 0
CONSTIPATION: 0
COUGH: 0
BLOOD IN STOOL: 0
CHEST TIGHTNESS: 0
SHORTNESS OF BREATH: 1
ABDOMINAL PAIN: 0

## 2020-01-07 NOTE — CARE COORDINATION
[employer] approved patient for admission and will admit today. [facility] to transport at [place] completed. Orders faxed. Nurse to call report to [phone]. Discussed with patient and son-agreeable with dc plan. Saundra Wright

## 2020-01-07 NOTE — PROGRESS NOTES
Occupational Therapy  Facility/Department: [facility]  Daily Treatment Note  NAME: Evelina Veliz  : 1924  MRN: [id number]    Date of Service: 2020    Discharge Recommendations:  [address]   Patient would benefit from SNF for continued occupational therapy to increase independence with  ADL of bathing, dressing, toileting and grooming. Writer recommending SNF placement for for activity tolerance and strength which will increase independence with ADL's coordinated with bed mobility and chair transfers. Continued skilled OT services to address decreased safety awareness with ADL and IADL tasks and for education and increased independence with DME and AE for fall prevention and ec/ws techniques prior to d/c home. Assessment   Performance deficits / Impairments: Decreased functional mobility ; Decreased strength;Decreased balance;Decreased ADL status; Decreased safe awareness;Decreased high-level IADLs;Decreased coordination;Decreased vision/visual deficit; Decreased endurance  Assessment: Skilled OT is indicated to maximize overall I and safety in function as able to reduce caregiver assist/burden as able. Prognosis: Fair  OT Education: OT Role;Energy Conservation;IADL Safety;Home Exercise Program;ADL Adaptive Strategies;Transfer Training  Patient Education: Pt issued handouts on EC/WS, fall prevention, home/community safety, pursed lip breathing, difference between PT/OT, simple B UE ROM HEP  REQUIRES OT FOLLOW UP: Yes  Activity Tolerance  Activity Tolerance: Patient limited by fatigue  Activity Tolerance: poor plus and pt fatigues easily   Safety Devices  Safety Devices in place: Yes  Type of devices: Patient at risk for falls;Call light within reach; Left in chair;Nurse notified; Chair alarm in place;Gait belt         Patient Diagnosis(es): The encounter diagnosis was Acute on chronic congestive heart failure, unspecified heart failure type ([facility] [address]       has a past medical step commands with increased time; Follows one step commands with repetition  Attention Span: Appears intact  Memory: Decreased short term memory  Safety Judgement: Decreased awareness of need for assistance;Decreased awareness of need for safety  Problem Solving: Assistance required to implement solutions;Assistance required to generate solutions;Assistance required to identify errors made;Assistance required to correct errors made;Decreased awareness of errors  Insights: Decreased awareness of deficits  Initiation: Requires cues for some  Sequencing: Requires cues for some     Perception  Overall Perceptual Status: Impaired  Initiation: Cues to initiate tasks                                   Plan   Plan  Times per week: 4-5x/week 1-2x day as maximo   Current Treatment Recommendations: Strengthening, Balance Training, Safety Education & Training, Neuromuscular Re-education, Self-Care / ADL, Equipment Evaluation, Education, & procurement, Endurance Training, Functional Mobility Training, Patient/Caregiver Education & Training, Home Management Training                        AM-PAC Score        AM-PAC Inpatient Daily Activity Raw Score: 17 (01/07/20 0803)  AM-PAC Inpatient ADL T-Scale Score : 37.26 (01/07/20 0803)  ADL Inpatient CMS 0-100% Score: 50.11 (01/07/20 0803)  ADL Inpatient CMS G-Code Modifier : CK (01/07/20 0803)    Goals  Short term goals  Time Frame for Short term goals: by discharge, pt to demo   Short term goal 1: bed mob tasks with use of rail as needed to SUP. Short term goal 2: increase in BUE strength by a 1/2 grade to assist with self care tasks. Short term goal 3: UB ADL to set up and LB ADL to min assist with use of AD/AE as needed. Short term goal 4: toileting tasks with use of BSC/grab bar and AD as needed to min assist.    Short term goal 5: ADL transfers and functional mob with AD as needed to SBA level.     Long term goals  Long term goal 1: Pt to stand with SUP with AD as needed and maximo > 4

## 2020-01-07 NOTE — DISCHARGE SUMMARY
[address]      Discharge Summary     Patient ID: Praveen Aburto  :  1924   MRN: [id number]     ACCOUNT:  [de-identified]   Patient Location :   Patient's PCP: Mary Grace Johns MD  Admit Date: 2020   Discharge Date: 2020     Length of Stay: 3  Code Status:  Full Code  Admitting Physician: Ame Ibrahim MD  Discharge Physician: Ame Ibrahim MD     Active Discharge Diagnosis :     Primary Problem  Acute on chronic diastolic congestive heart failure [facility])      [facility] Problems    Diagnosis Date Noted    Mild protein-calorie malnutrition ([facility] [address] [E44.1] 2020    Fall at home, initial encounter [W19. [facility], Y92.009]     Chronic anticoagulation [Z79.01]     Acute respiratory failure with hypoxia (HCC) [J96.01] 2018    Acute on chronic diastolic congestive heart failure ([facility] [address] [I50.33]     Essential hypertension [I10]     Chronic a-fib [I48.20]        Admission Condition:  fair     Discharged Condition: stable    Hospital Stay:     Hospital Course:  Praveen Aburto is a 80 y.o. female who was admitted for the management of   Acute on chronic diastolic congestive heart failure ([facility] [address] , presented to ER with Fall and Shortness of Breath    Patient was brought to emergency room by her son with fall, shortness of breath. Patient has been having difficulty in breathing for 1 day. She reportedly fell backwards and hit her head without loss of consciousness. Patient did had some increased confusion after the fall. Patient has history of atrial fibrillation and is on long-term anticoagulation with Eliquis. Evaluation showed elevated proBNP 750, mildly elevated troponin 25, chest x-ray showed stable small right greater than left layering pleural effusion with basilar opacity concerning for atelectasis versus pneumonia. CT head, CT cervical spine was negative for any acute abnormality.   Previous echocardiogram showed preserved EF with mild pulmonary hypertension, mild TR with moderately dilated right and left atrium. Patient lives alone. She continued  to have fatigue and had trouble ambulating and fall risk . She was sent to SNF for rehab. Significant therapeutic interventions:   Acute diastolic CHF - lasix 40 mg daily. fluid restriction, low-salt diet. continue oxygen support. Pleural effusion -chronic right pleural effusion . No plan for thoracentesis. Fall with head trauma -no intracranial hemorrhage. Chronic atrial fibrillation -rate controlled. On long-term anticoagulation with Eliquis.   Atelectasis -monitor off antibiotics.        Significant Diagnostic Studies:   Labs / Micro:/Radiology  Recent Labs     01/05/20  0546 01/04/20  2135   WBC 4.1 5.2   HGB 15.2* 16.1*   HCT 47.4* 52.9*   .2 105.2*   * 123*     Labs Renal Latest Ref Rng & Units 1/7/2020 1/6/2020 1/4/2020 10/26/2019 5/18/2019   BUN 8 - 23 mg/dL 30(H) 29(H) 28(H) 23 20   Cr 0.50 - 0.90 mg/dL 0.78 0.69 0.73 0.63 0.65   K 3.7 - 5.3 mmol/L 3.8 3.7 4.2 4.5 5.2   Na 135 - 144 mmol/L 141 144 140 145(H) 146(H)     Lab Results   Component Value Date    ALT 23 10/26/2019    AST 32 (H) 10/26/2019    ALKPHOS 71 10/26/2019    BILITOT 0.77 10/26/2019     Lab Results   Component Value Date    TSH 2.39 01/05/2020     No results found for: HAV, HEPAIGM, HEPBIGM, HEPBCAB, HBEAG, HEPCAB  Lab Results   Component Value Date    COLORU YELLOW 01/04/2020    NITRU NEGATIVE 01/04/2020    GLUCOSEU NEGATIVE 01/04/2020    KETUA NEGATIVE 01/04/2020    UROBILINOGEN Normal 01/04/2020    BILIRUBINUR NEGATIVE 01/04/2020     Lab Results   Component Value Date    LABA1C 6.0 10/26/2019     Lab Results   Component Value Date     10/26/2019     Lab Results   Component Value Date    INR 1.7 02/23/2018    INR 2.4 01/30/2018    INR 2.3 01/02/2018    PROTIME 20 02/23/2018    PROTIME 29.1 01/30/2018    PROTIME 27.1 01/02/2018       Ct Head Wo Contrast    Result Date: 1/4/2020  No acute intracranial abnormality. Ct Cervical Spine Wo Contrast    Result Date: 1/4/2020  No acute abnormality of the cervical spine. Xr Chest Portable    Result Date: 1/6/2020  1. Cardiomegaly with bibasilar opacities and moderate effusions. Findings are unchanged from the prior study. 2. COPD. Xr Chest Portable    Result Date: 1/5/2020  Stable chest with effusions and airspace disease. Xr Chest Portable    Result Date: 1/4/2020  Stable small right greater left layering pleural effusions with bibasilar opacity that may reflect atelectasis or pneumonia if the patient has fever or leukocytosis. Consultations:    Consults:     Final Specialist Recommendations/Findings:   IP CONSULT TO HOSPITALIST  IP CONSULT TO DIETITIAN      The patient was seen and examined on day of discharge and this discharge summary is in conjunction with any daily progress note from day of discharge. Discharge plan:     Disposition: skilled nursing Home . Physician Follow Up:     Osei Salazar MD  [address] [place] [address] 50  [phone]             Requiring Further Evaluation/Follow Up POST HOSPITALIZATION/Incidental Findings:repeat BMP . Diet: cardiac diet    Activity: As tolerated. Instructions to Patient: wean off oxygen as tolerated.      Discharge Medications:      Medication List      CHANGE how you take these medications    furosemide 40 MG tablet  Commonly known as:  LASIX  Take 1 tablet by mouth daily  Start taking on:  January 8, 2020  What changed:    medication strength  how much to take     metoprolol tartrate 50 MG tablet  Commonly known as:  LOPRESSOR  Take 0.5 tablets by mouth 2 times daily  What changed:  how much to take        CONTINUE taking these medications    apixaban 2.5 MG Tabs tablet  Commonly known as:  ELIQUIS  Take 1 tablet by mouth 2 times daily     calcium-vitamin D 500-200 MG-UNIT per tablet  Commonly known as:  OSCAL-500     diltiazem 180 MG extended release capsule  Commonly known as:  DILACOR XR     dorzolamide 2 % ophthalmic solution  Commonly known as:  TRUSOPT     lisinopril 20 MG tablet  Commonly known as:  PRINIVIL;ZESTRIL     pravastatin 80 MG tablet  Commonly known as:  PRAVACHOL     timolol 0.5 % ophthalmic solution  Commonly known as:  TIMOPTIC     TYLENOL EXTRA STRENGTH PO           Where to Get Your Medications      These medications were sent to [facility] [address] [address]    Phone:  [phone]   furosemide 40 MG tablet         Time Spent on discharge is  35 mins in patient examination, evaluation, counseling as well as medication reconciliation, prescriptions for required medications, discharge plan and follow up. Electronically signed by   Albert Blanton MD  1/7/2020        Thank you Dr. Kerry Hoff MD for the opportunity to be involved in this patient's care.

## 2020-01-07 NOTE — PROGRESS NOTES
Physical Therapy  Facility/Department: [facility]  Daily Treatment Note  NAME: Katlyn Yusuf  : 1924  MRN: [id number]    Date of Service: 2020    Discharge Recommendations:  Subacute/Skilled Nursing Facility       Assessment   Body structures, Functions, Activity limitations: Decreased functional mobility ; Decreased strength;Decreased endurance;Decreased vision/visual deficit; Decreased coordination;Decreased posture;Decreased ADL status; Decreased high-level IADLs;Decreased ROM; Decreased balance; Increased pain  Assessment: Pt has deficits noted in bed mobility, transfers, ambulation, balance, and endurance as well as impaired safety awareness this session. Pt requires continued IP PT & D/C to [address] to maximize independence with functional mobility, balance, safety & improved activity tolerancde  Prognosis: Good  Decision Making: Medium Complexity  PT Education: Goals; Functional Mobility Training;PT Role;Transfer Training; Adaptive Device Training;Pressure Relief;Plan of Care;Energy Conservation;Gait Training;Equipment;General Safety  Barriers to Learning: [place]  REQUIRES PT FOLLOW UP: Yes  Activity Tolerance  Activity Tolerance: Patient limited by endurance;Treatment limited secondary to medical complications (free text)     Patient Diagnosis(es): The primary encounter diagnosis was Acute on chronic congestive heart failure, unspecified heart failure type ([address] A diagnosis of Acute on chronic diastolic congestive heart failure (HCC) was also pertinent to this visit.      has a past medical history of Acute dermatitis, Arthritis, Asthma, Atrial fibrillation ([address] Bursitis, CHF (congestive heart failure) ([address] Hearing aid worn, Hyperlipidemia, Hypertension, Lumbar disc disease, Wears glasses, and Wound of right leg.   has a past surgical history that includes back surgery; debridement (Left, 2017); pr incis/drain thigh/knee abscess,deep (Right, 3/9/2017); other surgical history (03/30/2017); other surgical history (03/30/2017); and pr incis/drain thigh/knee abscess,deep (Right, 4/28/2017). Restrictions  Restrictions/Precautions  Restrictions/Precautions: General Precautions, Up as Tolerated, Fall Risk, Cardiac  Required Braces or Orthoses?: No  Position Activity Restriction  Other position/activity restrictions: up with assist, telemetry, fall precautions, [email or web address]  Subjective   General  Chart Reviewed: Yes  Response To Previous Treatment: Not applicable  Family / Caregiver Present: No  Subjective  Subjective: Pt agreeable to PT  General Comment  Comments: RNMingo PT  Pain Screening  Patient Currently in Pain: Denies  Vital Signs  BP Location: Left Arm  Level of Consciousness: Alert  Patient Currently in Pain: Denies  Oxygen Therapy  O2 Device: Nasal cannula       Orientation  Orientation  Overall Orientation Status: Within Functional Limits       Objective   Bed mobility  Bridging: Moderate assistance  Rolling to Left: Contact guard assistance  Rolling to Right: Contact guard assistance  Supine to Sit: Stand by assistance  Scooting: Maximal assistance  Comment: cues for technique  Transfers  Sit to Stand: Minimal Assistance  Stand to sit: Minimal Assistance  Stand Pivot Transfers: Minimal Assistance  Comment: needed Ed for correct hand placement for safe sit/stand  Ambulation  Ambulation?: Yes  Ambulation 1  Surface: level tile  Device: Rolling Walker  Assistance: Minimal assistance  Quality of Gait: step to pattern  Distance: 30ftx2  Comments: BR  Pt amb to BR for toileting, Min Assistance to sit to toilet. Pt stood with Min Assistance,stood 3 minutes for pericare & to pull up brief, amb 2ft to sink for hand hygiene x 2 minutes then ambulated 30 more ft with R/walker & sat to chair with Minimal Assistance   All lines intact, call light within reach, and patient positioned comfortably at end of treatment.   All patient needs addressed prior to ending therapy

## 2020-01-07 NOTE — PROGRESS NOTES
[facility]      Daily Progress Note     Admit Date: 1/4/2020  Bed/Room No.  2003/2003-02  Admitting Physician : Kayli Clements MD  Code Status :[facility] Day:  LOS: 3 days   Chief Complaint:     Chief Complaint   Patient presents with    Fall    Shortness of Breath     Principal Problem:    Acute on chronic diastolic congestive heart failure ([address]  Active Problems:    Chronic a-fib    Essential hypertension    Acute respiratory failure with hypoxia ([address]    Fall at home, initial encounter    Chronic anticoagulation    Mild protein-calorie malnutrition ([address]  Resolved Problems:    Congestive heart failure ([address]    Subjective : Interval History/Significant events :  01/07/20    Patient remains on supplemental oxygen. continues to have fatigue . She is eating and drinking OK , no fever or chills . Has cough without sputum . She denies any chest pain. Vitals - Stable afebrile  Labs  - normal creatinine. Nursing notes , Consults notes reviewed. Overnight events and updates discussed with Nursing staff . Background History:         Anton Wong is 80 y.o. female  Who was admitted to the hospital on 1/4/2020 for treatment of Acute on chronic diastolic congestive heart failure ([address] Patient was brought to emergency room by her son with fall, shortness of breath. Patient has been having difficulty in breathing for 1 day. She reportedly fell backwards and hit her head without loss of consciousness. Patient did had some increased confusion after the fall. Patient has history of atrial fibrillation and is on long-term anticoagulation with Eliquis. Evaluation showed elevated proBNP 750, mildly elevated troponin 25, chest x-ray showed stable small right greater than left layering pleural effusion with basilar opacity concerning for atelectasis versus pneumonia. CT head, CT cervical spine was negative for any acute abnormality.   Previous echocardiogram showed sleep disturbance. Objective :      Current Vitals : Temp: 97.3 °F (36.3 °C),  Pulse: 58, Resp: 16, BP: 129/76, SpO2: 100 %  Last 24 Hrs Vitals   Patient Vitals for the past 24 hrs:   BP Temp Temp src Pulse Resp SpO2 Weight   01/07/20 0753 129/76 97.3 °F (36.3 °C) Oral 58 16 100 % --   01/07/20 0437 -- -- -- -- -- -- 113 lb 14.4 oz (51.7 kg)   01/06/20 1941 (!) 105/50 97.5 °F (36.4 °C) Oral 61 14 97 % --   01/06/20 1539 (!) 107/49 98.3 °F (36.8 °C) Oral 60 16 96 % --   01/06/20 1123 (!) 108/53 98.2 °F (36.8 °C) Oral 60 16 98 % --     Intake / output   01/06 0701 - 01/07 0700  In: 0 [P.O.:650]  Out: 2250 [Urine:2250]  Physical Exam:  Physical Exam  Vitals signs and nursing note reviewed. Constitutional:       General: She is not in acute distress. Appearance: She is underweight. She is ill-appearing. She is not diaphoretic. Interventions: Nasal cannula in place. HENT:      Head: Normocephalic and atraumatic. Nose:      Right Sinus: No maxillary sinus tenderness or frontal sinus tenderness. Left Sinus: No maxillary sinus tenderness or frontal sinus tenderness. Mouth/Throat:      Pharynx: No oropharyngeal exudate. Eyes:      General: No scleral icterus. Conjunctiva/sclera: Conjunctivae normal.      Pupils: Pupils are equal, round, and reactive to light. Neck:      Musculoskeletal: Full passive range of motion without pain and neck supple. Thyroid: No thyromegaly. Vascular: No JVD. Cardiovascular:      Rate and Rhythm: Normal rate and regular rhythm. Pulses:           Dorsalis pedis pulses are 2+ on the right side and 2+ on the left side. Heart sounds: Normal heart sounds. No murmur. Pulmonary:      Effort: Pulmonary effort is normal.      Breath sounds: Normal breath sounds. No wheezing or rales. Abdominal:      Palpations: Abdomen is soft. There is no mass. Tenderness: There is no tenderness.    Lymphadenopathy:      Head:      Right side of head:

## 2020-01-11 LAB
CULTURE: NORMAL
CULTURE: NORMAL
Lab: NORMAL
Lab: NORMAL
SPECIMEN DESCRIPTION: NORMAL
SPECIMEN DESCRIPTION: NORMAL

## 2020-04-03 ENCOUNTER — HOSPITAL ENCOUNTER (INPATIENT)
Age: 85
LOS: 6 days | Discharge: NURSING, REHAB OR LONG-TERM CARE | DRG: 291 | End: 2020-04-09
Attending: EMERGENCY MEDICINE | Admitting: INTERNAL MEDICINE
Payer: MEDICARE

## 2020-04-03 ENCOUNTER — APPOINTMENT (OUTPATIENT)
Dept: GENERAL RADIOLOGY | Age: 85
DRG: 291 | End: 2020-04-03
Payer: MEDICARE

## 2020-04-03 PROBLEM — I50.30 HEART FAILURE WITH PRESERVED LEFT VENTRICULAR FUNCTION (HFPEF) (HCC): Status: ACTIVE | Noted: 2020-01-04

## 2020-04-03 PROBLEM — L98.499 CHRONIC SKIN ULCER (HCC): Status: ACTIVE | Noted: 2020-04-03

## 2020-04-03 PROBLEM — J18.9 PNEUMONIA: Status: ACTIVE | Noted: 2020-04-03

## 2020-04-03 LAB
ABSOLUTE EOS #: 0 K/UL (ref 0–0.4)
ABSOLUTE IMMATURE GRANULOCYTE: 0 K/UL (ref 0–0.3)
ABSOLUTE LYMPH #: 0.51 K/UL (ref 1–4.8)
ABSOLUTE MONO #: 0.51 K/UL (ref 0.2–0.8)
ALBUMIN SERPL-MCNC: 4 G/DL (ref 3.5–5.2)
ALBUMIN/GLOBULIN RATIO: ABNORMAL (ref 1–2.5)
ALP BLD-CCNC: 89 U/L (ref 35–104)
ALT SERPL-CCNC: 28 U/L (ref 5–33)
ANION GAP SERPL CALCULATED.3IONS-SCNC: 13 MMOL/L (ref 9–17)
AST SERPL-CCNC: 33 U/L
BASOPHILS # BLD: 0 %
BASOPHILS ABSOLUTE: 0 K/UL (ref 0–0.2)
BILIRUB SERPL-MCNC: 0.55 MG/DL (ref 0.3–1.2)
BNP INTERPRETATION: ABNORMAL
BUN BLDV-MCNC: 19 MG/DL (ref 8–23)
BUN/CREAT BLD: 31 (ref 9–20)
C-REACTIVE PROTEIN: 6.8 MG/L (ref 0–5)
CALCIUM SERPL-MCNC: 10.4 MG/DL (ref 8.6–10.4)
CHLORIDE BLD-SCNC: 101 MMOL/L (ref 98–107)
CO2: 29 MMOL/L (ref 20–31)
CREAT SERPL-MCNC: 0.62 MG/DL (ref 0.5–0.9)
D-DIMER QUANTITATIVE: 1.54 MG/L FEU
DIFFERENTIAL TYPE: ABNORMAL
EKG ATRIAL RATE: 234 BPM
EKG Q-T INTERVAL: 452 MS
EKG QRS DURATION: 90 MS
EKG QTC CALCULATION (BAZETT): 452 MS
EKG R AXIS: 64 DEGREES
EKG T AXIS: 59 DEGREES
EKG VENTRICULAR RATE: 60 BPM
EOSINOPHILS RELATIVE PERCENT: 0 % (ref 1–4)
FERRITIN: 96 UG/L (ref 13–150)
GFR AFRICAN AMERICAN: >60 ML/MIN
GFR NON-AFRICAN AMERICAN: >60 ML/MIN
GFR SERPL CREATININE-BSD FRML MDRD: ABNORMAL ML/MIN/{1.73_M2}
GFR SERPL CREATININE-BSD FRML MDRD: ABNORMAL ML/MIN/{1.73_M2}
GLUCOSE BLD-MCNC: 258 MG/DL (ref 70–99)
HCT VFR BLD CALC: 47.1 % (ref 36.3–47.1)
HEMOGLOBIN: 14.7 G/DL (ref 11.9–15.1)
IMMATURE GRANULOCYTES: 0 %
INR BLD: 1.1
LACTATE DEHYDROGENASE: 237 U/L (ref 135–214)
LACTIC ACID: 2.8 MMOL/L (ref 0.5–2.2)
LYMPHOCYTES # BLD: 7 % (ref 24–44)
MCH RBC QN AUTO: 32.7 PG (ref 25.2–33.5)
MCHC RBC AUTO-ENTMCNC: 31.2 G/DL (ref 28.4–34.8)
MCV RBC AUTO: 104.7 FL (ref 82.6–102.9)
MONOCYTES # BLD: 7 % (ref 1–7)
MYOGLOBIN: 41 NG/ML (ref 25–58)
NRBC AUTOMATED: 0 PER 100 WBC
PDW BLD-RTO: 15 % (ref 11.8–14.4)
PLATELET # BLD: 147 K/UL (ref 138–453)
PLATELET ESTIMATE: ABNORMAL
PMV BLD AUTO: 11.4 FL (ref 8.1–13.5)
POTASSIUM SERPL-SCNC: 4.3 MMOL/L (ref 3.7–5.3)
PRO-BNP: 632 PG/ML
PROCALCITONIN: 0.05 NG/ML
PROTHROMBIN TIME: 11.5 SEC (ref 9.7–11.6)
RBC # BLD: 4.5 M/UL (ref 3.95–5.11)
RBC # BLD: ABNORMAL 10*6/UL
SEG NEUTROPHILS: 86 % (ref 36–66)
SEGMENTED NEUTROPHILS ABSOLUTE COUNT: 6.28 K/UL (ref 1.8–7.7)
SODIUM BLD-SCNC: 143 MMOL/L (ref 135–144)
TOTAL PROTEIN: 7.5 G/DL (ref 6.4–8.3)
TROPONIN INTERP: ABNORMAL
TROPONIN T: ABNORMAL NG/ML
TROPONIN, HIGH SENSITIVITY: 18 NG/L (ref 0–14)
WBC # BLD: 7.3 K/UL (ref 3.5–11.3)
WBC # BLD: ABNORMAL 10*3/UL

## 2020-04-03 PROCEDURE — 84484 ASSAY OF TROPONIN QUANT: CPT

## 2020-04-03 PROCEDURE — 96365 THER/PROPH/DIAG IV INF INIT: CPT

## 2020-04-03 PROCEDURE — 93005 ELECTROCARDIOGRAM TRACING: CPT | Performed by: NURSE PRACTITIONER

## 2020-04-03 PROCEDURE — 87449 NOS EACH ORGANISM AG IA: CPT

## 2020-04-03 PROCEDURE — 82728 ASSAY OF FERRITIN: CPT

## 2020-04-03 PROCEDURE — U0002 COVID-19 LAB TEST NON-CDC: HCPCS

## 2020-04-03 PROCEDURE — 85379 FIBRIN DEGRADATION QUANT: CPT

## 2020-04-03 PROCEDURE — 83605 ASSAY OF LACTIC ACID: CPT

## 2020-04-03 PROCEDURE — 1200000000 HC SEMI PRIVATE

## 2020-04-03 PROCEDURE — 87899 AGENT NOS ASSAY W/OPTIC: CPT

## 2020-04-03 PROCEDURE — 71045 X-RAY EXAM CHEST 1 VIEW: CPT

## 2020-04-03 PROCEDURE — 85025 COMPLETE CBC W/AUTO DIFF WBC: CPT

## 2020-04-03 PROCEDURE — 84145 PROCALCITONIN (PCT): CPT

## 2020-04-03 PROCEDURE — 99223 1ST HOSP IP/OBS HIGH 75: CPT | Performed by: INTERNAL MEDICINE

## 2020-04-03 PROCEDURE — 83880 ASSAY OF NATRIURETIC PEPTIDE: CPT

## 2020-04-03 PROCEDURE — 6360000002 HC RX W HCPCS: Performed by: NURSE PRACTITIONER

## 2020-04-03 PROCEDURE — 6370000000 HC RX 637 (ALT 250 FOR IP): Performed by: NURSE PRACTITIONER

## 2020-04-03 PROCEDURE — 87040 BLOOD CULTURE FOR BACTERIA: CPT

## 2020-04-03 PROCEDURE — 83874 ASSAY OF MYOGLOBIN: CPT

## 2020-04-03 PROCEDURE — 80053 COMPREHEN METABOLIC PANEL: CPT

## 2020-04-03 PROCEDURE — 2580000003 HC RX 258: Performed by: NURSE PRACTITIONER

## 2020-04-03 PROCEDURE — 85610 PROTHROMBIN TIME: CPT

## 2020-04-03 PROCEDURE — 86140 C-REACTIVE PROTEIN: CPT

## 2020-04-03 PROCEDURE — 99285 EMERGENCY DEPT VISIT HI MDM: CPT

## 2020-04-03 PROCEDURE — 83615 LACTATE (LD) (LDH) ENZYME: CPT

## 2020-04-03 PROCEDURE — 0100U HC RESPIRPTHGN MULT REV TRANS & AMP PRB TECH 21 TRGT: CPT

## 2020-04-03 RX ORDER — DORZOLAMIDE HCL 20 MG/ML
1 SOLUTION/ DROPS OPHTHALMIC 2 TIMES DAILY
Status: DISCONTINUED | OUTPATIENT
Start: 2020-04-03 | End: 2020-04-09 | Stop reason: HOSPADM

## 2020-04-03 RX ORDER — POTASSIUM CITRATE 10 MEQ/1
TABLET, EXTENDED RELEASE ORAL DAILY
COMMUNITY

## 2020-04-03 RX ORDER — TIMOLOL MALEATE 5 MG/ML
1 SOLUTION/ DROPS OPHTHALMIC DAILY
Status: DISCONTINUED | OUTPATIENT
Start: 2020-04-03 | End: 2020-04-09 | Stop reason: HOSPADM

## 2020-04-03 RX ORDER — SODIUM CHLORIDE 0.9 % (FLUSH) 0.9 %
10 SYRINGE (ML) INJECTION EVERY 12 HOURS SCHEDULED
Status: DISCONTINUED | OUTPATIENT
Start: 2020-04-03 | End: 2020-04-09 | Stop reason: HOSPADM

## 2020-04-03 RX ORDER — SODIUM CHLORIDE 0.9 % (FLUSH) 0.9 %
10 SYRINGE (ML) INJECTION PRN
Status: DISCONTINUED | OUTPATIENT
Start: 2020-04-03 | End: 2020-04-09 | Stop reason: HOSPADM

## 2020-04-03 RX ORDER — BUMETANIDE 1 MG/1
1 TABLET ORAL DAILY
Status: DISCONTINUED | OUTPATIENT
Start: 2020-04-03 | End: 2020-04-03

## 2020-04-03 RX ORDER — ACETAMINOPHEN 325 MG/1
650 TABLET ORAL EVERY 4 HOURS PRN
Status: DISCONTINUED | OUTPATIENT
Start: 2020-04-03 | End: 2020-04-09 | Stop reason: HOSPADM

## 2020-04-03 RX ORDER — HYDRALAZINE HYDROCHLORIDE 20 MG/ML
10 INJECTION INTRAMUSCULAR; INTRAVENOUS ONCE
Status: COMPLETED | OUTPATIENT
Start: 2020-04-04 | End: 2020-04-04

## 2020-04-03 RX ORDER — BUMETANIDE 0.25 MG/ML
1 INJECTION, SOLUTION INTRAMUSCULAR; INTRAVENOUS 2 TIMES DAILY
Status: DISCONTINUED | OUTPATIENT
Start: 2020-04-04 | End: 2020-04-05

## 2020-04-03 RX ORDER — PRAVASTATIN SODIUM 40 MG
20 TABLET ORAL DAILY
Status: DISCONTINUED | OUTPATIENT
Start: 2020-04-03 | End: 2020-04-09 | Stop reason: HOSPADM

## 2020-04-03 RX ORDER — BUMETANIDE 1 MG/1
1 TABLET ORAL DAILY
COMMUNITY

## 2020-04-03 RX ORDER — CALCIUM CARBONATE/VITAMIN D3 600 MG-10
1 TABLET ORAL DAILY
Status: DISCONTINUED | OUTPATIENT
Start: 2020-04-03 | End: 2020-04-09 | Stop reason: HOSPADM

## 2020-04-03 RX ADMIN — CEFTRIAXONE SODIUM 1 G: 1 INJECTION, POWDER, FOR SOLUTION INTRAMUSCULAR; INTRAVENOUS at 14:52

## 2020-04-03 RX ADMIN — METOPROLOL TARTRATE 12.5 MG: 25 TABLET, FILM COATED ORAL at 21:51

## 2020-04-03 RX ADMIN — BUMETANIDE 1 MG: 1 TABLET ORAL at 21:50

## 2020-04-03 RX ADMIN — SODIUM CHLORIDE, PRESERVATIVE FREE 10 ML: 5 INJECTION INTRAVENOUS at 21:52

## 2020-04-03 RX ADMIN — DORZOLAMIDE HCL 1 DROP: 20 SOLUTION/ DROPS OPHTHALMIC at 21:50

## 2020-04-03 RX ADMIN — APIXABAN 2.5 MG: 2.5 TABLET, FILM COATED ORAL at 21:50

## 2020-04-03 RX ADMIN — AZITHROMYCIN MONOHYDRATE 500 MG: 500 INJECTION, POWDER, LYOPHILIZED, FOR SOLUTION INTRAVENOUS at 15:41

## 2020-04-03 RX ADMIN — TIMOLOL MALEATE 1 DROP: 5 SOLUTION/ DROPS OPHTHALMIC at 21:57

## 2020-04-03 RX ADMIN — Medication 1 TABLET: at 21:57

## 2020-04-03 RX ADMIN — PRAVASTATIN SODIUM 20 MG: 40 TABLET ORAL at 21:50

## 2020-04-03 ASSESSMENT — ENCOUNTER SYMPTOMS
RHINORRHEA: 0
COUGH: 1
SHORTNESS OF BREATH: 1
SORE THROAT: 0
DIARRHEA: 0
WHEEZING: 0
COLOR CHANGE: 0
CONSTIPATION: 0
ABDOMINAL PAIN: 0
SINUS PRESSURE: 0
NAUSEA: 0
VOMITING: 0

## 2020-04-03 ASSESSMENT — PAIN SCALES - GENERAL
PAINLEVEL_OUTOF10: 0
PAINLEVEL_OUTOF10: 0

## 2020-04-03 NOTE — ED NOTES
Assisted to bedside commode via pivot. Gait slow, steady. Voided. Donna care given. Assisted back to bed. Warm blankets applied. Telemetry continues.       Eugenio Rust RN  04/03/20 3694

## 2020-04-03 NOTE — H&P
(35.5 °C), Min:95.1 °F (35.1 °C), Max:97.1 °F (36.2 °C)    No results for input(s): POCGLU in the last 72 hours. Intake/Output Summary (Last 24 hours) at 4/3/2020 1813  Last data filed at 4/3/2020 1645  Gross per 24 hour   Intake --   Output 550 ml   Net -550 ml       General Appearance:  alert, well appearing, and in no acute distress, elderly  female  Mental status: oriented to person, place, and time  Head:  normocephalic, atraumatic  Eye: no icterus, redness, pupils equal and reactive, extraocular eye movements intact, conjunctiva clear  Ear: normal external ear, no discharge, hearing intact  Nose:  no drainage noted  Mouth: mucous membranes moist  Neck: supple, no carotid bruits, thyroid not palpable  Lungs: Bilateral equal air entry, diminished lung sounds bibasilarly, no wheezing, rales or rhonchi, normal effort  Cardiovascular: bradycardic, irregular rhythm, no murmur, gallop, rub.   Abdomen: Soft, nontender, nondistended, normal bowel sounds, no hepatomegaly or splenomegaly  Neurologic: There are no new focal motor or sensory deficits, normal muscle tone and bulk, no abnormal sensation, normal speech, cranial nerves II through XII grossly intact  Skin: No gross lesions, rashes, bruising or bleeding on exposed skin area  Extremities:  1+ bilateral lower extremity pitting edema, no peripheral pulses palpable, or calf pain with palpation  Psych: normal affect     Investigations:      Laboratory Testing:  Recent Results (from the past 24 hour(s))   EKG 12 Lead    Collection Time: 04/03/20  1:11 PM   Result Value Ref Range    Ventricular Rate 60 BPM    Atrial Rate 234 BPM    QRS Duration 90 ms    Q-T Interval 452 ms    QTc Calculation (Bazett) 452 ms    R Axis 64 degrees    T Axis 59 degrees   CBC Auto Differential    Collection Time: 04/03/20  1:20 PM   Result Value Ref Range    WBC 7.3 3.5 - 11.3 k/uL    RBC 4.50 3.95 - 5.11 m/uL    Hemoglobin 14.7 11.9 - 15.1 g/dL    Hematocrit 47.1 36.3 - 47.1 % .7 (H) 82.6 - 102.9 fL    MCH 32.7 25.2 - 33.5 pg    MCHC 31.2 28.4 - 34.8 g/dL    RDW 15.0 (H) 11.8 - 14.4 %    Platelets 741 915 - 312 k/uL    MPV 11.4 8.1 - 13.5 fL    NRBC Automated 0.0 0.0 per 100 WBC    Differential Type NOT REPORTED     Seg Neutrophils PENDING %    Lymphocytes PENDING %    Monocytes PENDING %    Eosinophils % PENDING %    Basophils PENDING %    Immature Granulocytes PENDING 0 %    Segs Absolute PENDING k/uL    Absolute Lymph # PENDING k/uL    Absolute Mono # PENDING k/uL    Absolute Eos # PENDING k/uL    Basophils Absolute PENDING 0.0 - 0.2 k/uL    Absolute Immature Granulocyte PENDING 0.00 - 0.30 k/uL    WBC Morphology NOT REPORTED     RBC Morphology NOT REPORTED     Platelet Estimate NOT REPORTED    Comprehensive Metabolic Panel    Collection Time: 04/03/20  1:20 PM   Result Value Ref Range    Glucose 258 (H) 70 - 99 mg/dL    BUN 19 8 - 23 mg/dL    CREATININE 0.62 0.50 - 0.90 mg/dL    Bun/Cre Ratio 31 (H) 9 - 20    Calcium 10.4 8.6 - 10.4 mg/dL    Sodium 143 135 - 144 mmol/L    Potassium 4.3 3.7 - 5.3 mmol/L    Chloride 101 98 - 107 mmol/L    CO2 29 20 - 31 mmol/L    Anion Gap 13 9 - 17 mmol/L    Alkaline Phosphatase 89 35 - 104 U/L    ALT 28 5 - 33 U/L    AST 33 (H) <32 U/L    Total Bilirubin 0.55 0.3 - 1.2 mg/dL    Total Protein 7.5 6.4 - 8.3 g/dL    Alb 4.0 3.5 - 5.2 g/dL    Albumin/Globulin Ratio NOT REPORTED 1.0 - 2.5    GFR Non-African American >60 >60 mL/min    GFR African American >60 >60 mL/min    GFR Comment          GFR Staging NOT REPORTED    Brain Natriuretic Peptide    Collection Time: 04/03/20  1:20 PM   Result Value Ref Range    Pro- (H) <300 pg/mL    BNP Interpretation Pro-BNP Reference Range:    LACTATE DEHYDROGENASE    Collection Time: 04/03/20  1:20 PM   Result Value Ref Range     (H) 135 - 214 U/L   C-Reactive Protein    Collection Time: 04/03/20  1:20 PM   Result Value Ref Range    CRP 6.8 (H) 0.0 - 5.0 mg/L   Protime-INR    Collection Time: 04/03/20  1:20 PM   Result Value Ref Range    Protime 11.5 9.7 - 11.6 sec    INR 1.1    Trop/Myoglobin    Collection Time: 04/03/20  1:20 PM   Result Value Ref Range    Troponin, High Sensitivity 18 (H) 0 - 14 ng/L    Troponin T NOT REPORTED <0.03 ng/mL    Troponin Interp NOT REPORTED     Myoglobin 41 25 - 58 ng/mL   Lactic Acid    Collection Time: 04/03/20  1:20 PM   Result Value Ref Range    Lactic Acid 2.8 (H) 0.5 - 2.2 mmol/L   D-Dimer, Quantitative    Collection Time: 04/03/20  1:20 PM   Result Value Ref Range    D-Dimer, Quant 1.54 mg/L FEU       Imaging/Diagnostics:  Xr Chest Portable    Result Date: 4/3/2020  Bilateral effusions with adjacent infiltrates representing atelectasis versus pneumonia. Assessment :      Hospital Problems           Last Modified POA    Acute on chronic diastolic congestive heart failure ([address] 4/3/2020 Yes    Acute respiratory failure with hypoxia ([address] 4/3/2020 Yes    Persistent atrial fibrillation 4/3/2020 Yes    Venous ulcer of left leg ([address] 4/3/2020 Yes    Chronic anticoagulation 4/3/2020 Yes    Hyperlipidemia 4/3/2020 Yes          Plan:     Patient status inpatient in the Progressive Unit/Step down    1. Admit to [facility]  2. Check COVID-19  3. Droplet plus isolation precautions  4. Supplemental O2 to maintain SpO2 > 90%  5. Diuresis with bumex 1 mg BID  6. Hold BB due to bradycardia  7. Await blood cultures  8. Stop CAP therapy due to normal procalcitonin  9. Continue Eliquis for Afib  10. Low sodium diet  11. ISS + glucose checks BID; check A1c  12. Will discuss with son tomorrow    Consultations:   IP CONSULT TO INTERNAL MEDICINE    Patient is admitted as inpatient status because of co-morbidities listed above, severity of signs and symptoms as outlined, requirement for current medical therapies and most importantly because of direct risk to patient if care not provided in a hospital setting.     Sahil Bal DO  4/3/2020  6:13 PM    Copy sent to Dr. Jenna Smith Arden Jose MD

## 2020-04-03 NOTE — ED NOTES
Lab to add on ferritin and D-dimer to previous sample. Verified/clarified repeat lactic level with Adri Mayo CNP.       Funmilayo Barros RN  04/03/20 9068

## 2020-04-03 NOTE — ED NOTES
Alejo Cruz, RN on CVICU, updates that she will bring ICU bed to ED to assist with pt transfer.       Eun Singletary RN  04/03/20 0319

## 2020-04-03 NOTE — ED PROVIDER NOTES
EMERGENCY DEPARTMENT ENCOUNTER   ATTENDING ATTESTATION     Pt Name: Mila Hayden  MRN: [id number]  [place] 4/27/1924  Date of evaluation: 4/3/20   Mila Hayden is a 80 y.o. female with CC: Shortness of Breath and Fatigue    MDM:   60-year-old female coming to the emergency department for shortness of breath. Patient has not had fever at home however is slightly hypothermic here in the emergency department. Her chest x-ray shows vascular congestion and possible pneumonia. Given her vitals bacterial pneumonia should be considered. Covid 19 not ruled out here in the emergency department however less likely in my opinion. Hypoxia on initial vitals, requiring oxygen at this time. CRITICAL CARE:       EKG: All EKG's are interpreted by the Emergency Department Physician who either signs or Co-signs this chart in the absence of a cardiologist.  EKG- atrial flutter with variable AV block  Rate 60  QTc 452  Unchanged from previous    RADIOLOGY:All plain film, CT, MRI, and formal ultrasound images (except ED bedside ultrasound) are read by the radiologist, see reports below, unless otherwise noted in MDM or here. XR CHEST PORTABLE    (Results Pending)     LABS: All lab results were reviewed by myself, and all abnormals are listed below. Labs Reviewed   CBC WITH AUTO DIFFERENTIAL   COMPREHENSIVE METABOLIC PANEL   BRAIN NATRIURETIC PEPTIDE   LACTATE DEHYDROGENASE   C-REACTIVE PROTEIN   PROCALCITONIN   PROTIME-INR   TROP/MYOGLOBIN   LACTIC ACID     CONSULTS:  None  FINAL IMPRESSION    No diagnosis found.         PASTMEDICAL HISTORY     Past Medical History:   Diagnosis Date    Acute dermatitis     Arthritis     Asthma     Atrial fibrillation (HCC)     Bursitis     CHF (congestive heart failure) (HCC)     Hearing aid worn     Hyperlipidemia     Hypertension     Lumbar disc disease     Wears glasses     Wound of right leg      SURGICAL HISTORY       Past Surgical History:   Procedure Laterality

## 2020-04-03 NOTE — ED NOTES
Annelise David, house supervisor, calls ED to speak with Dr Karina Benedict. Ashish Marina RN, house supervisor, to clarify with internal medicine.      Oly Whitney RN  04/03/20 [address], RN  04/03/20 3924

## 2020-04-04 LAB
ADENOVIRUS PCR: NOT DETECTED
BORDETELLA PARAPERTUSSIS: NOT DETECTED
BORDETELLA PERTUSSIS PCR: NOT DETECTED
CHLAMYDIA PNEUMONIAE BY PCR: NOT DETECTED
CORONAVIRUS 229E PCR: NOT DETECTED
CORONAVIRUS HKU1 PCR: NOT DETECTED
CORONAVIRUS NL63 PCR: NOT DETECTED
CORONAVIRUS OC43 PCR: NOT DETECTED
HUMAN METAPNEUMOVIRUS PCR: NOT DETECTED
INFLUENZA A BY PCR: NOT DETECTED
INFLUENZA A H1 (2009) PCR: NORMAL
INFLUENZA A H1 PCR: NORMAL
INFLUENZA A H3 PCR: NORMAL
INFLUENZA B BY PCR: NOT DETECTED
MYCOPLASMA PNEUMONIAE PCR: NOT DETECTED
PARAINFLUENZA 1 PCR: NOT DETECTED
PARAINFLUENZA 2 PCR: NOT DETECTED
PARAINFLUENZA 3 PCR: NOT DETECTED
PARAINFLUENZA 4 PCR: NOT DETECTED
RESP SYNCYTIAL VIRUS PCR: NOT DETECTED
RHINO/ENTEROVIRUS PCR: NOT DETECTED
SARS-COV-2, NAA: NORMAL
SARS-COV-2, PCR: NORMAL
SARS-COV-2: NOT DETECTED
SOURCE: NORMAL
SPECIMEN DESCRIPTION: NORMAL

## 2020-04-04 PROCEDURE — 6360000002 HC RX W HCPCS: Performed by: NURSE PRACTITIONER

## 2020-04-04 PROCEDURE — 2580000003 HC RX 258: Performed by: NURSE PRACTITIONER

## 2020-04-04 PROCEDURE — 99232 SBSQ HOSP IP/OBS MODERATE 35: CPT | Performed by: INTERNAL MEDICINE

## 2020-04-04 PROCEDURE — 6370000000 HC RX 637 (ALT 250 FOR IP): Performed by: INTERNAL MEDICINE

## 2020-04-04 PROCEDURE — 94761 N-INVAS EAR/PLS OXIMETRY MLT: CPT

## 2020-04-04 PROCEDURE — 2060000000 HC ICU INTERMEDIATE R&B

## 2020-04-04 PROCEDURE — 6370000000 HC RX 637 (ALT 250 FOR IP): Performed by: NURSE PRACTITIONER

## 2020-04-04 PROCEDURE — 93005 ELECTROCARDIOGRAM TRACING: CPT | Performed by: INTERNAL MEDICINE

## 2020-04-04 PROCEDURE — 2500000003 HC RX 250 WO HCPCS: Performed by: INTERNAL MEDICINE

## 2020-04-04 PROCEDURE — 2700000000 HC OXYGEN THERAPY PER DAY

## 2020-04-04 RX ORDER — DILTIAZEM HYDROCHLORIDE 180 MG/1
180 CAPSULE, COATED, EXTENDED RELEASE ORAL DAILY
Status: DISCONTINUED | OUTPATIENT
Start: 2020-04-04 | End: 2020-04-09 | Stop reason: HOSPADM

## 2020-04-04 RX ORDER — ALBUTEROL SULFATE 2.5 MG/3ML
2.5 SOLUTION RESPIRATORY (INHALATION) EVERY 4 HOURS PRN
Status: DISCONTINUED | OUTPATIENT
Start: 2020-04-04 | End: 2020-04-09 | Stop reason: HOSPADM

## 2020-04-04 RX ADMIN — SODIUM CHLORIDE, PRESERVATIVE FREE 10 ML: 5 INJECTION INTRAVENOUS at 20:21

## 2020-04-04 RX ADMIN — HYDRALAZINE HYDROCHLORIDE 10 MG: 20 INJECTION INTRAMUSCULAR; INTRAVENOUS at 02:03

## 2020-04-04 RX ADMIN — TIMOLOL MALEATE 1 DROP: 5 SOLUTION/ DROPS OPHTHALMIC at 09:20

## 2020-04-04 RX ADMIN — Medication 1 TABLET: at 09:20

## 2020-04-04 RX ADMIN — APIXABAN 2.5 MG: 2.5 TABLET, FILM COATED ORAL at 09:20

## 2020-04-04 RX ADMIN — BUMETANIDE 1 MG: 0.25 INJECTION INTRAMUSCULAR; INTRAVENOUS at 09:20

## 2020-04-04 RX ADMIN — PRAVASTATIN SODIUM 20 MG: 40 TABLET ORAL at 20:20

## 2020-04-04 RX ADMIN — BUMETANIDE 1 MG: 0.25 INJECTION INTRAMUSCULAR; INTRAVENOUS at 20:20

## 2020-04-04 RX ADMIN — SODIUM CHLORIDE, PRESERVATIVE FREE 10 ML: 5 INJECTION INTRAVENOUS at 09:50

## 2020-04-04 RX ADMIN — APIXABAN 2.5 MG: 2.5 TABLET, FILM COATED ORAL at 20:20

## 2020-04-04 RX ADMIN — DORZOLAMIDE HCL 1 DROP: 20 SOLUTION/ DROPS OPHTHALMIC at 09:20

## 2020-04-04 RX ADMIN — DILTIAZEM HYDROCHLORIDE 180 MG: 180 CAPSULE, COATED, EXTENDED RELEASE ORAL at 17:26

## 2020-04-04 RX ADMIN — DORZOLAMIDE HCL 1 DROP: 20 SOLUTION/ DROPS OPHTHALMIC at 20:21

## 2020-04-04 ASSESSMENT — PAIN SCALES - GENERAL
PAINLEVEL_OUTOF10: 0

## 2020-04-04 NOTE — DISCHARGE INSTR - COC
Continuity of Care Form    Patient Name: Halford Bumpers   :  1924  MRN:  [id number]    Admit date:  4/3/2020  Discharge date:  2020    Code Status Order: Full Code   Advance Directives:   Advance Care Flowsheet Documentation     Date/Time Healthcare Directive Type of Healthcare Directive Copy in [address] Agent's Name Healthcare Agent's Phone Number    20 0016  No, patient does not have an advance directive for healthcare treatment -- -- -- -- --          Admitting Physician:  [address] [place] [address]  PCP: Magi Baker MD    Discharging Nurse: [facility] Unit/Room#:   Discharging Unit Phone Number: [phone]    Emergency Contact:   Extended Emergency Contact Information  Primary Emergency Contact: [address] Phone: [phone]  Relation: Child  Secondary Emergency Contact: Deacon [address] Phone: [phone]  Relation: Niece/Nephew    Past Surgical History:  Past Surgical History:   Procedure Laterality Date    BACK SURGERY      DEBRIDEMENT Left 2017    rt. leg    OTHER SURGICAL HISTORY  2017    Right leg Angio    OTHER SURGICAL HISTORY  2017    Right leg angio    OR INCIS/DRAIN THIGH/KNEE ABSCESS,DEEP Right 3/9/2017    DEBRIDEMENT CALF WOUND  performed by [facility] INCIS/DRAIN THIGH/KNEE ABSCESS,DEEP Right 2017    RIGHT LOWER EXTREMITY DEBRIDEMENT, INTEGRA APPLICATION, PREVENA VAC APPLICATION LOWR RIGHT LEG performed by Tim Carmichael MD at [place] [address]       Immunization History:   Immunization History   Administered Date(s) Administered    Tdap (Boostrix, Adacel) 2020       Active Problems:  Patient Active Problem List   Diagnosis Code    Non-pressure chronic ulcer of left lower leg with fat layer exposed ([facility] L97.922    Persistent atrial fibrillation I48.19    Venous ulcer of left leg ([facility] I83.029, L97.929    Acute on chronic diastolic congestive heart failure (HCC) I50.33    Essential hypertension I10    Acute respiratory failure with hypoxia ([facility]) J96.01    Heart failure with preserved left ventricular function (HFpEF) ([facility] I50.30    Fall at home, initial encounter W19. Howie Fraire, Y92.009    Chronic anticoagulation Z79.01    Acute on chronic congestive heart failure (HCC) I50.9    Mild protein-calorie malnutrition (HCC) E44.1    Pneumonia J18.9    Carotid atherosclerosis I65.29    Chronic skin ulcer ([facility]) L98.499    Hyperlipidemia E78.5       Isolation/Infection:   Isolation          Droplet Plus        Patient Infection Status     Infection Onset Added Last Indicated Last Indicated By Review Planned Expiration Resolved Resolved By    COVID-19 Rule Out 04/03/20 04/03/20 04/03/20 COVID-19 (Ordered)              Nurse Assessment:  Last Vital Signs: BP (!) 167/75   Pulse 81   Temp 98.3 °F (36.8 °C) (Infrared)   Resp 20   Ht 5' 3\" (1.6 m)   Wt 125 lb 4.8 oz (56.8 kg)   SpO2 97%   BMI 22.20 kg/m²     Last documented pain score (0-10 scale): Pain Level: 0  Last Weight:   Wt Readings from Last 1 Encounters:   04/04/20 125 lb 4.8 oz (56.8 kg)     Mental Status:  oriented and alert    IV Access:  - None    Nursing Mobility/ADLs:  Walking   Assisted  Transfer  Assisted  Bathing  Assisted  Dressing  Assisted  Toileting  Assisted  Feeding  Assisted  Med Admin  Assisted  Med Delivery   whole    Wound Care Documentation and Therapy:        Elimination:  Continence:   · Bowel:  Yes  · Bladder: Yes  Urinary Catheter: None   Colostomy/Ileostomy/Ileal Conduit: No       Date of Last BM: 4/6    Intake/Output Summary (Last 24 hours) at 4/4/2020 1537  Last data filed at 4/4/2020 1245  Gross per 24 hour   Intake --   Output 2150 ml   Net -2150 ml     I/O last 3 completed shifts:  In: -   Out: 2150 [Urine:2150]    Safety Concerns:     History of Falls (last 30 days) and At Risk for the continuing treatment of the diagnosis listed and that she requires [place] for greater 30 days.      Update Admission H&P: No change in H&P    PHYSICIAN SIGNATURE:  Electronically signed by Yang Wellington MD on 4/9/20 at 9:11 AM EDT

## 2020-04-04 NOTE — PLAN OF CARE
Problem: Respiratory:  Goal: Ability to maintain adequate oxygenation will improve  Description: Ability to maintain adequate oxygenation will improve  Outcome: Ongoing  Goal: Ability to maintain adequate ventilation will improve  Description: Ability to maintain adequate ventilation will improve  Outcome: Ongoing  Goal: Ability to maintain a clear airway will improve  Description: Ability to maintain a clear airway will improve  Outcome: Ongoing

## 2020-04-05 ENCOUNTER — APPOINTMENT (OUTPATIENT)
Dept: GENERAL RADIOLOGY | Age: 85
DRG: 291 | End: 2020-04-05
Payer: MEDICARE

## 2020-04-05 LAB
ABSOLUTE EOS #: 0.07 K/UL (ref 0–0.4)
ABSOLUTE IMMATURE GRANULOCYTE: 0 K/UL (ref 0–0.3)
ABSOLUTE LYMPH #: 0.77 K/UL (ref 1–4.8)
ABSOLUTE MONO #: 0.77 K/UL (ref 0.2–0.8)
ALBUMIN SERPL-MCNC: 3.2 G/DL (ref 3.5–5.2)
ANION GAP SERPL CALCULATED.3IONS-SCNC: 11 MMOL/L (ref 9–17)
BASOPHILS # BLD: 0 %
BASOPHILS ABSOLUTE: 0 K/UL (ref 0–0.2)
BNP INTERPRETATION: ABNORMAL
BUN BLDV-MCNC: 15 MG/DL (ref 8–23)
BUN/CREAT BLD: 31 (ref 9–20)
CALCIUM SERPL-MCNC: 9.5 MG/DL (ref 8.6–10.4)
CHLORIDE BLD-SCNC: 99 MMOL/L (ref 98–107)
CO2: 35 MMOL/L (ref 20–31)
CREAT SERPL-MCNC: 0.49 MG/DL (ref 0.5–0.9)
DIFFERENTIAL TYPE: ABNORMAL
EOSINOPHILS RELATIVE PERCENT: 1 % (ref 1–4)
GFR AFRICAN AMERICAN: >60 ML/MIN
GFR NON-AFRICAN AMERICAN: >60 ML/MIN
GFR SERPL CREATININE-BSD FRML MDRD: ABNORMAL ML/MIN/{1.73_M2}
GFR SERPL CREATININE-BSD FRML MDRD: ABNORMAL ML/MIN/{1.73_M2}
GLUCOSE BLD-MCNC: 95 MG/DL (ref 70–99)
HCT VFR BLD CALC: 45.7 % (ref 36.3–47.1)
HEMOGLOBIN: 14 G/DL (ref 11.9–15.1)
IMMATURE GRANULOCYTES: 0 %
LACTIC ACID, SEPSIS WHOLE BLOOD: NORMAL MMOL/L (ref 0.5–1.9)
LACTIC ACID, SEPSIS: 1 MMOL/L (ref 0.5–1.9)
LYMPHOCYTES # BLD: 11 % (ref 24–44)
MCH RBC QN AUTO: 32.1 PG (ref 25.2–33.5)
MCHC RBC AUTO-ENTMCNC: 30.6 G/DL (ref 28.4–34.8)
MCV RBC AUTO: 104.8 FL (ref 82.6–102.9)
MONOCYTES # BLD: 11 % (ref 1–7)
NRBC AUTOMATED: 0 PER 100 WBC
PDW BLD-RTO: 15 % (ref 11.8–14.4)
PHOSPHORUS: 2.3 MG/DL (ref 2.6–4.5)
PLATELET # BLD: 119 K/UL (ref 138–453)
PLATELET ESTIMATE: ABNORMAL
PMV BLD AUTO: 11.3 FL (ref 8.1–13.5)
POTASSIUM SERPL-SCNC: 3.9 MMOL/L (ref 3.7–5.3)
PRO-BNP: 910 PG/ML
RBC # BLD: 4.36 M/UL (ref 3.95–5.11)
RBC # BLD: ABNORMAL 10*6/UL
SEG NEUTROPHILS: 77 % (ref 36–66)
SEGMENTED NEUTROPHILS ABSOLUTE COUNT: 5.39 K/UL (ref 1.8–7.7)
SODIUM BLD-SCNC: 145 MMOL/L (ref 135–144)
WBC # BLD: 7 K/UL (ref 3.5–11.3)
WBC # BLD: ABNORMAL 10*3/UL

## 2020-04-05 PROCEDURE — 2060000000 HC ICU INTERMEDIATE R&B

## 2020-04-05 PROCEDURE — 36415 COLL VENOUS BLD VENIPUNCTURE: CPT

## 2020-04-05 PROCEDURE — 6370000000 HC RX 637 (ALT 250 FOR IP): Performed by: INTERNAL MEDICINE

## 2020-04-05 PROCEDURE — 97535 SELF CARE MNGMENT TRAINING: CPT

## 2020-04-05 PROCEDURE — 85025 COMPLETE CBC W/AUTO DIFF WBC: CPT

## 2020-04-05 PROCEDURE — 97116 GAIT TRAINING THERAPY: CPT

## 2020-04-05 PROCEDURE — 2580000003 HC RX 258: Performed by: NURSE PRACTITIONER

## 2020-04-05 PROCEDURE — 97162 PT EVAL MOD COMPLEX 30 MIN: CPT

## 2020-04-05 PROCEDURE — 83605 ASSAY OF LACTIC ACID: CPT

## 2020-04-05 PROCEDURE — 97166 OT EVAL MOD COMPLEX 45 MIN: CPT

## 2020-04-05 PROCEDURE — 71045 X-RAY EXAM CHEST 1 VIEW: CPT

## 2020-04-05 PROCEDURE — 6370000000 HC RX 637 (ALT 250 FOR IP): Performed by: NURSE PRACTITIONER

## 2020-04-05 PROCEDURE — 2500000003 HC RX 250 WO HCPCS: Performed by: INTERNAL MEDICINE

## 2020-04-05 PROCEDURE — 97110 THERAPEUTIC EXERCISES: CPT

## 2020-04-05 PROCEDURE — 80069 RENAL FUNCTION PANEL: CPT

## 2020-04-05 PROCEDURE — 99232 SBSQ HOSP IP/OBS MODERATE 35: CPT | Performed by: INTERNAL MEDICINE

## 2020-04-05 PROCEDURE — 83880 ASSAY OF NATRIURETIC PEPTIDE: CPT

## 2020-04-05 PROCEDURE — 97530 THERAPEUTIC ACTIVITIES: CPT

## 2020-04-05 RX ORDER — METOLAZONE 2.5 MG/1
2.5 TABLET ORAL ONCE
Status: COMPLETED | OUTPATIENT
Start: 2020-04-06 | End: 2020-04-06

## 2020-04-05 RX ORDER — BUMETANIDE 0.25 MG/ML
2 INJECTION, SOLUTION INTRAMUSCULAR; INTRAVENOUS 2 TIMES DAILY
Status: DISCONTINUED | OUTPATIENT
Start: 2020-04-06 | End: 2020-04-08

## 2020-04-05 RX ADMIN — DILTIAZEM HYDROCHLORIDE 180 MG: 180 CAPSULE, COATED, EXTENDED RELEASE ORAL at 09:06

## 2020-04-05 RX ADMIN — BUMETANIDE 1 MG: 0.25 INJECTION INTRAMUSCULAR; INTRAVENOUS at 20:23

## 2020-04-05 RX ADMIN — Medication 1 TABLET: at 09:06

## 2020-04-05 RX ADMIN — DORZOLAMIDE HCL 1 DROP: 20 SOLUTION/ DROPS OPHTHALMIC at 20:25

## 2020-04-05 RX ADMIN — TIMOLOL MALEATE 1 DROP: 5 SOLUTION/ DROPS OPHTHALMIC at 09:07

## 2020-04-05 RX ADMIN — BUMETANIDE 1 MG: 0.25 INJECTION INTRAMUSCULAR; INTRAVENOUS at 09:06

## 2020-04-05 RX ADMIN — SODIUM CHLORIDE, PRESERVATIVE FREE 10 ML: 5 INJECTION INTRAVENOUS at 09:07

## 2020-04-05 RX ADMIN — APIXABAN 2.5 MG: 2.5 TABLET, FILM COATED ORAL at 09:06

## 2020-04-05 RX ADMIN — SODIUM CHLORIDE, PRESERVATIVE FREE 10 ML: 5 INJECTION INTRAVENOUS at 20:23

## 2020-04-05 RX ADMIN — PRAVASTATIN SODIUM 20 MG: 40 TABLET ORAL at 20:23

## 2020-04-05 RX ADMIN — APIXABAN 2.5 MG: 2.5 TABLET, FILM COATED ORAL at 20:23

## 2020-04-05 RX ADMIN — DORZOLAMIDE HCL 1 DROP: 20 SOLUTION/ DROPS OPHTHALMIC at 09:07

## 2020-04-05 ASSESSMENT — PAIN SCALES - GENERAL
PAINLEVEL_OUTOF10: 0
PAINLEVEL_OUTOF10: 0

## 2020-04-05 NOTE — CARE COORDINATION
Social Work-Attempted to meet with patient.  was unable to wake her. Spoke with pt's son. Discussed progress in therapy. Discussed option of home with son vs SN. He is considering SNF for short term rehab. The Plan for Transition of Care is related to the following treatment goals: SNF for PT/OT, skilled nursing    The Patient and/or patient representative son was provided with a choice of provider and agrees   with the discharge plan. [x] Yes [] No    Freedom of choice list was provided with basic dialogue that supports the patient's individualized plan of care/goals, treatment preferences and shares the quality data associated with the providers. [x] Yes [] No. The son's first choice for SNF is [facility]. Explained that last admission, [facility] was out of network . Sent referral. His second choice is [address] [place] [address].  Sent referral. Carlota Nina

## 2020-04-05 NOTE — PLAN OF CARE
Problem: Respiratory:  Goal: Ability to maintain adequate oxygenation will improve  Description: Ability to maintain adequate oxygenation will improve  4/4/2020 2247 by Mundo Burr RN  Outcome: Ongoing  4/4/2020 1814 by Violetta Riojas RN  Outcome: Ongoing  Goal: Ability to maintain adequate ventilation will improve  Description: Ability to maintain adequate ventilation will improve  4/4/2020 2247 by Mundo Burr RN  Outcome: Ongoing  4/4/2020 1814 by Violetta Riojas RN  Outcome: Ongoing  Goal: Ability to maintain a clear airway will improve  Description: Ability to maintain a clear airway will improve  4/4/2020 2247 by Mundo Burr RN  Outcome: Ongoing  4/4/2020 1814 by Violetta Riojas RN  Outcome: Ongoing

## 2020-04-05 NOTE — PROGRESS NOTES
appearing airspace disease.      She will be admitted for acute exacerbation of HFpEF. However, due to [address] [facility] pandemic for COVID-19, we will test patient, given her constellation of symptoms and lab studies.       Review of Systems:     Constitutional:  negative for chills, fevers, sweats  Respiratory:  Reports persistent shortness of breath; negative for cough, dyspnea on exertion  Cardiovascular:  negative for chest pain, chest pressure/discomfort, lower extremity edema, palpitations  Gastrointestinal:  negative for abdominal pain, constipation, diarrhea, nausea, vomiting  Neurological:  negative for dizziness, headache    Medications: Allergies: Allergies   Allergen Reactions    Shellfish-Derived Products Swelling    Tudorza Pressair [Aclidinium Bromide]      LISTED AS ALLERGY, UNKNOWN REACTION  LISTED AS ALLERGY, UNKNOWN REACTION       Current Meds:   Scheduled Meds:    dilTIAZem  180 mg Oral Daily    apixaban  2.5 mg Oral BID    calcium carb-cholecalciferol  1 tablet Oral Daily    dorzolamide  1 drop Both Eyes BID    [Held by provider] metoprolol tartrate  12.5 mg Oral BID    pravastatin  20 mg Oral Daily    timolol  1 drop Both Eyes Daily    sodium chloride flush  10 mL Intravenous 2 times per day    bumetanide  1 mg Intravenous BID     Continuous Infusions:   PRN Meds: sodium chloride flush, magnesium hydroxide, acetaminophen    Data:     Past Medical History:   has a past medical history of Acute dermatitis, Arthritis, Asthma, Atrial fibrillation ([address] Bursitis, CHF (congestive heart failure) ([address] Hearing aid worn, Hyperlipidemia, Hypertension, Lumbar disc disease, Wears glasses, and Wound of right leg. Social History:   reports that she has never smoked. She has never used smokeless tobacco. She reports previous alcohol use. She reports that she does not use drugs.      Family History:   Family History   Problem Relation Age of Onset    Heart Disease Mother     Heart Disease Father

## 2020-04-05 NOTE — PROGRESS NOTES
Physical Therapy    Facility/Department: [facility] PROGRESSIVE CARE  Initial Assessment    NAME: Amy Mitchell  : 1924  MRN: [id number]    Date of Service: 2020    Discharge Recommendations:  Subacute/Skilled Nursing Facility, Continue to assess pending progress      Pt presented to ED on 4/3/20 for shortness of breath. Pt has not had fever at home however is slightly hypothermic here in the emergency department. Her chest x-ray shows vascular congestion and possible pneumonia. Given her vitals bacterial pneumonia should be considered. Covid 19 not ruled out here in the emergency department however less likely in my opinion. Hypoxia on initial vitals, requiring oxygen at this time    RN reports patient is medically stable for therapy treatment this date. Chart reviewed prior to treatment and patient is agreeable for therapy. All lines intact and patient positioned comfortably at end of treatment. All patient needs addressed prior to ending therapy session. Assessment   Body structures, Functions, Activity limitations: Decreased functional mobility ; Decreased safe awareness;Decreased strength;Decreased balance  Assessment:  Pt tolerated session with deficits noted in bed mobility, transfers, ambulation, balance, and endurance this session. Pt's decreased endurance and generalized weakness are limiting factors with mobility at this time. Pt requires continued IP PT & at current level of limitations recommend D/C to [address] to maximize independence with functional mobility, balance, safety awareness & activity tolerance but will continue to assess pending progress.     Prognosis: Good  Decision Making: Medium Complexity  Exam: ROM, MMT, functional mobility, activity tolerance, Balance, & [employer] AM-PAC 6 Clicks Basic Mobility   Clinical Presentation: evolving  PT Education: Functional Mobility Training;Transfer Training;Gait Training  Patient Education: Ed pt assistance  Lateral Transfers: Stand by assistance  Comment: needed Ed on use of upper body for safe sit/mark  Ambulation  Ambulation?: Yes  Ambulation 1  Surface: level tile  Device: Rolling Walker  Other Apparatus: O2  Assistance: Stand by assistance  Quality of Gait: step to pattern  Gait Deviations: Slow Nancy;Decreased step length;Decreased step height  Distance: 10ft     Balance  Sitting - Static: Good  Sitting - Dynamic: Good  Standing - Static: Good;-(R/W)  Standing - Dynamic: Fair;+(R/W)  Exercises  Comments: Ed pt on functional mobility, safety awareness, prevention of sedentary complications, seated LE ex's: LAQ, Hip Abd/Add, Heel/Toe-raises, Hip flexion  Reps:10      All lines intact, call light within reach, and patient positioned comfortably at end of treatment. All patient needs addressed prior to ending therapy session. Plan   Plan  Times per week: 1-2x/D,5-6D/week  Current Treatment Recommendations: Strengthening, Balance Training, Functional Mobility Training, Transfer Training, Gait Training, Stair training, Home Exercise Program, Safety Education & Training, Patient/Caregiver Education & Training  Safety Devices  Type of devices: Gait belt, Patient at risk for falls, Bed alarm in place, Call light within reach, Left in chair, Chair alarm in place    G-Code       OutComes Score                                                  AM-PAC Score  AM-PAC Inpatient Mobility Raw Score : 14 (04/05/20 1122)  AM-PAC Inpatient T-Scale Score : 38.1 (04/05/20 1122)  Mobility Inpatient CMS 0-100% Score: 61.29 (04/05/20 1122)  Mobility Inpatient CMS G-Code Modifier : CL (04/05/20 1122)          Goals  Short term goals  Time Frame for Short term goals: 12 visits  Short term goal 1: Inc bed-mobility & transfers to independent to enable pt to safely get in/OOB  Short term goal 2: Inc gait to amb 350ft or > indep w/ RW to enable pt to return to previous level of independence  Short term goal 3:  Inc strength to [address] standing balance to good with device to facilitate pt independence for performance of ADL's & functional mobility & reduce fall risk; Short term goal 4: Pt able to tolerate 30-40 min of activity to include 15-20 reps of ex & functional mobility including 5 minutes of standing to facilitate activity tolerance to [facility]; Short term goal 5: Pt able to go up/down 2 steps with one rail indep;   Short term goal 6: Ed pt on home ex's, safety & energy principles & issue written home program;       Therapy Time   Individual Concurrent Group Co-treatment   Time In 1038         Time Out 1125         Minutes 47+10=57         Timed Code Treatment Minutes: 40 Minutes     Additional 10 minutes for chart review        [address], PT

## 2020-04-06 ENCOUNTER — APPOINTMENT (OUTPATIENT)
Dept: GENERAL RADIOLOGY | Age: 85
DRG: 291 | End: 2020-04-06
Payer: MEDICARE

## 2020-04-06 PROBLEM — E87.0 HYPERNATREMIA: Status: ACTIVE | Noted: 2020-04-06

## 2020-04-06 LAB
ABSOLUTE EOS #: 0.14 K/UL (ref 0–0.44)
ABSOLUTE IMMATURE GRANULOCYTE: 0.02 K/UL (ref 0–0.3)
ABSOLUTE LYMPH #: 1.27 K/UL (ref 1.1–3.7)
ABSOLUTE MONO #: 0.6 K/UL (ref 0.1–1.2)
ALBUMIN SERPL-MCNC: 3.4 G/DL (ref 3.5–5.2)
ANION GAP SERPL CALCULATED.3IONS-SCNC: 13 MMOL/L (ref 9–17)
BASOPHILS # BLD: 1 % (ref 0–2)
BASOPHILS ABSOLUTE: 0.04 K/UL (ref 0–0.2)
BUN BLDV-MCNC: 17 MG/DL (ref 8–23)
BUN/CREAT BLD: 30 (ref 9–20)
CALCIUM SERPL-MCNC: 9.7 MG/DL (ref 8.6–10.4)
CHLORIDE BLD-SCNC: 98 MMOL/L (ref 98–107)
CO2: 36 MMOL/L (ref 20–31)
CREAT SERPL-MCNC: 0.57 MG/DL (ref 0.5–0.9)
DIFFERENTIAL TYPE: ABNORMAL
EKG ATRIAL RATE: 250 BPM
EKG P AXIS: 105 DEGREES
EKG Q-T INTERVAL: 432 MS
EKG QRS DURATION: 116 MS
EKG QTC CALCULATION (BAZETT): 445 MS
EKG R AXIS: 73 DEGREES
EKG T AXIS: 90 DEGREES
EKG VENTRICULAR RATE: 64 BPM
EOSINOPHILS RELATIVE PERCENT: 2 % (ref 1–4)
GFR AFRICAN AMERICAN: >60 ML/MIN
GFR NON-AFRICAN AMERICAN: >60 ML/MIN
GFR SERPL CREATININE-BSD FRML MDRD: ABNORMAL ML/MIN/{1.73_M2}
GFR SERPL CREATININE-BSD FRML MDRD: ABNORMAL ML/MIN/{1.73_M2}
GLUCOSE BLD-MCNC: 101 MG/DL (ref 70–99)
HCT VFR BLD CALC: 46.5 % (ref 36.3–47.1)
HEMOGLOBIN: 14.5 G/DL (ref 11.9–15.1)
IMMATURE GRANULOCYTES: 0 %
LYMPHOCYTES # BLD: 22 % (ref 24–43)
MAGNESIUM: 1.7 MG/DL (ref 1.6–2.6)
MCH RBC QN AUTO: 32.2 PG (ref 25.2–33.5)
MCHC RBC AUTO-ENTMCNC: 31.2 G/DL (ref 28.4–34.8)
MCV RBC AUTO: 103.3 FL (ref 82.6–102.9)
MONOCYTES # BLD: 10 % (ref 3–12)
NRBC AUTOMATED: 0 PER 100 WBC
PDW BLD-RTO: 14.9 % (ref 11.8–14.4)
PHOSPHORUS: 2.1 MG/DL (ref 2.6–4.5)
PLATELET # BLD: 138 K/UL (ref 138–453)
PLATELET ESTIMATE: ABNORMAL
PMV BLD AUTO: 10.8 FL (ref 8.1–13.5)
POTASSIUM SERPL-SCNC: 3.2 MMOL/L (ref 3.7–5.3)
RBC # BLD: 4.5 M/UL (ref 3.95–5.11)
RBC # BLD: ABNORMAL 10*6/UL
SEG NEUTROPHILS: 65 % (ref 36–65)
SEGMENTED NEUTROPHILS ABSOLUTE COUNT: 3.76 K/UL (ref 1.5–8.1)
SODIUM BLD-SCNC: 147 MMOL/L (ref 135–144)
WBC # BLD: 5.8 K/UL (ref 3.5–11.3)
WBC # BLD: ABNORMAL 10*3/UL

## 2020-04-06 PROCEDURE — 99232 SBSQ HOSP IP/OBS MODERATE 35: CPT | Performed by: INTERNAL MEDICINE

## 2020-04-06 PROCEDURE — 97110 THERAPEUTIC EXERCISES: CPT

## 2020-04-06 PROCEDURE — 2500000003 HC RX 250 WO HCPCS: Performed by: INTERNAL MEDICINE

## 2020-04-06 PROCEDURE — 36415 COLL VENOUS BLD VENIPUNCTURE: CPT

## 2020-04-06 PROCEDURE — 85025 COMPLETE CBC W/AUTO DIFF WBC: CPT

## 2020-04-06 PROCEDURE — 6370000000 HC RX 637 (ALT 250 FOR IP): Performed by: INTERNAL MEDICINE

## 2020-04-06 PROCEDURE — 2060000000 HC ICU INTERMEDIATE R&B

## 2020-04-06 PROCEDURE — 6370000000 HC RX 637 (ALT 250 FOR IP): Performed by: NURSE PRACTITIONER

## 2020-04-06 PROCEDURE — 2580000003 HC RX 258: Performed by: NURSE PRACTITIONER

## 2020-04-06 PROCEDURE — 83735 ASSAY OF MAGNESIUM: CPT

## 2020-04-06 PROCEDURE — 71045 X-RAY EXAM CHEST 1 VIEW: CPT

## 2020-04-06 PROCEDURE — 80069 RENAL FUNCTION PANEL: CPT

## 2020-04-06 PROCEDURE — 97535 SELF CARE MNGMENT TRAINING: CPT

## 2020-04-06 PROCEDURE — 93010 ELECTROCARDIOGRAM REPORT: CPT | Performed by: INTERNAL MEDICINE

## 2020-04-06 PROCEDURE — 97530 THERAPEUTIC ACTIVITIES: CPT

## 2020-04-06 RX ORDER — MINERAL OIL/I-PROP MYR/WATER
LOTION (ML) TOPICAL 2 TIMES DAILY
Status: DISCONTINUED | OUTPATIENT
Start: 2020-04-06 | End: 2020-04-06 | Stop reason: CLARIF

## 2020-04-06 RX ORDER — POTASSIUM CHLORIDE 20 MEQ/1
40 TABLET, EXTENDED RELEASE ORAL PRN
Status: DISCONTINUED | OUTPATIENT
Start: 2020-04-06 | End: 2020-04-09 | Stop reason: HOSPADM

## 2020-04-06 RX ORDER — MINERAL OIL/I-PROP MYR/WATER
LOTION (ML) TOPICAL 2 TIMES DAILY
Status: DISCONTINUED | OUTPATIENT
Start: 2020-04-06 | End: 2020-04-07

## 2020-04-06 RX ORDER — POTASSIUM CHLORIDE 7.45 MG/ML
10 INJECTION INTRAVENOUS PRN
Status: DISCONTINUED | OUTPATIENT
Start: 2020-04-06 | End: 2020-04-09 | Stop reason: HOSPADM

## 2020-04-06 RX ADMIN — Medication 1 TABLET: at 09:06

## 2020-04-06 RX ADMIN — DILTIAZEM HYDROCHLORIDE 180 MG: 180 CAPSULE, COATED, EXTENDED RELEASE ORAL at 09:06

## 2020-04-06 RX ADMIN — APIXABAN 2.5 MG: 2.5 TABLET, FILM COATED ORAL at 09:06

## 2020-04-06 RX ADMIN — SODIUM CHLORIDE, PRESERVATIVE FREE 10 ML: 5 INJECTION INTRAVENOUS at 21:14

## 2020-04-06 RX ADMIN — SODIUM CHLORIDE, PRESERVATIVE FREE 10 ML: 5 INJECTION INTRAVENOUS at 09:07

## 2020-04-06 RX ADMIN — METOLAZONE 2.5 MG: 2.5 TABLET ORAL at 09:06

## 2020-04-06 RX ADMIN — TIMOLOL MALEATE 1 DROP: 5 SOLUTION/ DROPS OPHTHALMIC at 09:09

## 2020-04-06 RX ADMIN — POTASSIUM BICARBONATE 40 MEQ: 782 TABLET, EFFERVESCENT ORAL at 09:15

## 2020-04-06 RX ADMIN — DORZOLAMIDE HCL 1 DROP: 20 SOLUTION/ DROPS OPHTHALMIC at 21:14

## 2020-04-06 RX ADMIN — DORZOLAMIDE HCL 1 DROP: 20 SOLUTION/ DROPS OPHTHALMIC at 09:09

## 2020-04-06 RX ADMIN — BUMETANIDE 2 MG: 0.25 INJECTION INTRAMUSCULAR; INTRAVENOUS at 09:07

## 2020-04-06 RX ADMIN — PRAVASTATIN SODIUM 20 MG: 40 TABLET ORAL at 21:14

## 2020-04-06 RX ADMIN — Medication: at 13:52

## 2020-04-06 RX ADMIN — Medication: at 21:14

## 2020-04-06 ASSESSMENT — PAIN SCALES - GENERAL
PAINLEVEL_OUTOF10: 0
PAINLEVEL_OUTOF10: 0

## 2020-04-06 NOTE — CONSULTS
[address]  [phone]               Cardiology Consult           Date of Admission:  4/3/2020  Date of Consultation:  4/6/2020      PCP:  Alice Tejeda MD      Chief Complaint: shortness of breath    History of Present Illness:  Fatou Suero is a 80 y.o. female who presents with shortness of breath. Pleasant elderly frail woman with longstanding history of atrial fibrillation on Eliquis, moderate pulmonary hypertension, mild aortic stenosis,chronic diastolic CHF, chronic bilateral pleural effusions, and hypertension. She reports increasing breathlessness over the past week or so, with mild increase of pedal edema. Has been trying to avoid excessive sodium intake but is not always successful. Has received extra diuretic and is breathing more comfortably with less edema today. Denies chest pain. Denies fevers, chills or cough. PMH:   has a past medical history of Acute dermatitis, Arthritis, Asthma, Atrial fibrillation ([address] Bursitis, CHF (congestive heart failure) ([address] Hearing aid worn, Hyperlipidemia, Hypertension, Lumbar disc disease, Wears glasses, and Wound of right leg. PSH:   has a past surgical history that includes back surgery; debridement (Left, 03/09/2017); pr incis/drain thigh/knee abscess,deep (Right, 3/9/2017); other surgical history (03/30/2017); other surgical history (03/30/2017); and pr incis/drain thigh/knee abscess,deep (Right, 4/28/2017). Allergies: Allergies   Allergen Reactions    Shellfish-Derived Products Swelling    Tudorza Pressair [Aclidinium Bromide]      LISTED AS ALLERGY, UNKNOWN REACTION  LISTED AS ALLERGY, UNKNOWN REACTION        Home Meds:    Prior to Admission medications    Medication Sig Start Date End Date Taking?  Authorizing Provider   bumetanide (BUMEX) 1 MG tablet Take 1 mg by mouth daily   Yes Historical Provider, MD   potassium citrate (UROCIT-K) 10 MEQ (1080 MG) extended release tablet Take by mouth daily   Yes Historical Provider, MD   metoprolol tartrate (LOPRESSOR) 25 MG tablet Take 12.5 mg by mouth 2 times daily   Yes Historical Provider, MD   dorzolamide (TRUSOPT) 2 % ophthalmic solution Place 1 drop into both eyes 2 times daily   Yes Historical Provider, MD   timolol (TIMOPTIC) 0.5 % ophthalmic solution Place 1 drop into both eyes daily   Yes Historical Provider, MD   calcium-vitamin D (OSCAL-500) 500-200 MG-UNIT per tablet Take 1 tablet by mouth daily   Yes Historical Provider, MD   apixaban (ELIQUIS) 2.5 MG TABS tablet Take 1 tablet by mouth 2 times daily 3/6/18  Yes Tianna Smith MD   pravastatin (PRAVACHOL) 80 MG tablet Take 20 mg by mouth daily    Yes Historical Provider, MD   diltiazem (DILACOR XR) 180 MG extended release capsule Take 180 mg by mouth daily   Yes Historical Provider, MD   Acetaminophen (TYLENOL EXTRA STRENGTH PO) Take 500 mg by mouth as needed    Yes Historical Provider, MD        Hospital Meds:    Current Facility-Administered Medications   Medication Dose Route Frequency Provider Last Rate Last Dose    potassium chloride (KLOR-CON M) extended release tablet 40 mEq  40 mEq Oral PRN Tanika Shruthi, APRN - NP        Or    potassium bicarb-citric acid (EFFER-K) effervescent tablet 40 mEq  40 mEq Oral PRN Gracy Wilson APRN - NP   40 mEq at 04/06/20 0915    Or    potassium chloride 10 mEq/100 mL IVPB (Peripheral Line)  10 mEq Intravenous PRN Tanika Shruthi, APRN - NP        hydrocerin (EUCERIN) lotion   Topical BID Tanika Shruthi, APRN - NP        bumetanide (BUMEX) injection 2 mg  2 mg Intravenous BID Beryl Leaf Hauger, DO   2 mg at 04/06/20 4217    dilTIAZem (CARDIZEM CD) extended release capsule 180 mg  180 mg Oral Daily [place] Leaf Hauger, DO   180 mg at 04/06/20 0906    albuterol (PROVENTIL) nebulizer solution 2.5 mg  2.5 mg Nebulization Q4H PRN Beryl Leaf Hauger, DO        [Held by provider] apixaban Meli Almanza) lesions    Neurologic: Grossly normal            Labs:      CBC:   Recent Labs     04/05/20  0714 04/06/20  0637   WBC 7.0 5.8   HGB 14.0 14.5   HCT 45.7 46.5   .8* 103.3*   * 138     BMP:   Recent Labs     04/05/20  0714 04/06/20  0637   * 147*   K 3.9 3.2*   CL 99 98   CO2 35* 36*   PHOS 2.3* 2.1*   BUN 15 17   CREATININE 0.49* 0.57     PT/INR: No results for input(s): PROTIME, INR in the last 72 hours. APTT: No results for input(s): APTT in the last 72 hours. MAG:   Recent Labs     04/06/20  0637   MG 1.7     D Dimer: No results for input(s): DDIMER in the last 72 hours. Troponin T No results for input(s): TROPONINT in the last 72 hours. ProBNP Invalid input(s): PRO-BNP          Diagnosis:  Active Problems:    Hypernatremia    Persistent atrial fibrillation    Venous ulcer of left leg (HCC)    Acute on chronic diastolic congestive heart failure (HCC)    Acute respiratory failure with hypoxia (HCC)    Chronic anticoagulation    Hyperlipidemia  Resolved Problems:    * No resolved hospital problems. *          Plan:    1. Acute on chronic diastolic heart failure. Agree with increased diuretics and thoracentesis  (altough pleural effusions appear about unchanged from January). Normal LV function with minimal AS on echo then. Continue home salt avoidance. No objection to hold Eliquis prior to thoracentesis. Resume post procedure. 2. Persistent atrial fibrillation/flutter. Heart rate controlled. Long term anticoagulation is appropriate so long as not falling, etc  3. HTN reasonably controlled. 4. Extremely advanced age, debilation. Supportive care  5. Your other diagnoses. Good (>75%)

## 2020-04-06 NOTE — PROGRESS NOTES
Occupational Therapy  Facility/Department: [facility] PROGRESSIVE CARE  Daily Treatment Note  NAME: Chuck Gaffney  : 1924  MRN: [id number]    RN Lalito Muñiz reports patient is medically stable for therapy treatment this date. Chart reviewed prior to treatment and patient is agreeable for therapy. All lines intact and patient positioned comfortably at end of treatment. All patient needs addressed prior to ending therapy session. Date of Service: 2020    Discharge Recommendations:  Subacute/Skilled Nursing Facility  OT Equipment Recommendations  Equipment Needed: Yes  Mobility Devices: ADL Assistive Devices  ADL Assistive Devices: Emergency Alert System;Long-handled Sponge;Reacher;Long-handled Shoe Horn    Assessment   Performance deficits / Impairments: Decreased functional mobility ; Decreased ADL status; Decreased strength;Decreased safe awareness;Decreased balance;Decreased endurance;Decreased cognition;Decreased posture  Assessment: Skilled OT is indicated to increase overall I and safety awareness in function to return home with assist as needed. Prognosis: Good  OT Education: OT Role;Plan of Care;Family Education;Transfer Training  Patient Education: call light use/fall prevention, benefits of being up OOB, postural control, EC/WS tech, pursed lip breathing, proper bed mob tech, safety in function  REQUIRES OT FOLLOW UP: Yes  Activity Tolerance  Activity Tolerance: Patient limited by fatigue;Patient Tolerated treatment well  Activity Tolerance: fair minus   Safety Devices  Safety Devices in place: Yes  Type of devices: Call light within reach; Chair alarm in place; Left in chair;Patient at risk for falls;Gait belt;Nurse notified         Patient Diagnosis(es): The primary encounter diagnosis was Hypoxia. Diagnoses of Pneumonia due to organism and Congestive heart failure, unspecified HF chronicity, unspecified heart failure type [facility]) were also pertinent to this visit.       has a past medical history of Acute dermatitis, Arthritis, Asthma, Atrial fibrillation ([facility] Bursitis, CHF (congestive heart failure) ([facility] Hearing aid worn, Hyperlipidemia, Hypertension, Lumbar disc disease, Wears glasses, and Wound of right leg.   has a past surgical history that includes back surgery; debridement (Left, 03/09/2017); pr incis/drain thigh/knee abscess,deep (Right, 3/9/2017); other surgical history (03/30/2017); other surgical history (03/30/2017); and pr incis/drain thigh/knee abscess,deep (Right, 4/28/2017). Restrictions  Restrictions/Precautions  Restrictions/Precautions: General Precautions, Fall Risk  Position Activity Restriction  Other position/activity restrictions: up as tolerated, RUE IV, 3 L O2 per NC, alarms   Subjective   General  Chart Reviewed: Yes  Patient assessed for rehabilitation services?: Yes  Family / Caregiver Present: No  Vital Signs  Patient Currently in Pain: Denies   Orientation  Orientation  Overall Orientation Status: Within Functional Limits  Objective    ADL  Grooming: Setup;Stand by assistance(set up to apply deodorant and comb hair while seated EOB)  UE Bathing: Setup;Stand by assistance(for sponge bath seated EOB )  LE Bathing: Setup; Moderate assistance(CG for balance to stand with RW and pt able to wash dandy area; assist with BLE's )  UE Dressing: Setup; Moderate assistance(min assist to cindy 1st hosp gown and mod assist to cindy 2nd one as a robe to increase pt's modesty and pt stated she was cold.  )  LE Dressing: Setup;Maximum assistance(to cindy clean B  socks and brief.  )  Toileting: Unable to assess(comment)(Pt declined and stated no needs during session. )      *Pt was edu on EC/WS tech of pacing self/resting as needed, planning and prioritzing day, sitting vs standing as able with self care tasks all to conserve energy. *Educated patient on pursed lip breathing to increase control of breathing.  Initiated by breathing through the nose and blowing out with pursed lip to solutions;Assistance required to generate solutions  Insights: Decreased awareness of deficits  Initiation: Requires cues for some  Sequencing: Requires cues for some                                         Plan   Plan  Times per week: 4-5x/week, 1-2x/day  Current Treatment Recommendations: Strengthening, Balance Training, Functional Mobility Training, Endurance Training, Equipment Evaluation, Education, & procurement, Patient/Caregiver Education & Training, Self-Care / ADL, Safety Education & Training, Positioning                                                    AM-PAC Score   14          Goals  Short term goals  Time Frame for Short term goals: by discharge, pt will  Short term goal 1: demo SBA with ADL transfers with good safety, AD/DME as needed  Short term goal 2: demo SBA with functional mob in room for ADL completion with good safety, pacing  Short term goal 3: demo SBA with toileting routine  Short term goal 4: demo SBA with UB ADLs and min A with LB ADLs with good safety, pacing, and DME as needed  Short term goal 5: demo and verb good understanding of fall prevention techs, EC/WS techs, breathing techs, and possible equip needs  Patient Goals   Patient goals : to go home when able        Therapy Time   Individual Concurrent Group Co-treatment   Time In 0806         Time Out 0844         Minutes [address]

## 2020-04-06 NOTE — PROGRESS NOTES
History:   Family History   Problem Relation Age of Onset    Heart Disease Mother     Heart Disease Father     Diabetes Maternal Aunt     Obesity Maternal Aunt     Stroke Paternal Grandmother        Vitals:  BP (!) 117/48   Pulse 65   Temp 97.5 °F (36.4 °C) (Axillary)   Resp 20   Ht 5' 3\" (1.6 m)   Wt 125 lb 4.8 oz (56.8 kg)   SpO2 94%   BMI 22.20 kg/m²   Temp (24hrs), Av.5 °F (36.4 °C), Min:97.2 °F (36.2 °C), Max:98.5 °F (36.9 °C)    No results for input(s): POCGLU in the last 72 hours. I/O (24Hr):     Intake/Output Summary (Last 24 hours) at 2020  Last data filed at 2020 1602  Gross per 24 hour   Intake 200 ml   Output 800 ml   Net -600 ml       Labs:  Hematology:  Recent Labs     20  1320 20  0714   WBC 7.3 7.0   RBC 4.50 4.36   HGB 14.7 14.0   HCT 47.1 45.7   .7* 104.8*   MCH 32.7 32.1   MCHC 31.2 30.6   RDW 15.0* 15.0*    119*   MPV 11.4 11.3   CRP 6.8*  --    INR 1.1  --    DDIMER 1.54  --      Chemistry:  Recent Labs     20  13220  0714    145*   K 4.3 3.9    99   CO2 29 35*   GLUCOSE 258* 95   BUN 19 15   CREATININE 0.62 0.49*   ANIONGAP 13 11   LABGLOM >60 >60   GFRAA >60 >60   CALCIUM 10.4 9.5   PHOS  --  2.3*   PROBNP 632* 910*   TROPHS 18*  --    MYOGLOBIN 41  --      Recent Labs     20  1320 20  0714   PROT 7.5  --    LABALBU 4.0 3.2*   AST 33*  --    ALT 28  --    *  --    ALKPHOS 89  --    BILITOT 0.55  --      ABG:No results found for: POCPH, PHART, PH, POCPCO2, KJH0XNZ, PCO2, POCPO2, PO2ART, PO2, POCHCO3, KDG6RQO, HCO3, NBEA, PBEA, BEART, BE, THGBART, THB, YDX1YTU, TRYO8QCI, F8FCHXGI, O2SAT, FIO2  Lab Results   Component Value Date/Time    SPECIAL RAC,8ML,KM 2020 02:18 PM     Lab Results   Component Value Date/Time    CULTURE NO GROWTH 2 DAYS 2020 02:18 PM       Radiology:  Xr Chest Portable    Result Date: 4/3/2020  Bilateral effusions with adjacent infiltrates representing atelectasis

## 2020-04-06 NOTE — CONSULTS
LFTS  Lab Results   Component Value Date    ALKPHOS 89 04/03/2020    ALT 28 04/03/2020    AST 33 04/03/2020    PROT 7.5 04/03/2020    BILITOT 0.55 04/03/2020    LABALBU 3.4 04/06/2020     INR   Lab Results   Component Value Date    INR 1.1 04/03/2020    INR 1.7 02/23/2018    INR 2.4 01/30/2018    PROTIME 11.5 04/03/2020    PROTIME 20 02/23/2018    PROTIME 29.1 01/30/2018   Results for Sundar Naranjo (MRN [id number]) as of 4/6/2020 11:09   Ref. Range 4/3/2020 13:20   Procalcitonin Latest Ref Range: <0.09 ng/mL 0.05       Radiology    CXR       (See actual reports for details)    \"Thank you for asking us to see this patient\"    Case discussed with nurse and patient. Questions and concerns addressed. Electronically signed by     ESTER Garcia  Pulmonary Critical Care and Sleep Medicine,  Patient seen under the supervision of Alison Thomas MD, [place]       Patient seen and evaluated by me. 26-year-old white female admitted for decompensated diastolic heart failure with moderate bilateral pleural effusions. She has been having worsening shortness of breath over the past few days at home so she came to the emergency room. Despite diuresis with IV Bumex her shortness of breath has not significantly improved. She denies any significant cough or any chest pain. Lung exam reveals moderate to decreased air exchange with occasional crackles. Plan is to continue diuresis with IV Bumex. Consult IR to evaluate for right thoracentesis. Repeat x-ray chest in a.m. Wean FiO2 as tolerated. Incentive spirometry every hour while awake. Will need to hold Eliquis prior to thoracentesis as per IR recommendations. Above was reviewed and discussed with Bernardo Astorga CNP. And I agree with assessment and plan of care.   Electronically signed by     Mariah Warren MD on 4/6/2020 at 12:31 PM  Pulmonary Critical Care and Sleep Medicine,  [facility]  Cell: [phone]  Office: [phone]

## 2020-04-06 NOTE — PROGRESS NOTES
8. PT/OT  9. Discussed with son, Maylin Guevara, this morning.     33 Jayshree Daugherty DO  4/6/2020  10:41 AM

## 2020-04-07 ENCOUNTER — APPOINTMENT (OUTPATIENT)
Dept: GENERAL RADIOLOGY | Age: 85
DRG: 291 | End: 2020-04-07
Payer: MEDICARE

## 2020-04-07 LAB
ABSOLUTE EOS #: 0.17 K/UL (ref 0–0.44)
ABSOLUTE IMMATURE GRANULOCYTE: 0.02 K/UL (ref 0–0.3)
ABSOLUTE LYMPH #: 1.42 K/UL (ref 1.1–3.7)
ABSOLUTE MONO #: 0.77 K/UL (ref 0.1–1.2)
ALBUMIN SERPL-MCNC: 2.8 G/DL (ref 3.5–5.2)
ANION GAP SERPL CALCULATED.3IONS-SCNC: 8 MMOL/L (ref 9–17)
BASOPHILS # BLD: 1 % (ref 0–2)
BASOPHILS ABSOLUTE: 0.05 K/UL (ref 0–0.2)
BNP INTERPRETATION: ABNORMAL
BUN BLDV-MCNC: 27 MG/DL (ref 8–23)
BUN/CREAT BLD: 38 (ref 9–20)
CALCIUM SERPL-MCNC: 9.5 MG/DL (ref 8.6–10.4)
CHLORIDE BLD-SCNC: 95 MMOL/L (ref 98–107)
CO2: 39 MMOL/L (ref 20–31)
CREAT SERPL-MCNC: 0.71 MG/DL (ref 0.5–0.9)
DIFFERENTIAL TYPE: ABNORMAL
EOSINOPHILS RELATIVE PERCENT: 3 % (ref 1–4)
GFR AFRICAN AMERICAN: >60 ML/MIN
GFR NON-AFRICAN AMERICAN: >60 ML/MIN
GFR SERPL CREATININE-BSD FRML MDRD: ABNORMAL ML/MIN/{1.73_M2}
GFR SERPL CREATININE-BSD FRML MDRD: ABNORMAL ML/MIN/{1.73_M2}
GLUCOSE BLD-MCNC: 99 MG/DL (ref 70–99)
HCT VFR BLD CALC: 40.3 % (ref 36.3–47.1)
HEMOGLOBIN: 12.6 G/DL (ref 11.9–15.1)
IMMATURE GRANULOCYTES: 0 %
LYMPHOCYTES # BLD: 25 % (ref 24–43)
MCH RBC QN AUTO: 32.3 PG (ref 25.2–33.5)
MCHC RBC AUTO-ENTMCNC: 31.3 G/DL (ref 28.4–34.8)
MCV RBC AUTO: 103.3 FL (ref 82.6–102.9)
MONOCYTES # BLD: 14 % (ref 3–12)
NRBC AUTOMATED: 0 PER 100 WBC
PDW BLD-RTO: 14.8 % (ref 11.8–14.4)
PHOSPHORUS: 2.5 MG/DL (ref 2.6–4.5)
PLATELET # BLD: 139 K/UL (ref 138–453)
PLATELET ESTIMATE: ABNORMAL
PMV BLD AUTO: 11.5 FL (ref 8.1–13.5)
POTASSIUM SERPL-SCNC: 3.5 MMOL/L (ref 3.7–5.3)
PRO-BNP: 347 PG/ML
RBC # BLD: 3.9 M/UL (ref 3.95–5.11)
RBC # BLD: ABNORMAL 10*6/UL
SEG NEUTROPHILS: 57 % (ref 36–65)
SEGMENTED NEUTROPHILS ABSOLUTE COUNT: 3.19 K/UL (ref 1.5–8.1)
SODIUM BLD-SCNC: 142 MMOL/L (ref 135–144)
WBC # BLD: 5.6 K/UL (ref 3.5–11.3)
WBC # BLD: ABNORMAL 10*3/UL

## 2020-04-07 PROCEDURE — 83880 ASSAY OF NATRIURETIC PEPTIDE: CPT

## 2020-04-07 PROCEDURE — 80069 RENAL FUNCTION PANEL: CPT

## 2020-04-07 PROCEDURE — 2500000003 HC RX 250 WO HCPCS: Performed by: INTERNAL MEDICINE

## 2020-04-07 PROCEDURE — 97535 SELF CARE MNGMENT TRAINING: CPT

## 2020-04-07 PROCEDURE — 99232 SBSQ HOSP IP/OBS MODERATE 35: CPT | Performed by: INTERNAL MEDICINE

## 2020-04-07 PROCEDURE — 2580000003 HC RX 258: Performed by: NURSE PRACTITIONER

## 2020-04-07 PROCEDURE — 71045 X-RAY EXAM CHEST 1 VIEW: CPT

## 2020-04-07 PROCEDURE — 36415 COLL VENOUS BLD VENIPUNCTURE: CPT

## 2020-04-07 PROCEDURE — 2060000000 HC ICU INTERMEDIATE R&B

## 2020-04-07 PROCEDURE — 6370000000 HC RX 637 (ALT 250 FOR IP): Performed by: INTERNAL MEDICINE

## 2020-04-07 PROCEDURE — APPSS45 APP SPLIT SHARED TIME 31-45 MINUTES: Performed by: NURSE PRACTITIONER

## 2020-04-07 PROCEDURE — 97530 THERAPEUTIC ACTIVITIES: CPT

## 2020-04-07 PROCEDURE — 6370000000 HC RX 637 (ALT 250 FOR IP): Performed by: NURSE PRACTITIONER

## 2020-04-07 PROCEDURE — 85025 COMPLETE CBC W/AUTO DIFF WBC: CPT

## 2020-04-07 RX ADMIN — SODIUM CHLORIDE, PRESERVATIVE FREE 10 ML: 5 INJECTION INTRAVENOUS at 20:30

## 2020-04-07 RX ADMIN — SODIUM CHLORIDE, PRESERVATIVE FREE 10 ML: 5 INJECTION INTRAVENOUS at 09:11

## 2020-04-07 RX ADMIN — TIMOLOL MALEATE 1 DROP: 5 SOLUTION/ DROPS OPHTHALMIC at 09:11

## 2020-04-07 RX ADMIN — DORZOLAMIDE HCL 1 DROP: 20 SOLUTION/ DROPS OPHTHALMIC at 20:13

## 2020-04-07 RX ADMIN — DILTIAZEM HYDROCHLORIDE 180 MG: 180 CAPSULE, COATED, EXTENDED RELEASE ORAL at 10:05

## 2020-04-07 RX ADMIN — DORZOLAMIDE HCL 1 DROP: 20 SOLUTION/ DROPS OPHTHALMIC at 09:11

## 2020-04-07 RX ADMIN — Medication 1 TABLET: at 09:11

## 2020-04-07 RX ADMIN — PRAVASTATIN SODIUM 20 MG: 40 TABLET ORAL at 20:13

## 2020-04-07 RX ADMIN — POTASSIUM BICARBONATE 40 MEQ: 782 TABLET, EFFERVESCENT ORAL at 13:59

## 2020-04-07 RX ADMIN — BUMETANIDE 2 MG: 0.25 INJECTION INTRAMUSCULAR; INTRAVENOUS at 20:19

## 2020-04-07 RX ADMIN — Medication: at 09:11

## 2020-04-07 RX ADMIN — BUMETANIDE 2 MG: 0.25 INJECTION INTRAMUSCULAR; INTRAVENOUS at 12:55

## 2020-04-07 ASSESSMENT — PAIN SCALES - GENERAL: PAINLEVEL_OUTOF10: 0

## 2020-04-07 NOTE — PROGRESS NOTES
Am assessment and vitals complete, pt denies any pain, blood pressure running on low side this am, spoke with Tanika NP, ok to give cardizem now and wait until lunch time to give bumex if bp does not drop too low then

## 2020-04-07 NOTE — PROGRESS NOTES
Occupational Therapy  Facility/Department: [facility] PROGRESSIVE CARE  Daily Treatment Note  NAME: Roxanne Cline  : 1924  MRN: [id number]    Date of Service: 2020    Discharge Recommendations:  [address]   Patient would benefit from SNF for continued occupational therapy to increase independence with  ADL of bathing, dressing, toileting and grooming. Writer recommending SNF placement for for activity tolerance and strength which will increase independence with ADL's coordinated with bed mobility and chair transfers. Continued skilled OT services to address decreased safety awareness with ADL and IADL tasks and for education and increased independence with DME and AE for fall prevention and ec/ws techniques prior to d/c home. OT Equipment Recommendations  ADL Assistive Devices: Emergency Alert System;Long-handled Sponge;Reacher;Long-handled Shoe Horn    Assessment   Performance deficits / Impairments: Decreased functional mobility ; Decreased ADL status; Decreased strength;Decreased safe awareness;Decreased balance;Decreased endurance;Decreased cognition;Decreased posture  Assessment: Skilled OT is indicated to increase overall I and safety awareness in function to return home with assist as needed. Prognosis: Good  REQUIRES OT FOLLOW UP: Yes  Activity Tolerance  Activity Tolerance: Patient Tolerated treatment well  Activity Tolerance: Fair  Safety Devices  Safety Devices in place: Yes  Type of devices: Patient at risk for falls;Call light within reach; Left in chair;Chair alarm in place;Nurse notified;Gait belt         Patient Diagnosis(es): The primary encounter diagnosis was Hypoxia. Diagnoses of Pneumonia due to organism and Congestive heart failure, unspecified HF chronicity, unspecified heart failure type [facility]) were also pertinent to this visit.       has a past medical history of Acute dermatitis, Arthritis, Asthma, Atrial fibrillation ([facility] Bursitis, CHF (congestive heart failure) ([facility] Hearing aid worn, Hyperlipidemia, Hypertension, Lumbar disc disease, Wears glasses, and Wound of right leg.   has a past surgical history that includes back surgery; debridement (Left, 03/09/2017); pr incis/drain thigh/knee abscess,deep (Right, 3/9/2017); other surgical history (03/30/2017); other surgical history (03/30/2017); and pr incis/drain thigh/knee abscess,deep (Right, 4/28/2017).     Restrictions  Restrictions/Precautions  Restrictions/Precautions: General Precautions, Fall Risk, Up as Tolerated  Required Braces or Orthoses?: No  Position Activity Restriction  Other position/activity restrictions: up as tolerated, RUE IV, 3 L O2 per NC, alarms   Subjective   General  Chart Reviewed: Yes  Patient assessed for rehabilitation services?: Yes  Response to previous treatment: Patient with no complaints from previous session  Family / Caregiver Present: No      Orientation  Orientation  Overall Orientation Status: Within Functional Limits  Objective    ADL  Grooming: Setup;Stand by assistance(standing at sink to brush teeth, comb hair and wash hands/face)  Toileting: Maximum assistance(with hygiene and brief change)  Additional Comments: writer managed O2 tubing        Balance  Sitting Balance: Supervision  Standing Balance: Stand by assistance  Standing Balance  Time: 5-6 min  Activity: grooming at sink  Functional Mobility  Functional - Mobility Device: Rolling Walker  Activity: To/from bathroom  Assist Level: Stand by assistance  Functional Mobility Comments: verbal cues for safety, writer managed O2 tubing  Toilet Transfers  Toilet - Technique: Ambulating  Equipment Used: Standard toilet  Toilet Transfer: Stand by assistance  Toilet Transfers Comments: verbal cues for safety/technique, writer managed O2 tubing  Bed mobility  Supine to Sit: Stand by assistance(with HOB up)  Comment: verbal cues for technique  Transfers  Stand Step Transfers: Stand by assistance  Sit to stand: Stand by Therapy Time   Individual Concurrent Group Co-treatment   Time In 8761         Time Out 1150         Minutes 26          Pt stated her fingertips felt \"numb\" and asked if there were any exercises she could do. Writer issued pt yellow sponge with FM HEP handout. Writer demo'd each ex and pt returned fair demo.        LOREN Pollack

## 2020-04-07 NOTE — PROGRESS NOTES
given her constellation of symptoms and lab studies. - COVID negative  - Treating heart failure   - O2 requirements persist (not typically requiring O2 at home)    Review of Systems:     Constitutional:  negative for chills, fevers, sweats  Respiratory:  Reports persistent shortness of breath; negative for cough, dyspnea on exertion  Cardiovascular:  Reports improvement of leg swelling; negative for chest pain, chest pressure/discomfort, palpitations  Gastrointestinal:  negative for abdominal pain, constipation, diarrhea, nausea, vomiting  Neurological:  negative for dizziness, headache    Medications: Allergies: Allergies   Allergen Reactions    Shellfish-Derived Products Swelling    Tudorza Pressair [Aclidinium Bromide]      LISTED AS ALLERGY, UNKNOWN REACTION  LISTED AS ALLERGY, UNKNOWN REACTION       Current Meds:   Scheduled Meds:    hydrocerin   Topical BID    bumetanide  2 mg Intravenous BID    dilTIAZem  180 mg Oral Daily    [Held by provider] apixaban  2.5 mg Oral BID    calcium carb-cholecalciferol  1 tablet Oral Daily    dorzolamide  1 drop Both Eyes BID    [Held by provider] metoprolol tartrate  12.5 mg Oral BID    pravastatin  20 mg Oral Daily    timolol  1 drop Both Eyes Daily    sodium chloride flush  10 mL Intravenous 2 times per day     Continuous Infusions:   PRN Meds: potassium chloride **OR** potassium alternative oral replacement **OR** potassium chloride, albuterol, sodium chloride flush, magnesium hydroxide, acetaminophen    Data:     Past Medical History:   has a past medical history of Acute dermatitis, Arthritis, Asthma, Atrial fibrillation ([address] Bursitis, CHF (congestive heart failure) ([address] Hearing aid worn, Hyperlipidemia, Hypertension, Lumbar disc disease, Wears glasses, and Wound of right leg. Social History:   reports that she has never smoked. She has never used smokeless tobacco. She reports previous alcohol use. She reports that she does not use drugs. versus pneumonia. Physical Examination:        General appearance:  alert, cooperative and no distress, frail elderly  female  Mental Status:  oriented to person, place and time and normal affect  Lungs: Diminished lung sounds at bilateral bases; normal effort. Continue Bumex twice daily, Zaroxolyn dose this morning. Heart:  irregular rhythm, controlled rate; no murmur  Abdomen:  soft, nontender, nondistended, normal bowel sounds, no masses, hepatomegaly, splenomegaly  Extremities:  trace pitting edema bilateral lower extremities; no redness, tenderness in the calves  Skin:  no gross lesions, rashes, induration    Assessment:        Hospital Problems           Last Modified POA    Hypernatremia 4/6/2020 Yes    Persistent atrial fibrillation 4/3/2020 Yes    Venous ulcer of left leg ([facility] 4/3/2020 Yes    Acute on chronic diastolic congestive heart failure ([address] 4/3/2020 Yes    Acute respiratory failure with hypoxia ([facility] 4/3/2020 Yes    Chronic anticoagulation 4/3/2020 Yes    Hyperlipidemia 4/3/2020 Yes          Plan:        1. COVID negative  2. Bumex held overnight due to low blood pressure. Will space out Cardizem and Bumex morning, /44  3. Continue supplemental oxygen to maintain SPO2 greater than 90%,  Wean as tolerated  4. Hold Eliquis till after thoracentesis  5. Cardiology consult reviewed, continue diuretics  6. Pulmonology consult reviewed, appreciate recommendations  7. IR to do thoracentesis, patient received 1 dose of Eliquis this morning, thoracentesis postponed, will reevaluate tomorrow  8. Monitor labs, replace electrolytes as needed  9. Repeat chest x-ray in the morning  10. Continue PT/OT  11.  Plan discussed with patient and staff      Honor ELENA Badillo NP  4/7/2020  9:08 AM

## 2020-04-07 NOTE — PROGRESS NOTES
[address]  [phone]          Progress Note    Patient Name:  Varsha Mayfield    :  1924 12:21 PM      SUBJECTIVE       Ms. Chyna Mora  has no chest pain,  palpitations, nausea or vomiting. Intake and output about equal but negative since admission. SOB mildly improved. Less edema. Thoracentesis planned today      OBJECTIVE     Vital signs:    BP (!) 99/39   Pulse 65   Temp 97.5 °F (36.4 °C) (Axillary)   Resp 18   Ht 5' 3\" (1.6 m)   Wt 127 lb 1.6 oz (57.7 kg)   SpO2 94%   BMI 22.51 kg/m²  2 L/min  .tro    Admit Weight:  115 lb (52.2 kg)    Last 3 weights: Wt Readings from Last 3 Encounters:   20 127 lb 1.6 oz (57.7 kg)   20 113 lb 14.4 oz (51.7 kg)   19 130 lb (59 kg)       BMI: Body mass index is 22.51 kg/m². Input/Output:       Intake/Output Summary (Last 24 hours) at 2020 1221  Last data filed at 2020 8846  Gross per 24 hour   Intake 1000 ml   Output 1200 ml   Net -200 ml       Date 20 0000 - 20 2359   Shift 1784-9967 1458-8987 9393-6290 24 Hour Total   INTAKE   Shift Total(mL/kg)       OUTPUT   Urine(mL/kg/hr) 600(1.3)   600   Shift Total(mL/kg) 600(10.4)   600(10.4)   Weight (kg) 57.7 57.7 57.7 57.7     Exam:     General appearance: awake and alert moves all ext   Lungs: no rhonchi, no wheezes, no rales.  Diminished bases  Heart: irregular S1 and S2 normal short ejection murmur  Abdomen: positive bowel sounds, no bruits, no masses  Extremities: warm and dry, no cyanosis, no clubbing        Laboratory Studies:     CBC:   Recent Labs     20  0714 20  0637 20  0543   WBC 7.0 5.8 5.6   HGB 14.0 14.5 12.6   HCT 45.7 46.5 40.3   .8* 103.3* 103.3*   * 138 139     BMP:   Recent Labs     20  0714 20  0637 20  0543   * 147* 142   K 3.9 3.2* 3.5*   CL 99 98 95*   CO2 35* 36* 39*   PHOS 2.3* 2.1* 2.5*   BUN 15 17 27*   CREATININE

## 2020-04-08 ENCOUNTER — APPOINTMENT (OUTPATIENT)
Dept: GENERAL RADIOLOGY | Age: 85
DRG: 291 | End: 2020-04-08
Payer: MEDICARE

## 2020-04-08 ENCOUNTER — APPOINTMENT (OUTPATIENT)
Dept: INTERVENTIONAL RADIOLOGY/VASCULAR | Age: 85
DRG: 291 | End: 2020-04-08
Payer: MEDICARE

## 2020-04-08 LAB
ABSOLUTE EOS #: 0.15 K/UL (ref 0–0.44)
ABSOLUTE IMMATURE GRANULOCYTE: 0.01 K/UL (ref 0–0.3)
ABSOLUTE LYMPH #: 1.28 K/UL (ref 1.1–3.7)
ABSOLUTE MONO #: 0.55 K/UL (ref 0.1–1.2)
ALBUMIN SERPL-MCNC: 3 G/DL (ref 3.5–5.2)
ANION GAP SERPL CALCULATED.3IONS-SCNC: 9 MMOL/L (ref 9–17)
BASOPHILS # BLD: 1 % (ref 0–2)
BASOPHILS ABSOLUTE: 0.05 K/UL (ref 0–0.2)
BUN BLDV-MCNC: 31 MG/DL (ref 8–23)
BUN/CREAT BLD: 41 (ref 9–20)
CALCIUM SERPL-MCNC: 9.9 MG/DL (ref 8.6–10.4)
CHLORIDE BLD-SCNC: 91 MMOL/L (ref 98–107)
CO2: 39 MMOL/L (ref 20–31)
CREAT SERPL-MCNC: 0.75 MG/DL (ref 0.5–0.9)
DIFFERENTIAL TYPE: ABNORMAL
EOSINOPHILS RELATIVE PERCENT: 3 % (ref 1–4)
GFR AFRICAN AMERICAN: >60 ML/MIN
GFR NON-AFRICAN AMERICAN: >60 ML/MIN
GFR SERPL CREATININE-BSD FRML MDRD: ABNORMAL ML/MIN/{1.73_M2}
GFR SERPL CREATININE-BSD FRML MDRD: ABNORMAL ML/MIN/{1.73_M2}
GLUCOSE BLD-MCNC: 106 MG/DL (ref 70–99)
GLUCOSE, FLUID: 141 MG/DL
HCT VFR BLD CALC: 42.7 % (ref 36.3–47.1)
HEMOGLOBIN: 13.3 G/DL (ref 11.9–15.1)
IMMATURE GRANULOCYTES: 0 %
LACTATE DEHYDROGENASE, FLUID: 76 U/L
LYMPHOCYTES # BLD: 26 % (ref 24–43)
MCH RBC QN AUTO: 31.9 PG (ref 25.2–33.5)
MCHC RBC AUTO-ENTMCNC: 31.1 G/DL (ref 28.4–34.8)
MCV RBC AUTO: 102.4 FL (ref 82.6–102.9)
MONOCYTES # BLD: 11 % (ref 3–12)
NRBC AUTOMATED: 0 PER 100 WBC
PDW BLD-RTO: 14.7 % (ref 11.8–14.4)
PH FLUID: 8
PHOSPHORUS: 3 MG/DL (ref 2.6–4.5)
PLATELET # BLD: 142 K/UL (ref 138–453)
PLATELET ESTIMATE: ABNORMAL
PMV BLD AUTO: 11 FL (ref 8.1–13.5)
POTASSIUM SERPL-SCNC: 3.3 MMOL/L (ref 3.7–5.3)
POTASSIUM SERPL-SCNC: 3.5 MMOL/L (ref 3.7–5.3)
RBC # BLD: 4.17 M/UL (ref 3.95–5.11)
RBC # BLD: ABNORMAL 10*6/UL
SEG NEUTROPHILS: 59 % (ref 36–65)
SEGMENTED NEUTROPHILS ABSOLUTE COUNT: 2.86 K/UL (ref 1.5–8.1)
SODIUM BLD-SCNC: 139 MMOL/L (ref 135–144)
SPECIMEN TYPE: NORMAL
TOTAL PROTEIN, BODY FLUID: 2.5 G/DL
WBC # BLD: 4.9 K/UL (ref 3.5–11.3)
WBC # BLD: ABNORMAL 10*3/UL

## 2020-04-08 PROCEDURE — 87075 CULTR BACTERIA EXCEPT BLOOD: CPT

## 2020-04-08 PROCEDURE — 87102 FUNGUS ISOLATION CULTURE: CPT

## 2020-04-08 PROCEDURE — 87205 SMEAR GRAM STAIN: CPT

## 2020-04-08 PROCEDURE — 97110 THERAPEUTIC EXERCISES: CPT

## 2020-04-08 PROCEDURE — C1729 CATH, DRAINAGE: HCPCS

## 2020-04-08 PROCEDURE — APPSS30 APP SPLIT SHARED TIME 16-30 MINUTES: Performed by: NURSE PRACTITIONER

## 2020-04-08 PROCEDURE — 36415 COLL VENOUS BLD VENIPUNCTURE: CPT

## 2020-04-08 PROCEDURE — 83615 LACTATE (LD) (LDH) ENZYME: CPT

## 2020-04-08 PROCEDURE — 71045 X-RAY EXAM CHEST 1 VIEW: CPT

## 2020-04-08 PROCEDURE — 0W9930Z DRAINAGE OF RIGHT PLEURAL CAVITY WITH DRAINAGE DEVICE, PERCUTANEOUS APPROACH: ICD-10-PCS | Performed by: RADIOLOGY

## 2020-04-08 PROCEDURE — 97535 SELF CARE MNGMENT TRAINING: CPT

## 2020-04-08 PROCEDURE — 32555 ASPIRATE PLEURA W/ IMAGING: CPT | Performed by: RADIOLOGY

## 2020-04-08 PROCEDURE — 88305 TISSUE EXAM BY PATHOLOGIST: CPT

## 2020-04-08 PROCEDURE — 84157 ASSAY OF PROTEIN OTHER: CPT

## 2020-04-08 PROCEDURE — 97116 GAIT TRAINING THERAPY: CPT

## 2020-04-08 PROCEDURE — 82945 GLUCOSE OTHER FLUID: CPT

## 2020-04-08 PROCEDURE — 2580000003 HC RX 258: Performed by: NURSE PRACTITIONER

## 2020-04-08 PROCEDURE — 94761 N-INVAS EAR/PLS OXIMETRY MLT: CPT

## 2020-04-08 PROCEDURE — 87015 SPECIMEN INFECT AGNT CONCNTJ: CPT

## 2020-04-08 PROCEDURE — 2700000000 HC OXYGEN THERAPY PER DAY

## 2020-04-08 PROCEDURE — 84132 ASSAY OF SERUM POTASSIUM: CPT

## 2020-04-08 PROCEDURE — 2500000003 HC RX 250 WO HCPCS: Performed by: NURSE PRACTITIONER

## 2020-04-08 PROCEDURE — 87070 CULTURE OTHR SPECIMN AEROBIC: CPT

## 2020-04-08 PROCEDURE — 2060000000 HC ICU INTERMEDIATE R&B

## 2020-04-08 PROCEDURE — 6370000000 HC RX 637 (ALT 250 FOR IP): Performed by: NURSE PRACTITIONER

## 2020-04-08 PROCEDURE — 0W9B30Z DRAINAGE OF LEFT PLEURAL CAVITY WITH DRAINAGE DEVICE, PERCUTANEOUS APPROACH: ICD-10-PCS | Performed by: RADIOLOGY

## 2020-04-08 PROCEDURE — 89051 BODY FLUID CELL COUNT: CPT

## 2020-04-08 PROCEDURE — 85025 COMPLETE CBC W/AUTO DIFF WBC: CPT

## 2020-04-08 PROCEDURE — 6370000000 HC RX 637 (ALT 250 FOR IP): Performed by: INTERNAL MEDICINE

## 2020-04-08 PROCEDURE — 80069 RENAL FUNCTION PANEL: CPT

## 2020-04-08 PROCEDURE — 99232 SBSQ HOSP IP/OBS MODERATE 35: CPT | Performed by: INTERNAL MEDICINE

## 2020-04-08 PROCEDURE — 83986 ASSAY PH BODY FLUID NOS: CPT

## 2020-04-08 PROCEDURE — 87206 SMEAR FLUORESCENT/ACID STAI: CPT

## 2020-04-08 PROCEDURE — 88112 CYTOPATH CELL ENHANCE TECH: CPT

## 2020-04-08 PROCEDURE — 87116 MYCOBACTERIA CULTURE: CPT

## 2020-04-08 PROCEDURE — 97530 THERAPEUTIC ACTIVITIES: CPT

## 2020-04-08 RX ORDER — BUMETANIDE 0.25 MG/ML
1 INJECTION, SOLUTION INTRAMUSCULAR; INTRAVENOUS 2 TIMES DAILY
Status: DISCONTINUED | OUTPATIENT
Start: 2020-04-08 | End: 2020-04-09

## 2020-04-08 RX ORDER — POTASSIUM CHLORIDE 20 MEQ/1
20 TABLET, EXTENDED RELEASE ORAL ONCE
Status: COMPLETED | OUTPATIENT
Start: 2020-04-08 | End: 2020-04-08

## 2020-04-08 RX ADMIN — POTASSIUM BICARBONATE 40 MEQ: 782 TABLET, EFFERVESCENT ORAL at 06:24

## 2020-04-08 RX ADMIN — Medication 1 TABLET: at 09:59

## 2020-04-08 RX ADMIN — TIMOLOL MALEATE 1 DROP: 5 SOLUTION/ DROPS OPHTHALMIC at 10:00

## 2020-04-08 RX ADMIN — DILTIAZEM HYDROCHLORIDE 180 MG: 180 CAPSULE, COATED, EXTENDED RELEASE ORAL at 09:59

## 2020-04-08 RX ADMIN — SODIUM CHLORIDE, PRESERVATIVE FREE 10 ML: 5 INJECTION INTRAVENOUS at 20:35

## 2020-04-08 RX ADMIN — PRAVASTATIN SODIUM 20 MG: 40 TABLET ORAL at 20:49

## 2020-04-08 RX ADMIN — POTASSIUM CHLORIDE 20 MEQ: 1500 TABLET, EXTENDED RELEASE ORAL at 09:59

## 2020-04-08 RX ADMIN — SODIUM CHLORIDE, PRESERVATIVE FREE 10 ML: 5 INJECTION INTRAVENOUS at 10:00

## 2020-04-08 RX ADMIN — DORZOLAMIDE HCL 1 DROP: 20 SOLUTION/ DROPS OPHTHALMIC at 10:00

## 2020-04-08 RX ADMIN — DORZOLAMIDE HCL 1 DROP: 20 SOLUTION/ DROPS OPHTHALMIC at 20:35

## 2020-04-08 RX ADMIN — BUMETANIDE 1 MG: 0.25 INJECTION INTRAMUSCULAR; INTRAVENOUS at 20:49

## 2020-04-08 RX ADMIN — POTASSIUM BICARBONATE 40 MEQ: 782 TABLET, EFFERVESCENT ORAL at 16:03

## 2020-04-08 NOTE — CARE COORDINATION
Discharge planning    Patient has bed waiting for her at [facility]. . patient went to IR today for thoracentesis and will anticipate potential for dc to snf tomorrow. FORD will need to be completed and signed.

## 2020-04-08 NOTE — PROCEDURES
Mila Hayden is a 80 y.o. female patient. 1. Hypoxia    2. Pneumonia due to organism    3. Congestive heart failure, unspecified HF chronicity, unspecified heart failure type [facility])      Past Medical History:   Diagnosis Date    Acute dermatitis     Arthritis     Asthma     Atrial fibrillation (HCC)     Bursitis     CHF (congestive heart failure) (HCC)     Hearing aid worn     Hyperlipidemia     Hypertension     Lumbar disc disease     Wears glasses     Wound of right leg      Blood pressure 117/64, pulse 66, temperature 97.9 °F (36.6 °C), temperature source Oral, resp. rate 14, height 5' 3\" (1.6 m), weight 111 lb 12.8 oz (50.7 kg), SpO2 98 %.     Procedures     U/S guided bilateral thoracentesis - R-750ml, L-600ml    Kanwal Robertson MD  4/8/2020

## 2020-04-08 NOTE — PROGRESS NOTES
4/28/2017). Restrictions  Restrictions/Precautions  Restrictions/Precautions: General Precautions, Fall Risk  Required Braces or Orthoses?: No  Position Activity Restriction  Other position/activity restrictions: up as tolerated, RUE IV, 2L O2 per NC, alarms   Subjective   General  Chart Reviewed: Yes  Patient assessed for rehabilitation services?: Yes  Response to previous treatment: Patient with no complaints from previous session  Family / Caregiver Present: No      Orientation  Orientation  Overall Orientation Status: Within Functional Limits  Objective    ADL  Grooming: Setup;Stand by assistance;Minimal assistance(standing at sink to complete oral hygiene, washing axillary area, and applying deodorant, min A for gown mgt)  Toileting:  Moderate assistance;Minimal assistance(min A for gown mgt, and mod A forpulling up breif in the back.)  Additional Comments: writer managed O2 tubing        Balance  Sitting Balance: Supervision  Standing Balance: Stand by assistance  Standing Balance  Time: ~8 mins  Activity: grooming at sink  Comment: no LOB noted during static standing  Functional Mobility  Functional - Mobility Device: Rolling Walker  Activity: To/from bathroom  Assist Level: Minimal assistance  Functional Mobility Comments: verbal cues for safety, environment scanning, posture, RW safety, writer managed O2 tubing, patient required min A navigating obstacles d/t patient running into doorways, not making wide enough turns   Toilet Transfers  Toilet - Technique: Ambulating  Equipment Used: Standard toilet  Toilet Transfer: Stand by assistance  Toilet Transfers Comments: verbal cues for safety/technique and use of grab bars as needed, writer managed O2 tubing  Bed mobility  Comment: Patient up in chair upon arrival  Transfers  Sit to stand: Stand by assistance  Stand to sit: Stand by assistance  Transfer Comments: verbal cues for safety/technique, writer managed O2 tubing Cognition  Overall Cognitive Status: Exceptions  Arousal/Alertness: Appropriate responses to stimuli  Following Commands:  Follows one step commands with repetition  Attention Span: Appears intact  Memory: Decreased short term memory  Safety Judgement: Decreased awareness of need for safety  Problem Solving: Assistance required to identify errors made;Assistance required to correct errors made  Insights: Decreased awareness of deficits  Initiation: Requires cues for some  Sequencing: Requires cues for some                                         Plan   Plan  Times per week: 4-5x/week, 1-2x/day  Current Treatment Recommendations: Strengthening, Balance Training, Functional Mobility Training, Endurance Training, Equipment Evaluation, Education, & procurement, Patient/Caregiver Education & Training, Self-Care / ADL, Safety Education & Training, Positioning    AM-PAC Score        AM-PAC Inpatient Daily Activity Raw Score: 18 (04/08/20 1223)  AM-PAC Inpatient ADL T-Scale Score : 38.66 (04/08/20 1223)  ADL Inpatient CMS 0-100% Score: 46.65 (04/08/20 1223)  ADL Inpatient CMS G-Code Modifier : CK (04/08/20 1223)    Goals  Short term goals  Time Frame for Short term goals: by discharge, pt will  Short term goal 1: demo SBA with ADL transfers with good safety, AD/DME as needed  Short term goal 2: demo SBA with functional mob in room for ADL completion with good safety, pacing  Short term goal 3: demo SBA with toileting routine  Short term goal 4: demo SBA with UB ADLs and min A with LB ADLs with good safety, pacing, and DME as needed  Short term goal 5: demo and verb good understanding of fall prevention techs, EC/WS techs, breathing techs, and possible equip needs  Patient Goals   Patient goals : to go home when able        Therapy Time   Individual Concurrent Group Co-treatment   Time In 1100         Time Out 1123         Minutes 23           Stimulating environment provided through opening blinds, turning on lights,

## 2020-04-08 NOTE — PLAN OF CARE
Problem: Falls - Risk of:  Goal: Will remain free from falls  Description: Will remain free from falls  4/8/2020 0129 by Jazmyn Hodgson RN  Outcome: Ongoing  Note: Patient will remain free from falls this shift, call light is within reach, bed in locked and lowest position. Non skid socks on. Bed alarm on. Side rails up x2. Encouraged to call for assistance with transferring. Will continue to monitor. Problem: Skin Integrity:  Goal: Will show no infection signs and symptoms  Description: Will show no infection signs and symptoms  Outcome: Ongoing  Note: The patient's skin remains dry and intact. Assist patient with turning Q2 hours and PRN. Waffle mattress in place. Pillows for comfort. Will monitor.

## 2020-04-08 NOTE — PROGRESS NOTES
belt, Patient at risk for falls, Bed alarm in place, Call light within reach, Left in chair, Chair alarm in place     Therapy Time   Individual Concurrent Group Co-treatment   Time In 0903         Time Out 0946         Minutes 8988  [facility],

## 2020-04-08 NOTE — PROGRESS NOTES
Pulmonary Critical Care Progress Note  Serene Churchill, ELENA-LOLLY/Harriet Campbell MD     Patient seen for the follow up of acute hypoxic respiratory failure, moderate bilateral pleural effusions, acute on chronic diastolic heart failure, atelectasis, moderate pulmonary hypertension    Subjective:  She is resting comfortably in the bed. She just returned from IR, had bilateral thoracentesis. Shortness of breath is improved. She is occasional dry cough. She denies any chest pain. Examination:  Vitals: /64   Pulse 66   Temp 97.9 °F (36.6 °C) (Oral)   Resp 14   Ht 5' 3\" (1.6 m)   Wt 111 lb 12.8 oz (50.7 kg)   SpO2 98%   BMI 19.80 kg/m²   General appearance: alert and cooperative with exam, sitting up in bed eating lunch  Neck: No JVD  Lungs: Moderate air exchange, improved air exchange bilateral bases  Heart: regular rate and rhythm, S1, S2 normal, no gallop  Abdomen: Soft, non tender, + BS  Extremities: no cyanosis or clubbing.  No significant edema    LABs:  CBC:   Recent Labs     04/07/20  0543 04/08/20 0459   WBC 5.6 4.9   HGB 12.6 13.3   HCT 40.3 42.7    142     BMP:   Recent Labs     04/07/20  0543 04/08/20 0459 04/08/20  1042    139  --    K 3.5* 3.3* 3.5*   CO2 39* 39*  --    BUN 27* 31*  --    CREATININE 0.71 0.75  --    LABGLOM >60 >60  --    GLUCOSE 99 106*  --      LIVER PROFILE:  Recent Labs     04/07/20  0543 04/08/20 0459   LABALBU 2.8* 3.0*     Radiology:  4/8/20 s/p bilateral thoracentesis      Impression:  · Acute hypoxic respiratory failure  · Moderate bilateral pleural effusions  · S/p bilateral thoracentesis 4/8/2020, 600 mL's on the left/750 mL's on the right  · Acute on chronic diastolic heart failure  · Atelectasis  · Moderate pulmonary hypertension, RVSP 51 mmHg  · A. fib, arthritis HTN, HLD  · Hypernatremia/hypokalemia    Recommendations:  · Continue diuresis with IV Bumex  · Albuterol Q 4 hours prn  · X-ray chest in am  · Labs: CBC and BMP in am  · Cardiology

## 2020-04-08 NOTE — FLOWSHEET NOTE
Pt hd bi lateral thoracentesis clear zion pleural fluid removed from left side clear dark yellow removed from right side dressings applied to both sites specimens collected for labs pt had rcxr that was reveiwed by physician pt returned to room in nad. Tolerated procedure well.

## 2020-04-08 NOTE — PLAN OF CARE
Problem: Respiratory:  Goal: Ability to maintain adequate oxygenation will improve  Description: Ability to maintain adequate oxygenation will improve  Outcome: Ongoing     Problem: Respiratory:  Goal: Ability to maintain adequate ventilation will improve  Description: Ability to maintain adequate ventilation will improve  Outcome: Ongoing     Problem: Respiratory:  Goal: Ability to maintain a clear airway will improve  Description: Ability to maintain a clear airway will improve  Outcome: Ongoing     Problem: Falls - Risk of:  Goal: Will remain free from falls  Description: Will remain free from falls  4/8/2020 1413 by Efren Bush RN  Outcome: Ongoing     Problem: Falls - Risk of:  Goal: Absence of physical injury  Description: Absence of physical injury  Outcome: Ongoing     Problem: Infection:  Goal: Will remain free from infection  Description: Will remain free from infection  Outcome: Ongoing     Problem: Safety:  Goal: Free from accidental physical injury  Description: Free from accidental physical injury  4/8/2020 1413 by Efren Bush RN  Outcome: Ongoing     Problem: Safety:  Goal: Free from intentional harm  Description: Free from intentional harm  Outcome: Ongoing     Problem: Safety:  Goal: Ability to remain free from injury will improve  Description: Ability to remain free from injury will improve  Outcome: Ongoing     Problem: Daily Care:  Goal: Daily care needs are met  Description: Daily care needs are met  Outcome: Ongoing     Problem: Pain:  Goal: Patient's pain/discomfort is manageable  Description: Patient's pain/discomfort is manageable  Outcome: Ongoing     Problem: Discharge Planning:  Goal: Patients continuum of care needs are met  Description: Patients continuum of care needs are met  Outcome: Ongoing     Problem: Skin Integrity:  Goal: Will show no infection signs and symptoms  Description: Will show no infection signs and symptoms  4/8/2020 1413 by Efren Bush RN  Outcome: Ongoing

## 2020-04-08 NOTE — PROGRESS NOTES
Patient transferred back from IR post-thoracentesis   Vitals stable  Re-oriented to room  Assessment completed

## 2020-04-09 ENCOUNTER — APPOINTMENT (OUTPATIENT)
Dept: GENERAL RADIOLOGY | Age: 85
DRG: 291 | End: 2020-04-09
Payer: MEDICARE

## 2020-04-09 VITALS
HEIGHT: 63 IN | SYSTOLIC BLOOD PRESSURE: 97 MMHG | OXYGEN SATURATION: 99 % | WEIGHT: 111.8 LBS | BODY MASS INDEX: 19.81 KG/M2 | HEART RATE: 66 BPM | DIASTOLIC BLOOD PRESSURE: 47 MMHG | TEMPERATURE: 97.7 F | RESPIRATION RATE: 16 BRPM

## 2020-04-09 LAB
ABSOLUTE EOS #: 0.16 K/UL (ref 0–0.44)
ABSOLUTE IMMATURE GRANULOCYTE: 0.02 K/UL (ref 0–0.3)
ABSOLUTE LYMPH #: 1.14 K/UL (ref 1.1–3.7)
ABSOLUTE MONO #: 0.56 K/UL (ref 0.1–1.2)
ALBUMIN SERPL-MCNC: 3.1 G/DL (ref 3.5–5.2)
ANION GAP SERPL CALCULATED.3IONS-SCNC: 9 MMOL/L (ref 9–17)
APPEARANCE FLUID: NORMAL
BASO FLUID: NORMAL %
BASOPHILS # BLD: 1 % (ref 0–2)
BASOPHILS ABSOLUTE: 0.03 K/UL (ref 0–0.2)
BUN BLDV-MCNC: 30 MG/DL (ref 8–23)
BUN/CREAT BLD: 35 (ref 9–20)
CALCIUM SERPL-MCNC: 10 MG/DL (ref 8.6–10.4)
CASE NUMBER:: NORMAL
CHLORIDE BLD-SCNC: 93 MMOL/L (ref 98–107)
CO2: 40 MMOL/L (ref 20–31)
COLOR FLUID: NORMAL
CREAT SERPL-MCNC: 0.86 MG/DL (ref 0.5–0.9)
CULTURE: NORMAL
CULTURE: NORMAL
DIFFERENTIAL TYPE: ABNORMAL
EOSINOPHIL FLUID: NORMAL %
EOSINOPHILS RELATIVE PERCENT: 3 % (ref 1–4)
FLUID DIFF COMMENT: NORMAL
GFR AFRICAN AMERICAN: >60 ML/MIN
GFR NON-AFRICAN AMERICAN: >60 ML/MIN
GFR SERPL CREATININE-BSD FRML MDRD: ABNORMAL ML/MIN/{1.73_M2}
GFR SERPL CREATININE-BSD FRML MDRD: ABNORMAL ML/MIN/{1.73_M2}
GLUCOSE BLD-MCNC: 115 MG/DL (ref 70–99)
HCT VFR BLD CALC: 42.8 % (ref 36.3–47.1)
HEMOGLOBIN: 13.5 G/DL (ref 11.9–15.1)
IMMATURE GRANULOCYTES: 0 %
INR BLD: 1.1
LYMPHOCYTES # BLD: 20 % (ref 24–43)
LYMPHOCYTES, BODY FLUID: 41 %
Lab: NORMAL
Lab: NORMAL
MCH RBC QN AUTO: 32.3 PG (ref 25.2–33.5)
MCHC RBC AUTO-ENTMCNC: 31.5 G/DL (ref 28.4–34.8)
MCV RBC AUTO: 102.4 FL (ref 82.6–102.9)
MONOCYTE, FLUID: NORMAL %
MONOCYTES # BLD: 10 % (ref 3–12)
NEUTROPHIL, FLUID: 2 %
NRBC AUTOMATED: 0 PER 100 WBC
OTHER CELLS FLUID: NORMAL %
PDW BLD-RTO: 14.4 % (ref 11.8–14.4)
PHOSPHORUS: 3.2 MG/DL (ref 2.6–4.5)
PLATELET # BLD: 151 K/UL (ref 138–453)
PLATELET ESTIMATE: ABNORMAL
PMV BLD AUTO: 11 FL (ref 8.1–13.5)
POTASSIUM SERPL-SCNC: 4.8 MMOL/L (ref 3.7–5.3)
PROTHROMBIN TIME: 10.8 SEC (ref 9.7–11.6)
RBC # BLD: 4.18 M/UL (ref 3.95–5.11)
RBC # BLD: ABNORMAL 10*6/UL
RBC FLUID: NORMAL /MM3
SEG NEUTROPHILS: 66 % (ref 36–65)
SEGMENTED NEUTROPHILS ABSOLUTE COUNT: 3.69 K/UL (ref 1.5–8.1)
SODIUM BLD-SCNC: 142 MMOL/L (ref 135–144)
SPECIMEN DESCRIPTION: NORMAL
SPECIMEN TYPE: NORMAL
WBC # BLD: 5.6 K/UL (ref 3.5–11.3)
WBC # BLD: ABNORMAL 10*3/UL
WBC FLUID: 1129 /MM3

## 2020-04-09 PROCEDURE — 97116 GAIT TRAINING THERAPY: CPT

## 2020-04-09 PROCEDURE — 80069 RENAL FUNCTION PANEL: CPT

## 2020-04-09 PROCEDURE — 99239 HOSP IP/OBS DSCHRG MGMT >30: CPT | Performed by: INTERNAL MEDICINE

## 2020-04-09 PROCEDURE — 6370000000 HC RX 637 (ALT 250 FOR IP): Performed by: NURSE PRACTITIONER

## 2020-04-09 PROCEDURE — 6370000000 HC RX 637 (ALT 250 FOR IP): Performed by: INTERNAL MEDICINE

## 2020-04-09 PROCEDURE — 97530 THERAPEUTIC ACTIVITIES: CPT

## 2020-04-09 PROCEDURE — 71045 X-RAY EXAM CHEST 1 VIEW: CPT

## 2020-04-09 PROCEDURE — 97535 SELF CARE MNGMENT TRAINING: CPT

## 2020-04-09 PROCEDURE — 36415 COLL VENOUS BLD VENIPUNCTURE: CPT

## 2020-04-09 PROCEDURE — APPSS60 APP SPLIT SHARED TIME 46-60 MINUTES: Performed by: NURSE PRACTITIONER

## 2020-04-09 PROCEDURE — 2580000003 HC RX 258: Performed by: NURSE PRACTITIONER

## 2020-04-09 PROCEDURE — 85025 COMPLETE CBC W/AUTO DIFF WBC: CPT

## 2020-04-09 PROCEDURE — 85610 PROTHROMBIN TIME: CPT

## 2020-04-09 RX ORDER — BUMETANIDE 1 MG/1
1 TABLET ORAL DAILY
Status: DISCONTINUED | OUTPATIENT
Start: 2020-04-09 | End: 2020-04-09 | Stop reason: HOSPADM

## 2020-04-09 RX ORDER — BUMETANIDE 1 MG/1
1 TABLET ORAL DAILY
Qty: 30 TABLET | Refills: 3 | Status: CANCELLED | OUTPATIENT
Start: 2020-04-10

## 2020-04-09 RX ADMIN — BUMETANIDE 1 MG: 1 TABLET ORAL at 11:42

## 2020-04-09 RX ADMIN — TIMOLOL MALEATE 1 DROP: 5 SOLUTION/ DROPS OPHTHALMIC at 09:22

## 2020-04-09 RX ADMIN — Medication 1 TABLET: at 09:22

## 2020-04-09 RX ADMIN — DORZOLAMIDE HCL 1 DROP: 20 SOLUTION/ DROPS OPHTHALMIC at 09:22

## 2020-04-09 RX ADMIN — DILTIAZEM HYDROCHLORIDE 180 MG: 180 CAPSULE, COATED, EXTENDED RELEASE ORAL at 09:22

## 2020-04-09 RX ADMIN — SODIUM CHLORIDE, PRESERVATIVE FREE 10 ML: 5 INJECTION INTRAVENOUS at 09:22

## 2020-04-09 RX ADMIN — APIXABAN 2.5 MG: 2.5 TABLET, FILM COATED ORAL at 09:22

## 2020-04-09 NOTE — PROGRESS NOTES
Physical Therapy  Facility/Department: [facility]  Daily Treatment Note  NAME: Jacquie Damian  : 1924  MRN: [id number]    Date of Service: 2020    Discharge Recommendations:  Subacute/Skilled Nursing Facility        Assessment   Body structures, Functions, Activity limitations: Decreased functional mobility ; Decreased safe awareness;Decreased strength;Decreased balance  Assessment:  Pt tolerated session with less deficits noted in bed mobility, transfers, ambulation, balance, and improving activity tolerance this session. Pt requires continued IP PT & D/C to [address] to maximize independence with functional mobility, balance, safety awareness & activity tolerance but will continue to assess pending progress. Prognosis: Good  Clinical Presentation: evolving  PT Education: Functional Mobility Training;Transfer Training;Gait Training  Patient Education: Ed pt on functional mobility, safety awareness, prevention of sedentary complications  REQUIRES PT FOLLOW UP: Yes  Activity Tolerance  Activity Tolerance: Patient limited by endurance  Activity Tolerance: saO2 resting on room air was at 95%, but with ambulation dropped to 86%, O2 applied & was at 97% on 2LO2     Patient Diagnosis(es): The primary encounter diagnosis was Hypoxia. Diagnoses of Pneumonia due to organism and Congestive heart failure, unspecified HF chronicity, unspecified heart failure type [facility]) were also pertinent to this visit.      has a past medical history of Acute dermatitis, Arthritis, Asthma, Atrial fibrillation ([address] Bursitis, CHF (congestive heart failure) ([address] Hearing aid worn, Hyperlipidemia, Hypertension, Lumbar disc disease, Wears glasses, and Wound of right leg.   has a past surgical history that includes back surgery; debridement (Left, 2017); pr incis/drain thigh/knee abscess,deep (Right, 3/9/2017); other surgical history (2017); other surgical history (2017); and pr assistance  Stand Pivot Transfers: Supervision  Lateral Transfers: Supervision  Comment: needed Ed on use of upper body for safe sit/stand  Ambulation  Ambulation?: Yes  Ambulation 1  Surface: level tile  Device: Rolling Walker  Other Apparatus: O2  Assistance: Stand by assistance  Quality of Gait: step to pattern  Gait Deviations: Slow Nancy;Decreased step length;Decreased step height  Distance: 10ft  Pt amb to BR for toileting, Contact Guard Assistance to sit to toilet. Pt stood with Contact Guard Assistance,stood 3 minutes for pericare & to pull up brief, amb 2ft to sink for hand hygiene x 2 minutes         Ambulation 2  Surface: level tile  Device: Rolling Walker  Other Apparatus: O2  Assistance: Stand by assistance  Quality of Gait: step to pattern  Gait Deviations: Slow Nancy;Decreased step length;Decreased step height  Distance: 30ft     Balance  Sitting - Static: Good  Sitting - Dynamic: Good  Standing - Static: Good;-(R/W)  Standing - Dynamic: Fair;+(R/W)  Exercises  Comments: Ed pt on functional mobility, safety awareness, prevention of sedentary complications, seated LE ex's          All lines intact, call light within reach, and patient positioned comfortably at end of treatment. All patient needs addressed prior to ending therapy session. G-Code     OutComes Score                                                     AM-PAC Score  AM-PAC Inpatient Mobility Raw Score : 18 (04/09/20 0941)  AM-PAC Inpatient T-Scale Score : 43.63 (04/09/20 0941)  Mobility Inpatient CMS 0-100% Score: 46.58 (04/09/20 0941)  Mobility Inpatient CMS G-Code Modifier : CK (04/09/20 0941)          Goals  Short term goals  Time Frame for Short term goals: 12 visits  Short term goal 1: Inc bed-mobility & transfers to independent to enable pt to safely get in/OOB  Short term goal 2: Inc gait to amb 350ft or > indep w/ RW to enable pt to return to previous level of independence  Short term goal 3:  Inc strength to

## 2020-04-09 NOTE — CARE COORDINATION
MARCEL spoke with pt RN Dipti Wheeler regarding the pt and son are agreeable to pt discharging to [facility].   MARCEL will fax [phone] left message for [facility] regarding transport time of 4:00pm    RN to call report [phone]     MARCEL faxed discharge paperwork

## 2020-04-09 NOTE — CARE COORDINATION
Discharge planning    Patient chart reviewed. ELMER Hunter updated writer that son is considering taking her home with him now. He is working from home. Patient was current with [facility] prior to admission. She has walker, cane, shower bench and medication dispenser. She will need home 02 evaluation if home. Also will need to discuss with son address and sleeping arrangements ( will she need to climb steps to bed or bath, will they need hospital bed. )     Will reach out to son later this am to discuss. Patient does have bed at [address] waiting if needed.      FORD in epic

## 2020-04-09 NOTE — PROGRESS NOTES
Pulmonary Critical Care Progress Note  Jessica Fuchs CNP / Dr. Cherry Irizarry M.D. Patient seen for the follow up of respiratory failure, pleural effusions, heart failure    Subjective:  She is ambulating in her room working with occupational therapy. She did qualify for oxygen, this will be arranged at the time of discharge. She was noted to become quite bradycardic yesterday evening during her sleep, cardiology has been notified. She denies significant shortness of breath, occasional cough, denies chest pain. Length of stay: 6 Days    Examination:  Vitals: BP (!) 106/52   Pulse 64   Temp 97.5 °F (36.4 °C) (Axillary)   Resp 16   Ht 5' 3\" (1.6 m)   Wt 111 lb 12.8 oz (50.7 kg)   SpO2 98%   BMI 19.80 kg/m²     General appearance: alert and cooperative with exam, ambulating in room in no distress  Neck: No JVD  Lungs: Slight crackles posteriorly  Heart: Bradycardic  Abdomen: Soft, non tender, + BS  Extremities: no cyanosis or clubbing. Slight lower extremity edema    LABs:  CBC:   Recent Labs     04/08/20 0459 04/09/20  0515   WBC 4.9 5.6   HGB 13.3 13.5   HCT 42.7 42.8    151     BMP:   Recent Labs     04/08/20 0459 04/08/20  1042 04/09/20  0515     --  142   K 3.3* 3.5* 4.8   CO2 39*  --  40*   BUN 31*  --  30*   CREATININE 0.75  --  0.86   LABGLOM >60  --  >60   GLUCOSE 106*  --  115*     PT/INR:   Recent Labs     04/09/20  0515   PROTIME 10.8   INR 1.1     LIVER PROFILE:  Recent Labs     04/08/20 0459 04/09/20  0515   LABALBU 3.0* 3.1*     Radiology:      Impression:  · Acute hypoxic respiratory failure  · Moderate bilateral pleural effusions  · Acute on chronic diastolic heart failure  · Bilateral thoracentesis 4/8/2020, 600 mL on left, 750 mL on right  · Atelectasis  · Moderate pulmonary hypertension (RVSP 51 mmHg)  · Hypernatremia/hypokalemia  · A.  Fib    Recommendations:  · 2 liters/min via nasal cannula  · Patient qualified for oxygen  · Diuresis  · Albuterol Q 4 hours

## 2020-04-09 NOTE — PROGRESS NOTES
deficits  Initiation: Requires cues for some  Sequencing: Requires cues for some                                         Plan   Plan  Times per week: 4-5x/week, 1-2x/day  Current Treatment Recommendations: Strengthening, Balance Training, Functional Mobility Training, Endurance Training, Equipment Evaluation, Education, & procurement, Patient/Caregiver Education & Training, Self-Care / ADL, Safety Education & Training, Positioning                                                    AM-PAC Score   17         Goals  Short term goals  Time Frame for Short term goals: by discharge, pt will  Short term goal 1: demo SBA with ADL transfers with good safety, AD/DME as needed  Short term goal 2: demo SBA with functional mob in room for ADL completion with good safety, pacing  Short term goal 3: demo SBA with toileting routine  Short term goal 4: demo SBA with UB ADLs and min A with LB ADLs with good safety, pacing, and DME as needed  Short term goal 5: demo and verb good understanding of fall prevention techs, EC/WS techs, breathing techs, and possible equip needs  Patient Goals   Patient goals : go home and not be on oxygen!        Therapy Time   Individual Concurrent Group Co-treatment   Time In [address]         Time Out [address]         Minutes [address] [place], [place]

## 2020-04-09 NOTE — DISCHARGE SUMMARY
Studies:   Labs / Micro:  CBC:   Lab Results   Component Value Date    WBC 5.6 04/09/2020    RBC 4.18 04/09/2020    HGB 13.5 04/09/2020    HCT 42.8 04/09/2020    .4 04/09/2020    MCH 32.3 04/09/2020    MCHC 31.5 04/09/2020    RDW 14.4 04/09/2020     04/09/2020     BMP:    Lab Results   Component Value Date    GLUCOSE 115 04/09/2020    GLUCOSE 109 11/07/2011     04/09/2020    K 4.8 04/09/2020    CL 93 04/09/2020    CO2 40 04/09/2020    ANIONGAP 9 04/09/2020    BUN 30 04/09/2020    CREATININE 0.86 04/09/2020    BUNCRER 35 04/09/2020    CALCIUM 10.0 04/09/2020    LABGLOM >60 04/09/2020    GFRAA >60 04/09/2020    GFR      04/09/2020    GFR NOT REPORTED 04/09/2020     PT/INR:    Lab Results   Component Value Date    PROTIME 10.8 04/09/2020    PROTIME 20 02/23/2018    PROTIME 14.7 04/28/2017    INR 1.1 04/09/2020     Radiology:  Xr Chest (single View Frontal)    Result Date: 4/7/2020  Cardiomegaly and chronic pulmonary change with bibasilar effusions and adjacent infiltrates representing atelectasis versus pneumonia. Xr Chest (single View Frontal)    Result Date: 4/6/2020  Moderate pleural effusions. Underlying opacities may represent atelectasis with pneumonia not excluded. Xr Chest (single View Frontal)    Result Date: 4/5/2020  Worsening congestive heart failure changes. Xr Chest Portable    Result Date: 4/9/2020  Small effusions and bibasilar atelectasis, left greater than right. Subtle small lucency right lower lateral chest as described above can be reassessed on a follow-up study. Xr Chest Portable    Result Date: 4/8/2020  No significant change in moderate size pleural effusions and basilar airspace disease. Xr Chest Portable    Result Date: 4/3/2020  Bilateral effusions with adjacent infiltrates representing atelectasis versus pneumonia. Xr Chest 1 Vw    Result Date: 4/8/2020  1.  Status post bilateral thoracentesis with decrease in the pleural effusions and no

## 2020-04-09 NOTE — PLAN OF CARE
Problem: Respiratory:  Goal: Ability to maintain adequate oxygenation will improve  Outcome: Ongoing     Problem: Respiratory:  Goal: Ability to maintain adequate ventilation will improve  Outcome: Ongoing     Problem: Respiratory:  Goal: Ability to maintain a clear airway will improve  Outcome: Ongoing   Patient oxygenation 98 percent on 2 liters of oxygen per nasal cannula. Patient reports feels\"easier to breathe \" since thorancentesis of left and right lung. Problem: Falls - Risk of:  Goal: Will remain free from falls  Outcome: Ongoing     Problem: Falls - Risk of:  Goal: Absence of physical injury  Outcome: Ongoing   Patient calls for needs appropriately. Nursing staff rounding hourly.

## 2020-04-10 LAB — SURGICAL PATHOLOGY REPORT: NORMAL

## 2020-04-14 LAB
CULTURE: ABNORMAL
DIRECT EXAM: ABNORMAL
DIRECT EXAM: ABNORMAL
Lab: ABNORMAL
SPECIMEN DESCRIPTION: ABNORMAL

## 2020-05-11 LAB
CULTURE: NORMAL
Lab: NORMAL
SPECIMEN DESCRIPTION: NORMAL

## 2020-05-26 LAB
CULTURE: NORMAL
DIRECT EXAM: NORMAL
Lab: NORMAL
SPECIMEN DESCRIPTION: NORMAL

## 2020-11-09 ENCOUNTER — TELEPHONE (OUTPATIENT)
Dept: PHARMACY | Age: 85
End: 2020-11-09

## 2020-11-09 NOTE — TELEPHONE ENCOUNTER
Patient cancelled Eliquis follow up. Currently following with cardiology and PCP with no issue. Maintained on 2.5mg BID as appropriate. No need for further follow up here in clinic.

## 2021-08-21 ENCOUNTER — HOSPITAL ENCOUNTER (OUTPATIENT)
Age: 86
Discharge: HOME | End: 2021-08-21
Payer: MEDICARE

## 2021-08-21 LAB
ALBUMIN SERPL-MCNC: 3.8 G/DL (ref 3.5–5.2)
ALBUMIN/GLOBULIN RATIO: 1.3 (ref 1–2.5)
ALP BLD-CCNC: 60 U/L (ref 35–104)
ALT SERPL-CCNC: 14 U/L (ref 5–33)
ANION GAP SERPL CALCULATED.3IONS-SCNC: 10 MMOL/L (ref 9–17)
AST SERPL-CCNC: 24 U/L
BILIRUB SERPL-MCNC: 0.69 MG/DL (ref 0.3–1.2)
BUN BLDV-MCNC: 21 MG/DL (ref 8–23)
BUN/CREAT BLD: ABNORMAL (ref 9–20)
CALCIUM SERPL-MCNC: 9.8 MG/DL (ref 8.6–10.4)
CHLORIDE BLD-SCNC: 101 MMOL/L (ref 98–107)
CHOLESTEROL, FASTING: 155 MG/DL
CHOLESTEROL/HDL RATIO: 2.6
CO2: 29 MMOL/L (ref 20–31)
CREAT SERPL-MCNC: 0.98 MG/DL (ref 0.5–0.9)
GFR AFRICAN AMERICAN: >60 ML/MIN
GFR NON-AFRICAN AMERICAN: 53 ML/MIN
GFR SERPL CREATININE-BSD FRML MDRD: ABNORMAL ML/MIN/{1.73_M2}
GFR SERPL CREATININE-BSD FRML MDRD: ABNORMAL ML/MIN/{1.73_M2}
GLUCOSE BLD-MCNC: 101 MG/DL (ref 70–99)
HCT VFR BLD CALC: 43.2 % (ref 36.3–47.1)
HDLC SERPL-MCNC: 60 MG/DL
HEMOGLOBIN: 13.3 G/DL (ref 11.9–15.1)
LDL CHOLESTEROL: 82 MG/DL (ref 0–130)
MCH RBC QN AUTO: 31.4 PG (ref 25.2–33.5)
MCHC RBC AUTO-ENTMCNC: 30.8 G/DL (ref 28.4–34.8)
MCV RBC AUTO: 101.9 FL (ref 82.6–102.9)
NRBC AUTOMATED: 0 PER 100 WBC
PDW BLD-RTO: 14.6 % (ref 11.8–14.4)
PLATELET # BLD: 150 K/UL (ref 138–453)
PMV BLD AUTO: 11.5 FL (ref 8.1–13.5)
POTASSIUM SERPL-SCNC: 4.3 MMOL/L (ref 3.7–5.3)
RBC # BLD: 4.24 M/UL (ref 3.95–5.11)
SODIUM BLD-SCNC: 140 MMOL/L (ref 135–144)
TOTAL PROTEIN: 6.7 G/DL (ref 6.4–8.3)
TRIGLYCERIDE, FASTING: 65 MG/DL
VLDLC SERPL CALC-MCNC: NORMAL MG/DL (ref 1–30)
WBC # BLD: 4.9 K/UL (ref 3.5–11.3)

## 2021-08-21 PROCEDURE — 36415 COLL VENOUS BLD VENIPUNCTURE: CPT

## 2021-08-21 PROCEDURE — 83036 HEMOGLOBIN GLYCOSYLATED A1C: CPT

## 2021-08-21 PROCEDURE — 85027 COMPLETE CBC AUTOMATED: CPT

## 2021-08-21 PROCEDURE — 80053 COMPREHEN METABOLIC PANEL: CPT

## 2021-08-21 PROCEDURE — 80061 LIPID PANEL: CPT

## 2021-08-22 LAB
ESTIMATED AVERAGE GLUCOSE: 114 MG/DL
HBA1C MFR BLD: 5.6 % (ref 4–6)

## 2022-01-22 ENCOUNTER — HOSPITAL ENCOUNTER (OUTPATIENT)
Age: 87
Discharge: HOME | End: 2022-01-22
Payer: MEDICARE

## 2022-01-22 LAB
ALBUMIN SERPL-MCNC: 3.9 G/DL (ref 3.5–5.2)
ALBUMIN/GLOBULIN RATIO: 1.3 (ref 1–2.5)
ALP BLD-CCNC: 64 U/L (ref 35–104)
ALT SERPL-CCNC: 16 U/L (ref 5–33)
ANION GAP SERPL CALCULATED.3IONS-SCNC: 10 MMOL/L (ref 9–17)
AST SERPL-CCNC: 23 U/L
BILIRUB SERPL-MCNC: 0.57 MG/DL (ref 0.3–1.2)
BUN BLDV-MCNC: 21 MG/DL (ref 8–23)
BUN/CREAT BLD: ABNORMAL (ref 9–20)
CALCIUM SERPL-MCNC: 10.2 MG/DL (ref 8.6–10.4)
CHLORIDE BLD-SCNC: 103 MMOL/L (ref 98–107)
CHOLESTEROL/HDL RATIO: 2.3
CHOLESTEROL: 165 MG/DL
CO2: 30 MMOL/L (ref 20–31)
CREAT SERPL-MCNC: 0.8 MG/DL (ref 0.5–0.9)
GFR AFRICAN AMERICAN: >60 ML/MIN
GFR NON-AFRICAN AMERICAN: >60 ML/MIN
GFR SERPL CREATININE-BSD FRML MDRD: ABNORMAL ML/MIN/{1.73_M2}
GFR SERPL CREATININE-BSD FRML MDRD: ABNORMAL ML/MIN/{1.73_M2}
GLUCOSE BLD-MCNC: 107 MG/DL (ref 70–99)
HDLC SERPL-MCNC: 72 MG/DL
LDL CHOLESTEROL: 78 MG/DL (ref 0–130)
POTASSIUM SERPL-SCNC: 4.7 MMOL/L (ref 3.7–5.3)
SODIUM BLD-SCNC: 143 MMOL/L (ref 135–144)
THYROXINE, FREE: 1.09 NG/DL (ref 0.93–1.7)
TOTAL PROTEIN: 7 G/DL (ref 6.4–8.3)
TRIGL SERPL-MCNC: 75 MG/DL
TSH SERPL DL<=0.05 MIU/L-ACNC: 3.7 MIU/L (ref 0.3–5)
VLDLC SERPL CALC-MCNC: NORMAL MG/DL (ref 1–30)

## 2022-01-22 PROCEDURE — 83036 HEMOGLOBIN GLYCOSYLATED A1C: CPT

## 2022-01-22 PROCEDURE — 84443 ASSAY THYROID STIM HORMONE: CPT

## 2022-01-22 PROCEDURE — 36415 COLL VENOUS BLD VENIPUNCTURE: CPT

## 2022-01-22 PROCEDURE — 80053 COMPREHEN METABOLIC PANEL: CPT

## 2022-01-22 PROCEDURE — 84439 ASSAY OF FREE THYROXINE: CPT

## 2022-01-22 PROCEDURE — 80061 LIPID PANEL: CPT

## 2022-01-23 LAB
ESTIMATED AVERAGE GLUCOSE: 123 MG/DL
HBA1C MFR BLD: 5.9 % (ref 4–6)

## 2022-04-05 ENCOUNTER — HOSPITAL ENCOUNTER (OUTPATIENT)
Age: 87
Setting detail: SPECIMEN
Discharge: HOME | End: 2022-04-05
Payer: MEDICARE

## 2022-04-05 LAB
-: NORMAL
BILIRUBIN URINE: NEGATIVE
COLOR: YELLOW
EPITHELIAL CELLS UA: NORMAL /HPF (ref 0–5)
GLUCOSE URINE: NEGATIVE
KETONES, URINE: NEGATIVE
LEUKOCYTE ESTERASE, URINE: ABNORMAL
NITRITE, URINE: NEGATIVE
PH UA: 6 (ref 5–8)
PROTEIN UA: NEGATIVE
RBC UA: NORMAL /HPF (ref 0–4)
SPECIFIC GRAVITY UA: 1.01 (ref 1–1.03)
TURBIDITY: CLEAR
URINE HGB: NEGATIVE
UROBILINOGEN, URINE: NORMAL
WBC UA: NORMAL /HPF (ref 0–5)

## 2022-04-05 PROCEDURE — 81001 URINALYSIS AUTO W/SCOPE: CPT

## 2022-04-05 PROCEDURE — 87186 SC STD MICRODIL/AGAR DIL: CPT

## 2022-04-05 PROCEDURE — 87077 CULTURE AEROBIC IDENTIFY: CPT

## 2022-04-05 PROCEDURE — 87086 URINE CULTURE/COLONY COUNT: CPT

## 2022-04-08 LAB
CULTURE: ABNORMAL
SPECIMEN DESCRIPTION: ABNORMAL

## 2022-08-20 ENCOUNTER — HOSPITAL ENCOUNTER (OUTPATIENT)
Age: 87
Discharge: HOME | End: 2022-08-20
Payer: MEDICARE

## 2022-08-20 LAB
ALBUMIN SERPL-MCNC: 4.2 G/DL (ref 3.5–5.2)
ALBUMIN/GLOBULIN RATIO: 1.4 (ref 1–2.5)
ALP BLD-CCNC: 61 U/L (ref 35–104)
ALT SERPL-CCNC: 18 U/L (ref 5–33)
ANION GAP SERPL CALCULATED.3IONS-SCNC: 12 MMOL/L (ref 9–17)
AST SERPL-CCNC: 30 U/L
BILIRUB SERPL-MCNC: 0.71 MG/DL (ref 0.3–1.2)
BUN BLDV-MCNC: 28 MG/DL (ref 8–23)
CALCIUM SERPL-MCNC: 9.9 MG/DL (ref 8.6–10.4)
CHLORIDE BLD-SCNC: 103 MMOL/L (ref 98–107)
CHOLESTEROL, FASTING: 155 MG/DL
CHOLESTEROL/HDL RATIO: 2.5
CO2: 29 MMOL/L (ref 20–31)
CREAT SERPL-MCNC: 1 MG/DL (ref 0.5–0.9)
GFR AFRICAN AMERICAN: >60 ML/MIN
GFR NON-AFRICAN AMERICAN: 51 ML/MIN
GFR SERPL CREATININE-BSD FRML MDRD: ABNORMAL ML/MIN/{1.73_M2}
GLUCOSE BLD-MCNC: 103 MG/DL (ref 70–99)
HCT VFR BLD CALC: 42.8 % (ref 36.3–47.1)
HDLC SERPL-MCNC: 61 MG/DL
HEMOGLOBIN: 14.5 G/DL (ref 11.9–15.1)
LDL CHOLESTEROL: 82 MG/DL (ref 0–130)
MCH RBC QN AUTO: 33.7 PG (ref 25.2–33.5)
MCHC RBC AUTO-ENTMCNC: 33.9 G/DL (ref 28.4–34.8)
MCV RBC AUTO: 99.5 FL (ref 82.6–102.9)
NRBC AUTOMATED: 0 PER 100 WBC
PDW BLD-RTO: 14.1 % (ref 11.8–14.4)
PLATELET # BLD: 129 K/UL (ref 138–453)
PMV BLD AUTO: 11.5 FL (ref 8.1–13.5)
POTASSIUM SERPL-SCNC: 4.8 MMOL/L (ref 3.7–5.3)
RBC # BLD: 4.3 M/UL (ref 3.95–5.11)
SODIUM BLD-SCNC: 144 MMOL/L (ref 135–144)
TOTAL PROTEIN: 7.3 G/DL (ref 6.4–8.3)
TRIGLYCERIDE, FASTING: 58 MG/DL
WBC # BLD: 5.4 K/UL (ref 3.5–11.3)

## 2022-08-20 PROCEDURE — 80061 LIPID PANEL: CPT

## 2022-08-20 PROCEDURE — 85027 COMPLETE CBC AUTOMATED: CPT

## 2022-08-20 PROCEDURE — 36415 COLL VENOUS BLD VENIPUNCTURE: CPT

## 2022-08-20 PROCEDURE — 80053 COMPREHEN METABOLIC PANEL: CPT

## 2023-01-30 ENCOUNTER — HOSPITAL ENCOUNTER (OUTPATIENT)
Age: 88
Setting detail: SPECIMEN
Discharge: HOME | End: 2023-01-30

## 2023-01-30 LAB
ANION GAP SERPL CALCULATED.3IONS-SCNC: 8 MMOL/L (ref 9–17)
BUN BLDV-MCNC: 26 MG/DL (ref 8–23)
CALCIUM SERPL-MCNC: 9.7 MG/DL (ref 8.6–10.4)
CHLORIDE BLD-SCNC: 100 MMOL/L (ref 98–107)
CO2: 32 MMOL/L (ref 20–31)
CREAT SERPL-MCNC: 0.7 MG/DL (ref 0.5–0.9)
GFR SERPL CREATININE-BSD FRML MDRD: >60 ML/MIN/1.73M2
GLUCOSE BLD-MCNC: 171 MG/DL (ref 70–99)
POTASSIUM SERPL-SCNC: 4.1 MMOL/L (ref 3.7–5.3)
SODIUM BLD-SCNC: 140 MMOL/L (ref 135–144)

## 2023-03-08 ENCOUNTER — OFFICE VISIT (OUTPATIENT)
Age: 88
End: 2023-03-08

## 2023-03-08 VITALS
BODY MASS INDEX: 19.81 KG/M2 | HEIGHT: 64 IN | DIASTOLIC BLOOD PRESSURE: 66 MMHG | HEART RATE: 53 BPM | WEIGHT: 116 LBS | SYSTOLIC BLOOD PRESSURE: 105 MMHG | OXYGEN SATURATION: 95 %

## 2023-03-08 DIAGNOSIS — I83.029 VENOUS ULCER OF LEFT LEG (HCC): Primary | ICD-10-CM

## 2023-03-08 DIAGNOSIS — L97.922 NON-PRESSURE CHRONIC ULCER OF LEFT LOWER LEG WITH FAT LAYER EXPOSED (HCC): ICD-10-CM

## 2023-03-08 DIAGNOSIS — L97.929 VENOUS ULCER OF LEFT LEG (HCC): Primary | ICD-10-CM

## 2023-03-08 NOTE — PROGRESS NOTES
[facility] 10  4/27/1924  80 y. o.female       Chief Complaint:  Chief Complaint   Patient presents with    Follow-up     Follow up wounds on bilat feet        History of present Illness:  Pt is here today for evaluation and treatment of bilateral leg swelling with ulceration. This is a 25-year-old female who I taken care of in the past.  She had a venous ulcer of the left leg and now has bilateral small draining ulcerations. She has been receiving home health care and they have been wrapping her legs in an Ace bandage. Over the past few weeks that I have seen her there is been a significant decrease in the amount of drainage. I think she is ready to be transition to wear compression stockings. On the right anterior tibia the wound is only 1 x 1 mm that I can see it on the left it is about 5 x 5 mm. There is exposed granulation bilaterally. I will refer her to the wound healing center at [facility]. I can follow her wounds there and get her into the correct compression long-term. Past Medical History:  has a past medical history of Acute dermatitis, Arthritis, Asthma, Atrial fibrillation ([address] Bursitis, CHF (congestive heart failure) ([address] Hearing aid worn, Hyperlipidemia, Hypertension, Lumbar disc disease, Wears glasses, and Wound of right leg.     Past Surgical History:   Past Surgical History:   Procedure Laterality Date    BACK SURGERY      DEBRIDEMENT Left 03/09/2017    rt. leg    OTHER SURGICAL HISTORY  03/30/2017    Right leg Angio    OTHER SURGICAL HISTORY  03/30/2017    Right leg angio    NM I&D DEEP ABSC BURSA/HEMATOMA THIGH/KNEE REGION Right 3/9/2017    DEBRIDEMENT CALF WOUND  performed by Cody Toscano MD at [facility] I&D [address] [place] [address],[place]. [address] Right 4/28/2017    RIGHT LOWER EXTREMITY DEBRIDEMENT, INTEGRA APPLICATION, PREVENA VAC APPLICATION LOWR RIGHT LEG performed by Elliot Gan MD at [address] [facility] History:  reports that she has never smoked. She has never used smokeless tobacco. She reports that she does not currently use alcohol. She reports that she does not use drugs. Family History: family history includes Diabetes in her maternal aunt; Heart Disease in her father and mother; Obesity in her maternal aunt; Stroke in her paternal grandmother.     Review of Systems:   Constitutional:  negative for  fevers, chills, sweats, fatigue, and weight loss  HEENT:  negative for vision or hearing changes,   Respiratory:  negative for shortness of breath, cough, or congestion  Cardiovascular:  negative for  chest pain, palpitations  Gastrointestinal:  negative for nausea, vomiting, diarrhea, constipation, abdominal pain  Genitourinary:  negative for frequency, dysuria  Integument/Breast:  negative for rash, skin lesions  Musculoskeletal:  negative for muscle aches or joint pain  Neurological:  negative for headaches, dizziness, lightheadedness, numbness, pain and tingling extremities  Behavior/Psych:  negative for depression and anxiety    Allergies: Shellfish-derived products and Tudorza pressair [aclidinium bromide]    Current Meds:  Current Outpatient Medications:     bumetanide (BUMEX) 1 MG tablet, Take 1 mg by mouth daily, Disp: , Rfl:     potassium citrate (UROCIT-K) 10 MEQ (1080 MG) extended release tablet, Take by mouth daily, Disp: , Rfl:     dorzolamide (TRUSOPT) 2 % ophthalmic solution, Place 1 drop into both eyes 2 times daily, Disp: , Rfl:     timolol (TIMOPTIC) 0.5 % ophthalmic solution, Place 1 drop into both eyes daily, Disp: , Rfl:     calcium-vitamin D (OSCAL-500) 500-200 MG-UNIT per tablet, Take 1 tablet by mouth daily, Disp: , Rfl:     apixaban (ELIQUIS) 2.5 MG TABS tablet, Take 1 tablet by mouth 2 times daily, Disp: 180 tablet, Rfl: 3    pravastatin (PRAVACHOL) 80 MG tablet, Take 20 mg by mouth daily , Disp: , Rfl:     diltiazem (DILACOR XR) 180 MG extended release capsule, Take 180 mg by mouth daily, Disp: , Rfl:     Acetaminophen (TYLENOL EXTRA STRENGTH PO), Take 500 mg by mouth as needed , Disp: , Rfl:     Vital Signs:  Vitals:    03/08/23 1305   BP: 105/66   Pulse: 53   SpO2: 95%       Physical Exam:  General appearance - alert, well appearing and in no acute distress  Mental status - oriented to person, place and time with normal affect  Head - normocephalic and atraumatic  Eyes - pupils equal and reactive, extraocular eye movements intact, conjunctiva clear  Ears - hearing appears to be intact  Nose - no drainage noted  Mouth - mucous membranes moist  Neck - supple, no carotid bruits, thyroid not palpable, no JVD  Chest - clear to auscultation, normal effort  Heart - normal rate, regular rhythm, no murmurs  Abdomen - soft, non-tender, non-distended, bowel sounds present all four quadrants, no masses, hepatomegaly, splenomegaly or aortic enlargement  Neurological - normal speech, no focal findings or movement disorder noted, cranial nerves II through XII grossly intact  Extremities - peripheral pulses palpable, edema appears well controlled in the bilateral legs with Ace bandages. The small draining ulcer in the right anterior tibia appears dry with no soaking through the bandage. On the left there is still some Aquacel on the wound.     Skin - no gross lesions, rashes, or induration noted      Labs:   Lab Results   Component Value Date/Time    WBC 5.4 08/20/2022 10:21 AM    HGB 14.5 08/20/2022 10:21 AM    HCT 42.8 08/20/2022 10:21 AM    MCV 99.5 08/20/2022 10:21 AM     08/20/2022 10:21 AM     Lab Results   Component Value Date/Time     01/30/2023 12:45 PM    K 4.1 01/30/2023 12:45 PM     01/30/2023 12:45 PM    CO2 32 01/30/2023 12:45 PM    BUN 26 01/30/2023 12:45 PM    CREATININE 0.70 01/30/2023 12:45 PM    GLUCOSE 171 01/30/2023 12:45 PM    GLUCOSE 109 11/07/2011 10:27 AM    CALCIUM 9.7 01/30/2023 12:45 PM     Lab Results   Component Value Date/Time    ALKPHOS 61 08/20/2022 10:21 AM    ALT 18 08/20/2022 10:21 AM    AST 30 08/20/2022 10:21 AM    PROT 7.3 08/20/2022 10:21 AM    BILITOT 0.71 08/20/2022 10:21 AM    LABALBU 4.2 08/20/2022 10:21 AM     Lab Results   Component Value Date/Time    LACTA 2.8 04/03/2020 01:20 PM     No results found for: AMYLASE  No results found for: LIPASE  Lab Results   Component Value Date/Time    INR 1.1 04/09/2020 05:15 AM         Assessment:  44-year-old female with bilateral leg wounds, congestive heart failure, leg swelling    1. Venous ulcer of left leg ([address]    2. Non-pressure chronic ulcer of left lower leg with fat layer exposed ([address]        Plan:   She has been referred to [facility] wound care center and I will follow her there and get her into the correct compression stockings.       Orders Placed This Encounter   Procedures    [address]           Electronically signed by Koby Hatch MD on 3/8/2023 at 1:22 PM     Copy sent to Dr. Marita Coronel MD

## 2023-03-14 NOTE — DISCHARGE INSTRUCTIONS
[address] [facility],5Th Floor -Phone: [phone] Fax: [phone]   Visit  Discharge Instructions / Physician Orders    DATE: 3/16/2023     Home Care:      SUPPLIES ORDERED THRU:      Wound Location:      Cleanse with: Liquid antibacterial soap and water, rinse well      Dressing Orders:      Frequency:      Additional Orders: Increase protein to diet (meat, cheese, eggs, fish, peanut butter, nuts and beans)  ELEVATE LEGS AS MUCH AS POSSIBLE    Your next appointment with [address] [place] [place] [address] [email or web address]     (Please note your next appointment above and if you are unable to keep, kindly give a 24 hour notice. Thank you.)  If more than 15 min late we cannot guarantee you will be seen due to clinician schedule  Per Policy, Excessive cancellation will call for dismissal from program.     If you experience any of the following, please call the [address] [place] [address] during business hours:  [phone]  Your Phone call may be forwarded to [email or web address] during business hours that [facility]. * Increase in Pain  * Temperature over 101  * Increase in drainage from your wound  * Drainage with a foul odor  * Bleeding  * Increase in swelling  * Need for compression bandage changes due to slippage, breakthrough drainage. If you need medical attention outside of the business hours of the [address] [place] [address] please contact your PCP or go to the nearest emergency room. The information contained in the After Visit Summary has been reviewed with me, the patient and/or responsible adult, by my health care provider(s). I had the opportunity to ask questions regarding this information.  I have elected to receive;      []After Visit Summary  [x]Comprehensive Discharge Instruction      Patient signature______________________________________Date:________

## 2023-03-16 ENCOUNTER — HOSPITAL ENCOUNTER (OUTPATIENT)
Dept: WOUND CARE | Age: 88
Discharge: HOME | End: 2023-03-16

## 2023-03-16 ENCOUNTER — TELEPHONE (OUTPATIENT)
Dept: WOUND CARE | Age: 88
End: 2023-03-16

## 2023-03-21 ENCOUNTER — HOSPITAL ENCOUNTER (OUTPATIENT)
Dept: WOUND CARE | Age: 88
Discharge: HOME | End: 2023-03-21
Payer: MEDICARE

## 2023-03-21 VITALS
TEMPERATURE: 97.2 F | WEIGHT: 114 LBS | SYSTOLIC BLOOD PRESSURE: 148 MMHG | DIASTOLIC BLOOD PRESSURE: 85 MMHG | RESPIRATION RATE: 18 BRPM | BODY MASS INDEX: 19.57 KG/M2

## 2023-03-21 DIAGNOSIS — L97.911 NON-PRESSURE ULCER OF RIGHT LOWER EXTREMITY, LIMITED TO BREAKDOWN OF SKIN (HCC): ICD-10-CM

## 2023-03-21 DIAGNOSIS — L97.921 NON-PRESSURE ULCER OF LEFT LOWER EXTREMITY, LIMITED TO BREAKDOWN OF SKIN (HCC): Primary | ICD-10-CM

## 2023-03-21 PROCEDURE — 99213 OFFICE O/P EST LOW 20 MIN: CPT | Performed by: SURGERY

## 2023-03-21 PROCEDURE — 99212 OFFICE O/P EST SF 10 MIN: CPT

## 2023-03-21 NOTE — PROGRESS NOTES
Encounters:   03/21/23 114 lb (51.7 kg)   03/08/23 116 lb (52.6 kg)   04/08/20 111 lb 12.8 oz (50.7 kg)       PHYSICAL EXAM    General Appearance: alert and oriented to person, place and time, well developed and well- nourished, in no acute distress  Skin: warm and dry, no rash or erythema  Head: normocephalic and atraumatic  Eyes: pupils equal, round, and reactive to light, extraocular eye movements intact, conjunctivae normal  ENT: tympanic membrane, external ear and ear canal normal bilaterally, nose without deformity, nasal mucosa and turbinates normal without polyps  Neck: supple and non-tender without mass, no thyromegaly or thyroid nodules, no cervical lymphadenopathy  Pulmonary/Chest: clear to auscultation bilaterally- no wheezes, rales or rhonchi, normal air movement, no respiratory distress  Cardiovascular: normal rate, regular rhythm, normal S1 and S2, no murmurs, rubs, clicks, or gallops, distal pulses intact, no carotid bruits  Abdomen: soft, non-tender, non-distended, normal bowel sounds, no masses or organomegaly  Extremities: no cyanosis, clubbing or edema  Musculoskeletal: normal range of motion, no joint swelling, deformity or tenderness  Neurologic: reflexes normal and symmetric, no cranial nerve deficit, gait, coordination and speech normal      Assessment:     Problem List Items Addressed This Visit    None  Visit Diagnoses       Non-pressure ulcer of left lower extremity, limited to breakdown of skin (HCC)    -  Primary    Non-pressure ulcer of right lower extremity, limited to breakdown of skin ([address]                 Procedure Note  Indications:  Based on my examination of this patient's wound(s)/ulcer(s) today, debridement is not required to promote healing and evaluate the wound base.         Puncture 04/08/20 Back Left;Upper (Active)   Number of days: 1077       Puncture 04/08/20 Back Right;Upper (Active)   Number of days: 0721         Plan:     Treatment Note please see Discharge

## 2023-04-01 ENCOUNTER — HOSPITAL ENCOUNTER (OUTPATIENT)
Age: 88
End: 2023-04-01
Payer: MEDICARE

## 2023-04-01 ENCOUNTER — HOSPITAL ENCOUNTER (OUTPATIENT)
Dept: GENERAL RADIOLOGY | Age: 88
End: 2023-04-01
Payer: MEDICARE

## 2023-04-01 DIAGNOSIS — I50.9 HEART FAILURE, UNSPECIFIED HF CHRONICITY, UNSPECIFIED HEART FAILURE TYPE (HCC): ICD-10-CM

## 2023-04-01 DIAGNOSIS — I48.0 AF (PAROXYSMAL ATRIAL FIBRILLATION) (HCC): ICD-10-CM

## 2023-04-01 PROCEDURE — 71046 X-RAY EXAM CHEST 2 VIEWS: CPT

## 2023-11-06 ENCOUNTER — APPOINTMENT (OUTPATIENT)
Dept: GENERAL RADIOLOGY | Age: 88
DRG: 177 | End: 2023-11-06
Payer: MEDICARE

## 2023-11-06 ENCOUNTER — HOSPITAL ENCOUNTER (INPATIENT)
Age: 88
LOS: 8 days | Discharge: NURSING, REHAB OR LONG-TERM CARE | DRG: 177 | End: 2023-11-14
Attending: EMERGENCY MEDICINE | Admitting: INTERNAL MEDICINE
Payer: MEDICARE

## 2023-11-06 DIAGNOSIS — I50.33 ACUTE ON CHRONIC DIASTOLIC CONGESTIVE HEART FAILURE (HCC): ICD-10-CM

## 2023-11-06 DIAGNOSIS — R09.02 HYPOXIA: ICD-10-CM

## 2023-11-06 DIAGNOSIS — U07.1 COVID-19: Primary | ICD-10-CM

## 2023-11-06 LAB
ANION GAP SERPL CALCULATED.3IONS-SCNC: 6 MMOL/L (ref 9–17)
BASOPHILS # BLD: 0.08 K/UL (ref 0–0.2)
BASOPHILS NFR BLD: 2 %
BNP SERPL-MCNC: 625 PG/ML
BUN SERPL-MCNC: 39 MG/DL (ref 8–23)
BUN/CREAT SERPL: 33 (ref 9–20)
CALCIUM SERPL-MCNC: 9.4 MG/DL (ref 8.6–10.4)
CHLORIDE SERPL-SCNC: 100 MMOL/L (ref 98–107)
CO2 SERPL-SCNC: 39 MMOL/L (ref 20–31)
CREAT SERPL-MCNC: 1.2 MG/DL (ref 0.5–0.9)
EOSINOPHIL # BLD: 0 K/UL (ref 0–0.4)
EOSINOPHILS RELATIVE PERCENT: 0 % (ref 1–4)
ERYTHROCYTE [DISTWIDTH] IN BLOOD BY AUTOMATED COUNT: 15.6 % (ref 11.8–14.4)
FLUAV RNA RESP QL NAA+PROBE: NOT DETECTED
FLUBV RNA RESP QL NAA+PROBE: NOT DETECTED
GFR SERPL CREATININE-BSD FRML MDRD: 41 ML/MIN/1.73M2
GLUCOSE SERPL-MCNC: 87 MG/DL (ref 70–99)
HCT VFR BLD AUTO: 46.2 % (ref 36.3–47.1)
HGB BLD-MCNC: 14.3 G/DL (ref 11.9–15.1)
IMM GRANULOCYTES # BLD AUTO: 0 K/UL (ref 0–0.3)
IMM GRANULOCYTES NFR BLD: 0 %
LYMPHOCYTES NFR BLD: 0.55 K/UL (ref 1–4.8)
LYMPHOCYTES RELATIVE PERCENT: 14 % (ref 24–44)
MCH RBC QN AUTO: 32.2 PG (ref 25.2–33.5)
MCHC RBC AUTO-ENTMCNC: 31 G/DL (ref 28.4–34.8)
MCV RBC AUTO: 104.1 FL (ref 82.6–102.9)
MONOCYTES NFR BLD: 0.55 K/UL (ref 0.2–0.8)
MONOCYTES NFR BLD: 14 % (ref 1–7)
MORPHOLOGY: ABNORMAL
MORPHOLOGY: ABNORMAL
NEUTROPHILS NFR BLD: 70 % (ref 36–66)
NEUTS SEG NFR BLD: 2.72 K/UL (ref 1.8–7.7)
NRBC BLD-RTO: 0 PER 100 WBC
PLATELET # BLD AUTO: 123 K/UL (ref 138–453)
PMV BLD AUTO: 11.2 FL (ref 8.1–13.5)
POTASSIUM SERPL-SCNC: 4.9 MMOL/L (ref 3.7–5.3)
RBC # BLD AUTO: 4.44 M/UL (ref 3.95–5.11)
SARS-COV-2 RNA RESP QL NAA+PROBE: DETECTED
SODIUM SERPL-SCNC: 145 MMOL/L (ref 135–144)
SOURCE: ABNORMAL
SPECIMEN DESCRIPTION: ABNORMAL
TROPONIN I SERPL HS-MCNC: 32 NG/L (ref 0–14)
TROPONIN I SERPL HS-MCNC: 34 NG/L (ref 0–14)
WBC OTHER # BLD: 3.9 K/UL (ref 3.5–11.3)

## 2023-11-06 PROCEDURE — 6370000000 HC RX 637 (ALT 250 FOR IP): Performed by: INTERNAL MEDICINE

## 2023-11-06 PROCEDURE — 87636 SARSCOV2 & INF A&B AMP PRB: CPT

## 2023-11-06 PROCEDURE — 93005 ELECTROCARDIOGRAM TRACING: CPT | Performed by: EMERGENCY MEDICINE

## 2023-11-06 PROCEDURE — 6360000002 HC RX W HCPCS: Performed by: EMERGENCY MEDICINE

## 2023-11-06 PROCEDURE — 99285 EMERGENCY DEPT VISIT HI MDM: CPT

## 2023-11-06 PROCEDURE — 83880 ASSAY OF NATRIURETIC PEPTIDE: CPT

## 2023-11-06 PROCEDURE — 2580000003 HC RX 258: Performed by: INTERNAL MEDICINE

## 2023-11-06 PROCEDURE — 84484 ASSAY OF TROPONIN QUANT: CPT

## 2023-11-06 PROCEDURE — 80048 BASIC METABOLIC PNL TOTAL CA: CPT

## 2023-11-06 PROCEDURE — 85025 COMPLETE CBC W/AUTO DIFF WBC: CPT

## 2023-11-06 PROCEDURE — 96374 THER/PROPH/DIAG INJ IV PUSH: CPT

## 2023-11-06 PROCEDURE — 71045 X-RAY EXAM CHEST 1 VIEW: CPT

## 2023-11-06 PROCEDURE — 1200000000 HC SEMI PRIVATE

## 2023-11-06 RX ORDER — ACETAMINOPHEN 325 MG/1
650 TABLET ORAL EVERY 4 HOURS PRN
Status: DISCONTINUED | OUTPATIENT
Start: 2023-11-06 | End: 2023-11-14 | Stop reason: HOSPADM

## 2023-11-06 RX ORDER — SODIUM CHLORIDE 0.9 % (FLUSH) 0.9 %
5-40 SYRINGE (ML) INJECTION PRN
Status: DISCONTINUED | OUTPATIENT
Start: 2023-11-06 | End: 2023-11-14 | Stop reason: HOSPADM

## 2023-11-06 RX ORDER — ONDANSETRON 2 MG/ML
4 INJECTION INTRAMUSCULAR; INTRAVENOUS EVERY 6 HOURS PRN
Status: DISCONTINUED | OUTPATIENT
Start: 2023-11-06 | End: 2023-11-14 | Stop reason: HOSPADM

## 2023-11-06 RX ORDER — SODIUM CHLORIDE 0.9 % (FLUSH) 0.9 %
5-40 SYRINGE (ML) INJECTION EVERY 12 HOURS SCHEDULED
Status: DISCONTINUED | OUTPATIENT
Start: 2023-11-06 | End: 2023-11-14 | Stop reason: HOSPADM

## 2023-11-06 RX ORDER — DEXAMETHASONE SODIUM PHOSPHATE 10 MG/ML
6 INJECTION, SOLUTION INTRAMUSCULAR; INTRAVENOUS ONCE
Status: COMPLETED | OUTPATIENT
Start: 2023-11-06 | End: 2023-11-06

## 2023-11-06 RX ORDER — SODIUM CHLORIDE 9 MG/ML
INJECTION, SOLUTION INTRAVENOUS PRN
Status: DISCONTINUED | OUTPATIENT
Start: 2023-11-06 | End: 2023-11-14 | Stop reason: HOSPADM

## 2023-11-06 RX ORDER — ONDANSETRON 4 MG/1
4 TABLET, ORALLY DISINTEGRATING ORAL EVERY 8 HOURS PRN
Status: DISCONTINUED | OUTPATIENT
Start: 2023-11-06 | End: 2023-11-14 | Stop reason: HOSPADM

## 2023-11-06 RX ADMIN — APIXABAN 2.5 MG: 2.5 TABLET, FILM COATED ORAL at 22:35

## 2023-11-06 RX ADMIN — SODIUM CHLORIDE, PRESERVATIVE FREE 10 ML: 5 INJECTION INTRAVENOUS at 22:36

## 2023-11-06 RX ADMIN — DEXAMETHASONE SODIUM PHOSPHATE 6 MG: 10 INJECTION, SOLUTION INTRAMUSCULAR; INTRAVENOUS at 17:47

## 2023-11-06 ASSESSMENT — PAIN - FUNCTIONAL ASSESSMENT: PAIN_FUNCTIONAL_ASSESSMENT: 0-10

## 2023-11-06 ASSESSMENT — PAIN SCALES - GENERAL: PAINLEVEL_OUTOF10: 0

## 2023-11-06 NOTE — ED NOTES
ED to inpatient nurses report     Chief Complaint   Patient presents with    Shortness of Breath     Pt PCP sent to ED to have an extra. Concerned for pneumonia or COVID. Pt son had COVID      Present to ED from home for SOB. Lives alone.    LOC: alert and orientated to name, place, date  Vital signs   Vitals:    11/06/23 1557 11/06/23 1725 11/06/23 1734   BP: 124/66     Pulse: 57     Resp: 18 24 14   SpO2: 93% 98% 100%   Weight: 49.9 kg (110 lb)        Oxygen Baseline 90% RA    Current needs required 2L/NC   SEPSIS:   [] Lactate X 2 ordered (Yes or No)  [] Antibiotics given (Yes or No)  [] IV Fluids ordered (Yes or No)             [] 2nd IV completed (Yes or No)  [] Hourly Vital Signs (Validated)  [] Outstanding Orders:     LDAs:   Peripheral IV 11/06/23 Left Antecubital (Active)   Site Assessment Clean, dry & intact 11/06/23 1747   Line Status Blood return noted 11/06/23 1747     Mobility: Independent  Fall Risk:    Pending ED orders:   Present condition:   Code Status:   Consults: IP CONSULT TO INTERNAL MEDICINE  IP CONSULT TO INFECTIOUS DISEASES  IP CONSULT TO PHARMACY  []  Hospitalist  Completed  [] yes [] no Who:   []  Medicine  Completed  [] yes [] No Who:   []  Cardiology  Completed  [] yes [] No Who:   []  GI   Completed  [] yes [] No Who:   []  Neurology  Completed  [] yes [] No Who:   []  Nephrology Completed  [] yes [] No Who:    []  Vascular  Completed  [] yes [] No Who:   []  Ortho  Completed  [] yes [] No Who:     []  Surgery  Completed  [] yes [] No Who:    []  Urology  Completed  [] yes [] No Who:    []  CT Surgery Completed  [] yes [] No Who:   []  Podiatry  Completed  [] yes [] No Who:    []  Other    Completed  [] yes [] No Who:  Interventions: Decadron  Important Events:  COVID +       Electronically signed by Cele Hutchinson RN on 11/6/2023 at 5:49 PM            Hyacinth Salinas  11/06/23 1400

## 2023-11-07 ENCOUNTER — APPOINTMENT (OUTPATIENT)
Age: 88
DRG: 177 | End: 2023-11-07
Attending: INTERNAL MEDICINE
Payer: MEDICARE

## 2023-11-07 LAB
ANION GAP SERPL CALCULATED.3IONS-SCNC: 8 MMOL/L (ref 9–17)
BUN SERPL-MCNC: 36 MG/DL (ref 8–23)
BUN/CREAT SERPL: 33 (ref 9–20)
CALCIUM SERPL-MCNC: 9 MG/DL (ref 8.6–10.4)
CHLORIDE SERPL-SCNC: 104 MMOL/L (ref 98–107)
CO2 SERPL-SCNC: 34 MMOL/L (ref 20–31)
CREAT SERPL-MCNC: 1.1 MG/DL (ref 0.5–0.9)
CRP SERPL HS-MCNC: 18 MG/L (ref 0–5)
EKG ATRIAL RATE: 71 BPM
EKG Q-T INTERVAL: 466 MS
EKG QRS DURATION: 80 MS
EKG QTC CALCULATION (BAZETT): 476 MS
EKG R AXIS: 104 DEGREES
EKG T AXIS: -3 DEGREES
EKG VENTRICULAR RATE: 63 BPM
ERYTHROCYTE [DISTWIDTH] IN BLOOD BY AUTOMATED COUNT: 15.3 % (ref 11.8–14.4)
GFR SERPL CREATININE-BSD FRML MDRD: 45 ML/MIN/1.73M2
GLUCOSE SERPL-MCNC: 153 MG/DL (ref 70–99)
HCT VFR BLD AUTO: 42.2 % (ref 36.3–47.1)
HGB BLD-MCNC: 13.1 G/DL (ref 11.9–15.1)
MCH RBC QN AUTO: 32.2 PG (ref 25.2–33.5)
MCHC RBC AUTO-ENTMCNC: 31 G/DL (ref 28.4–34.8)
MCV RBC AUTO: 103.7 FL (ref 82.6–102.9)
NRBC BLD-RTO: 0 PER 100 WBC
PLATELET # BLD AUTO: 108 K/UL (ref 138–453)
PMV BLD AUTO: 11.2 FL (ref 8.1–13.5)
POTASSIUM SERPL-SCNC: 4.9 MMOL/L (ref 3.7–5.3)
PROCALCITONIN SERPL-MCNC: 0.07 NG/ML
PROCALCITONIN SERPL-MCNC: 0.07 NG/ML
RBC # BLD AUTO: 4.07 M/UL (ref 3.95–5.11)
SODIUM SERPL-SCNC: 146 MMOL/L (ref 135–144)
WBC OTHER # BLD: 2.4 K/UL (ref 3.5–11.3)

## 2023-11-07 PROCEDURE — 93308 TTE F-UP OR LMTD: CPT

## 2023-11-07 PROCEDURE — 94761 N-INVAS EAR/PLS OXIMETRY MLT: CPT

## 2023-11-07 PROCEDURE — 2700000000 HC OXYGEN THERAPY PER DAY

## 2023-11-07 PROCEDURE — 80048 BASIC METABOLIC PNL TOTAL CA: CPT

## 2023-11-07 PROCEDURE — 84145 PROCALCITONIN (PCT): CPT

## 2023-11-07 PROCEDURE — 6370000000 HC RX 637 (ALT 250 FOR IP): Performed by: INTERNAL MEDICINE

## 2023-11-07 PROCEDURE — 99223 1ST HOSP IP/OBS HIGH 75: CPT | Performed by: INTERNAL MEDICINE

## 2023-11-07 PROCEDURE — 1200000000 HC SEMI PRIVATE

## 2023-11-07 PROCEDURE — 85027 COMPLETE CBC AUTOMATED: CPT

## 2023-11-07 PROCEDURE — 36415 COLL VENOUS BLD VENIPUNCTURE: CPT

## 2023-11-07 PROCEDURE — 86140 C-REACTIVE PROTEIN: CPT

## 2023-11-07 PROCEDURE — 6360000002 HC RX W HCPCS: Performed by: INTERNAL MEDICINE

## 2023-11-07 PROCEDURE — 2580000003 HC RX 258: Performed by: INTERNAL MEDICINE

## 2023-11-07 RX ORDER — DILTIAZEM HYDROCHLORIDE 180 MG/1
180 CAPSULE, COATED, EXTENDED RELEASE ORAL DAILY
Status: DISCONTINUED | OUTPATIENT
Start: 2023-11-07 | End: 2023-11-14 | Stop reason: HOSPADM

## 2023-11-07 RX ORDER — SODIUM CHLORIDE 450 MG/100ML
INJECTION, SOLUTION INTRAVENOUS CONTINUOUS
Status: DISCONTINUED | OUTPATIENT
Start: 2023-11-07 | End: 2023-11-07

## 2023-11-07 RX ADMIN — SODIUM CHLORIDE, PRESERVATIVE FREE 10 ML: 5 INJECTION INTRAVENOUS at 10:39

## 2023-11-07 RX ADMIN — DILTIAZEM HYDROCHLORIDE 180 MG: 180 CAPSULE, COATED, EXTENDED RELEASE ORAL at 14:26

## 2023-11-07 RX ADMIN — SODIUM CHLORIDE, PRESERVATIVE FREE 10 ML: 5 INJECTION INTRAVENOUS at 20:38

## 2023-11-07 RX ADMIN — APIXABAN 2.5 MG: 2.5 TABLET, FILM COATED ORAL at 10:39

## 2023-11-07 RX ADMIN — DEXAMETHASONE 6 MG: 2 TABLET ORAL at 10:44

## 2023-11-07 RX ADMIN — APIXABAN 2.5 MG: 2.5 TABLET, FILM COATED ORAL at 20:37

## 2023-11-07 NOTE — H&P
History & Physical  [facility]    Adult Hospitalist      Name: Tho Roberts  MRN: [id number]     Acct: [de-identified]  Room: [facility]-01    Admit Date: 11/6/2023  4:07 PM  PCP: Shawna Mccullough MD    Primary Problem  Principal Problem:    COVID-19  Resolved Problems:    * No resolved hospital problems. *        Assesment:     Acute hypoxic respiratory failure  Viral pneumonia secondary to COVID-19  Hypernatremia secondary to free water deficit  Persistent A-fib on Eliquis  Bilateral pleural effusion        Plan:   Admit to medical floor on telemetry  Check vitals closely  Airborne isolation  Oxygen to maintain saturation above 92%  Started on Decadron 6 mg oral daily  Consult infectious disease and pulmonary  Start half-normal saline at 50 cc/h  Plan for echocardiogram  Restart the patient on the home dose of Cardizem  Restart the patient on Eliquis for anticoagulation  No need for further DVT prophylaxis. Continue to monitor/telemetry/CBC with differential daily/BMP daily  DVT and GI prophylaxis. Continue medications as below      Scheduled Meds:   dexamethasone  6 mg Oral Daily    sodium chloride flush  5-40 mL IntraVENous 2 times per day    apixaban  2.5 mg Oral BID     Continuous Infusions:   sodium chloride       PRN Meds:  perflutren lipid microspheres, 1.5 mL, ONCE PRN  sodium chloride flush, 5-40 mL, PRN  sodium chloride, , PRN  acetaminophen, 650 mg, Q4H PRN  ondansetron, 4 mg, Q8H PRN   Or  ondansetron, 4 mg, Q6H PRN        Chief Complaint:     Chief Complaint   Patient presents with    Shortness of Breath     Pt PCP sent to ED to have an extra. Concerned for pneumonia or COVID. Pt son had COVID         History of Present Illness:      Tho Roberts is a 80 y.o.  female who presents with Shortness of Breath (Pt PCP sent to ED to have an extra. Concerned for pneumonia or COVID.  Pt son had [place])    Patient presented to the hospital with above-mentioned complaints, patient tested

## 2023-11-07 NOTE — CARE COORDINATION
Case Management Assessment  Initial Evaluation    Date/Time of Evaluation: 11/7/2023 1:56 PM  Assessment Completed by: Stephanie Love RN    If patient is discharged prior to next notation, then this note serves as note for discharge by case management. Patient Name: Indira Beltran                   YOB: 1924  Diagnosis: Hypoxia [R09.02]  COVID-19 [U07.1]                   Date / Time: 11/6/2023  4:07 PM    Patient Admission Status: Inpatient   Readmission Risk (Low < 19, Mod (19-27), High > 27): Readmission Risk Score: 13.3    Current PCP: Heidi Smallwood MD  PCP verified by CM? (P) Yes    Chart Reviewed: Yes      History Provided by: (P) Patient, Child/Family  Patient Orientation: Alert and Oriented    Patient Cognition: Alert    Hospitalization in the last 30 days (Readmission):  No    If yes, Readmission Assessment in CM Navigator will be completed.     Advance Directives:      Code Status: Full Code   Patient's Primary Decision Maker is: Legal Next of Kin      Discharge Planning:    Patient lives with: (P) Alone Type of Home: (P) House  Primary Care Giver: Self  Patient Support Systems include: Children, Family Members   Current Financial resources: (P) Medicare  Current community resources: (P) None  Current services prior to admission: (P) Durable Medical Equipment            Current DME: (P) Berdie Adas, Shower Chair            Type of Home Care services:  (P) OT, PT, Nursing Services    ADLS  Prior functional level: (P) Independent in ADLs/IADLs, Housework, Cooking, Shopping  Current functional level: (P) Independent in ADLs/IADLs, Housework, Cooking, Shopping    PT AM-PAC:   /24  OT AM-PAC:   /24    Family can provide assistance at DC: (P) Yes  Would you like Case Management to discuss the discharge plan with any other family members/significant others, and if so, who? (P) Yes (Son Madai Olmedo)  Plans to Return to Present Housing: (P) Yes  Other Identified Issues/Barriers to RETURNING to

## 2023-11-08 ENCOUNTER — APPOINTMENT (OUTPATIENT)
Dept: GENERAL RADIOLOGY | Age: 88
DRG: 177 | End: 2023-11-08
Payer: MEDICARE

## 2023-11-08 LAB
ANION GAP SERPL CALCULATED.3IONS-SCNC: 6 MMOL/L (ref 9–17)
BASOPHILS # BLD: 0 K/UL (ref 0–0.2)
BASOPHILS NFR BLD: 0 %
BNP SERPL-MCNC: 745 PG/ML
BUN SERPL-MCNC: 40 MG/DL (ref 8–23)
BUN/CREAT SERPL: 36 (ref 9–20)
CALCIUM SERPL-MCNC: 9.2 MG/DL (ref 8.6–10.4)
CHLORIDE SERPL-SCNC: 101 MMOL/L (ref 98–107)
CO2 SERPL-SCNC: 36 MMOL/L (ref 20–31)
CREAT SERPL-MCNC: 1.1 MG/DL (ref 0.5–0.9)
ECHO BSA: 1.48 M2
EOSINOPHIL # BLD: 0 K/UL (ref 0–0.4)
EOSINOPHILS RELATIVE PERCENT: 0 % (ref 1–4)
ERYTHROCYTE [DISTWIDTH] IN BLOOD BY AUTOMATED COUNT: 15.2 % (ref 11.8–14.4)
GFR SERPL CREATININE-BSD FRML MDRD: 45 ML/MIN/1.73M2
GLUCOSE SERPL-MCNC: 134 MG/DL (ref 70–99)
HCT VFR BLD AUTO: 39.6 % (ref 36.3–47.1)
HGB BLD-MCNC: 12.2 G/DL (ref 11.9–15.1)
IMM GRANULOCYTES # BLD AUTO: 0 K/UL (ref 0–0.3)
IMM GRANULOCYTES NFR BLD: 0 %
LYMPHOCYTES NFR BLD: 0.66 K/UL (ref 1–4.8)
LYMPHOCYTES RELATIVE PERCENT: 8 % (ref 24–44)
MCH RBC QN AUTO: 31.9 PG (ref 25.2–33.5)
MCHC RBC AUTO-ENTMCNC: 30.8 G/DL (ref 28.4–34.8)
MCV RBC AUTO: 103.4 FL (ref 82.6–102.9)
MONOCYTES NFR BLD: 0.25 K/UL (ref 0.2–0.8)
MONOCYTES NFR BLD: 3 % (ref 1–7)
NEUTROPHILS NFR BLD: 89 % (ref 36–66)
NEUTS SEG NFR BLD: 7.39 K/UL (ref 1.8–7.7)
NRBC BLD-RTO: 0 PER 100 WBC
PLATELET # BLD AUTO: 118 K/UL (ref 138–453)
PMV BLD AUTO: 11.3 FL (ref 8.1–13.5)
POTASSIUM SERPL-SCNC: 4.5 MMOL/L (ref 3.7–5.3)
RBC # BLD AUTO: 3.83 M/UL (ref 3.95–5.11)
SODIUM SERPL-SCNC: 143 MMOL/L (ref 135–144)
WBC OTHER # BLD: 8.3 K/UL (ref 3.5–11.3)

## 2023-11-08 PROCEDURE — 2580000003 HC RX 258: Performed by: INTERNAL MEDICINE

## 2023-11-08 PROCEDURE — 97110 THERAPEUTIC EXERCISES: CPT

## 2023-11-08 PROCEDURE — 97163 PT EVAL HIGH COMPLEX 45 MIN: CPT

## 2023-11-08 PROCEDURE — 97530 THERAPEUTIC ACTIVITIES: CPT

## 2023-11-08 PROCEDURE — 83880 ASSAY OF NATRIURETIC PEPTIDE: CPT

## 2023-11-08 PROCEDURE — 1200000000 HC SEMI PRIVATE

## 2023-11-08 PROCEDURE — 97116 GAIT TRAINING THERAPY: CPT

## 2023-11-08 PROCEDURE — 6370000000 HC RX 637 (ALT 250 FOR IP): Performed by: INTERNAL MEDICINE

## 2023-11-08 PROCEDURE — 36415 COLL VENOUS BLD VENIPUNCTURE: CPT

## 2023-11-08 PROCEDURE — 99233 SBSQ HOSP IP/OBS HIGH 50: CPT | Performed by: INTERNAL MEDICINE

## 2023-11-08 PROCEDURE — 71045 X-RAY EXAM CHEST 1 VIEW: CPT

## 2023-11-08 PROCEDURE — 6370000000 HC RX 637 (ALT 250 FOR IP): Performed by: FAMILY MEDICINE

## 2023-11-08 PROCEDURE — 85025 COMPLETE CBC W/AUTO DIFF WBC: CPT

## 2023-11-08 PROCEDURE — 6360000002 HC RX W HCPCS: Performed by: INTERNAL MEDICINE

## 2023-11-08 PROCEDURE — 80048 BASIC METABOLIC PNL TOTAL CA: CPT

## 2023-11-08 RX ORDER — ACETAMINOPHEN 500 MG
500 TABLET ORAL EVERY 6 HOURS PRN
Status: ON HOLD | COMMUNITY
End: 2023-11-14 | Stop reason: HOSPADM

## 2023-11-08 RX ORDER — PRAVASTATIN SODIUM 20 MG
20 TABLET ORAL DAILY
Status: DISCONTINUED | OUTPATIENT
Start: 2023-11-08 | End: 2023-11-14 | Stop reason: HOSPADM

## 2023-11-08 RX ORDER — POTASSIUM CHLORIDE 750 MG/1
10 TABLET, FILM COATED, EXTENDED RELEASE ORAL 2 TIMES DAILY
Status: ON HOLD | COMMUNITY
End: 2023-11-14 | Stop reason: HOSPADM

## 2023-11-08 RX ORDER — LATANOPROST 50 UG/ML
1 SOLUTION/ DROPS OPHTHALMIC NIGHTLY
COMMUNITY

## 2023-11-08 RX ORDER — LATANOPROST 50 UG/ML
1 SOLUTION/ DROPS OPHTHALMIC NIGHTLY
Status: DISCONTINUED | OUTPATIENT
Start: 2023-11-08 | End: 2023-11-14 | Stop reason: HOSPADM

## 2023-11-08 RX ORDER — TIMOLOL MALEATE 5 MG/ML
1 SOLUTION/ DROPS OPHTHALMIC 2 TIMES DAILY
Status: DISCONTINUED | OUTPATIENT
Start: 2023-11-08 | End: 2023-11-14 | Stop reason: HOSPADM

## 2023-11-08 RX ADMIN — TIMOLOL MALEATE 1 DROP: 5 SOLUTION OPHTHALMIC at 20:42

## 2023-11-08 RX ADMIN — SODIUM CHLORIDE, PRESERVATIVE FREE 10 ML: 5 INJECTION INTRAVENOUS at 10:00

## 2023-11-08 RX ADMIN — DEXAMETHASONE 6 MG: 2 TABLET ORAL at 09:56

## 2023-11-08 RX ADMIN — APIXABAN 2.5 MG: 2.5 TABLET, FILM COATED ORAL at 09:56

## 2023-11-08 RX ADMIN — LATANOPROST 1 DROP: 50 SOLUTION OPHTHALMIC at 20:42

## 2023-11-08 RX ADMIN — APIXABAN 2.5 MG: 2.5 TABLET, FILM COATED ORAL at 20:42

## 2023-11-08 RX ADMIN — PRAVASTATIN SODIUM 20 MG: 20 TABLET ORAL at 17:10

## 2023-11-08 RX ADMIN — SODIUM CHLORIDE, PRESERVATIVE FREE 10 ML: 5 INJECTION INTRAVENOUS at 20:42

## 2023-11-08 ASSESSMENT — ENCOUNTER SYMPTOMS
RESPIRATORY NEGATIVE: 1
GASTROINTESTINAL NEGATIVE: 1

## 2023-11-08 NOTE — ED PROVIDER NOTES
EMERGENCY DEPARTMENT ENCOUNTER    Pt Name: Calin Caputo  MRN: [id number]  [address] [facility] 4/27/1924  Date of evaluation: 11/8/23  CHIEF COMPLAINT       Chief Complaint   Patient presents with    Shortness of Breath     Pt PCP sent to ED to have an extra. Concerned for pneumonia or COVID. Pt son had COVID     HISTORY OF PRESENT ILLNESS   HPI  89F hx of CHF, a-fib on eliquis, HTN, asthma presents to the ED for evaluation of SOB for the past few days. Pt states that her son recently tested positive for covid. Patient states that she's more short of breath with exertion lately. Pt endorses some coughing, denies fever/chills or chest pain. Pt hypoxic to high 80s-low 90s on RA on arrival to ED. Pt states she is complaint with her eliquis. Pt has received multiple covid vaccines. REVIEW OF SYSTEMS     Review of Systems   All other systems reviewed and are negative.     PASTMEDICAL HISTORY     Past Medical History:   Diagnosis Date    Acute dermatitis     Arthritis     Asthma     Atrial fibrillation (HCC)     Bursitis     CHF (congestive heart failure) ([address])     Hearing aid worn     Hyperlipidemia     Hypertension     Lumbar disc disease     Wears glasses     Wound of right leg      SURGICAL HISTORY       Past Surgical History:   Procedure Laterality Date    BACK SURGERY      DEBRIDEMENT Left 03/09/2017    rt. leg    OTHER SURGICAL HISTORY  03/30/2017    Right leg Angio    OTHER SURGICAL HISTORY  03/30/2017    Right leg angio    IN I&D DEEP ABSC BURSA/HEMATOMA THIGH/KNEE REGION Right 3/9/2017    DEBRIDEMENT CALF WOUND  performed by Adrian Hair MD at [facility] I&D [facility] Right 4/28/2017    RIGHT LOWER EXTREMITY DEBRIDEMENT, INTEGRA APPLICATION, PREVENA VAC APPLICATION LOWR RIGHT LEG performed by Adrian Hair MD at [address]       Current Discharge Medication List        CONTINUE these medications which have NOT CHANGED    Details   acetaminophen

## 2023-11-08 NOTE — DISCHARGE INSTR - COC
Continuity of Care Form    Patient Name: Leela Hunter   :  1924  MRN:  [id number]    Admit date:  2023  Discharge date:  2023    Code Status Order: Full Code   Advance Directives:     Admitting Physician:  Mine Holden MD  PCP: Gem Mcdonald MD    Discharging Nurse: Eduardo Bowman [address] Unit/Room#: 6807/3152-18  Discharging Unit Phone Number: [phone]    Emergency Contact:   Extended Emergency Contact Information  Primary Emergency Contact: [facility] of [address] [address] Phone: [phone]  Relation: Child  Secondary Emergency Contact: Joe Morales [facility] of [address] [address] Phone: [phone]  Relation: Niece/Nephew    Past Surgical History:  Past Surgical History:   Procedure Laterality Date    BACK SURGERY      DEBRIDEMENT Left 2017    rt. leg    OTHER SURGICAL HISTORY  2017    Right leg Angio    OTHER SURGICAL HISTORY  2017    Right leg angio    [place] I&D DEEP ABSC BURSA/HEMATOMA THIGH/KNEE REGION Right 3/9/2017    DEBRIDEMENT CALF WOUND  performed by Gwen Anguiano MD at [facility] I&D [address] [facility] Right 2017    RIGHT LOWER EXTREMITY DEBRIDEMENT, INTEGRA APPLICATION, PREVENA VAC APPLICATION LOWR RIGHT LEG performed by Gwen Anguiano MD at [address] [facility] History:   Immunization History   Administered Date(s) Administered    COVID-19, PFIZER GRAY top, DO NOT Dilute, (age 15 y+), IM, 30 mcg/0.3 mL 2022    COVID-19, PFIZER PURPLE top, DILUTE for use, (age 15 y+), 30mcg/0.3mL 2021, 2021, 10/28/2021    TDaP, ADACEL (age 6y-58y), Blanco Perks (age 10y+), IM, 0.5mL 2020       Active Problems:  Patient Active Problem List   Diagnosis Code    Non-pressure ulcer of left lower extremity, limited to breakdown of skin ([address]) L97.921    Persistent atrial fibrillation ([address]) I48.19    Venous ulcer of left leg ([address]) I83.029, L97.929    Essential hypertension I10

## 2023-11-09 PROBLEM — J96.02 ACUTE RESPIRATORY FAILURE WITH HYPOXIA AND HYPERCAPNIA (HCC): Status: ACTIVE | Noted: 2018-12-11

## 2023-11-09 PROBLEM — Z71.89 GOALS OF CARE, COUNSELING/DISCUSSION: Status: ACTIVE | Noted: 2023-11-09

## 2023-11-09 PROBLEM — Z51.5 PALLIATIVE CARE ENCOUNTER: Status: ACTIVE | Noted: 2023-11-09

## 2023-11-09 PROBLEM — Z71.89 DNR (DO NOT RESUSCITATE) DISCUSSION: Status: ACTIVE | Noted: 2023-11-09

## 2023-11-09 PROBLEM — Z71.89 ACP (ADVANCE CARE PLANNING): Status: ACTIVE | Noted: 2023-11-09

## 2023-11-09 LAB
ALLEN TEST: POSITIVE
ANION GAP SERPL CALCULATED.3IONS-SCNC: 7 MMOL/L (ref 9–17)
BUN SERPL-MCNC: 45 MG/DL (ref 8–23)
BUN/CREAT SERPL: 41 (ref 9–20)
CALCIUM SERPL-MCNC: 9.6 MG/DL (ref 8.6–10.4)
CHLORIDE SERPL-SCNC: 103 MMOL/L (ref 98–107)
CO2 BLD CALC-SCNC: 38 MMOL/L (ref 22–30)
CO2 SERPL-SCNC: 33 MMOL/L (ref 20–31)
CREAT SERPL-MCNC: 1.1 MG/DL (ref 0.5–0.9)
ERYTHROCYTE [DISTWIDTH] IN BLOOD BY AUTOMATED COUNT: 15.1 % (ref 11.8–14.4)
FIO2: 30
FIO2: 5
GFR SERPL CREATININE-BSD FRML MDRD: 45 ML/MIN/1.73M2
GLUCOSE SERPL-MCNC: 161 MG/DL (ref 70–99)
HCT VFR BLD AUTO: 52.3 % (ref 36.3–47.1)
HGB BLD-MCNC: 15.5 G/DL (ref 11.9–15.1)
INR PPP: 1.4
MCH RBC QN AUTO: 31.9 PG (ref 25.2–33.5)
MCHC RBC AUTO-ENTMCNC: 29.6 G/DL (ref 28.4–34.8)
MCV RBC AUTO: 107.6 FL (ref 82.6–102.9)
MODE: ABNORMAL
NRBC BLD-RTO: 0.2 PER 100 WBC
O2 DELIVERY DEVICE: ABNORMAL
PATIENT TEMP: 37
PLATELET # BLD AUTO: 149 K/UL (ref 138–453)
PMV BLD AUTO: 11.4 FL (ref 8.1–13.5)
POC HCO3: 37.1 MMOL/L (ref 21–28)
POC HCO3: 38 MMOL/L (ref 21–28)
POC HCO3: 38.5 MMOL/L (ref 21–28)
POC HCO3: 38.8 MMOL/L (ref 21–28)
POC O2 SATURATION: 87.9 % (ref 94–98)
POC O2 SATURATION: 91.5 % (ref 94–98)
POC O2 SATURATION: 91.7 % (ref 94–98)
POC O2 SATURATION: 96.6 % (ref 94–98)
POC PCO2: 73.2 MM HG (ref 35–48)
POC PCO2: 76.8 MM HG (ref 35–48)
POC PCO2: 79.9 MM HG (ref 35–48)
POC PCO2: 94.4 MM HG (ref 35–48)
POC PH: 7.22 (ref 7.35–7.45)
POC PH: 7.29 (ref 7.35–7.45)
POC PH: 7.29 (ref 7.35–7.45)
POC PH: 7.33 (ref 7.35–7.45)
POC PO2: 110.1 MM HG (ref 83–108)
POC PO2: 63.7 MM HG (ref 83–108)
POC PO2: 70.8 MM HG (ref 83–108)
POC PO2: 73.7 MM HG (ref 83–108)
POSITIVE BASE EXCESS, ART: 6 MMOL/L (ref 0–3)
POSITIVE BASE EXCESS, ART: 6.6 MMOL/L (ref 0–3)
POSITIVE BASE EXCESS, ART: 7.4 MMOL/L (ref 0–3)
POSITIVE BASE EXCESS, ART: 8.9 MMOL/L (ref 0–3)
POTASSIUM SERPL-SCNC: 5 MMOL/L (ref 3.7–5.3)
PROTHROMBIN TIME: 16.6 SEC (ref 11.5–14.2)
RBC # BLD AUTO: 4.86 M/UL (ref 3.95–5.11)
SAMPLE SITE: ABNORMAL
SODIUM SERPL-SCNC: 143 MMOL/L (ref 135–144)
WBC OTHER # BLD: 10.8 K/UL (ref 3.5–11.3)

## 2023-11-09 PROCEDURE — 37799 UNLISTED PX VASCULAR SURGERY: CPT

## 2023-11-09 PROCEDURE — 85610 PROTHROMBIN TIME: CPT

## 2023-11-09 PROCEDURE — 99233 SBSQ HOSP IP/OBS HIGH 50: CPT | Performed by: INTERNAL MEDICINE

## 2023-11-09 PROCEDURE — 2700000000 HC OXYGEN THERAPY PER DAY

## 2023-11-09 PROCEDURE — 80048 BASIC METABOLIC PNL TOTAL CA: CPT

## 2023-11-09 PROCEDURE — 2580000003 HC RX 258: Performed by: INTERNAL MEDICINE

## 2023-11-09 PROCEDURE — 94761 N-INVAS EAR/PLS OXIMETRY MLT: CPT

## 2023-11-09 PROCEDURE — 99222 1ST HOSP IP/OBS MODERATE 55: CPT | Performed by: NURSE PRACTITIONER

## 2023-11-09 PROCEDURE — 36415 COLL VENOUS BLD VENIPUNCTURE: CPT

## 2023-11-09 PROCEDURE — 6360000002 HC RX W HCPCS: Performed by: INTERNAL MEDICINE

## 2023-11-09 PROCEDURE — 94660 CPAP INITIATION&MGMT: CPT

## 2023-11-09 PROCEDURE — 6370000000 HC RX 637 (ALT 250 FOR IP): Performed by: FAMILY MEDICINE

## 2023-11-09 PROCEDURE — 5A09457 ASSISTANCE WITH RESPIRATORY VENTILATION, 24-96 CONSECUTIVE HOURS, CONTINUOUS POSITIVE AIRWAY PRESSURE: ICD-10-PCS | Performed by: INTERNAL MEDICINE

## 2023-11-09 PROCEDURE — 6370000000 HC RX 637 (ALT 250 FOR IP): Performed by: INTERNAL MEDICINE

## 2023-11-09 PROCEDURE — 85027 COMPLETE CBC AUTOMATED: CPT

## 2023-11-09 PROCEDURE — 36600 WITHDRAWAL OF ARTERIAL BLOOD: CPT

## 2023-11-09 PROCEDURE — 82374 ASSAY BLOOD CARBON DIOXIDE: CPT

## 2023-11-09 PROCEDURE — 82803 BLOOD GASES ANY COMBINATION: CPT

## 2023-11-09 PROCEDURE — 2000000000 HC ICU R&B

## 2023-11-09 RX ORDER — FUROSEMIDE 10 MG/ML
20 INJECTION INTRAMUSCULAR; INTRAVENOUS ONCE
Status: CANCELLED | OUTPATIENT
Start: 2023-11-10

## 2023-11-09 RX ORDER — FUROSEMIDE 10 MG/ML
20 INJECTION INTRAMUSCULAR; INTRAVENOUS ONCE
Status: COMPLETED | OUTPATIENT
Start: 2023-11-09 | End: 2023-11-09

## 2023-11-09 RX ORDER — LORAZEPAM 2 MG/ML
0.5 INJECTION INTRAMUSCULAR ONCE
Status: COMPLETED | OUTPATIENT
Start: 2023-11-09 | End: 2023-11-09

## 2023-11-09 RX ORDER — FUROSEMIDE 10 MG/ML
20 INJECTION INTRAMUSCULAR; INTRAVENOUS ONCE
Status: CANCELLED | OUTPATIENT
Start: 2023-11-09

## 2023-11-09 RX ADMIN — FUROSEMIDE 20 MG: 20 INJECTION, SOLUTION INTRAMUSCULAR; INTRAVENOUS at 13:29

## 2023-11-09 RX ADMIN — SODIUM CHLORIDE, PRESERVATIVE FREE 10 ML: 5 INJECTION INTRAVENOUS at 13:30

## 2023-11-09 RX ADMIN — LATANOPROST 1 DROP: 50 SOLUTION OPHTHALMIC at 21:35

## 2023-11-09 RX ADMIN — PRAVASTATIN SODIUM 20 MG: 20 TABLET ORAL at 08:01

## 2023-11-09 RX ADMIN — DILTIAZEM HYDROCHLORIDE 180 MG: 180 CAPSULE, COATED, EXTENDED RELEASE ORAL at 08:01

## 2023-11-09 RX ADMIN — TIMOLOL MALEATE 1 DROP: 5 SOLUTION OPHTHALMIC at 21:35

## 2023-11-09 RX ADMIN — LORAZEPAM 0.5 MG: 2 INJECTION INTRAMUSCULAR; INTRAVENOUS at 10:52

## 2023-11-09 RX ADMIN — DEXAMETHASONE 6 MG: 2 TABLET ORAL at 08:01

## 2023-11-09 RX ADMIN — SODIUM CHLORIDE, PRESERVATIVE FREE 10 ML: 5 INJECTION INTRAVENOUS at 21:35

## 2023-11-09 RX ADMIN — FUROSEMIDE 20 MG: 20 INJECTION, SOLUTION INTRAMUSCULAR; INTRAVENOUS at 17:55

## 2023-11-09 RX ADMIN — APIXABAN 2.5 MG: 2.5 TABLET, FILM COATED ORAL at 08:01

## 2023-11-09 RX ADMIN — TIMOLOL MALEATE 1 DROP: 5 SOLUTION OPHTHALMIC at 08:32

## 2023-11-09 RX ADMIN — SODIUM CHLORIDE, PRESERVATIVE FREE 10 ML: 5 INJECTION INTRAVENOUS at 08:32

## 2023-11-09 ASSESSMENT — PAIN SCALES - GENERAL: PAINLEVEL_OUTOF10: 0

## 2023-11-09 NOTE — CARE COORDINATION
Discharge planning    Chart reviewed. Patient lives alone in single story home. DME: Kimber Fraire and Shower chair.,  Has a son Janeen Merida who checks on her daily. Patient is on Eliquis as a home med. Accepted by [facility] . Therapy is recommending snf. Will need to follow up with patient to see if agreeable vs home with home care    Admitted with COVID. ID following. On decadron. No paxlovid  due to renal status. Will need to discuss with RN her 02 requirements last one documented was 5 L at 2300     Pulmonary following.

## 2023-11-09 NOTE — CONSULTS
Palliative Care Inpatient Consult    NAME:  [facility] RECORD NUMBER:  [id number]  AGE: 80 y.o. GENDER: female  : 1924  TODAY'S DATE:  2023    Reasons for Consultation:    Symptom and/or pain management  Provision of information regarding PC and/or hospice philosophies  Complex, time-intensive communication and interdisciplinary psychosocial support  Clarification of goals of care and/or assistance with difficult decision-making  Guidance in regards to resources and transition(s)    Members of PC team contributing to this consultation are : Chica Henriquez, Palliative Care APRN-CNP    Plan      Palliative Interaction: I went to see patient and she was sleeping on Bipap. Son at bedside and reports that staff had to give patient something to settle her down. I spoke to RN briefly and she reports IV Lasix today, repeat CXR in AM and possible thoracentesis based on results. Patient is very groggy. I introduced the palliative role to son. I discussed chronic history and current hospitalized problems in detail. Son reports patient is independent living at home and gets around with [facility]. He reports that she cooks and bathes herself. He visits her daily. He reports her not being frequently hospitalized and is very active and feisty typically. I discussed goals and son reports patient does want treatment and is not ready for hospice care. I explained code status to him and he reports that patient would not want anything done if heart stops and would not want intubated. I further explained DNRCC-A no intubation and he reports this is patients wishes. I discussed HCPOA and he reports that if she has one should be on file. I did not find legal document on file. He reports to be Next of Kin/only child. I offered son support. He reports other than some confusion, grogginess, and SOB patient has not had any other symptoms. I offered son support and informed him that we will follow.  I
Pulmonary Medicine and [facility] Troy Araiza MD      Patient - Ligia Suarez   MRN -  [id number]   [address] [place] [address] # - [de-identified]   - 1924      Date of Admission -  2023  4:07 PM  Date of evaluation -  2023  Room - [address] [place] [address]   Hospital Day - 1  Consulting - Donita Raygoza MD Primary Care Physician - Sarah Ling MD     Reason for Consult    Hypoxia, COVID-19 infection    Assessment/recommendations   Acute hypoxic respiratory insufficiency   Oxygen by nasal cannula to keep oxygen saturation above 92%  Incentive spirometry every hour while awake  Wean FiO2 as tolerated    Acute on chronic diastolic congestive heart failure/moderate bilateral pleural effusions  Discontinue IV fluids  Repeat x-ray chest in a.m. to evaluate the need for thoracentesis  Patient may need diuresis    COVID-19 infection  IV Decadron  Antiviral therapy? ID following    Asthma with mild exacerbation  IV Decadron  Symbicort  Albuterol/ipratropium aerosol treatment every 4 hours as needed    DVT prophylaxis: Patient is on Eliquis 2.5 mg acute 12 hours    We will follow with you      HPI     Ligia Suarez is 80 y.o.,  female, admitted because of increased shortness of breath. Patient is very hard of hearing and difficult to get good history. She denies any chest pain. She does feel short of breath. She does have cough. She denies any fever and chills. She denies any history of smoking.     PMHx   Past Medical History      Diagnosis Date    Acute dermatitis     Arthritis     Asthma     Atrial fibrillation ([address])     Bursitis     CHF (congestive heart failure) ([address])     Hearing aid worn     Hyperlipidemia     Hypertension     Lumbar disc disease     Wears glasses     Wound of right leg       Past Surgical History        Procedure Laterality Date    BACK SURGERY      DEBRIDEMENT Left 2017    rt. leg    OTHER SURGICAL HISTORY  2017    Right leg Angio    OTHER SURGICAL HISTORY  2017
nucleic acid assay. This test is a multiplex Real-Time Reverse Transcriptase Polymerase Chain Reaction   (RT-PCR)-based in vitro diagnostic test intended for the qualitative detection of nucleic   acids from SARS-CoV-2,   influenza A, and influenza B in nasopharyngeal and nasal swab specimens for use under the   FDA's Emergency Use Authorization (EUA) only. Detected results are indicative of the presence of SARS-CoV-2. Clinical correlation with patient history and other diagnostic information is necessary to   determine patient infection status. Positive results do not rule out bacterial infection or    co-infection with other pathogens. Fact sheet for Patients: FindDrives.pl   Fact sheet for Healthcare Providers: FindDrives.pl         Results reported to the appropriate Health Department        INFLUENZA A Not Detected    Comment: Note:  Influenza A and Influenza B negative results should be considered presumptive in   samples that have a Detected SARS-CoV-2 result. Consider re-testing with an alternate   FDA-approved test if clinically indicated. INFLUENZA B Not Detected    Comment: Note:  Influenza A and Influenza B negative results should be considered presumptive in   samples that have a Detected SARS-CoV-2 result. Consider re-testing with an alternate   FDA-approved test if clinically indicated. Electronically signed by Nathalie Gusman MD on 11/7/2023 at 9:01 AM      [facility]  Nathalie Gusman MD  [employer] messaging  OFFICE: [phone]    Thank you for allowing us to participate in the care of this patient. Please call with questions.      This note is created with the assistance of a speech recognition program.  While intending to generate a document that actually reflects the content of the visit, the document can still have some errors including those of syntax and

## 2023-11-09 NOTE — ACP (ADVANCE CARE PLANNING)
Advance Care Planning     Advance Care Planning (ACP) Physician/NP/PA Conversation    Date of Conversation: 11/6/2023  Conducted with:  Healthcare Decision Maker: Next of Kin by law (only applies in absence of above) (name) son     Healthcare Decision Maker:      Primary Decision Maker: [place] - Child - [phone]    Click here to [place] including selection of the Healthcare Decision Maker Relationship (ie \"Primary\")  Today we documented Decision Maker(s) consistent with Legal Next of Kin hierarchy. Care Preferences:    Hospitalization: \"If your health worsens and it becomes clear that your chance of recovery is unlikely, what would be your preference regarding hospitalization? \"  The patient would prefer hospitalization. Ventilation: \"If you were unable to breath on your own and your chance of recovery was unlikely, what would be your preference about the use of a ventilator (breathing machine) if it was available to you? \"  The patient would NOT desire the use of a ventilator. Resuscitation: \"In the event your heart stopped as a result of an underlying serious health condition, would you want attempts made to restart your heart, or would you prefer a natural death? \"  No, do NOT attempt to resuscitate.     treatment goals, benefit/burden of treatment options, ventilation preferences, hospitalization preferences, resuscitation preferences, and hospice care    Conversation Outcomes / Follow-Up Plan:  ACP complete - no further action today  Reviewed DNR/DNI and patient elects DNR order - completed portable DNR form & placed order    Length of Voluntary ACP Conversation in minutes:  <16 minutes (Non-Billable)    Cecily Hollis, APRN - CNP

## 2023-11-10 ENCOUNTER — APPOINTMENT (OUTPATIENT)
Dept: INTERVENTIONAL RADIOLOGY/VASCULAR | Age: 88
DRG: 177 | End: 2023-11-10
Payer: MEDICARE

## 2023-11-10 LAB
ANION GAP SERPL CALCULATED.3IONS-SCNC: 8 MMOL/L (ref 9–17)
BUN SERPL-MCNC: 44 MG/DL (ref 8–23)
BUN/CREAT SERPL: 44 (ref 9–20)
CALCIUM SERPL-MCNC: 9.5 MG/DL (ref 8.6–10.4)
CHLORIDE SERPL-SCNC: 102 MMOL/L (ref 98–107)
CO2 SERPL-SCNC: 38 MMOL/L (ref 20–31)
CREAT SERPL-MCNC: 1 MG/DL (ref 0.5–0.9)
ERYTHROCYTE [DISTWIDTH] IN BLOOD BY AUTOMATED COUNT: 15 % (ref 11.8–14.4)
GFR SERPL CREATININE-BSD FRML MDRD: 51 ML/MIN/1.73M2
GLUCOSE BLD-MCNC: 186 MG/DL (ref 65–105)
GLUCOSE SERPL-MCNC: 121 MG/DL (ref 70–99)
HCT VFR BLD AUTO: 48.4 % (ref 36.3–47.1)
HGB BLD-MCNC: 15.1 G/DL (ref 11.9–15.1)
MCH RBC QN AUTO: 32.4 PG (ref 25.2–33.5)
MCHC RBC AUTO-ENTMCNC: 31.2 G/DL (ref 28.4–34.8)
MCV RBC AUTO: 103.9 FL (ref 82.6–102.9)
NRBC BLD-RTO: 0 PER 100 WBC
PLATELET # BLD AUTO: 116 K/UL (ref 138–453)
PMV BLD AUTO: 11.9 FL (ref 8.1–13.5)
POTASSIUM SERPL-SCNC: 4.7 MMOL/L (ref 3.7–5.3)
RBC # BLD AUTO: 4.66 M/UL (ref 3.95–5.11)
SODIUM SERPL-SCNC: 148 MMOL/L (ref 135–144)
WBC OTHER # BLD: 7.9 K/UL (ref 3.5–11.3)

## 2023-11-10 PROCEDURE — 97530 THERAPEUTIC ACTIVITIES: CPT

## 2023-11-10 PROCEDURE — 97166 OT EVAL MOD COMPLEX 45 MIN: CPT

## 2023-11-10 PROCEDURE — 32555 ASPIRATE PLEURA W/ IMAGING: CPT

## 2023-11-10 PROCEDURE — C1729 CATH, DRAINAGE: HCPCS

## 2023-11-10 PROCEDURE — 2580000003 HC RX 258: Performed by: INTERNAL MEDICINE

## 2023-11-10 PROCEDURE — 6370000000 HC RX 637 (ALT 250 FOR IP): Performed by: INTERNAL MEDICINE

## 2023-11-10 PROCEDURE — 94761 N-INVAS EAR/PLS OXIMETRY MLT: CPT

## 2023-11-10 PROCEDURE — 85027 COMPLETE CBC AUTOMATED: CPT

## 2023-11-10 PROCEDURE — 80048 BASIC METABOLIC PNL TOTAL CA: CPT

## 2023-11-10 PROCEDURE — 99233 SBSQ HOSP IP/OBS HIGH 50: CPT | Performed by: INTERNAL MEDICINE

## 2023-11-10 PROCEDURE — 2700000000 HC OXYGEN THERAPY PER DAY

## 2023-11-10 PROCEDURE — 6360000002 HC RX W HCPCS: Performed by: INTERNAL MEDICINE

## 2023-11-10 PROCEDURE — 0W993ZZ DRAINAGE OF RIGHT PLEURAL CAVITY, PERCUTANEOUS APPROACH: ICD-10-PCS | Performed by: INTERNAL MEDICINE

## 2023-11-10 PROCEDURE — 36415 COLL VENOUS BLD VENIPUNCTURE: CPT

## 2023-11-10 PROCEDURE — 82947 ASSAY GLUCOSE BLOOD QUANT: CPT

## 2023-11-10 PROCEDURE — 2000000000 HC ICU R&B

## 2023-11-10 PROCEDURE — 6370000000 HC RX 637 (ALT 250 FOR IP): Performed by: FAMILY MEDICINE

## 2023-11-10 PROCEDURE — 97110 THERAPEUTIC EXERCISES: CPT

## 2023-11-10 PROCEDURE — 94660 CPAP INITIATION&MGMT: CPT

## 2023-11-10 RX ADMIN — APIXABAN 2.5 MG: 2.5 TABLET, FILM COATED ORAL at 20:30

## 2023-11-10 RX ADMIN — DILTIAZEM HYDROCHLORIDE 180 MG: 180 CAPSULE, COATED, EXTENDED RELEASE ORAL at 11:26

## 2023-11-10 RX ADMIN — DEXAMETHASONE 6 MG: 2 TABLET ORAL at 11:26

## 2023-11-10 RX ADMIN — APIXABAN 2.5 MG: 2.5 TABLET, FILM COATED ORAL at 11:25

## 2023-11-10 RX ADMIN — SODIUM CHLORIDE, PRESERVATIVE FREE 10 ML: 5 INJECTION INTRAVENOUS at 11:15

## 2023-11-10 RX ADMIN — TIMOLOL MALEATE 1 DROP: 5 SOLUTION OPHTHALMIC at 20:25

## 2023-11-10 RX ADMIN — LATANOPROST 1 DROP: 50 SOLUTION OPHTHALMIC at 20:25

## 2023-11-10 RX ADMIN — SODIUM CHLORIDE, PRESERVATIVE FREE 10 ML: 5 INJECTION INTRAVENOUS at 20:25

## 2023-11-10 RX ADMIN — TIMOLOL MALEATE 1 DROP: 5 SOLUTION OPHTHALMIC at 11:15

## 2023-11-10 ASSESSMENT — PAIN SCALES - GENERAL
PAINLEVEL_OUTOF10: 0

## 2023-11-10 NOTE — CARE COORDINATION
Discharge planning    Patient transferred to ICU yesterday for respiratory failure r/t COVID. On bipap. Code status is DNR CCA after speaking with NP from palliative care. ID and pulmonary following.

## 2023-11-11 ENCOUNTER — APPOINTMENT (OUTPATIENT)
Dept: GENERAL RADIOLOGY | Age: 88
DRG: 177 | End: 2023-11-11
Payer: MEDICARE

## 2023-11-11 LAB
ANION GAP SERPL CALCULATED.3IONS-SCNC: 5 MMOL/L (ref 9–17)
BUN SERPL-MCNC: 47 MG/DL (ref 8–23)
BUN/CREAT SERPL: 59 (ref 9–20)
CALCIUM SERPL-MCNC: 8.9 MG/DL (ref 8.6–10.4)
CHLORIDE SERPL-SCNC: 102 MMOL/L (ref 98–107)
CO2 SERPL-SCNC: 37 MMOL/L (ref 20–31)
CREAT SERPL-MCNC: 0.8 MG/DL (ref 0.5–0.9)
ERYTHROCYTE [DISTWIDTH] IN BLOOD BY AUTOMATED COUNT: 14.9 % (ref 11.8–14.4)
GFR SERPL CREATININE-BSD FRML MDRD: >60 ML/MIN/1.73M2
GLUCOSE SERPL-MCNC: 151 MG/DL (ref 70–99)
HCT VFR BLD AUTO: 46.1 % (ref 36.3–47.1)
HGB BLD-MCNC: 14 G/DL (ref 11.9–15.1)
MCH RBC QN AUTO: 31.8 PG (ref 25.2–33.5)
MCHC RBC AUTO-ENTMCNC: 30.4 G/DL (ref 28.4–34.8)
MCV RBC AUTO: 104.8 FL (ref 82.6–102.9)
NRBC BLD-RTO: 0.3 PER 100 WBC
PLATELET # BLD AUTO: 105 K/UL (ref 138–453)
PMV BLD AUTO: 11.6 FL (ref 8.1–13.5)
POTASSIUM SERPL-SCNC: 4.8 MMOL/L (ref 3.7–5.3)
RBC # BLD AUTO: 4.4 M/UL (ref 3.95–5.11)
SODIUM SERPL-SCNC: 144 MMOL/L (ref 135–144)
WBC OTHER # BLD: 6.9 K/UL (ref 3.5–11.3)

## 2023-11-11 PROCEDURE — 6360000002 HC RX W HCPCS: Performed by: INTERNAL MEDICINE

## 2023-11-11 PROCEDURE — 2700000000 HC OXYGEN THERAPY PER DAY

## 2023-11-11 PROCEDURE — 94761 N-INVAS EAR/PLS OXIMETRY MLT: CPT

## 2023-11-11 PROCEDURE — 97116 GAIT TRAINING THERAPY: CPT

## 2023-11-11 PROCEDURE — 94660 CPAP INITIATION&MGMT: CPT

## 2023-11-11 PROCEDURE — 97530 THERAPEUTIC ACTIVITIES: CPT

## 2023-11-11 PROCEDURE — 85027 COMPLETE CBC AUTOMATED: CPT

## 2023-11-11 PROCEDURE — 6370000000 HC RX 637 (ALT 250 FOR IP): Performed by: FAMILY MEDICINE

## 2023-11-11 PROCEDURE — 6370000000 HC RX 637 (ALT 250 FOR IP): Performed by: INTERNAL MEDICINE

## 2023-11-11 PROCEDURE — 71045 X-RAY EXAM CHEST 1 VIEW: CPT

## 2023-11-11 PROCEDURE — 2580000003 HC RX 258: Performed by: INTERNAL MEDICINE

## 2023-11-11 PROCEDURE — 2000000000 HC ICU R&B

## 2023-11-11 PROCEDURE — 97110 THERAPEUTIC EXERCISES: CPT

## 2023-11-11 PROCEDURE — 36415 COLL VENOUS BLD VENIPUNCTURE: CPT

## 2023-11-11 PROCEDURE — 99233 SBSQ HOSP IP/OBS HIGH 50: CPT | Performed by: INTERNAL MEDICINE

## 2023-11-11 PROCEDURE — 80048 BASIC METABOLIC PNL TOTAL CA: CPT

## 2023-11-11 RX ADMIN — APIXABAN 2.5 MG: 2.5 TABLET, FILM COATED ORAL at 09:26

## 2023-11-11 RX ADMIN — LATANOPROST 1 DROP: 50 SOLUTION OPHTHALMIC at 20:42

## 2023-11-11 RX ADMIN — SODIUM CHLORIDE, PRESERVATIVE FREE 10 ML: 5 INJECTION INTRAVENOUS at 09:26

## 2023-11-11 RX ADMIN — TIMOLOL MALEATE 1 DROP: 5 SOLUTION OPHTHALMIC at 09:27

## 2023-11-11 RX ADMIN — TIMOLOL MALEATE 1 DROP: 5 SOLUTION OPHTHALMIC at 20:42

## 2023-11-11 RX ADMIN — APIXABAN 2.5 MG: 2.5 TABLET, FILM COATED ORAL at 20:41

## 2023-11-11 RX ADMIN — DILTIAZEM HYDROCHLORIDE 180 MG: 180 CAPSULE, COATED, EXTENDED RELEASE ORAL at 09:26

## 2023-11-11 RX ADMIN — DEXAMETHASONE 6 MG: 2 TABLET ORAL at 09:26

## 2023-11-11 RX ADMIN — SODIUM CHLORIDE, PRESERVATIVE FREE 10 ML: 5 INJECTION INTRAVENOUS at 23:06

## 2023-11-11 RX ADMIN — PRAVASTATIN SODIUM 20 MG: 20 TABLET ORAL at 09:26

## 2023-11-11 ASSESSMENT — PAIN SCALES - GENERAL: PAINLEVEL_OUTOF10: 0

## 2023-11-11 ASSESSMENT — ENCOUNTER SYMPTOMS
GASTROINTESTINAL NEGATIVE: 1
RESPIRATORY NEGATIVE: 1

## 2023-11-12 LAB
ANION GAP SERPL CALCULATED.3IONS-SCNC: 7 MMOL/L (ref 9–17)
BUN SERPL-MCNC: 40 MG/DL (ref 8–23)
BUN/CREAT SERPL: 57 (ref 9–20)
CALCIUM SERPL-MCNC: 9.2 MG/DL (ref 8.6–10.4)
CHLORIDE SERPL-SCNC: 101 MMOL/L (ref 98–107)
CO2 SERPL-SCNC: 35 MMOL/L (ref 20–31)
CREAT SERPL-MCNC: 0.7 MG/DL (ref 0.5–0.9)
ERYTHROCYTE [DISTWIDTH] IN BLOOD BY AUTOMATED COUNT: 14.7 % (ref 11.8–14.4)
GFR SERPL CREATININE-BSD FRML MDRD: >60 ML/MIN/1.73M2
GLUCOSE SERPL-MCNC: 145 MG/DL (ref 70–99)
HCT VFR BLD AUTO: 45.8 % (ref 36.3–47.1)
HGB BLD-MCNC: 14.1 G/DL (ref 11.9–15.1)
MCH RBC QN AUTO: 31.6 PG (ref 25.2–33.5)
MCHC RBC AUTO-ENTMCNC: 30.8 G/DL (ref 28.4–34.8)
MCV RBC AUTO: 102.7 FL (ref 82.6–102.9)
NRBC BLD-RTO: 0 PER 100 WBC
PLATELET # BLD AUTO: 103 K/UL (ref 138–453)
PMV BLD AUTO: 11.7 FL (ref 8.1–13.5)
POTASSIUM SERPL-SCNC: 4.6 MMOL/L (ref 3.7–5.3)
RBC # BLD AUTO: 4.46 M/UL (ref 3.95–5.11)
SODIUM SERPL-SCNC: 143 MMOL/L (ref 135–144)
WBC OTHER # BLD: 6.3 K/UL (ref 3.5–11.3)

## 2023-11-12 PROCEDURE — 94660 CPAP INITIATION&MGMT: CPT

## 2023-11-12 PROCEDURE — 94640 AIRWAY INHALATION TREATMENT: CPT

## 2023-11-12 PROCEDURE — 2580000003 HC RX 258: Performed by: INTERNAL MEDICINE

## 2023-11-12 PROCEDURE — 36415 COLL VENOUS BLD VENIPUNCTURE: CPT

## 2023-11-12 PROCEDURE — 94761 N-INVAS EAR/PLS OXIMETRY MLT: CPT

## 2023-11-12 PROCEDURE — 85027 COMPLETE CBC AUTOMATED: CPT

## 2023-11-12 PROCEDURE — 6370000000 HC RX 637 (ALT 250 FOR IP): Performed by: FAMILY MEDICINE

## 2023-11-12 PROCEDURE — 6370000000 HC RX 637 (ALT 250 FOR IP): Performed by: INTERNAL MEDICINE

## 2023-11-12 PROCEDURE — 6360000002 HC RX W HCPCS: Performed by: INTERNAL MEDICINE

## 2023-11-12 PROCEDURE — 80048 BASIC METABOLIC PNL TOTAL CA: CPT

## 2023-11-12 PROCEDURE — 2700000000 HC OXYGEN THERAPY PER DAY

## 2023-11-12 PROCEDURE — 6370000000 HC RX 637 (ALT 250 FOR IP): Performed by: NURSE PRACTITIONER

## 2023-11-12 PROCEDURE — 99232 SBSQ HOSP IP/OBS MODERATE 35: CPT | Performed by: INTERNAL MEDICINE

## 2023-11-12 PROCEDURE — 2060000000 HC ICU INTERMEDIATE R&B

## 2023-11-12 RX ORDER — BUDESONIDE AND FORMOTEROL FUMARATE DIHYDRATE 160; 4.5 UG/1; UG/1
2 AEROSOL RESPIRATORY (INHALATION)
Status: DISCONTINUED | OUTPATIENT
Start: 2023-11-12 | End: 2023-11-14 | Stop reason: HOSPADM

## 2023-11-12 RX ADMIN — LATANOPROST 1 DROP: 50 SOLUTION OPHTHALMIC at 20:38

## 2023-11-12 RX ADMIN — BUDESONIDE AND FORMOTEROL FUMARATE DIHYDRATE 2 PUFF: 160; 4.5 AEROSOL RESPIRATORY (INHALATION) at 19:27

## 2023-11-12 RX ADMIN — DEXAMETHASONE 6 MG: 2 TABLET ORAL at 09:24

## 2023-11-12 RX ADMIN — APIXABAN 2.5 MG: 2.5 TABLET, FILM COATED ORAL at 20:37

## 2023-11-12 RX ADMIN — TIMOLOL MALEATE 1 DROP: 5 SOLUTION OPHTHALMIC at 20:38

## 2023-11-12 RX ADMIN — PRAVASTATIN SODIUM 20 MG: 20 TABLET ORAL at 09:24

## 2023-11-12 RX ADMIN — DILTIAZEM HYDROCHLORIDE 180 MG: 180 CAPSULE, COATED, EXTENDED RELEASE ORAL at 09:24

## 2023-11-12 RX ADMIN — BUDESONIDE AND FORMOTEROL FUMARATE DIHYDRATE 2 PUFF: 160; 4.5 AEROSOL RESPIRATORY (INHALATION) at 11:25

## 2023-11-12 RX ADMIN — SODIUM CHLORIDE, PRESERVATIVE FREE 10 ML: 5 INJECTION INTRAVENOUS at 20:38

## 2023-11-12 RX ADMIN — TIMOLOL MALEATE 1 DROP: 5 SOLUTION OPHTHALMIC at 09:24

## 2023-11-12 RX ADMIN — APIXABAN 2.5 MG: 2.5 TABLET, FILM COATED ORAL at 09:24

## 2023-11-12 RX ADMIN — SODIUM CHLORIDE, PRESERVATIVE FREE 10 ML: 5 INJECTION INTRAVENOUS at 09:23

## 2023-11-12 ASSESSMENT — ENCOUNTER SYMPTOMS
GASTROINTESTINAL NEGATIVE: 1
RESPIRATORY NEGATIVE: 1

## 2023-11-12 ASSESSMENT — PAIN SCALES - GENERAL: PAINLEVEL_OUTOF10: 0

## 2023-11-13 LAB
ANION GAP SERPL CALCULATED.3IONS-SCNC: 5 MMOL/L (ref 9–17)
BUN SERPL-MCNC: 37 MG/DL (ref 8–23)
BUN/CREAT SERPL: 46 (ref 9–20)
CALCIUM SERPL-MCNC: 8.8 MG/DL (ref 8.6–10.4)
CHLORIDE SERPL-SCNC: 102 MMOL/L (ref 98–107)
CO2 SERPL-SCNC: 35 MMOL/L (ref 20–31)
CREAT SERPL-MCNC: 0.8 MG/DL (ref 0.5–0.9)
ERYTHROCYTE [DISTWIDTH] IN BLOOD BY AUTOMATED COUNT: 14.5 % (ref 11.8–14.4)
GFR SERPL CREATININE-BSD FRML MDRD: >60 ML/MIN/1.73M2
GLUCOSE SERPL-MCNC: 159 MG/DL (ref 70–99)
HCT VFR BLD AUTO: 43.3 % (ref 36.3–47.1)
HGB BLD-MCNC: 13.8 G/DL (ref 11.9–15.1)
MCH RBC QN AUTO: 31.9 PG (ref 25.2–33.5)
MCHC RBC AUTO-ENTMCNC: 31.9 G/DL (ref 28.4–34.8)
MCV RBC AUTO: 100 FL (ref 82.6–102.9)
NRBC BLD-RTO: 0 PER 100 WBC
PLATELET # BLD AUTO: 111 K/UL (ref 138–453)
PMV BLD AUTO: 11.6 FL (ref 8.1–13.5)
POTASSIUM SERPL-SCNC: 4.2 MMOL/L (ref 3.7–5.3)
RBC # BLD AUTO: 4.33 M/UL (ref 3.95–5.11)
SODIUM SERPL-SCNC: 142 MMOL/L (ref 135–144)
WBC OTHER # BLD: 8.4 K/UL (ref 3.5–11.3)

## 2023-11-13 PROCEDURE — 97164 PT RE-EVAL EST PLAN CARE: CPT

## 2023-11-13 PROCEDURE — 94660 CPAP INITIATION&MGMT: CPT

## 2023-11-13 PROCEDURE — 97530 THERAPEUTIC ACTIVITIES: CPT

## 2023-11-13 PROCEDURE — 36415 COLL VENOUS BLD VENIPUNCTURE: CPT

## 2023-11-13 PROCEDURE — 85027 COMPLETE CBC AUTOMATED: CPT

## 2023-11-13 PROCEDURE — 6370000000 HC RX 637 (ALT 250 FOR IP): Performed by: FAMILY MEDICINE

## 2023-11-13 PROCEDURE — 97168 OT RE-EVAL EST PLAN CARE: CPT

## 2023-11-13 PROCEDURE — 2060000000 HC ICU INTERMEDIATE R&B

## 2023-11-13 PROCEDURE — 97116 GAIT TRAINING THERAPY: CPT

## 2023-11-13 PROCEDURE — 6370000000 HC RX 637 (ALT 250 FOR IP): Performed by: INTERNAL MEDICINE

## 2023-11-13 PROCEDURE — 97112 NEUROMUSCULAR REEDUCATION: CPT

## 2023-11-13 PROCEDURE — 99232 SBSQ HOSP IP/OBS MODERATE 35: CPT | Performed by: INTERNAL MEDICINE

## 2023-11-13 PROCEDURE — 97535 SELF CARE MNGMENT TRAINING: CPT

## 2023-11-13 PROCEDURE — 2700000000 HC OXYGEN THERAPY PER DAY

## 2023-11-13 PROCEDURE — 94640 AIRWAY INHALATION TREATMENT: CPT

## 2023-11-13 PROCEDURE — 80048 BASIC METABOLIC PNL TOTAL CA: CPT

## 2023-11-13 PROCEDURE — 94761 N-INVAS EAR/PLS OXIMETRY MLT: CPT

## 2023-11-13 PROCEDURE — 97110 THERAPEUTIC EXERCISES: CPT

## 2023-11-13 PROCEDURE — 2580000003 HC RX 258: Performed by: INTERNAL MEDICINE

## 2023-11-13 RX ADMIN — SODIUM CHLORIDE, PRESERVATIVE FREE 10 ML: 5 INJECTION INTRAVENOUS at 21:56

## 2023-11-13 RX ADMIN — APIXABAN 2.5 MG: 2.5 TABLET, FILM COATED ORAL at 10:25

## 2023-11-13 RX ADMIN — TIMOLOL MALEATE 1 DROP: 5 SOLUTION OPHTHALMIC at 10:27

## 2023-11-13 RX ADMIN — BUDESONIDE AND FORMOTEROL FUMARATE DIHYDRATE 2 PUFF: 160; 4.5 AEROSOL RESPIRATORY (INHALATION) at 19:33

## 2023-11-13 RX ADMIN — PRAVASTATIN SODIUM 20 MG: 20 TABLET ORAL at 10:25

## 2023-11-13 RX ADMIN — DILTIAZEM HYDROCHLORIDE 180 MG: 180 CAPSULE, COATED, EXTENDED RELEASE ORAL at 10:25

## 2023-11-13 RX ADMIN — LATANOPROST 1 DROP: 50 SOLUTION OPHTHALMIC at 21:56

## 2023-11-13 RX ADMIN — SODIUM CHLORIDE, PRESERVATIVE FREE 10 ML: 5 INJECTION INTRAVENOUS at 15:34

## 2023-11-13 RX ADMIN — APIXABAN 2.5 MG: 2.5 TABLET, FILM COATED ORAL at 21:55

## 2023-11-13 RX ADMIN — TIMOLOL MALEATE 1 DROP: 5 SOLUTION OPHTHALMIC at 21:56

## 2023-11-13 RX ADMIN — BUDESONIDE AND FORMOTEROL FUMARATE DIHYDRATE 2 PUFF: 160; 4.5 AEROSOL RESPIRATORY (INHALATION) at 08:43

## 2023-11-13 ASSESSMENT — ENCOUNTER SYMPTOMS
RESPIRATORY NEGATIVE: 1
GASTROINTESTINAL NEGATIVE: 1

## 2023-11-13 ASSESSMENT — PAIN SCALES - GENERAL
PAINLEVEL_OUTOF10: 0

## 2023-11-13 NOTE — CARE COORDINATION
Social work: Spoke to son, SNF choices given of [facility] and [address]. Referrals sent. Post Acute Facility/Agency List     Provided child with the following list, the list includes the overall star ratings obtained from CMS per the Medicare Web site (www.Medicare.gov):     [] [address]  [] Acute Inpatient Rehabilitation Facilities  [x] Skilled Nursing Facilities  [] Home Care    Provided verbal instructions on how to utilize the QR Code to obtain additional detailed star ratings from www. Medicare. gov     offered to print and provide the detailed list:    []Accepted   [x]Declined

## 2023-11-13 NOTE — CARE COORDINATION
Discharge planning    Chart reviewed. Patient lives alone in single story home. DME: [employer] and [employer].,  Has a son Jeremy Barrios who checks on her daily. Patient is on Eliquis as a home med. Accepted by [facility] . Therapy is recommending snf. Will need to follow up with patient and family to discuss plan of care. Admitted with COVID. On bipap at night and 1 liter during day. ID/pulmonary/palliative following. S/P thoracentesis on 11/10, yeilded 750 ml. Decadron no po. . No paxlovid  due to renal status.

## 2023-11-14 ENCOUNTER — APPOINTMENT (OUTPATIENT)
Dept: GENERAL RADIOLOGY | Age: 88
DRG: 177 | End: 2023-11-14
Payer: MEDICARE

## 2023-11-14 VITALS
BODY MASS INDEX: 20.33 KG/M2 | RESPIRATION RATE: 20 BRPM | HEIGHT: 62 IN | OXYGEN SATURATION: 90 % | WEIGHT: 110.45 LBS | DIASTOLIC BLOOD PRESSURE: 58 MMHG | SYSTOLIC BLOOD PRESSURE: 109 MMHG | TEMPERATURE: 97.1 F | HEART RATE: 64 BPM

## 2023-11-14 LAB
ANION GAP SERPL CALCULATED.3IONS-SCNC: 5 MMOL/L (ref 9–17)
BUN SERPL-MCNC: 37 MG/DL (ref 8–23)
BUN/CREAT SERPL: 41 (ref 9–20)
CALCIUM SERPL-MCNC: 9.2 MG/DL (ref 8.6–10.4)
CHLORIDE SERPL-SCNC: 102 MMOL/L (ref 98–107)
CO2 SERPL-SCNC: 35 MMOL/L (ref 20–31)
CREAT SERPL-MCNC: 0.9 MG/DL (ref 0.5–0.9)
ERYTHROCYTE [DISTWIDTH] IN BLOOD BY AUTOMATED COUNT: 14.6 % (ref 11.8–14.4)
GFR SERPL CREATININE-BSD FRML MDRD: 57 ML/MIN/1.73M2
GLUCOSE SERPL-MCNC: 125 MG/DL (ref 70–99)
HCT VFR BLD AUTO: 46.6 % (ref 36.3–47.1)
HGB BLD-MCNC: 14.7 G/DL (ref 11.9–15.1)
MCH RBC QN AUTO: 31.9 PG (ref 25.2–33.5)
MCHC RBC AUTO-ENTMCNC: 31.5 G/DL (ref 28.4–34.8)
MCV RBC AUTO: 101.1 FL (ref 82.6–102.9)
NRBC BLD-RTO: 0 PER 100 WBC
PLATELET # BLD AUTO: 119 K/UL (ref 138–453)
PMV BLD AUTO: 11.4 FL (ref 8.1–13.5)
POTASSIUM SERPL-SCNC: 4.4 MMOL/L (ref 3.7–5.3)
RBC # BLD AUTO: 4.61 M/UL (ref 3.95–5.11)
SODIUM SERPL-SCNC: 142 MMOL/L (ref 135–144)
WBC OTHER # BLD: 10.2 K/UL (ref 3.5–11.3)

## 2023-11-14 PROCEDURE — 2580000003 HC RX 258: Performed by: INTERNAL MEDICINE

## 2023-11-14 PROCEDURE — 36415 COLL VENOUS BLD VENIPUNCTURE: CPT

## 2023-11-14 PROCEDURE — 99232 SBSQ HOSP IP/OBS MODERATE 35: CPT | Performed by: INTERNAL MEDICINE

## 2023-11-14 PROCEDURE — 97530 THERAPEUTIC ACTIVITIES: CPT

## 2023-11-14 PROCEDURE — 71045 X-RAY EXAM CHEST 1 VIEW: CPT

## 2023-11-14 PROCEDURE — 97535 SELF CARE MNGMENT TRAINING: CPT

## 2023-11-14 PROCEDURE — 85027 COMPLETE CBC AUTOMATED: CPT

## 2023-11-14 PROCEDURE — 6370000000 HC RX 637 (ALT 250 FOR IP): Performed by: FAMILY MEDICINE

## 2023-11-14 PROCEDURE — 94640 AIRWAY INHALATION TREATMENT: CPT

## 2023-11-14 PROCEDURE — 80048 BASIC METABOLIC PNL TOTAL CA: CPT

## 2023-11-14 PROCEDURE — 97110 THERAPEUTIC EXERCISES: CPT

## 2023-11-14 PROCEDURE — 2700000000 HC OXYGEN THERAPY PER DAY

## 2023-11-14 PROCEDURE — 94660 CPAP INITIATION&MGMT: CPT

## 2023-11-14 PROCEDURE — 94761 N-INVAS EAR/PLS OXIMETRY MLT: CPT

## 2023-11-14 PROCEDURE — 6370000000 HC RX 637 (ALT 250 FOR IP): Performed by: INTERNAL MEDICINE

## 2023-11-14 RX ORDER — BUDESONIDE AND FORMOTEROL FUMARATE DIHYDRATE 160; 4.5 UG/1; UG/1
2 AEROSOL RESPIRATORY (INHALATION)
Qty: 10.2 G | Refills: 3 | Status: SHIPPED | OUTPATIENT
Start: 2023-11-14

## 2023-11-14 RX ADMIN — APIXABAN 2.5 MG: 2.5 TABLET, FILM COATED ORAL at 09:13

## 2023-11-14 RX ADMIN — SODIUM CHLORIDE, PRESERVATIVE FREE 10 ML: 5 INJECTION INTRAVENOUS at 09:13

## 2023-11-14 RX ADMIN — BUDESONIDE AND FORMOTEROL FUMARATE DIHYDRATE 2 PUFF: 160; 4.5 AEROSOL RESPIRATORY (INHALATION) at 10:17

## 2023-11-14 RX ADMIN — PRAVASTATIN SODIUM 20 MG: 20 TABLET ORAL at 09:13

## 2023-11-14 RX ADMIN — TIMOLOL MALEATE 1 DROP: 5 SOLUTION OPHTHALMIC at 09:13

## 2023-11-14 RX ADMIN — DILTIAZEM HYDROCHLORIDE 180 MG: 180 CAPSULE, COATED, EXTENDED RELEASE ORAL at 09:13

## 2023-11-14 ASSESSMENT — ENCOUNTER SYMPTOMS
RESPIRATORY NEGATIVE: 1
GASTROINTESTINAL NEGATIVE: 1

## 2023-11-14 ASSESSMENT — PAIN SCALES - GENERAL
PAINLEVEL_OUTOF10: 0
PAINLEVEL_OUTOF10: 0

## 2023-11-14 NOTE — PLAN OF CARE
Patient admitted for hypoxia and is positive for Covid-19   Patient on 4L NC this AM.   Patient weaned to 1L NC. Pulse ox reading incorrectly this afternoon. Patient mistakenly placed back on 4L NC   Patient on Decadron 6 mg daily   ID and pulm on consult. Repeat chest xray for bilateral pleural effusions. Possibly might need thora.  Has had them in the past.     Problem: Discharge Planning  Goal: Discharge to home or other facility with appropriate resources  Outcome: Progressing     Problem: Pain  Goal: Verbalizes/displays adequate comfort level or baseline comfort level  Outcome: Progressing     Problem: Safety - Adult  Goal: Free from fall injury  Outcome: Progressing     Problem: ABCDS Injury Assessment  Goal: Absence of physical injury  Outcome: Progressing     Problem: Chronic Conditions and Co-morbidities  Goal: Patient's chronic conditions and co-morbidity symptoms are monitored and maintained or improved  Outcome: Progressing
Problem: Discharge Planning  Goal: Discharge to home or other facility with appropriate resources  11/10/2023 0221 by Chalo Souza RN  Outcome: Progressing  Flowsheets (Taken 11/9/2023 2000)  Discharge to home or other facility with appropriate resources: Identify barriers to discharge with patient and caregiver  11/9/2023 1241 by Maycol Scott RN  Outcome: Progressing     Problem: Pain  Goal: Verbalizes/displays adequate comfort level or baseline comfort level  11/10/2023 0221 by Chalo Souza RN  Outcome: Progressing  Flowsheets (Taken 11/9/2023 1835 by Janes Lechuga RN)  Verbalizes/displays adequate comfort level or baseline comfort level: Assess pain using appropriate pain scale  11/9/2023 1241 by Maycol Scott RN  Outcome: Progressing     Problem: Safety - Adult  Goal: Free from fall injury  11/10/2023 0221 by Chalo Souza RN  Outcome: Progressing  11/9/2023 1241 by Maycol Scott RN  Outcome: Progressing     Problem: ABCDS Injury Assessment  Goal: Absence of physical injury  11/10/2023 0221 by Chalo Souza RN  Outcome: Progressing  11/9/2023 1241 by Maycol Scott RN  Outcome: Progressing     Problem: Chronic Conditions and Co-morbidities  Goal: Patient's chronic conditions and co-morbidity symptoms are monitored and maintained or improved  11/10/2023 0221 by Chalo Souza RN  Outcome: Progressing  Flowsheets (Taken 11/9/2023 2000)  Care Plan - Patient's Chronic Conditions and Co-Morbidity Symptoms are Monitored and Maintained or Improved: Monitor and assess patient's chronic conditions and comorbid symptoms for stability, deterioration, or improvement  11/9/2023 1241 by Maycol Scott RN  Outcome: Progressing     Problem: Respiratory - Adult  Goal: Achieves optimal ventilation and oxygenation  11/10/2023 0221 by Chalo Souza RN  Outcome: Progressing  Flowsheets (Taken 11/9/2023 2000)  Achieves optimal ventilation and oxygenation: Assess for changes in respiratory
Problem: Discharge Planning  Goal: Discharge to home or other facility with appropriate resources  11/11/2023 2132 by Meri Law RN  Outcome: Progressing  11/11/2023 0948 by Mikey Ny RN  Outcome: Progressing  Flowsheets (Taken 11/10/2023 2000 by Lea Delong, RN)  Discharge to home or other facility with appropriate resources:   Identify barriers to discharge with patient and caregiver   Arrange for needed discharge resources and transportation as appropriate   Identify discharge learning needs (meds, wound care, etc)   Arrange for interpreters to assist at discharge as needed   Refer to discharge planning if patient needs post-hospital services based on physician order or complex needs related to functional status, cognitive ability or social support system     Problem: Pain  Goal: Verbalizes/displays adequate comfort level or baseline comfort level  11/11/2023 2132 by Meri Law RN  Outcome: Progressing  11/11/2023 0948 by Mikey Ny RN  Outcome: Progressing  Flowsheets (Taken 11/10/2023 2000 by Lea Delong, RN)  Verbalizes/displays adequate comfort level or baseline comfort level:   Encourage patient to monitor pain and request assistance   Assess pain using appropriate pain scale   Administer analgesics based on type and severity of pain and evaluate response   Implement non-pharmacological measures as appropriate and evaluate response   Consider cultural and social influences on pain and pain management   Notify Licensed Independent Practitioner if interventions unsuccessful or patient reports new pain     Problem: Safety - Adult  Goal: Free from fall injury  11/11/2023 2132 by Meri Law RN  Outcome: Progressing  11/11/2023 0948 by Mikey Ny RN  Outcome: Progressing     Problem: ABCDS Injury Assessment  Goal: Absence of physical injury  11/11/2023 2132 by Meri Law RN  Outcome: Progressing  11/11/2023 0948 by Mikey Ny RN  Outcome: Progressing
Problem: Discharge Planning  Goal: Discharge to home or other facility with appropriate resources  11/12/2023 0743 by Robert Potter RN  Outcome: Progressing     Problem: Pain  Goal: Verbalizes/displays adequate comfort level or baseline comfort level  11/12/2023 0743 by Robert Potter RN  Outcome: Progressing     Problem: Safety - Adult  Goal: Free from fall injury  11/12/2023 0743 by Robert Potter RN  Outcome: Progressing     Problem: ABCDS Injury Assessment  Goal: Absence of physical injury  11/12/2023 0743 by Robert Potter RN  Outcome: Progressing     Problem: Chronic Conditions and Co-morbidities  Goal: Patient's chronic conditions and co-morbidity symptoms are monitored and maintained or improved  11/12/2023 0743 by Robert Potter RN  Outcome: Progressing     Problem: Respiratory - Adult  Goal: Achieves optimal ventilation and oxygenation  11/12/2023 0743 by Robert Potter RN  Outcome: Progressing     Problem: Skin/Tissue Integrity  Goal: Absence of new skin breakdown  Description: 1. Monitor for areas of redness and/or skin breakdown  2. Assess vascular access sites hourly  3. Every 4-6 hours minimum:  Change oxygen saturation probe site  4. Every 4-6 hours:  If on nasal continuous positive airway pressure, respiratory therapy assess nares and determine need for appliance change or resting period. 11/12/2023 0743 by Robert Potter RN  Outcome: Progressing     Problem: Confusion  Goal: Confusion, delirium, dementia, or psychosis is improved or at baseline  Description: INTERVENTIONS:  1. Assess for possible contributors to thought disturbance, including medications, impaired vision or hearing, underlying metabolic abnormalities, dehydration, psychiatric diagnoses, and notify attending LIP  2. [place] high risk fall precautions, as indicated  3. Provide frequent short contacts to provide reality reorientation, refocusing and direction  4.  Decrease environmental stimuli, including noise as
Problem: Discharge Planning  Goal: Discharge to home or other facility with appropriate resources  11/14/2023 0509 by Saul Giraldo RN  Outcome: Progressing  Flowsheets (Taken 11/13/2023 2000)  Discharge to home or other facility with appropriate resources: Identify barriers to discharge with patient and caregiver     Problem: Pain  Goal: Verbalizes/displays adequate comfort level or baseline comfort level  11/14/2023 0509 by Saul Giraldo RN  Outcome: Progressing     Problem: Safety - Adult  Goal: Free from fall injury  11/14/2023 0509 by Saul Giraldo RN  Outcome: Progressing     Problem: ABCDS Injury Assessment  Goal: Absence of physical injury  11/14/2023 0509 by Saul Giraldo RN  Outcome: Progressing     Problem: Chronic Conditions and Co-morbidities  Goal: Patient's chronic conditions and co-morbidity symptoms are monitored and maintained or improved  11/14/2023 0509 by Saul Giraldo RN  Outcome: Progressing  Flowsheets (Taken 11/13/2023 2000)  Care Plan - Patient's Chronic Conditions and Co-Morbidity Symptoms are Monitored and Maintained or Improved: Monitor and assess patient's chronic conditions and comorbid symptoms for stability, deterioration, or improvement     Problem: Respiratory - Adult  Goal: Achieves optimal ventilation and oxygenation  11/14/2023 0509 by Saul Giraldo RN  Outcome: Progressing  Flowsheets (Taken 11/13/2023 2000)  Achieves optimal ventilation and oxygenation:   Assess for changes in respiratory status   Assess for changes in mentation and behavior   Position to facilitate oxygenation and minimize respiratory effort   Respiratory therapy support as indicated
Problem: Discharge Planning  Goal: Discharge to home or other facility with appropriate resources  11/8/2023 0022 by Jazmin Alegre RN  Outcome: Progressing  Flowsheets (Taken 11/7/2023 2000)  Discharge to home or other facility with appropriate resources:   Identify barriers to discharge with patient and caregiver   Arrange for needed discharge resources and transportation as appropriate   Identify discharge learning needs (meds, wound care, etc)     Problem: Pain  Goal: Verbalizes/displays adequate comfort level or baseline comfort level  11/8/2023 0022 by Jazmin Alegre RN  Outcome: Progressing     Problem: Safety - Adult  Goal: Free from fall injury  11/8/2023 0022 by Jazmin Alegre RN  Outcome: Progressing     Problem: ABCDS Injury Assessment  Goal: Absence of physical injury  11/8/2023 0022 by Jazmin Alegre RN  Outcome: Progressing     Problem: Chronic Conditions and Co-morbidities  Goal: Patient's chronic conditions and co-morbidity symptoms are monitored and maintained or improved  11/8/2023 0022 by Jazmin Alegre RN  Outcome: Progressing  Flowsheets (Taken 11/7/2023 2000)  Care Plan - Patient's Chronic Conditions and Co-Morbidity Symptoms are Monitored and Maintained or Improved:   Monitor and assess patient's chronic conditions and comorbid symptoms for stability, deterioration, or improvement   Collaborate with multidisciplinary team to address chronic and comorbid conditions and prevent exacerbation or deterioration   Update acute care plan with appropriate goals if chronic or comorbid symptoms are exacerbated and prevent overall improvement and discharge
Problem: Discharge Planning  Goal: Discharge to home or other facility with appropriate resources  Outcome: Progressing    Problem: Pain  Goal: Verbalizes/displays adequate comfort level or baseline comfort level  Outcome: Progressing  Problem: Safety - Adult  Goal: Free from fall injury  Outcome: Progressing     Problem: ABCDS Injury Assessment  Goal: Absence of physical injury  Outcome: Progressing     Problem: Chronic Conditions and Co-morbidities  Goal: Patient's chronic conditions and co-morbidity symptoms are monitored and maintained or improved  Outcome: Progressing  Problem: Respiratory - Adult  Goal: Achieves optimal ventilation and oxygenation  Outcome: Progressing    Problem: Skin/Tissue Integrity  Goal: Absence of new skin breakdown  Description: 1. Monitor for areas of redness and/or skin breakdown  2. Assess vascular access sites hourly  3. Every 4-6 hours minimum:  Change oxygen saturation probe site  4. Every 4-6 hours:  If on nasal continuous positive airway pressure, respiratory therapy assess nares and determine need for appliance change or resting period. Outcome: Progressing     Problem: Confusion  Goal: Confusion, delirium, dementia, or psychosis is improved or at baseline  Description: INTERVENTIONS:  1. Assess for possible contributors to thought disturbance, including medications, impaired vision or hearing, underlying metabolic abnormalities, dehydration, psychiatric diagnoses, and notify attending LIP  2. [place] high risk fall precautions, as indicated  3. Provide frequent short contacts to provide reality reorientation, refocusing and direction  4. Decrease environmental stimuli, including noise as appropriate  5. Monitor and intervene to maintain adequate nutrition, hydration, elimination, sleep and activity  6. If unable to ensure safety without constant attention obtain sitter and review sitter guidelines with assigned personnel  7.  Initiate Psychosocial CNS and Spiritual
Problem: Discharge Planning  Goal: Discharge to home or other facility with appropriate resources  Outcome: Progressing  Flowsheets  Taken 11/13/2023 1600  Discharge to home or other facility with appropriate resources:   Identify barriers to discharge with patient and caregiver   Identify discharge learning needs (meds, wound care, etc)   Arrange for needed discharge resources and transportation as appropriate   Refer to discharge planning if patient needs post-hospital services based on physician order or complex needs related to functional status, cognitive ability or social support system  Taken 11/13/2023 0800  Discharge to home or other facility with appropriate resources:   Identify barriers to discharge with patient and caregiver   Arrange for needed discharge resources and transportation as appropriate   Identify discharge learning needs (meds, wound care, etc)   Refer to discharge planning if patient needs post-hospital services based on physician order or complex needs related to functional status, cognitive ability or social support system     Problem: Pain  Goal: Verbalizes/displays adequate comfort level or baseline comfort level  Outcome: Progressing  Flowsheets  Taken 11/13/2023 1600  Verbalizes/displays adequate comfort level or baseline comfort level:   Encourage patient to monitor pain and request assistance   Assess pain using appropriate pain scale   Administer analgesics based on type and severity of pain and evaluate response   Implement non-pharmacological measures as appropriate and evaluate response   Consider cultural and social influences on pain and pain management   Notify Licensed Independent Practitioner if interventions unsuccessful or patient reports new pain  Taken 11/13/2023 1200  Verbalizes/displays adequate comfort level or baseline comfort level:   Encourage patient to monitor pain and request assistance   Assess pain using appropriate pain scale   Administer analgesics based
Problem: Discharge Planning  Goal: Discharge to home or other facility with appropriate resources  Outcome: Progressing  Flowsheets (Taken 11/7/2023 0000)  Discharge to home or other facility with appropriate resources:   Identify barriers to discharge with patient and caregiver   Arrange for needed discharge resources and transportation as appropriate   Identify discharge learning needs (meds, wound care, etc)     Problem: Pain  Goal: Verbalizes/displays adequate comfort level or baseline comfort level  Outcome: Progressing     Problem: Safety - Adult  Goal: Free from fall injury  Outcome: Progressing     Problem: ABCDS Injury Assessment  Goal: Absence of physical injury  Outcome: Progressing     Problem: Chronic Conditions and Co-morbidities  Goal: Patient's chronic conditions and co-morbidity symptoms are monitored and maintained or improved  Outcome: Progressing  Flowsheets (Taken 11/7/2023 0000)  Care Plan - Patient's Chronic Conditions and Co-Morbidity Symptoms are Monitored and Maintained or Improved:   Monitor and assess patient's chronic conditions and comorbid symptoms for stability, deterioration, or improvement   Collaborate with multidisciplinary team to address chronic and comorbid conditions and prevent exacerbation or deterioration   Update acute care plan with appropriate goals if chronic or comorbid symptoms are exacerbated and prevent overall improvement and discharge
Problem: Pain  Goal: Verbalizes/displays adequate comfort level or baseline comfort level  Outcome: Progressing     Problem: Safety - Adult  Goal: Free from fall injury  Outcome: Progressing     Problem: ABCDS Injury Assessment  Goal: Absence of physical injury  Outcome: Progressing     Problem: Chronic Conditions and Co-morbidities  Goal: Patient's chronic conditions and co-morbidity symptoms are monitored and maintained or improved  Outcome: Progressing     Problem: Respiratory - Adult  Goal: Achieves optimal ventilation and oxygenation  11/13/2023 0348 by Arpita Allan RN  Outcome: Progressing     Problem: Skin/Tissue Integrity  Goal: Absence of new skin breakdown  Description: 1. Monitor for areas of redness and/or skin breakdown  2. Assess vascular access sites hourly  3. Every 4-6 hours minimum:  Change oxygen saturation probe site  4. Every 4-6 hours:  If on nasal continuous positive airway pressure, respiratory therapy assess nares and determine need for appliance change or resting period.   Outcome: Progressing
Problem: Respiratory - Adult  Goal: Achieves optimal ventilation and oxygenation  11/12/2023 1140 by Mesfin MALONEY RCP  Outcome: Progressing     Problem: Respiratory - Adult  Goal: Adequate oxygenation  Description: Adequate oxygenation  Outcome: Progressing     Problem: Respiratory - Adult  Goal: Able to breathe comfortably  Description: Able to breathe comfortably  Outcome: Progressing
Problem: Respiratory - Adult  Goal: Achieves optimal ventilation and oxygenation  11/12/2023 1903 by John Steinberg RCP  Outcome: Progressing     Problem: Respiratory - Adult  Goal: Adequate oxygenation  Description: Adequate oxygenation  11/12/2023 1903 by John Steinberg RCP  Outcome: Progressing     Problem: Respiratory - Adult  Goal: Able to breathe comfortably  Description: Able to breathe comfortably  11/12/2023 1903 by John Steinberg RCP  Outcome: Progressing
Problem: Respiratory - Adult  Goal: Achieves optimal ventilation and oxygenation  11/13/2023 0840 by Enrique Freed RCP  Outcome: Progressing  11/13/2023 0348 by Simón Knox RN  Outcome: Progressing  Flowsheets (Taken 11/12/2023 2000)  Achieves optimal ventilation and oxygenation: Assess for changes in respiratory status  11/12/2023 1903 by Warner Delaney RCP  Outcome: Progressing  Goal: Adequate oxygenation  Description: Adequate oxygenation  11/13/2023 0840 by Enrique Freed RCP  Outcome: Progressing  11/12/2023 1903 by Warner Delaney RCP  Outcome: Progressing  Goal: Able to breathe comfortably  Description: Able to breathe comfortably  11/13/2023 0840 by Enrique Freed RCP  Outcome: Progressing  11/12/2023 1903 by Warner Delaney RCP  Outcome: Progressing
Problem: Respiratory - Adult  Goal: Achieves optimal ventilation and oxygenation  11/14/2023 1035 by Micheal Lucas RCP  Outcome: Progressing  11/14/2023 0531 by Riley Renee RN  Outcome: Progressing  11/14/2023 0509 by Riley Renee RN  Outcome: Progressing  Flowsheets (Taken 11/13/2023 2000)  Achieves optimal ventilation and oxygenation:   Assess for changes in respiratory status   Assess for changes in mentation and behavior   Position to facilitate oxygenation and minimize respiratory effort   Respiratory therapy support as indicated  Goal: Adequate oxygenation  Description: Adequate oxygenation  Outcome: Progressing  Goal: Able to breathe comfortably  Description: Able to breathe comfortably  Outcome: Progressing
Problem: Respiratory - Adult  Goal: Achieves optimal ventilation and oxygenation  Outcome: Progressing  Goal: Adequate oxygenation  Description: Adequate oxygenation  Outcome: Progressing  Goal: Able to breathe comfortably  Description: Able to breathe comfortably  Outcome: Progressing
Pt alert and oriented, very [place]. Transfers/ambulates with 1 assist and cane. IV to left arm occluded, unable to flush. IV removed, moderate amount of bleeding from site. Pressure applied, bleeding stopped, dressing applied. Attempt IV insertion in left arm x 2 without success. Left forearm has large purple bruise, ice applied, pt denies pain. IV placement successful to left AC with 22g. Pt tolerated well. Pt denies pain/SOB/cough. Pt does remain on 2L of oxygen and oxygen saturation at 95%, oxygen turned down to 1L, continue to monitor.        Problem: Chronic Conditions and Co-morbidities  Goal: Patient's chronic conditions and co-morbidity symptoms are monitored and maintained or improved  Outcome: Progressing     Problem: Safety - Adult  Goal: Free from fall injury  Outcome: Progressing
Pt remained calm and cooperative with only a few instances where she needed to be redirected to not remove her telemetry pads. Pt on 5L NC d/t her desatting overnight, and is currently satting at 96-97%. Pt up to restroom with one assist and walker. Pt to have thora today. Will continue to monitor, call light within reach, bed in locked and lowest position.      Problem: Discharge Planning  Goal: Discharge to home or other facility with appropriate resources  Outcome: Progressing     Problem: Pain  Goal: Verbalizes/displays adequate comfort level or baseline comfort level  Outcome: Progressing     Problem: Safety - Adult  Goal: Free from fall injury  Outcome: Progressing     Problem: ABCDS Injury Assessment  Goal: Absence of physical injury  Outcome: Progressing     Problem: Chronic Conditions and Co-morbidities  Goal: Patient's chronic conditions and co-morbidity symptoms are monitored and maintained or improved  Outcome: Progressing
Pt started on continuous BiPap. Multiple attempts to remove mask. Dr. Ami Rush notified and order obtained for one time dose of ativan. Dose given and pt tolerated well. She is resting quietly with Bipap in place. Son remain at bedside. Safety measures remain in place.        Problem: Chronic Conditions and Co-morbidities  Goal: Patient's chronic conditions and co-morbidity symptoms are monitored and maintained or improved  11/9/2023 1241 by Hector Tompkins RN  Outcome: Progressing     Problem: Respiratory - Adult  Goal: Able to breathe comfortably  11/9/2023 0929 by Akhil MALONEY RCP  Outcome: Progressing
assessment  11/10/2023 0222 by Luther Blanca RN  Outcome: Progressing  11/10/2023 0221 by Luther Blanca RN  Outcome: Progressing     Problem: Chronic Conditions and Co-morbidities  Goal: Patient's chronic conditions and co-morbidity symptoms are monitored and maintained or improved  11/10/2023 1325 by Rosio Donahue RN  Outcome: Progressing  Flowsheets (Taken 11/10/2023 0830)  Care Plan - Patient's Chronic Conditions and Co-Morbidity Symptoms are Monitored and Maintained or Improved:   Monitor and assess patient's chronic conditions and comorbid symptoms for stability, deterioration, or improvement   Collaborate with multidisciplinary team to address chronic and comorbid conditions and prevent exacerbation or deterioration  11/10/2023 0222 by Luther Blanca RN  Outcome: Progressing  11/10/2023 0221 by Luther Blanca RN  Outcome: Progressing  Flowsheets (Taken 11/9/2023 2000)  Care Plan - Patient's Chronic Conditions and Co-Morbidity Symptoms are Monitored and Maintained or Improved: Monitor and assess patient's chronic conditions and comorbid symptoms for stability, deterioration, or improvement     Problem: Respiratory - Adult  Goal: Achieves optimal ventilation and oxygenation  11/10/2023 1325 by Rosio Donahue RN  Outcome: Progressing  Flowsheets (Taken 11/10/2023 0830)  Achieves optimal ventilation and oxygenation:   Assess for changes in respiratory status   Assess for changes in mentation and behavior   Position to facilitate oxygenation and minimize respiratory effort   Oxygen supplementation based on oxygen saturation or arterial blood gases   Encourage broncho-pulmonary hygiene including cough, deep breathe, incentive spirometry   Assess the need for suctioning and aspirate as needed   Assess and instruct to report shortness of breath or any respiratory difficulty   Respiratory therapy support as indicated  11/10/2023 0222 by Luther Blanca RN  Outcome: Progressing  11/10/2023

## 2023-11-14 NOTE — CARE COORDINATION
Social work: Patient to [employer] to [facility] via Lenin Jane at [employer].  # for RN report: [phone] Neeraj Dumont RN. Completed [facility] faxed to [phone]. Informed RN, pt, and facility of dc time, agreeable to plan. HENS completed.

## 2023-11-15 ENCOUNTER — HOSPITAL ENCOUNTER (OUTPATIENT)
Age: 88
Setting detail: SPECIMEN
Discharge: HOME | End: 2023-11-15

## 2023-11-15 LAB
ALBUMIN SERPL-MCNC: 2.6 G/DL (ref 3.5–5.2)
ALBUMIN/GLOB SERPL: 1 {RATIO} (ref 1–2.5)
ALP SERPL-CCNC: 76 U/L (ref 35–104)
ALT SERPL-CCNC: 54 U/L (ref 10–35)
ANION GAP SERPL CALCULATED.3IONS-SCNC: 5 MMOL/L (ref 9–16)
AST SERPL-CCNC: 37 U/L (ref 10–35)
BILIRUB SERPL-MCNC: 0.5 MG/DL (ref 0–1.2)
BUN SERPL-MCNC: 29 MG/DL (ref 8–23)
CALCIUM SERPL-MCNC: 9 MG/DL (ref 8.6–10.4)
CHLORIDE SERPL-SCNC: 104 MMOL/L (ref 98–107)
CO2 SERPL-SCNC: 34 MMOL/L (ref 20–31)
CREAT SERPL-MCNC: 0.8 MG/DL (ref 0.5–0.9)
ERYTHROCYTE [DISTWIDTH] IN BLOOD BY AUTOMATED COUNT: 14.8 % (ref 11.8–14.4)
GLUCOSE SERPL-MCNC: 91 MG/DL (ref 74–99)
HCT VFR BLD AUTO: 45.1 % (ref 36.3–47.1)
HGB BLD-MCNC: 13.9 G/DL (ref 11.9–15.1)
MCH RBC QN AUTO: 31.5 PG (ref 25.2–33.5)
MCHC RBC AUTO-ENTMCNC: 30.8 G/DL (ref 28.4–34.8)
MCV RBC AUTO: 102.3 FL (ref 82.6–102.9)
NRBC BLD-RTO: 0 PER 100 WBC
PLATELET # BLD AUTO: 106 K/UL (ref 138–453)
PMV BLD AUTO: 12.1 FL (ref 8.1–13.5)
POTASSIUM SERPL-SCNC: 4.4 MMOL/L (ref 3.7–5.3)
PROT SERPL-MCNC: 5.2 G/DL (ref 6.6–8.7)
RBC # BLD AUTO: 4.41 M/UL (ref 3.95–5.11)
SODIUM SERPL-SCNC: 143 MMOL/L (ref 136–145)
WBC OTHER # BLD: 7.7 K/UL (ref 3.5–11.3)

## 2023-11-15 PROCEDURE — P9603 ONE-WAY ALLOW PRORATED MILES: HCPCS

## 2023-11-15 PROCEDURE — 85027 COMPLETE CBC AUTOMATED: CPT

## 2023-11-15 PROCEDURE — 36415 COLL VENOUS BLD VENIPUNCTURE: CPT

## 2023-11-15 PROCEDURE — 80053 COMPREHEN METABOLIC PANEL: CPT

## 2023-11-16 ENCOUNTER — HOSPITAL ENCOUNTER (OUTPATIENT)
Age: 88
Setting detail: SPECIMEN
Discharge: HOME | End: 2023-11-16

## 2023-11-16 LAB
DATE, STOOL #1: NORMAL
HEMOCCULT SP1 STL QL: NEGATIVE
TIME, STOOL #1: 948

## 2023-11-16 PROCEDURE — 82270 OCCULT BLOOD FECES: CPT

## 2023-11-16 PROCEDURE — 87086 URINE CULTURE/COLONY COUNT: CPT

## 2023-11-16 PROCEDURE — 81001 URINALYSIS AUTO W/SCOPE: CPT

## 2023-11-16 PROCEDURE — 87077 CULTURE AEROBIC IDENTIFY: CPT

## 2023-11-16 PROCEDURE — 87186 SC STD MICRODIL/AGAR DIL: CPT

## 2023-11-17 LAB
BACTERIA URNS QL MICRO: ABNORMAL
BILIRUB UR QL STRIP: NEGATIVE
CASTS #/AREA URNS LPF: ABNORMAL /LPF (ref 0–8)
CLARITY UR: ABNORMAL
COLOR UR: YELLOW
EPI CELLS #/AREA URNS HPF: ABNORMAL /HPF (ref 0–5)
GLUCOSE UR STRIP-MCNC: NEGATIVE MG/DL
HGB UR QL STRIP.AUTO: NEGATIVE
KETONES UR STRIP-MCNC: NEGATIVE MG/DL
LEUKOCYTE ESTERASE UR QL STRIP: ABNORMAL
NITRITE UR QL STRIP: NEGATIVE
PH UR STRIP: 6 [PH] (ref 5–8)
PROT UR STRIP-MCNC: NEGATIVE MG/DL
RBC #/AREA URNS HPF: ABNORMAL /HPF (ref 0–4)
SP GR UR STRIP: 1.01 (ref 1–1.03)
UROBILINOGEN UR STRIP-ACNC: NORMAL EU/DL (ref 0–1)
WBC #/AREA URNS HPF: ABNORMAL /HPF (ref 0–5)

## 2023-11-19 LAB
MICROORGANISM SPEC CULT: ABNORMAL
SERVICE CMNT-IMP: ABNORMAL
SPECIMEN DESCRIPTION: ABNORMAL

## 2023-11-28 ENCOUNTER — HOSPITAL ENCOUNTER (OUTPATIENT)
Age: 88
Setting detail: SPECIMEN
Discharge: HOME | End: 2023-11-28

## 2023-11-28 LAB
ALBUMIN SERPL-MCNC: 2.6 G/DL (ref 3.5–5.2)
ALBUMIN/GLOB SERPL: 0.8 {RATIO} (ref 1–2.5)
ALP SERPL-CCNC: 96 U/L (ref 35–104)
ALT SERPL-CCNC: 17 U/L (ref 5–33)
ANION GAP SERPL CALCULATED.3IONS-SCNC: 5 MMOL/L (ref 9–17)
AST SERPL-CCNC: 20 U/L
BILIRUB SERPL-MCNC: 0.5 MG/DL (ref 0.3–1.2)
BUN SERPL-MCNC: 11 MG/DL (ref 8–23)
CALCIUM SERPL-MCNC: 8.9 MG/DL (ref 8.6–10.4)
CHLORIDE SERPL-SCNC: 105 MMOL/L (ref 98–107)
CO2 SERPL-SCNC: 35 MMOL/L (ref 20–31)
CREAT SERPL-MCNC: 0.6 MG/DL (ref 0.5–0.9)
ERYTHROCYTE [DISTWIDTH] IN BLOOD BY AUTOMATED COUNT: 14.5 % (ref 11.8–14.4)
GFR SERPL CREATININE-BSD FRML MDRD: >60 ML/MIN/1.73M2
GLUCOSE SERPL-MCNC: 87 MG/DL (ref 70–99)
HCT VFR BLD AUTO: 45.8 % (ref 36.3–47.1)
HGB BLD-MCNC: 13.6 G/DL (ref 11.9–15.1)
MCH RBC QN AUTO: 32 PG (ref 25.2–33.5)
MCHC RBC AUTO-ENTMCNC: 29.7 G/DL (ref 28.4–34.8)
MCV RBC AUTO: 107.8 FL (ref 82.6–102.9)
NRBC BLD-RTO: 0 PER 100 WBC
PLATELET # BLD AUTO: 138 K/UL (ref 138–453)
PMV BLD AUTO: 11.6 FL (ref 8.1–13.5)
POTASSIUM SERPL-SCNC: 5 MMOL/L (ref 3.7–5.3)
PROT SERPL-MCNC: 5.8 G/DL (ref 6.4–8.3)
RBC # BLD AUTO: 4.25 M/UL (ref 3.95–5.11)
SODIUM SERPL-SCNC: 145 MMOL/L (ref 135–144)
WBC OTHER # BLD: 5.7 K/UL (ref 3.5–11.3)

## 2023-11-28 PROCEDURE — 85027 COMPLETE CBC AUTOMATED: CPT

## 2023-11-28 PROCEDURE — P9603 ONE-WAY ALLOW PRORATED MILES: HCPCS

## 2023-11-28 PROCEDURE — 36415 COLL VENOUS BLD VENIPUNCTURE: CPT

## 2023-11-28 PROCEDURE — 80053 COMPREHEN METABOLIC PANEL: CPT

## 2023-11-29 ENCOUNTER — APPOINTMENT (OUTPATIENT)
Dept: CT IMAGING | Age: 88
DRG: 291 | End: 2023-11-29
Payer: MEDICARE

## 2023-11-29 ENCOUNTER — HOSPITAL ENCOUNTER (INPATIENT)
Age: 88
LOS: 6 days | Discharge: HOME | DRG: 291 | End: 2023-12-05
Attending: EMERGENCY MEDICINE | Admitting: INTERNAL MEDICINE
Payer: MEDICARE

## 2023-11-29 ENCOUNTER — HOSPITAL ENCOUNTER (OUTPATIENT)
Age: 88
Setting detail: SPECIMEN
Discharge: HOME | End: 2023-11-29

## 2023-11-29 ENCOUNTER — APPOINTMENT (OUTPATIENT)
Dept: GENERAL RADIOLOGY | Age: 88
DRG: 291 | End: 2023-11-29
Payer: MEDICARE

## 2023-11-29 DIAGNOSIS — J90 BILATERAL PLEURAL EFFUSION: ICD-10-CM

## 2023-11-29 DIAGNOSIS — R09.02 HYPOXIA: Primary | ICD-10-CM

## 2023-11-29 DIAGNOSIS — U07.1 COVID: ICD-10-CM

## 2023-11-29 LAB
ALBUMIN SERPL-MCNC: 3 G/DL (ref 3.5–5.2)
ALP SERPL-CCNC: 99 U/L (ref 35–104)
ALT SERPL-CCNC: 16 U/L (ref 5–33)
ANION GAP SERPL CALCULATED.3IONS-SCNC: 2 MMOL/L (ref 9–17)
AST SERPL-CCNC: 20 U/L
BASOPHILS # BLD: 0 K/UL (ref 0–0.2)
BASOPHILS NFR BLD: 1 % (ref 0–2)
BILIRUB SERPL-MCNC: 0.4 MG/DL (ref 0.3–1.2)
BNP SERPL-MCNC: 934 PG/ML
BUN SERPL-MCNC: 16 MG/DL (ref 8–23)
CALCIUM SERPL-MCNC: 9.2 MG/DL (ref 8.6–10.4)
CHLORIDE SERPL-SCNC: 103 MMOL/L (ref 98–107)
CO2 SERPL-SCNC: 37 MMOL/L (ref 20–31)
CREAT SERPL-MCNC: 0.7 MG/DL (ref 0.5–0.9)
CRP SERPL HS-MCNC: 15.4 MG/L (ref 0–5)
EOSINOPHIL # BLD: 0.2 K/UL (ref 0–0.4)
EOSINOPHILS RELATIVE PERCENT: 5 % (ref 0–4)
ERYTHROCYTE [DISTWIDTH] IN BLOOD BY AUTOMATED COUNT: 14.4 % (ref 11.8–14.4)
ERYTHROCYTE [DISTWIDTH] IN BLOOD BY AUTOMATED COUNT: 15.5 % (ref 11.5–14.9)
FLUAV RNA RESP QL NAA+PROBE: NOT DETECTED
FLUBV RNA RESP QL NAA+PROBE: NOT DETECTED
GFR SERPL CREATININE-BSD FRML MDRD: >60 ML/MIN/1.73M2
GLUCOSE SERPL-MCNC: 115 MG/DL (ref 70–99)
HCT VFR BLD AUTO: 41.5 % (ref 36.3–47.1)
HCT VFR BLD AUTO: 41.5 % (ref 36–46)
HGB BLD-MCNC: 12.2 G/DL (ref 11.9–15.1)
HGB BLD-MCNC: 13 G/DL (ref 12–16)
LDH SERPL-CCNC: 196 U/L (ref 135–214)
LYMPHOCYTES NFR BLD: 0.6 K/UL (ref 1–4.8)
LYMPHOCYTES RELATIVE PERCENT: 13 % (ref 24–44)
MAGNESIUM SERPL-MCNC: 2.2 MG/DL (ref 1.6–2.6)
MCH RBC QN AUTO: 31.8 PG (ref 25.2–33.5)
MCH RBC QN AUTO: 32.1 PG (ref 26–34)
MCHC RBC AUTO-ENTMCNC: 29.4 G/DL (ref 28.4–34.8)
MCHC RBC AUTO-ENTMCNC: 31.4 G/DL (ref 31–37)
MCV RBC AUTO: 102.1 FL (ref 80–100)
MCV RBC AUTO: 108.1 FL (ref 82.6–102.9)
MONOCYTES NFR BLD: 0.4 K/UL (ref 0.1–1.3)
MONOCYTES NFR BLD: 8 % (ref 1–7)
NEUTROPHILS NFR BLD: 73 % (ref 36–66)
NEUTS SEG NFR BLD: 3.4 K/UL (ref 1.3–9.1)
NRBC BLD-RTO: 0 PER 100 WBC
PLATELET # BLD AUTO: 117 K/UL (ref 138–453)
PLATELET # BLD AUTO: 127 K/UL (ref 150–450)
PMV BLD AUTO: 11.1 FL (ref 8.1–13.5)
PMV BLD AUTO: 9.1 FL (ref 6–12)
POTASSIUM SERPL-SCNC: 4.9 MMOL/L (ref 3.7–5.3)
PROT SERPL-MCNC: 6.2 G/DL (ref 6.4–8.3)
RBC # BLD AUTO: 3.84 M/UL (ref 3.95–5.11)
RBC # BLD AUTO: 4.06 M/UL (ref 4–5.2)
SARS-COV-2 RNA RESP QL NAA+PROBE: DETECTED
SODIUM SERPL-SCNC: 142 MMOL/L (ref 135–144)
SOURCE: ABNORMAL
SPECIMEN DESCRIPTION: ABNORMAL
TROPONIN I SERPL HS-MCNC: 29 NG/L (ref 0–14)
TROPONIN I SERPL HS-MCNC: 30 NG/L (ref 0–14)
WBC OTHER # BLD: 3.9 K/UL (ref 3.5–11.3)
WBC OTHER # BLD: 4.6 K/UL (ref 3.5–11)

## 2023-11-29 PROCEDURE — 86140 C-REACTIVE PROTEIN: CPT

## 2023-11-29 PROCEDURE — 2500000003 HC RX 250 WO HCPCS: Performed by: EMERGENCY MEDICINE

## 2023-11-29 PROCEDURE — 96361 HYDRATE IV INFUSION ADD-ON: CPT

## 2023-11-29 PROCEDURE — 2060000000 HC ICU INTERMEDIATE R&B

## 2023-11-29 PROCEDURE — 36415 COLL VENOUS BLD VENIPUNCTURE: CPT

## 2023-11-29 PROCEDURE — 71260 CT THORAX DX C+: CPT

## 2023-11-29 PROCEDURE — 84484 ASSAY OF TROPONIN QUANT: CPT

## 2023-11-29 PROCEDURE — 96365 THER/PROPH/DIAG IV INF INIT: CPT

## 2023-11-29 PROCEDURE — 83615 LACTATE (LD) (LDH) ENZYME: CPT

## 2023-11-29 PROCEDURE — 6360000004 HC RX CONTRAST MEDICATION: Performed by: EMERGENCY MEDICINE

## 2023-11-29 PROCEDURE — 80053 COMPREHEN METABOLIC PANEL: CPT

## 2023-11-29 PROCEDURE — 93005 ELECTROCARDIOGRAM TRACING: CPT | Performed by: EMERGENCY MEDICINE

## 2023-11-29 PROCEDURE — 87636 SARSCOV2 & INF A&B AMP PRB: CPT

## 2023-11-29 PROCEDURE — 85025 COMPLETE CBC W/AUTO DIFF WBC: CPT

## 2023-11-29 PROCEDURE — P9603 ONE-WAY ALLOW PRORATED MILES: HCPCS

## 2023-11-29 PROCEDURE — 83880 ASSAY OF NATRIURETIC PEPTIDE: CPT

## 2023-11-29 PROCEDURE — 6360000002 HC RX W HCPCS: Performed by: EMERGENCY MEDICINE

## 2023-11-29 PROCEDURE — 83735 ASSAY OF MAGNESIUM: CPT

## 2023-11-29 PROCEDURE — 71045 X-RAY EXAM CHEST 1 VIEW: CPT

## 2023-11-29 PROCEDURE — 94760 N-INVAS EAR/PLS OXIMETRY 1: CPT

## 2023-11-29 PROCEDURE — 96375 TX/PRO/DX INJ NEW DRUG ADDON: CPT

## 2023-11-29 PROCEDURE — 2700000000 HC OXYGEN THERAPY PER DAY

## 2023-11-29 PROCEDURE — 2580000003 HC RX 258: Performed by: EMERGENCY MEDICINE

## 2023-11-29 PROCEDURE — 85027 COMPLETE CBC AUTOMATED: CPT

## 2023-11-29 PROCEDURE — 99285 EMERGENCY DEPT VISIT HI MDM: CPT

## 2023-11-29 RX ORDER — LATANOPROST 50 UG/ML
1 SOLUTION/ DROPS OPHTHALMIC NIGHTLY
Status: DISCONTINUED | OUTPATIENT
Start: 2023-11-29 | End: 2023-12-05 | Stop reason: HOSPADM

## 2023-11-29 RX ORDER — ACETAMINOPHEN 650 MG/1
650 SUPPOSITORY RECTAL EVERY 6 HOURS PRN
Status: DISCONTINUED | OUTPATIENT
Start: 2023-11-29 | End: 2023-12-05 | Stop reason: HOSPADM

## 2023-11-29 RX ORDER — SODIUM CHLORIDE 9 MG/ML
INJECTION, SOLUTION INTRAVENOUS PRN
Status: DISCONTINUED | OUTPATIENT
Start: 2023-11-29 | End: 2023-12-05 | Stop reason: HOSPADM

## 2023-11-29 RX ORDER — DEXAMETHASONE SODIUM PHOSPHATE 10 MG/ML
10 INJECTION, SOLUTION INTRAMUSCULAR; INTRAVENOUS ONCE
Status: COMPLETED | OUTPATIENT
Start: 2023-11-29 | End: 2023-11-29

## 2023-11-29 RX ORDER — MAGNESIUM SULFATE HEPTAHYDRATE 40 MG/ML
2000 INJECTION, SOLUTION INTRAVENOUS ONCE
Status: COMPLETED | OUTPATIENT
Start: 2023-11-29 | End: 2023-11-29

## 2023-11-29 RX ORDER — IPRATROPIUM BROMIDE AND ALBUTEROL SULFATE 2.5; .5 MG/3ML; MG/3ML
1 SOLUTION RESPIRATORY (INHALATION)
Status: DISCONTINUED | OUTPATIENT
Start: 2023-11-29 | End: 2023-11-30

## 2023-11-29 RX ORDER — FUROSEMIDE 10 MG/ML
40 INJECTION INTRAMUSCULAR; INTRAVENOUS ONCE
Status: COMPLETED | OUTPATIENT
Start: 2023-11-29 | End: 2023-11-29

## 2023-11-29 RX ORDER — CETIRIZINE HYDROCHLORIDE 10 MG/1
10 TABLET ORAL DAILY
Status: DISCONTINUED | OUTPATIENT
Start: 2023-11-30 | End: 2023-12-01

## 2023-11-29 RX ORDER — PRAVASTATIN SODIUM 20 MG
20 TABLET ORAL DAILY
Status: DISCONTINUED | OUTPATIENT
Start: 2023-11-30 | End: 2023-12-01

## 2023-11-29 RX ORDER — SODIUM CHLORIDE 0.9 % (FLUSH) 0.9 %
10 SYRINGE (ML) INJECTION PRN
Status: DISCONTINUED | OUTPATIENT
Start: 2023-11-29 | End: 2023-12-05 | Stop reason: HOSPADM

## 2023-11-29 RX ORDER — ONDANSETRON 2 MG/ML
4 INJECTION INTRAMUSCULAR; INTRAVENOUS EVERY 6 HOURS PRN
Status: DISCONTINUED | OUTPATIENT
Start: 2023-11-29 | End: 2023-12-05 | Stop reason: HOSPADM

## 2023-11-29 RX ORDER — SODIUM CHLORIDE 0.9 % (FLUSH) 0.9 %
5-40 SYRINGE (ML) INJECTION EVERY 12 HOURS SCHEDULED
Status: DISCONTINUED | OUTPATIENT
Start: 2023-11-29 | End: 2023-12-05 | Stop reason: HOSPADM

## 2023-11-29 RX ORDER — TIMOLOL MALEATE 5 MG/ML
1 SOLUTION/ DROPS OPHTHALMIC 2 TIMES DAILY
Status: DISCONTINUED | OUTPATIENT
Start: 2023-11-29 | End: 2023-12-05 | Stop reason: HOSPADM

## 2023-11-29 RX ORDER — ALBUTEROL SULFATE 2.5 MG/3ML
15 SOLUTION RESPIRATORY (INHALATION)
Status: DISCONTINUED | OUTPATIENT
Start: 2023-11-29 | End: 2023-11-30

## 2023-11-29 RX ORDER — ONDANSETRON 4 MG/1
4 TABLET, ORALLY DISINTEGRATING ORAL EVERY 8 HOURS PRN
Status: DISCONTINUED | OUTPATIENT
Start: 2023-11-29 | End: 2023-12-05 | Stop reason: HOSPADM

## 2023-11-29 RX ORDER — 0.9 % SODIUM CHLORIDE 0.9 %
100 INTRAVENOUS SOLUTION INTRAVENOUS ONCE
Status: COMPLETED | OUTPATIENT
Start: 2023-11-29 | End: 2023-11-29

## 2023-11-29 RX ORDER — BUDESONIDE AND FORMOTEROL FUMARATE DIHYDRATE 160; 4.5 UG/1; UG/1
2 AEROSOL RESPIRATORY (INHALATION)
Status: DISCONTINUED | OUTPATIENT
Start: 2023-11-29 | End: 2023-12-05 | Stop reason: HOSPADM

## 2023-11-29 RX ORDER — DILTIAZEM HYDROCHLORIDE 180 MG/1
180 CAPSULE, COATED, EXTENDED RELEASE ORAL DAILY
Status: DISCONTINUED | OUTPATIENT
Start: 2023-11-30 | End: 2023-12-01

## 2023-11-29 RX ORDER — ALBUTEROL SULFATE 2.5 MG/3ML
2.5 SOLUTION RESPIRATORY (INHALATION)
Status: DISCONTINUED | OUTPATIENT
Start: 2023-11-29 | End: 2023-11-30

## 2023-11-29 RX ORDER — ACETAMINOPHEN 325 MG/1
650 TABLET ORAL EVERY 6 HOURS PRN
Status: DISCONTINUED | OUTPATIENT
Start: 2023-11-29 | End: 2023-12-05 | Stop reason: HOSPADM

## 2023-11-29 RX ORDER — MAGNESIUM SULFATE HEPTAHYDRATE 40 MG/ML
2000 INJECTION, SOLUTION INTRAVENOUS PRN
Status: DISCONTINUED | OUTPATIENT
Start: 2023-11-29 | End: 2023-12-05 | Stop reason: HOSPADM

## 2023-11-29 RX ORDER — POTASSIUM CHLORIDE 20 MEQ/1
40 TABLET, EXTENDED RELEASE ORAL PRN
Status: DISCONTINUED | OUTPATIENT
Start: 2023-11-29 | End: 2023-12-05 | Stop reason: HOSPADM

## 2023-11-29 RX ORDER — POTASSIUM CHLORIDE 7.45 MG/ML
10 INJECTION INTRAVENOUS PRN
Status: DISCONTINUED | OUTPATIENT
Start: 2023-11-29 | End: 2023-12-05 | Stop reason: HOSPADM

## 2023-11-29 RX ADMIN — SODIUM CHLORIDE 100 ML: 9 INJECTION, SOLUTION INTRAVENOUS at 16:49

## 2023-11-29 RX ADMIN — IOPAMIDOL 75 ML: 755 INJECTION, SOLUTION INTRAVENOUS at 16:48

## 2023-11-29 RX ADMIN — MAGNESIUM SULFATE HEPTAHYDRATE 2000 MG: 40 INJECTION, SOLUTION INTRAVENOUS at 17:22

## 2023-11-29 RX ADMIN — SODIUM CHLORIDE, PRESERVATIVE FREE 10 ML: 5 INJECTION INTRAVENOUS at 16:48

## 2023-11-29 RX ADMIN — DEXAMETHASONE SODIUM PHOSPHATE 10 MG: 10 INJECTION, SOLUTION INTRAMUSCULAR; INTRAVENOUS at 17:12

## 2023-11-29 RX ADMIN — FUROSEMIDE 40 MG: 10 INJECTION, SOLUTION INTRAMUSCULAR; INTRAVENOUS at 17:12

## 2023-11-29 ASSESSMENT — PAIN - FUNCTIONAL ASSESSMENT
PAIN_FUNCTIONAL_ASSESSMENT: NONE - DENIES PAIN
PAIN_FUNCTIONAL_ASSESSMENT: NONE - DENIES PAIN

## 2023-11-29 ASSESSMENT — ENCOUNTER SYMPTOMS
COUGH: 1
SHORTNESS OF BREATH: 0
ABDOMINAL PAIN: 0

## 2023-11-29 ASSESSMENT — LIFESTYLE VARIABLES
HOW MANY STANDARD DRINKS CONTAINING ALCOHOL DO YOU HAVE ON A TYPICAL DAY: PATIENT DOES NOT DRINK
HOW OFTEN DO YOU HAVE A DRINK CONTAINING ALCOHOL: NEVER

## 2023-11-29 NOTE — ED PROVIDER NOTES
[address]  Emergency Department Encounter  Emergency Medicine Resident     Pt Name:Edyta Solano  MRN: [id number]  [facility] 4/27/1924  Date of evaluation: 11/29/23  PCP:  Aleyda Green MD  Note Started: 3:33 PM EST      CHIEF COMPLAINT       Chief Complaint   Patient presents with    Shortness of Breath       HISTORY OF PRESENT ILLNESS  (Location/Symptom, Timing/Onset, Context/Setting, Quality, Duration, Modifying Factors, Severity.)      Emani Martinez is a 80 y.o. female who presents with hypoxia. Patient resides at a nursing facility, nursing facility called EMS due to hypoxia. EMS stated patient was satting in the 80s. Patient does wear 2 L nasal cannula at baseline and even on the 2 L nasal cannula patient was still satting in the 80s therefore EMS placed patient on a nonrebreather and gave a Combivent treatment. Patient states that for the past day she has been having a cough productive with clear phlegm. Patient denies any fever, chest pain, shortness of breath, abdominal pain. PAST MEDICAL / SURGICAL / SOCIAL / FAMILY HISTORY      has a past medical history of Acute dermatitis, Arthritis, Asthma, Atrial fibrillation ([address]), Bursitis, CHF (congestive heart failure) ([address]), Hearing aid worn, Hyperlipidemia, Hypertension, Lumbar disc disease, Wears glasses, and Wound of right leg.     has a past surgical history that includes back surgery; Wound debridement (Left, 03/09/2017); pr i&d deep absc bursa/hematoma thigh/knee region (Right, 3/9/2017); other surgical history (03/30/2017); other surgical history (03/30/2017); and pr i&d deep absc bursa/hematoma thigh/knee region (Right, 4/28/2017). Social History     Socioeconomic History    Marital status:       Spouse name: Not on file    Number of children: Not on file    Years of education: Not on file    Highest education level: Not on file   Occupational History    Occupation: retired   Tobacco Use    Smoking status:

## 2023-11-29 NOTE — ED NOTES
Patient to CT, patient changed from heated high flow via nasal cannula to non-rebreather for CT testing by RT Calvin.       Marcus Zapata RN  11/29/23 5933

## 2023-11-30 LAB
ABSOLUTE BANDS #: 0.27 K/UL (ref 0–1)
ANION GAP SERPL CALCULATED.3IONS-SCNC: 5 MMOL/L (ref 9–17)
ATYPICAL LYMPHOCYTE ABSOLUTE COUNT: 0.03 K/UL
ATYPICAL LYMPHOCYTES: 1 %
BANDS: 8 % (ref 0–10)
BASOPHILS # BLD: 0 K/UL (ref 0–0.2)
BASOPHILS NFR BLD: 0 % (ref 0–2)
BUN SERPL-MCNC: 16 MG/DL (ref 8–23)
CALCIUM SERPL-MCNC: 9.1 MG/DL (ref 8.6–10.4)
CHLORIDE SERPL-SCNC: 101 MMOL/L (ref 98–107)
CO2 SERPL-SCNC: 39 MMOL/L (ref 20–31)
CREAT SERPL-MCNC: 0.7 MG/DL (ref 0.5–0.9)
EKG ATRIAL RATE: 375 BPM
EKG Q-T INTERVAL: 312 MS
EKG QRS DURATION: 80 MS
EKG QTC CALCULATION (BAZETT): 418 MS
EKG R AXIS: 83 DEGREES
EKG T AXIS: 151 DEGREES
EKG VENTRICULAR RATE: 108 BPM
EOSINOPHIL # BLD: 0.03 K/UL (ref 0–0.4)
EOSINOPHILS RELATIVE PERCENT: 1 % (ref 0–4)
ERYTHROCYTE [DISTWIDTH] IN BLOOD BY AUTOMATED COUNT: 14.9 % (ref 11.5–14.9)
GFR SERPL CREATININE-BSD FRML MDRD: >60 ML/MIN/1.73M2
GLUCOSE SERPL-MCNC: 149 MG/DL (ref 70–99)
HCT VFR BLD AUTO: 41.5 % (ref 36–46)
HGB BLD-MCNC: 13.3 G/DL (ref 12–16)
LYMPHOCYTES NFR BLD: 0.2 K/UL (ref 1–4.8)
LYMPHOCYTES RELATIVE PERCENT: 6 % (ref 24–44)
MCH RBC QN AUTO: 32.2 PG (ref 26–34)
MCHC RBC AUTO-ENTMCNC: 32 G/DL (ref 31–37)
MCV RBC AUTO: 100.6 FL (ref 80–100)
MONOCYTES NFR BLD: 0.1 K/UL (ref 0.1–1.3)
MONOCYTES NFR BLD: 3 % (ref 1–7)
MORPHOLOGY: NORMAL
NEUTROPHILS NFR BLD: 81 % (ref 36–66)
NEUTS SEG NFR BLD: 2.77 K/UL (ref 1.3–9.1)
PLATELET # BLD AUTO: 114 K/UL (ref 150–450)
PMV BLD AUTO: 9 FL (ref 6–12)
POTASSIUM SERPL-SCNC: 5 MMOL/L (ref 3.7–5.3)
RBC # BLD AUTO: 4.13 M/UL (ref 4–5.2)
SODIUM SERPL-SCNC: 145 MMOL/L (ref 135–144)
WBC OTHER # BLD: 3.4 K/UL (ref 3.5–11)

## 2023-11-30 PROCEDURE — 94640 AIRWAY INHALATION TREATMENT: CPT

## 2023-11-30 PROCEDURE — 99223 1ST HOSP IP/OBS HIGH 75: CPT | Performed by: INTERNAL MEDICINE

## 2023-11-30 PROCEDURE — 36415 COLL VENOUS BLD VENIPUNCTURE: CPT

## 2023-11-30 PROCEDURE — 2060000000 HC ICU INTERMEDIATE R&B

## 2023-11-30 PROCEDURE — 6370000000 HC RX 637 (ALT 250 FOR IP): Performed by: NURSE PRACTITIONER

## 2023-11-30 PROCEDURE — 80048 BASIC METABOLIC PNL TOTAL CA: CPT

## 2023-11-30 PROCEDURE — 94760 N-INVAS EAR/PLS OXIMETRY 1: CPT

## 2023-11-30 PROCEDURE — 85025 COMPLETE CBC W/AUTO DIFF WBC: CPT

## 2023-11-30 PROCEDURE — 2580000003 HC RX 258: Performed by: NURSE PRACTITIONER

## 2023-11-30 PROCEDURE — 6360000002 HC RX W HCPCS: Performed by: INTERNAL MEDICINE

## 2023-11-30 PROCEDURE — 2700000000 HC OXYGEN THERAPY PER DAY

## 2023-11-30 PROCEDURE — 93010 ELECTROCARDIOGRAM REPORT: CPT | Performed by: INTERNAL MEDICINE

## 2023-11-30 PROCEDURE — 94664 DEMO&/EVAL PT USE INHALER: CPT

## 2023-11-30 RX ORDER — FUROSEMIDE 10 MG/ML
40 INJECTION INTRAMUSCULAR; INTRAVENOUS DAILY
Status: DISCONTINUED | OUTPATIENT
Start: 2023-11-30 | End: 2023-11-30

## 2023-11-30 RX ORDER — FUROSEMIDE 10 MG/ML
20 INJECTION INTRAMUSCULAR; INTRAVENOUS ONCE
Status: COMPLETED | OUTPATIENT
Start: 2023-11-30 | End: 2023-11-30

## 2023-11-30 RX ORDER — FUROSEMIDE 10 MG/ML
20 INJECTION INTRAMUSCULAR; INTRAVENOUS DAILY
Status: DISCONTINUED | OUTPATIENT
Start: 2023-12-01 | End: 2023-11-30

## 2023-11-30 RX ORDER — FUROSEMIDE 10 MG/ML
40 INJECTION INTRAMUSCULAR; INTRAVENOUS DAILY
Status: DISCONTINUED | OUTPATIENT
Start: 2023-12-01 | End: 2023-12-03

## 2023-11-30 RX ADMIN — CETIRIZINE HYDROCHLORIDE 10 MG: 10 TABLET, FILM COATED ORAL at 08:55

## 2023-11-30 RX ADMIN — LATANOPROST 1 DROP: 50 SOLUTION OPHTHALMIC at 00:11

## 2023-11-30 RX ADMIN — TIMOLOL MALEATE 1 DROP: 5 SOLUTION/ DROPS OPHTHALMIC at 08:56

## 2023-11-30 RX ADMIN — BUDESONIDE AND FORMOTEROL FUMARATE DIHYDRATE 2 PUFF: 160; 4.5 AEROSOL RESPIRATORY (INHALATION) at 09:13

## 2023-11-30 RX ADMIN — BUDESONIDE AND FORMOTEROL FUMARATE DIHYDRATE 2 PUFF: 160; 4.5 AEROSOL RESPIRATORY (INHALATION) at 20:09

## 2023-11-30 RX ADMIN — SODIUM CHLORIDE, PRESERVATIVE FREE 10 ML: 5 INJECTION INTRAVENOUS at 21:18

## 2023-11-30 RX ADMIN — DILTIAZEM HYDROCHLORIDE 180 MG: 180 CAPSULE, COATED, EXTENDED RELEASE ORAL at 08:55

## 2023-11-30 RX ADMIN — SODIUM CHLORIDE, PRESERVATIVE FREE 10 ML: 5 INJECTION INTRAVENOUS at 00:12

## 2023-11-30 RX ADMIN — TIMOLOL MALEATE 1 DROP: 5 SOLUTION/ DROPS OPHTHALMIC at 00:12

## 2023-11-30 RX ADMIN — LATANOPROST 1 DROP: 50 SOLUTION OPHTHALMIC at 21:18

## 2023-11-30 RX ADMIN — TIMOLOL MALEATE 1 DROP: 5 SOLUTION/ DROPS OPHTHALMIC at 21:18

## 2023-11-30 RX ADMIN — PRAVASTATIN SODIUM 20 MG: 20 TABLET ORAL at 08:55

## 2023-11-30 RX ADMIN — FUROSEMIDE 20 MG: 10 INJECTION, SOLUTION INTRAMUSCULAR; INTRAVENOUS at 14:23

## 2023-11-30 NOTE — DISCHARGE INSTR - COC
Continuity of Care Form    Patient Name: Joni Souza   :  1924  MRN:  [id number]    Admit date:  2023  Discharge date:  23    Code Status Order: DNR-CCA   Advance Directives:     Admitting Physician:  Devan Gonzalez MD  PCP: Loreta Jones MD    Discharging Nurse: [facility] Unit/Room#: 2098/2098-01  Discharging Unit Phone Number: [phone]    Emergency Contact:   Extended Emergency Contact Information  Primary Emergency Contact: [address] [place] [address] East of [address] Phone: [phone]  Work Phone: [phone]  Relation: Child  Secondary Emergency Contact: Narayangerry [facility] of [address] Phone: [phone]  Relation: Niece/Nephew    Past Surgical History:  Past Surgical History:   Procedure Laterality Date    BACK SURGERY      DEBRIDEMENT Left 2017    rt. leg    OTHER SURGICAL HISTORY  2017    Right leg Angio    OTHER SURGICAL HISTORY  2017    Right leg angio    [place] I&D DEEP ABSC BURSA/HEMATOMA THIGH/KNEE REGION Right 3/9/2017    DEBRIDEMENT CALF WOUND  performed by Alejandra Hahn MD at [facility] I&D [facility] Right 2017    RIGHT LOWER EXTREMITY DEBRIDEMENT, INTEGRA APPLICATION, PREVENA VAC APPLICATION LOWR RIGHT LEG performed by Alejandra Hahn MD at [address] History:   Immunization History   Administered Date(s) Administered    COVID-19, PFIZER GRAY top, DO NOT Dilute, (age 15 y+), IM, 30 mcg/0.3 mL 2022    COVID-19, PFIZER PURPLE top, DILUTE for use, (age 15 y+), 30mcg/0.3mL 2021, 2021, 10/28/2021    Influenza, FLUZONE (age 72 y+), High Dose, 0.7mL 2020, 2023    TDaP, ADACEL (age 6y-58y), [facility] (age 10y+), IM, 0.5mL 2020       Active Problems:  Patient Active Problem List   Diagnosis Code    Non-pressure ulcer of left lower extremity, limited to breakdown of skin ([address]) L97.921    Persistent atrial fibrillation ([address])

## 2023-11-30 NOTE — CONSULTS
[address] [facility] PULMONARY, CRITICAL CARE & SLEEP   Irving Vargas MD/ Kwadwo Monge MD/ Keysha Singh MD/ Dr Andres PARKER AGACNP-BC NP-C  Lu Coffeyarpan PARKER NP-C  [address] Torsten ELENA NP-C   CONSULT NOTE      Date of Admission: 11/29/2023  3:20 PM    Chief complaint: Hypoxia    Referring Physician: * No referring provider recorded for this case *  PCP: Riley Garcia MD     History of Present Illness: Sesar Escalante is a 80 y.o. female who presented to the emergency room from [facility] nursing facility on 11/29 secondary to hypoxia. She is reportedly on 2 L nasal cannula around-the-clock but was saturating in the 80s. She was initially placed on a nonrebreather and has been titrated back down to 3 L nasal cannula. She had a recent admission at [facility] from 11/6 through 11/14 secondary to COVID-pneumonia. She initially tested positive on 11/6. She was treated with dexamethasone. Due to history of kidney issues she was not given antivirals. She was found to have acute on chronic congestive heart failure during that admission along with pleural effusions. She did undergo a right-sided thoracentesis on 11/10 for 750 mL transudative fluid. No cytology was performed. She was discharged back to skilled nursing facility. Workup here in the ER with CTA of the chest was negative for PE but did show large bilateral pleural effusions with compressive atelectasis. COVID test is still positive. She was afebrile. Initial BNP was 934. White blood cell count was normal at 5.7. Sodium was slightly elevated at 145. Renal labs are normal.    Review of records show she also had bilateral thoracentesis performed on 4/8/2020. Echocardiogram performed on 11/7 showed EF of 55 to 60%. Severe dilation of left atrium and right atrium and bilateral pleural effusions. She also has a past history of atrial fibrillation on low-dose Eliquis 2.5 mg. Asthma.   Hyperlipidemia

## 2023-11-30 NOTE — PLAN OF CARE
Problem: Discharge Planning  Goal: Discharge to home or other facility with appropriate resources  Outcome: Progressing  Flowsheets  Taken 11/29/2023 2350  Discharge to home or other facility with appropriate resources: Identify barriers to discharge with patient and caregiver  Taken 11/29/2023 2130  Discharge to home or other facility with appropriate resources: Identify barriers to discharge with patient and caregiver     Problem: Safety - Adult  Goal: Free from fall injury  Outcome: Progressing  Flowsheets (Taken 11/29/2023 2342)  Free From Fall Injury: Instruct family/caregiver on patient safety     Problem: Skin/Tissue Integrity  Goal: Absence of new skin breakdown  Description: 1. Monitor for areas of redness and/or skin breakdown  2. Assess vascular access sites hourly  3. Every 4-6 hours minimum:  Change oxygen saturation probe site  4. Every 4-6 hours:  If on nasal continuous positive airway pressure, respiratory therapy assess nares and determine need for appliance change or resting period.   Outcome: Progressing     Problem: ABCDS Injury Assessment  Goal: Absence of physical injury  Outcome: Progressing  Flowsheets (Taken 11/29/2023 2342)  Absence of Physical Injury: Implement safety measures based on patient assessment

## 2023-11-30 NOTE — CARE COORDINATION
Case Management Assessment  Initial Evaluation    Date/Time of Evaluation: 11/30/2023 1:59 PM  Assessment Completed by: Ludwig Mcghee RN    If patient is discharged prior to next notation, then this note serves as note for discharge by case management. Patient Name: Macarena Wills                   YOB: 1924  Diagnosis: Bilateral pleural effusion [J90]                   Date / Time: 11/29/2023  3:20 PM    Patient Admission Status: Inpatient   Readmission Risk (Low < 19, Mod (19-27), High > 27): Readmission Risk Score: 16.2    Current PCP: Jose Lawson MD  PCP verified by CM? Yes    Chart Reviewed: Yes      History Provided by: Child/Family  Patient Orientation: Other (see comment) (Pt was alert, Very [place], spoke to Pt's Son, Cuca Dutta, states \"he needs to get her Hearing aids\")    Patient Cognition:      Hospitalization in the last 30 days (Readmission):  No    If yes, Readmission Assessment in  Navigator will be completed. Advance Directives:      Code Status: DNR-CCA   Patient's Primary Decision Maker is: Legal Next of Kin    Primary Decision Maker: [address] [facility]. - [phone]    Discharge Planning:    Patient lives with: Other (Comment) (SNF- Return to [facility]) Type of Home: [address]  Primary Care Giver: Other (Comment) (SNF)  Patient Support Systems include: Family Members   Current Financial resources: Medicare  Current community resources:    Current services prior to admission: [address]            Current DME: Alma Magalys            Type of Home Care services:  OT, PT, Nursing Services    ADLS  Prior functional level:    Current functional level:      PT AM-PAC:   /24  OT AM-PAC:   /24    Family can provide assistance at DC: No  Would you like Case Management to discuss the discharge plan with any other family members/significant others, and if so, who?  Yes (Son Cuca Dutta)  Plans to Return to Present Housing: Yes  Other Identified Issues/Barriers

## 2023-11-30 NOTE — H&P
pulmonologist  Eliquis kept on hold  Overall prognosis guarded with advanced age, patient is DNR CC    Consultations:   IP CONSULT TO INTERNAL MEDICINE  IP CONSULT TO CRITICAL CARE    Patient is admitted as inpatient status because of co-morbidities listed above, severity of signs and symptoms as outlined, requirement for current medical therapies and most importantly because of direct risk to patient if care not provided in a hospital setting. Sandra Anderson MD  11/30/2023  11:20 AM    Copy sent to Dr. Bela Banuelos MD    Please note that this chart was generated using voice recognition Dragon dictation software. Although every effort was made to ensure the accuracy of this automated transcription, some errors in transcription may have occurred.

## 2023-11-30 NOTE — ED NOTES
Verbal shift change report given to Fadia RN (oncoming nurse) by Александр Rios RN (offgoing nurse).  Report included the following information Nurse Handoff Report, ED Encounter Summary, Adult Overview, MAR, Recent Results, and Event Rafal Mulligan RN  11/29/23 1950

## 2023-12-01 ENCOUNTER — APPOINTMENT (OUTPATIENT)
Dept: GENERAL RADIOLOGY | Age: 88
DRG: 291 | End: 2023-12-01
Payer: MEDICARE

## 2023-12-01 ENCOUNTER — APPOINTMENT (OUTPATIENT)
Dept: ULTRASOUND IMAGING | Age: 88
DRG: 291 | End: 2023-12-01
Payer: MEDICARE

## 2023-12-01 LAB
ANION GAP SERPL CALCULATED.3IONS-SCNC: 1 MMOL/L (ref 9–17)
APPEARANCE FLD: CLEAR
BASOPHILS # BLD: 0 K/UL (ref 0–0.2)
BASOPHILS NFR BLD: 0 % (ref 0–2)
BODY FLD TYPE: NORMAL
BUN SERPL-MCNC: 26 MG/DL (ref 8–23)
CALCIUM SERPL-MCNC: 8.7 MG/DL (ref 8.6–10.4)
CHLORIDE SERPL-SCNC: 102 MMOL/L (ref 98–107)
CO2 SERPL-SCNC: 40 MMOL/L (ref 20–31)
COLOR FLD: YELLOW
CREAT SERPL-MCNC: 0.8 MG/DL (ref 0.5–0.9)
EOSINOPHIL # BLD: 0 K/UL (ref 0–0.4)
EOSINOPHILS RELATIVE PERCENT: 0 % (ref 0–4)
ERYTHROCYTE [DISTWIDTH] IN BLOOD BY AUTOMATED COUNT: 15 % (ref 11.5–14.9)
GFR SERPL CREATININE-BSD FRML MDRD: >60 ML/MIN/1.73M2
GLUCOSE SERPL-MCNC: 133 MG/DL (ref 70–99)
HCT VFR BLD AUTO: 37.5 % (ref 36–46)
HGB BLD-MCNC: 11.8 G/DL (ref 12–16)
LDH FLD L TO P-CCNC: 61 U/L
LYMPHOCYTES NFR BLD: 0.5 K/UL (ref 1–4.8)
LYMPHOCYTES RELATIVE PERCENT: 9 % (ref 24–44)
MCH RBC QN AUTO: 31.8 PG (ref 26–34)
MCHC RBC AUTO-ENTMCNC: 31.5 G/DL (ref 31–37)
MCV RBC AUTO: 101.1 FL (ref 80–100)
MONOCYTES NFR BLD: 0.3 K/UL (ref 0.1–1.3)
MONOCYTES NFR BLD: 5 % (ref 1–7)
NEUTROPHILS NFR BLD: 86 % (ref 36–66)
NEUTS SEG NFR BLD: 5.2 K/UL (ref 1.3–9.1)
PH FLUID: 6
PLATELET # BLD AUTO: 121 K/UL (ref 150–450)
PMV BLD AUTO: 8.8 FL (ref 6–12)
POTASSIUM SERPL-SCNC: 4.8 MMOL/L (ref 3.7–5.3)
PROT FLD-MCNC: 1.9 G/DL
RBC # BLD AUTO: 3.71 M/UL (ref 4–5.2)
RBC # FLD: 105 CELLS/UL
SODIUM SERPL-SCNC: 143 MMOL/L (ref 135–144)
SPECIMEN TYPE: NORMAL
WBC # FLD: 10 CELLS/UL
WBC OTHER # BLD: 6 K/UL (ref 3.5–11)

## 2023-12-01 PROCEDURE — 85025 COMPLETE CBC W/AUTO DIFF WBC: CPT

## 2023-12-01 PROCEDURE — 88112 CYTOPATH CELL ENHANCE TECH: CPT

## 2023-12-01 PROCEDURE — 94640 AIRWAY INHALATION TREATMENT: CPT

## 2023-12-01 PROCEDURE — 94760 N-INVAS EAR/PLS OXIMETRY 1: CPT

## 2023-12-01 PROCEDURE — 2580000003 HC RX 258: Performed by: NURSE PRACTITIONER

## 2023-12-01 PROCEDURE — 6360000002 HC RX W HCPCS: Performed by: INTERNAL MEDICINE

## 2023-12-01 PROCEDURE — 80048 BASIC METABOLIC PNL TOTAL CA: CPT

## 2023-12-01 PROCEDURE — 83615 LACTATE (LD) (LDH) ENZYME: CPT

## 2023-12-01 PROCEDURE — 32555 ASPIRATE PLEURA W/ IMAGING: CPT

## 2023-12-01 PROCEDURE — 6370000000 HC RX 637 (ALT 250 FOR IP): Performed by: NURSE PRACTITIONER

## 2023-12-01 PROCEDURE — 71045 X-RAY EXAM CHEST 1 VIEW: CPT

## 2023-12-01 PROCEDURE — 83986 ASSAY PH BODY FLUID NOS: CPT

## 2023-12-01 PROCEDURE — 2060000000 HC ICU INTERMEDIATE R&B

## 2023-12-01 PROCEDURE — 99233 SBSQ HOSP IP/OBS HIGH 50: CPT | Performed by: INTERNAL MEDICINE

## 2023-12-01 PROCEDURE — 84157 ASSAY OF PROTEIN OTHER: CPT

## 2023-12-01 PROCEDURE — 87075 CULTR BACTERIA EXCEPT BLOOD: CPT

## 2023-12-01 PROCEDURE — 87205 SMEAR GRAM STAIN: CPT

## 2023-12-01 PROCEDURE — 0W993ZZ DRAINAGE OF RIGHT PLEURAL CAVITY, PERCUTANEOUS APPROACH: ICD-10-PCS | Performed by: INTERNAL MEDICINE

## 2023-12-01 PROCEDURE — 2700000000 HC OXYGEN THERAPY PER DAY

## 2023-12-01 PROCEDURE — 87070 CULTURE OTHR SPECIMN AEROBIC: CPT

## 2023-12-01 PROCEDURE — 36415 COLL VENOUS BLD VENIPUNCTURE: CPT

## 2023-12-01 PROCEDURE — 88305 TISSUE EXAM BY PATHOLOGIST: CPT

## 2023-12-01 RX ORDER — PRAVASTATIN SODIUM 20 MG
20 TABLET ORAL NIGHTLY
Status: DISCONTINUED | OUTPATIENT
Start: 2023-12-02 | End: 2023-12-05 | Stop reason: HOSPADM

## 2023-12-01 RX ORDER — CETIRIZINE HYDROCHLORIDE 10 MG/1
5 TABLET ORAL DAILY
Status: DISCONTINUED | OUTPATIENT
Start: 2023-12-02 | End: 2023-12-05 | Stop reason: HOSPADM

## 2023-12-01 RX ORDER — DILTIAZEM HYDROCHLORIDE 120 MG/1
120 CAPSULE, COATED, EXTENDED RELEASE ORAL DAILY
Status: DISCONTINUED | OUTPATIENT
Start: 2023-12-02 | End: 2023-12-01

## 2023-12-01 RX ORDER — CALCIUM GLUCONATE 94 MG/ML
1000 INJECTION, SOLUTION INTRAVENOUS ONCE
Status: DISCONTINUED | OUTPATIENT
Start: 2023-12-01 | End: 2023-12-01 | Stop reason: CLARIF

## 2023-12-01 RX ORDER — CALCIUM GLUCONATE 10 MG/ML
1000 INJECTION, SOLUTION INTRAVENOUS ONCE
Status: COMPLETED | OUTPATIENT
Start: 2023-12-01 | End: 2023-12-01

## 2023-12-01 RX ADMIN — TIMOLOL MALEATE 1 DROP: 5 SOLUTION/ DROPS OPHTHALMIC at 21:26

## 2023-12-01 RX ADMIN — BUDESONIDE AND FORMOTEROL FUMARATE DIHYDRATE 2 PUFF: 160; 4.5 AEROSOL RESPIRATORY (INHALATION) at 21:13

## 2023-12-01 RX ADMIN — SODIUM CHLORIDE, PRESERVATIVE FREE 10 ML: 5 INJECTION INTRAVENOUS at 09:15

## 2023-12-01 RX ADMIN — DILTIAZEM HYDROCHLORIDE 180 MG: 180 CAPSULE, COATED, EXTENDED RELEASE ORAL at 09:13

## 2023-12-01 RX ADMIN — CETIRIZINE HYDROCHLORIDE 10 MG: 10 TABLET, FILM COATED ORAL at 09:13

## 2023-12-01 RX ADMIN — LATANOPROST 1 DROP: 50 SOLUTION OPHTHALMIC at 21:26

## 2023-12-01 RX ADMIN — FUROSEMIDE 40 MG: 10 INJECTION, SOLUTION INTRAMUSCULAR; INTRAVENOUS at 09:16

## 2023-12-01 RX ADMIN — PRAVASTATIN SODIUM 20 MG: 20 TABLET ORAL at 09:13

## 2023-12-01 RX ADMIN — BUDESONIDE AND FORMOTEROL FUMARATE DIHYDRATE 2 PUFF: 160; 4.5 AEROSOL RESPIRATORY (INHALATION) at 09:39

## 2023-12-01 RX ADMIN — TIMOLOL MALEATE 1 DROP: 5 SOLUTION/ DROPS OPHTHALMIC at 09:27

## 2023-12-01 RX ADMIN — CALCIUM GLUCONATE 1000 MG: 10 INJECTION, SOLUTION INTRAVENOUS at 18:20

## 2023-12-01 NOTE — PROCEDURES
Procedure is:  Ultrasound-guided right thoracentesis    Indication prediagnosis:, Large pleural effusion    Post diagnosis: Same. 1350 mL clear yellow fluid    Estimated blood loss:, 0 mL    After informed consent was obtained witnessed by bedside nurse and patient's son, patient was placed in a sitting position supported by the tray. Ultrasound was able to locate a large pleural effusion on the right. The right posterior back was prepped with ChloraPrep and draped in the usual sterile fashion. Using 1% Xylocaine local anesthetic under aseptic conditions, thoracentesis was performed at the right posterior back eighth intercostal space. 1350 mL of clear yellow fluid removed. No apparent immediate complications    Pleural fluid orders placed by myself on epic computer system. Chest x-ray has been ordered to rule out pneumothorax. Actual chest x-ray is pending at time of dictation. Patient's son present during the entire procedure. Questions answered. He voiced appreciation of the information.     Britney Bowers MD

## 2023-12-01 NOTE — PLAN OF CARE
Problem: Safety - Adult  Goal: Free from fall injury  12/1/2023 0319 by Milena Patel RN  Outcome: Progressing  Note: Pt assessed as a fall risk this shift. Remains free from falls and accidental injury at this time. Fall precautions in place. Floor free from obstacles, and bed is locked and in lowest position. Adequate lighting provided. Pt encouraged to call before getting OOB for any need. Bed alarm activated. Will continue to monitor needs during hourly rounding, and reinforce education on use of call light. Problem: Skin/Tissue Integrity  Goal: Absence of new skin breakdown  Description: 1. Monitor for areas of redness and/or skin breakdown  2. Assess vascular access sites hourly  3. Every 4-6 hours minimum:  Change oxygen saturation probe site  4. Every 4-6 hours:  If on nasal continuous positive airway pressure, respiratory therapy assess nares and determine need for appliance change or resting period.   12/1/2023 0319 by Milena Patel RN  Outcome: Progressing     Problem: Chronic Conditions and Co-morbidities  Goal: Patient's chronic conditions and co-morbidity symptoms are monitored and maintained or improved  12/1/2023 0319 by Milena Patel RN  Outcome: Progressing     Problem: Discharge Planning  Goal: Discharge to home or other facility with appropriate resources  12/1/2023 0319 by Milena Patel RN  Outcome: Progressing

## 2023-12-02 ENCOUNTER — APPOINTMENT (OUTPATIENT)
Dept: GENERAL RADIOLOGY | Age: 88
DRG: 291 | End: 2023-12-02
Payer: MEDICARE

## 2023-12-02 ENCOUNTER — APPOINTMENT (OUTPATIENT)
Dept: ULTRASOUND IMAGING | Age: 88
DRG: 291 | End: 2023-12-02
Payer: MEDICARE

## 2023-12-02 PROBLEM — I48.21 PERMANENT ATRIAL FIBRILLATION (HCC): Status: ACTIVE | Noted: 2023-12-02

## 2023-12-02 PROBLEM — R00.1 BRADYCARDIA: Status: ACTIVE | Noted: 2023-12-02

## 2023-12-02 LAB
ANION GAP SERPL CALCULATED.3IONS-SCNC: 1 MMOL/L (ref 9–17)
APPEARANCE FLD: NORMAL
BASOPHILS # BLD: 0 K/UL (ref 0–0.2)
BASOPHILS NFR BLD: 0 % (ref 0–2)
BLASTS NFR FLD: ABNORMAL %
BODY FLD TYPE: NORMAL
BUN SERPL-MCNC: 24 MG/DL (ref 8–23)
CALCIUM SERPL-MCNC: 8.9 MG/DL (ref 8.6–10.4)
CHLORIDE SERPL-SCNC: 100 MMOL/L (ref 98–107)
CO2 SERPL-SCNC: 43 MMOL/L (ref 20–31)
COLOR FLD: YELLOW
CREAT SERPL-MCNC: 0.7 MG/DL (ref 0.5–0.9)
EOSINOPHIL # BLD: 0.1 K/UL (ref 0–0.4)
EOSINOPHIL NFR FLD: ABNORMAL %
EOSINOPHILS RELATIVE PERCENT: 3 % (ref 0–4)
ERYTHROCYTE [DISTWIDTH] IN BLOOD BY AUTOMATED COUNT: 14.8 % (ref 11.5–14.9)
GFR SERPL CREATININE-BSD FRML MDRD: >60 ML/MIN/1.73M2
GLUCOSE SERPL-MCNC: 138 MG/DL (ref 70–99)
HCT VFR BLD AUTO: 39.6 % (ref 36–46)
HGB BLD-MCNC: 12.4 G/DL (ref 12–16)
LDH FLD L TO P-CCNC: 62 U/L
LYMPHOCYTES NFR BLD: 0.8 K/UL (ref 1–4.8)
LYMPHOCYTES NFR FLD: 76 %
LYMPHOCYTES RELATIVE PERCENT: 14 % (ref 24–44)
MCH RBC QN AUTO: 31.6 PG (ref 26–34)
MCHC RBC AUTO-ENTMCNC: 31.4 G/DL (ref 31–37)
MCV RBC AUTO: 100.5 FL (ref 80–100)
MONOCYTES NFR BLD: 0.4 K/UL (ref 0.1–1.3)
MONOCYTES NFR BLD: 8 % (ref 1–7)
MONOCYTES NFR FLD: 19 %
NEUTROPHILS NFR BLD: 75 % (ref 36–66)
NEUTROPHILS NFR FLD: 5 %
NEUTS SEG NFR BLD: 4.1 K/UL (ref 1.3–9.1)
PH FLUID: 8
PLATELET # BLD AUTO: 119 K/UL (ref 150–450)
PMV BLD AUTO: 9.1 FL (ref 6–12)
POTASSIUM SERPL-SCNC: 4.4 MMOL/L (ref 3.7–5.3)
PROT FLD-MCNC: 1.7 G/DL
RBC # BLD AUTO: 3.94 M/UL (ref 4–5.2)
RBC # FLD: 1140 CELLS/UL
SODIUM SERPL-SCNC: 144 MMOL/L (ref 135–144)
SPECIMEN TYPE: NORMAL
UNIDENT CELLS NFR FLD: ABNORMAL %
WBC # FLD: 853 CELLS/UL
WBC OTHER # BLD: 5.4 K/UL (ref 3.5–11)

## 2023-12-02 PROCEDURE — 2060000000 HC ICU INTERMEDIATE R&B

## 2023-12-02 PROCEDURE — 2500000003 HC RX 250 WO HCPCS: Performed by: INTERNAL MEDICINE

## 2023-12-02 PROCEDURE — 0W9B3ZZ DRAINAGE OF LEFT PLEURAL CAVITY, PERCUTANEOUS APPROACH: ICD-10-PCS | Performed by: INTERNAL MEDICINE

## 2023-12-02 PROCEDURE — 87075 CULTR BACTERIA EXCEPT BLOOD: CPT

## 2023-12-02 PROCEDURE — 85025 COMPLETE CBC W/AUTO DIFF WBC: CPT

## 2023-12-02 PROCEDURE — 94760 N-INVAS EAR/PLS OXIMETRY 1: CPT

## 2023-12-02 PROCEDURE — 87070 CULTURE OTHR SPECIMN AEROBIC: CPT

## 2023-12-02 PROCEDURE — 97116 GAIT TRAINING THERAPY: CPT

## 2023-12-02 PROCEDURE — 36415 COLL VENOUS BLD VENIPUNCTURE: CPT

## 2023-12-02 PROCEDURE — 97530 THERAPEUTIC ACTIVITIES: CPT

## 2023-12-02 PROCEDURE — 99223 1ST HOSP IP/OBS HIGH 75: CPT | Performed by: INTERNAL MEDICINE

## 2023-12-02 PROCEDURE — 2700000000 HC OXYGEN THERAPY PER DAY

## 2023-12-02 PROCEDURE — 97162 PT EVAL MOD COMPLEX 30 MIN: CPT

## 2023-12-02 PROCEDURE — 88112 CYTOPATH CELL ENHANCE TECH: CPT

## 2023-12-02 PROCEDURE — 2580000003 HC RX 258: Performed by: NURSE PRACTITIONER

## 2023-12-02 PROCEDURE — 97166 OT EVAL MOD COMPLEX 45 MIN: CPT

## 2023-12-02 PROCEDURE — 84157 ASSAY OF PROTEIN OTHER: CPT

## 2023-12-02 PROCEDURE — 32555 ASPIRATE PLEURA W/ IMAGING: CPT

## 2023-12-02 PROCEDURE — 88305 TISSUE EXAM BY PATHOLOGIST: CPT

## 2023-12-02 PROCEDURE — 87205 SMEAR GRAM STAIN: CPT

## 2023-12-02 PROCEDURE — 6360000002 HC RX W HCPCS: Performed by: INTERNAL MEDICINE

## 2023-12-02 PROCEDURE — 80048 BASIC METABOLIC PNL TOTAL CA: CPT

## 2023-12-02 PROCEDURE — 99232 SBSQ HOSP IP/OBS MODERATE 35: CPT | Performed by: INTERNAL MEDICINE

## 2023-12-02 PROCEDURE — 71045 X-RAY EXAM CHEST 1 VIEW: CPT

## 2023-12-02 PROCEDURE — 94640 AIRWAY INHALATION TREATMENT: CPT

## 2023-12-02 PROCEDURE — 83615 LACTATE (LD) (LDH) ENZYME: CPT

## 2023-12-02 PROCEDURE — 83986 ASSAY PH BODY FLUID NOS: CPT

## 2023-12-02 PROCEDURE — 6370000000 HC RX 637 (ALT 250 FOR IP): Performed by: INTERNAL MEDICINE

## 2023-12-02 RX ORDER — LIDOCAINE HYDROCHLORIDE AND EPINEPHRINE 10; 10 MG/ML; UG/ML
20 INJECTION, SOLUTION INFILTRATION; PERINEURAL ONCE
Status: COMPLETED | OUTPATIENT
Start: 2023-12-02 | End: 2023-12-02

## 2023-12-02 RX ORDER — LIDOCAINE HYDROCHLORIDE 10 MG/ML
INJECTION, SOLUTION EPIDURAL; INFILTRATION; INTRACAUDAL; PERINEURAL ONCE
Status: CANCELLED | OUTPATIENT
Start: 2023-12-02 | End: 2023-12-02

## 2023-12-02 RX ADMIN — TIMOLOL MALEATE 1 DROP: 5 SOLUTION/ DROPS OPHTHALMIC at 21:04

## 2023-12-02 RX ADMIN — LIDOCAINE HYDROCHLORIDE,EPINEPHRINE BITARTRATE 20 ML: 10; .01 INJECTION, SOLUTION INFILTRATION; PERINEURAL at 12:30

## 2023-12-02 RX ADMIN — FUROSEMIDE 40 MG: 10 INJECTION, SOLUTION INTRAMUSCULAR; INTRAVENOUS at 09:57

## 2023-12-02 RX ADMIN — LATANOPROST 1 DROP: 50 SOLUTION OPHTHALMIC at 21:04

## 2023-12-02 RX ADMIN — TIMOLOL MALEATE 1 DROP: 5 SOLUTION/ DROPS OPHTHALMIC at 09:58

## 2023-12-02 RX ADMIN — BUDESONIDE AND FORMOTEROL FUMARATE DIHYDRATE 2 PUFF: 160; 4.5 AEROSOL RESPIRATORY (INHALATION) at 19:28

## 2023-12-02 RX ADMIN — SODIUM CHLORIDE, PRESERVATIVE FREE 10 ML: 5 INJECTION INTRAVENOUS at 21:04

## 2023-12-02 RX ADMIN — SODIUM CHLORIDE, PRESERVATIVE FREE 10 ML: 5 INJECTION INTRAVENOUS at 09:56

## 2023-12-02 RX ADMIN — PRAVASTATIN SODIUM 20 MG: 20 TABLET ORAL at 21:04

## 2023-12-02 RX ADMIN — CETIRIZINE HYDROCHLORIDE 5 MG: 10 TABLET, FILM COATED ORAL at 09:56

## 2023-12-02 RX ADMIN — BUDESONIDE AND FORMOTEROL FUMARATE DIHYDRATE 2 PUFF: 160; 4.5 AEROSOL RESPIRATORY (INHALATION) at 07:25

## 2023-12-02 NOTE — CARE COORDINATION
ONGOING DISCHARGE  NOTE:      Writer reviewed notes, Patient is agreeable to discharge to [facility]. Phone: [phone]  Fax: [phone]      Discharge is pending pre cert. Pre cert has not been started. Will need updated PT/OT notes     Will continue to follow for additional discharge needs. If patient is discharged prior to next notation, then this note serves as note for discharge by case management.     Electronically signed by Arbutus Rinne, RN on 12/2/2023 at 12:00 PM

## 2023-12-02 NOTE — PROCEDURES
Procedure, left thoracentesis under ultrasound guidance    Indication: Pleural effusion dyspnea    Post diagnosis: Same, 725 mL of yellow fluid    Estimated blood loss: 0 mL    Informed consent was obtained. Patient was placed in the sitting position. The patient's son Sarah Beth White was assisting us. Ultrasound was able to locate a moderate pleural effusion on the left. The area was prepped ChloraPrep and draped in usual sterile fashion. Under aseptic conditions, thoracentesis performed eighth intercostal space left posterior back. Approximately 725 mL of yellow fluid was removed. Chest x-ray revealed no evidence of gross pneumothorax postprocedure.   Patient tolerated procedure with no apparent immediate complications        Burgess Nabila MD

## 2023-12-02 NOTE — FLOWSHEET NOTE
12/02/23 0754   Treatment Team Notification   Reason for Communication Critical results   Type of Critical Result Laboratory   Critical Lab Information CO2 43   Critical Lab Result Type Carbon dioxide   Name of Team Member Notified Dr. Judy Kirk Provider   Method of Communication Secure Message   Response Waiting for response     [phone]:  No new orders

## 2023-12-02 NOTE — CONSULTS
[facility]   Consult Note                 Date:   12/2/2023  Date of admission:  11/29/2023  3:20 PM  MRN:   [id number]  YOB: 1924    Reason for Consult: Bradycardia    HISTORY OF PRESENT ILLNESS:    The patient is a 80 y.o.  female who is admitted to the hospital .  Cardiology called patient heart rate has dropped to 30s, according to patient last night it was during sleep heart rate was 36  Patient is very known to our service regular patient of Dr. Vinnie Jon with a chronic persistent A-fib flutter on anticoag coagulation  Patient is hard of hearing  Patient denies any chest pain pressure tightness  Patient knows that she is in [place]  Patient physical activities are limited as patient had a COVID in early part of November. Patient is DNR CC a  Yesterday I was called and told them to stop all AV blocker medications  Patient still in A-fib very well rate controlled  Patient has thoracentesis done yesterday with 1300 out. Known with last note from Dr. Vinnie Jon on 7/5/2023  Past Medical:  1. Atrial fibrillation/flutter, permanent, on anticoagulation with Eliquis  2. Congestive heart failure/HFpEF  3. 2 D Echocardiogram on January 6, 2020 showed preserved LV systolic function with an estimatedejection fraction of > 65 %. No obvious wall motion abnormality seen. Aortic leaflet calcification with mild stenosis based on gradient. Visually  however it appears at least moderate. Thickened mitral valve leaflets. Moderate mitral regurgitation. Moderate tricuspid regurgitation. Moderate pulmonary hypertension. Estimated right ventricular systolic pressure is 51 mmHg. 4. Hypertension    Past Medical History:   has a past medical history of Acute dermatitis, Arthritis, Asthma, Atrial fibrillation ([address]), Bursitis, CHF (congestive heart failure) ([address]), Hearing aid worn, Hyperlipidemia, Hypertension, Lumbar disc disease, Wears glasses, and Wound of right leg.     Past Surgical

## 2023-12-02 NOTE — PLAN OF CARE
Problem: Discharge Planning  Goal: Discharge to home or other facility with appropriate resources  12/2/2023 1606 by Alanna Tay RN  Outcome: Progressing     Problem: Safety - Adult  Goal: Free from fall injury  12/2/2023 1606 by Alanna Tay RN  Outcome: Progressing     Problem: Skin/Tissue Integrity  Goal: Absence of new skin breakdown  Description: 1. Monitor for areas of redness and/or skin breakdown  2. Assess vascular access sites hourly  3. Every 4-6 hours minimum:  Change oxygen saturation probe site  4. Every 4-6 hours:  If on nasal continuous positive airway pressure, respiratory therapy assess nares and determine need for appliance change or resting period.   12/2/2023 1606 by Alanna Tay RN  Outcome: Progressing     Problem: ABCDS Injury Assessment  Goal: Absence of physical injury  Outcome: Progressing     Problem: Chronic Conditions and Co-morbidities  Goal: Patient's chronic conditions and co-morbidity symptoms are monitored and maintained or improved  Outcome: Progressing

## 2023-12-03 PROCEDURE — 2700000000 HC OXYGEN THERAPY PER DAY

## 2023-12-03 PROCEDURE — 2060000000 HC ICU INTERMEDIATE R&B

## 2023-12-03 PROCEDURE — 99233 SBSQ HOSP IP/OBS HIGH 50: CPT | Performed by: INTERNAL MEDICINE

## 2023-12-03 PROCEDURE — 97116 GAIT TRAINING THERAPY: CPT

## 2023-12-03 PROCEDURE — 6370000000 HC RX 637 (ALT 250 FOR IP): Performed by: NURSE PRACTITIONER

## 2023-12-03 PROCEDURE — 6370000000 HC RX 637 (ALT 250 FOR IP): Performed by: INTERNAL MEDICINE

## 2023-12-03 PROCEDURE — 6360000002 HC RX W HCPCS: Performed by: INTERNAL MEDICINE

## 2023-12-03 PROCEDURE — 2580000003 HC RX 258: Performed by: NURSE PRACTITIONER

## 2023-12-03 PROCEDURE — 6360000002 HC RX W HCPCS: Performed by: NURSE PRACTITIONER

## 2023-12-03 PROCEDURE — 94640 AIRWAY INHALATION TREATMENT: CPT

## 2023-12-03 PROCEDURE — 97110 THERAPEUTIC EXERCISES: CPT

## 2023-12-03 PROCEDURE — 94761 N-INVAS EAR/PLS OXIMETRY MLT: CPT

## 2023-12-03 RX ORDER — ENOXAPARIN SODIUM 100 MG/ML
40 INJECTION SUBCUTANEOUS DAILY
Status: DISCONTINUED | OUTPATIENT
Start: 2023-12-03 | End: 2023-12-05 | Stop reason: HOSPADM

## 2023-12-03 RX ORDER — FUROSEMIDE 10 MG/ML
40 INJECTION INTRAMUSCULAR; INTRAVENOUS 2 TIMES DAILY
Status: DISCONTINUED | OUTPATIENT
Start: 2023-12-03 | End: 2023-12-05 | Stop reason: HOSPADM

## 2023-12-03 RX ADMIN — LATANOPROST 1 DROP: 50 SOLUTION OPHTHALMIC at 21:14

## 2023-12-03 RX ADMIN — BUDESONIDE AND FORMOTEROL FUMARATE DIHYDRATE 2 PUFF: 160; 4.5 AEROSOL RESPIRATORY (INHALATION) at 07:33

## 2023-12-03 RX ADMIN — SODIUM CHLORIDE, PRESERVATIVE FREE 10 ML: 5 INJECTION INTRAVENOUS at 21:15

## 2023-12-03 RX ADMIN — MAGNESIUM HYDROXIDE 30 ML: 400 SUSPENSION ORAL at 23:57

## 2023-12-03 RX ADMIN — PRAVASTATIN SODIUM 20 MG: 20 TABLET ORAL at 21:14

## 2023-12-03 RX ADMIN — BUDESONIDE AND FORMOTEROL FUMARATE DIHYDRATE 2 PUFF: 160; 4.5 AEROSOL RESPIRATORY (INHALATION) at 20:01

## 2023-12-03 RX ADMIN — SODIUM CHLORIDE, PRESERVATIVE FREE 10 ML: 5 INJECTION INTRAVENOUS at 10:04

## 2023-12-03 RX ADMIN — FUROSEMIDE 40 MG: 10 INJECTION, SOLUTION INTRAMUSCULAR; INTRAVENOUS at 17:29

## 2023-12-03 RX ADMIN — TIMOLOL MALEATE 1 DROP: 5 SOLUTION/ DROPS OPHTHALMIC at 10:04

## 2023-12-03 RX ADMIN — CETIRIZINE HYDROCHLORIDE 5 MG: 10 TABLET, FILM COATED ORAL at 10:04

## 2023-12-03 RX ADMIN — FUROSEMIDE 40 MG: 10 INJECTION, SOLUTION INTRAMUSCULAR; INTRAVENOUS at 10:04

## 2023-12-03 RX ADMIN — ACETAMINOPHEN 650 MG: 325 TABLET ORAL at 16:19

## 2023-12-03 RX ADMIN — ENOXAPARIN SODIUM 40 MG: 100 INJECTION SUBCUTANEOUS at 11:07

## 2023-12-03 RX ADMIN — TIMOLOL MALEATE 1 DROP: 5 SOLUTION/ DROPS OPHTHALMIC at 21:14

## 2023-12-03 ASSESSMENT — PAIN DESCRIPTION - DESCRIPTORS: DESCRIPTORS: CRAMPING

## 2023-12-03 ASSESSMENT — PAIN DESCRIPTION - LOCATION: LOCATION: ABDOMEN

## 2023-12-03 ASSESSMENT — PAIN SCALES - GENERAL: PAINLEVEL_OUTOF10: 6

## 2023-12-03 NOTE — CARE COORDINATION
ONGOING DISCHARGE  NOTE:      Writer reviewed notes, Patient is agreeable to discharge to [facility]. Phone: [phone]  Fax: [phone]        Discharge is pending pre cert. Pre cert has not been started. Will need updated PT/OT notes      Will continue to follow for additional discharge needs.       If patient is discharged prior to next notation, then this note serves as note for discharge by case man    Electronically signed by Selena Coello RN on 12/3/2023 at 8:47 AM

## 2023-12-03 NOTE — PLAN OF CARE
Problem: Discharge Planning  Goal: Discharge to home or other facility with appropriate resources  Outcome: Progressing  Flowsheets (Taken 12/3/2023 0920)  Discharge to home or other facility with appropriate resources:   Identify barriers to discharge with patient and caregiver   Arrange for needed discharge resources and transportation as appropriate   Identify discharge learning needs (meds, wound care, etc)   Refer to discharge planning if patient needs post-hospital services based on physician order or complex needs related to functional status, cognitive ability or social support system     Problem: Safety - Adult  Goal: Free from fall injury  Outcome: Progressing     Problem: Skin/Tissue Integrity  Goal: Absence of new skin breakdown  Description: 1. Monitor for areas of redness and/or skin breakdown  2. Assess vascular access sites hourly  3. Every 4-6 hours minimum:  Change oxygen saturation probe site  4. Every 4-6 hours:  If on nasal continuous positive airway pressure, respiratory therapy assess nares and determine need for appliance change or resting period.   Outcome: Progressing     Problem: ABCDS Injury Assessment  Goal: Absence of physical injury  Outcome: Progressing     Problem: Chronic Conditions and Co-morbidities  Goal: Patient's chronic conditions and co-morbidity symptoms are monitored and maintained or improved  Outcome: Progressing  Flowsheets (Taken 12/3/2023 0920)  Care Plan - Patient's Chronic Conditions and Co-Morbidity Symptoms are Monitored and Maintained or Improved:   Monitor and assess patient's chronic conditions and comorbid symptoms for stability, deterioration, or improvement   Collaborate with multidisciplinary team to address chronic and comorbid conditions and prevent exacerbation or deterioration   Update acute care plan with appropriate goals if chronic or comorbid symptoms are exacerbated and prevent overall improvement and discharge

## 2023-12-03 NOTE — PLAN OF CARE
Problem: Safety - Adult  Goal: Free from fall injury  12/3/2023 0037 by Cesar Winslow RN  Outcome: Progressing     Problem: Skin/Tissue Integrity  Goal: Absence of new skin breakdown  Description: 1. Monitor for areas of redness and/or skin breakdown  2. Assess vascular access sites hourly  3. Every 4-6 hours minimum:  Change oxygen saturation probe site  4. Every 4-6 hours:  If on nasal continuous positive airway pressure, respiratory therapy assess nares and determine need for appliance change or resting period.   12/3/2023 0037 by Cesar Winslow RN  Outcome: Progressing     Problem: ABCDS Injury Assessment  Goal: Absence of physical injury  12/3/2023 0037 by Cesar Winslow RN  Outcome: Progressing     Problem: Chronic Conditions and Co-morbidities  Goal: Patient's chronic conditions and co-morbidity symptoms are monitored and maintained or improved  12/3/2023 0037 by Cesar Winslow RN  Outcome: Progressing  Flowsheets (Taken 12/2/2023 2000)  Care Plan - Patient's Chronic Conditions and Co-Morbidity Symptoms are Monitored and Maintained or Improved:   Monitor and assess patient's chronic conditions and comorbid symptoms for stability, deterioration, or improvement   Collaborate with multidisciplinary team to address chronic and comorbid conditions and prevent exacerbation or deterioration

## 2023-12-04 ENCOUNTER — APPOINTMENT (OUTPATIENT)
Dept: GENERAL RADIOLOGY | Age: 88
DRG: 291 | End: 2023-12-04
Payer: MEDICARE

## 2023-12-04 LAB
ANION GAP SERPL CALCULATED.3IONS-SCNC: 2 MMOL/L (ref 9–17)
BLASTS NFR FLD: 0 %
BNP SERPL-MCNC: 1840 PG/ML
BUN SERPL-MCNC: 26 MG/DL (ref 8–23)
CALCIUM SERPL-MCNC: 8.6 MG/DL (ref 8.6–10.4)
CASE NUMBER:: NORMAL
CASE NUMBER:: NORMAL
CHLORIDE SERPL-SCNC: 95 MMOL/L (ref 98–107)
CO2 SERPL-SCNC: 45 MMOL/L (ref 20–31)
CREAT SERPL-MCNC: 0.5 MG/DL (ref 0.5–0.9)
EOSINOPHIL NFR FLD: 0 %
GFR SERPL CREATININE-BSD FRML MDRD: >60 ML/MIN/1.73M2
GLUCOSE SERPL-MCNC: 93 MG/DL (ref 70–99)
LYMPHOCYTES NFR FLD: 73 %
MANUAL DIF COMMENT FLD-IMP: ABNORMAL
MONOCYTES NFR FLD: 13 %
NEUTROPHILS NFR FLD: 4 %
POTASSIUM SERPL-SCNC: 3.9 MMOL/L (ref 3.7–5.3)
SODIUM SERPL-SCNC: 142 MMOL/L (ref 135–144)
SPECIMEN DESCRIPTION: NORMAL
SPECIMEN DESCRIPTION: NORMAL
UNIDENT CELLS NFR FLD: 10 %

## 2023-12-04 PROCEDURE — 2580000003 HC RX 258: Performed by: NURSE PRACTITIONER

## 2023-12-04 PROCEDURE — 71045 X-RAY EXAM CHEST 1 VIEW: CPT

## 2023-12-04 PROCEDURE — 94761 N-INVAS EAR/PLS OXIMETRY MLT: CPT

## 2023-12-04 PROCEDURE — 97110 THERAPEUTIC EXERCISES: CPT

## 2023-12-04 PROCEDURE — 2700000000 HC OXYGEN THERAPY PER DAY

## 2023-12-04 PROCEDURE — 6370000000 HC RX 637 (ALT 250 FOR IP): Performed by: INTERNAL MEDICINE

## 2023-12-04 PROCEDURE — 99221 1ST HOSP IP/OBS SF/LOW 40: CPT | Performed by: INTERNAL MEDICINE

## 2023-12-04 PROCEDURE — 97530 THERAPEUTIC ACTIVITIES: CPT

## 2023-12-04 PROCEDURE — 36415 COLL VENOUS BLD VENIPUNCTURE: CPT

## 2023-12-04 PROCEDURE — 6360000002 HC RX W HCPCS: Performed by: NURSE PRACTITIONER

## 2023-12-04 PROCEDURE — 97116 GAIT TRAINING THERAPY: CPT

## 2023-12-04 PROCEDURE — 80048 BASIC METABOLIC PNL TOTAL CA: CPT

## 2023-12-04 PROCEDURE — 99232 SBSQ HOSP IP/OBS MODERATE 35: CPT | Performed by: INTERNAL MEDICINE

## 2023-12-04 PROCEDURE — 94640 AIRWAY INHALATION TREATMENT: CPT

## 2023-12-04 PROCEDURE — 83880 ASSAY OF NATRIURETIC PEPTIDE: CPT

## 2023-12-04 PROCEDURE — 2060000000 HC ICU INTERMEDIATE R&B

## 2023-12-04 RX ADMIN — FUROSEMIDE 40 MG: 10 INJECTION, SOLUTION INTRAMUSCULAR; INTRAVENOUS at 10:23

## 2023-12-04 RX ADMIN — PRAVASTATIN SODIUM 20 MG: 20 TABLET ORAL at 21:27

## 2023-12-04 RX ADMIN — TIMOLOL MALEATE 1 DROP: 5 SOLUTION/ DROPS OPHTHALMIC at 10:25

## 2023-12-04 RX ADMIN — FUROSEMIDE 40 MG: 10 INJECTION, SOLUTION INTRAMUSCULAR; INTRAVENOUS at 18:44

## 2023-12-04 RX ADMIN — BUDESONIDE AND FORMOTEROL FUMARATE DIHYDRATE 2 PUFF: 160; 4.5 AEROSOL RESPIRATORY (INHALATION) at 19:58

## 2023-12-04 RX ADMIN — ENOXAPARIN SODIUM 40 MG: 100 INJECTION SUBCUTANEOUS at 10:23

## 2023-12-04 RX ADMIN — TIMOLOL MALEATE 1 DROP: 5 SOLUTION/ DROPS OPHTHALMIC at 21:28

## 2023-12-04 RX ADMIN — LATANOPROST 1 DROP: 50 SOLUTION OPHTHALMIC at 21:28

## 2023-12-04 RX ADMIN — SODIUM CHLORIDE, PRESERVATIVE FREE 10 ML: 5 INJECTION INTRAVENOUS at 21:26

## 2023-12-04 RX ADMIN — SODIUM CHLORIDE, PRESERVATIVE FREE 10 ML: 5 INJECTION INTRAVENOUS at 10:30

## 2023-12-04 RX ADMIN — BUDESONIDE AND FORMOTEROL FUMARATE DIHYDRATE 2 PUFF: 160; 4.5 AEROSOL RESPIRATORY (INHALATION) at 08:28

## 2023-12-04 RX ADMIN — CETIRIZINE HYDROCHLORIDE 5 MG: 10 TABLET, FILM COATED ORAL at 10:23

## 2023-12-04 ASSESSMENT — PAIN SCALES - GENERAL: PAINLEVEL_OUTOF10: 0

## 2023-12-04 NOTE — CONSULTS
Today's Date: 12/4/2023  Patient Name: Mary Conley  Date of admission: 11/29/2023  3:20 PM  Patient's age: 80 y.o., 4/27/1924  Admission Dx: Bilateral pleural effusion [J90]    Reason for Consult: management recommendations  Requesting Physician: Rita Miller MD    CHIEF COMPLAINT:      History Obtained From:  patient, electronic medical record    HISTORY OF PRESENT ILLNESS:      The patient is a 80 y.o.   female who is admitted to the hospital for worsening shortness of breath and hypoxemia. She was found to have recurrent pleural effusion. Repeat labs showed a transudative effusion. We are asked to evaluate patient and rule out lymphoproliferative disorder. Patient is seen and evaluated. She is a relatively poor historian and most information obtained from the chart  She has history of diastolic congestive heart failure, history of atrial fibrillation. Past Medical History:   has a past medical history of Acute dermatitis, Arthritis, Asthma, Atrial fibrillation ([address]), Bursitis, CHF (congestive heart failure) ([address]), Hearing aid worn, Hyperlipidemia, Hypertension, Lumbar disc disease, Wears glasses, and Wound of right leg. Past Surgical History:   has a past surgical history that includes back surgery; Wound debridement (Left, 03/09/2017); pr i&d deep absc bursa/hematoma thigh/knee region (Right, 3/9/2017); other surgical history (03/30/2017); other surgical history (03/30/2017); and pr i&d deep absc bursa/hematoma thigh/knee region (Right, 4/28/2017). Medications:    Reviewed in Epic     Allergies:  Shellfish-derived products and Tudorza pressair [aclidinium bromide]    Social History:   reports that she has never smoked. She has never used smokeless tobacco. She reports that she does not currently use alcohol. She reports that she does not use drugs. Family History: family history includes Diabetes in her maternal aunt;  Heart Disease in her father and mother; Obesity in her maternal

## 2023-12-04 NOTE — CARE COORDINATION
ONGOING DISCHARGE  NOTE:      Writer reviewed notes, Patient is agreeable to discharge to [facility]. Phone: [phone]  Fax: [phone]      Pre cert approved     Pulmonary following    Eliquis on hold    Will continue to follow for additional discharge needs. If patient is discharged prior to next notation, then this note serves as note for discharge by case management.     Electronically signed by Pinky Ceballos RN on 12/4/2023 at 11:35 AM

## 2023-12-04 NOTE — PLAN OF CARE
Problem: Safety - Adult  Goal: Free from fall injury  12/3/2023 1947 by Tasneem Castelan RN  Outcome: Progressing     Problem: Skin/Tissue Integrity  Goal: Absence of new skin breakdown  Description: 1. Monitor for areas of redness and/or skin breakdown  2. Assess vascular access sites hourly  3. Every 4-6 hours minimum:  Change oxygen saturation probe site  4. Every 4-6 hours:  If on nasal continuous positive airway pressure, respiratory therapy assess nares and determine need for appliance change or resting period.   12/3/2023 1947 by Tasneem Castelan RN  Outcome: Progressing     Problem: ABCDS Injury Assessment  Goal: Absence of physical injury  12/3/2023 1947 by Tasneem Castelan RN  Outcome: Progressing     Problem: Chronic Conditions and Co-morbidities  Goal: Patient's chronic conditions and co-morbidity symptoms are monitored and maintained or improved  12/3/2023 1947 by Tasneem Castelan RN  Outcome: Progressing  Flowsheets (Taken 12/3/2023 1941)  Care Plan - Patient's Chronic Conditions and Co-Morbidity Symptoms are Monitored and Maintained or Improved:   Monitor and assess patient's chronic conditions and comorbid symptoms for stability, deterioration, or improvement   Collaborate with multidisciplinary team to address chronic and comorbid conditions and prevent exacerbation or deterioration

## 2023-12-05 VITALS
DIASTOLIC BLOOD PRESSURE: 69 MMHG | HEIGHT: 62 IN | HEART RATE: 78 BPM | WEIGHT: 112.43 LBS | BODY MASS INDEX: 20.69 KG/M2 | SYSTOLIC BLOOD PRESSURE: 123 MMHG | OXYGEN SATURATION: 100 % | TEMPERATURE: 97.5 F | RESPIRATION RATE: 18 BRPM

## 2023-12-05 LAB
ANION GAP SERPL CALCULATED.3IONS-SCNC: 1 MMOL/L (ref 9–17)
BASOPHILS # BLD: 0 K/UL (ref 0–0.2)
BASOPHILS NFR BLD: 1 % (ref 0–2)
BUN SERPL-MCNC: 24 MG/DL (ref 8–23)
CALCIUM SERPL-MCNC: 8.6 MG/DL (ref 8.6–10.4)
CHLORIDE SERPL-SCNC: 96 MMOL/L (ref 98–107)
CO2 SERPL-SCNC: 44 MMOL/L (ref 20–31)
CREAT SERPL-MCNC: 0.6 MG/DL (ref 0.5–0.9)
EOSINOPHIL # BLD: 0.3 K/UL (ref 0–0.4)
EOSINOPHILS RELATIVE PERCENT: 8 % (ref 0–4)
ERYTHROCYTE [DISTWIDTH] IN BLOOD BY AUTOMATED COUNT: 15.3 % (ref 11.5–14.9)
GFR SERPL CREATININE-BSD FRML MDRD: >60 ML/MIN/1.73M2
GLUCOSE SERPL-MCNC: 100 MG/DL (ref 70–99)
HCT VFR BLD AUTO: 39.6 % (ref 36–46)
HGB BLD-MCNC: 12.9 G/DL (ref 12–16)
LYMPHOCYTES NFR BLD: 0.8 K/UL (ref 1–4.8)
LYMPHOCYTES RELATIVE PERCENT: 21 % (ref 24–44)
MCH RBC QN AUTO: 32.4 PG (ref 26–34)
MCHC RBC AUTO-ENTMCNC: 32.6 G/DL (ref 31–37)
MCV RBC AUTO: 99.3 FL (ref 80–100)
MONOCYTES NFR BLD: 0.5 K/UL (ref 0.1–1.3)
MONOCYTES NFR BLD: 12 % (ref 1–7)
NEUTROPHILS NFR BLD: 58 % (ref 36–66)
NEUTS SEG NFR BLD: 2.3 K/UL (ref 1.3–9.1)
PLATELET # BLD AUTO: 125 K/UL (ref 150–450)
PMV BLD AUTO: 9.3 FL (ref 6–12)
POTASSIUM SERPL-SCNC: 3.8 MMOL/L (ref 3.7–5.3)
RBC # BLD AUTO: 3.99 M/UL (ref 4–5.2)
SODIUM SERPL-SCNC: 141 MMOL/L (ref 135–144)
SURGICAL PATHOLOGY REPORT: NORMAL
SURGICAL PATHOLOGY REPORT: NORMAL
WBC OTHER # BLD: 4 K/UL (ref 3.5–11)

## 2023-12-05 PROCEDURE — 97530 THERAPEUTIC ACTIVITIES: CPT

## 2023-12-05 PROCEDURE — 99239 HOSP IP/OBS DSCHRG MGMT >30: CPT | Performed by: INTERNAL MEDICINE

## 2023-12-05 PROCEDURE — 6370000000 HC RX 637 (ALT 250 FOR IP): Performed by: INTERNAL MEDICINE

## 2023-12-05 PROCEDURE — 97116 GAIT TRAINING THERAPY: CPT

## 2023-12-05 PROCEDURE — 94640 AIRWAY INHALATION TREATMENT: CPT

## 2023-12-05 PROCEDURE — 97110 THERAPEUTIC EXERCISES: CPT

## 2023-12-05 PROCEDURE — 2700000000 HC OXYGEN THERAPY PER DAY

## 2023-12-05 PROCEDURE — 36415 COLL VENOUS BLD VENIPUNCTURE: CPT

## 2023-12-05 PROCEDURE — 6360000002 HC RX W HCPCS: Performed by: NURSE PRACTITIONER

## 2023-12-05 PROCEDURE — 2580000003 HC RX 258: Performed by: NURSE PRACTITIONER

## 2023-12-05 PROCEDURE — 80048 BASIC METABOLIC PNL TOTAL CA: CPT

## 2023-12-05 PROCEDURE — 94760 N-INVAS EAR/PLS OXIMETRY 1: CPT

## 2023-12-05 PROCEDURE — 85025 COMPLETE CBC W/AUTO DIFF WBC: CPT

## 2023-12-05 RX ORDER — SPIRONOLACTONE 25 MG/1
25 TABLET ORAL DAILY
Qty: 30 TABLET | Refills: 3 | Status: SHIPPED | OUTPATIENT
Start: 2023-12-05

## 2023-12-05 RX ORDER — BUMETANIDE 1 MG/1
1 TABLET ORAL DAILY
Qty: 90 TABLET | Refills: 0 | Status: SHIPPED | OUTPATIENT
Start: 2023-12-05

## 2023-12-05 RX ADMIN — ENOXAPARIN SODIUM 40 MG: 100 INJECTION SUBCUTANEOUS at 08:45

## 2023-12-05 RX ADMIN — CETIRIZINE HYDROCHLORIDE 5 MG: 10 TABLET, FILM COATED ORAL at 08:45

## 2023-12-05 RX ADMIN — BUDESONIDE AND FORMOTEROL FUMARATE DIHYDRATE 2 PUFF: 160; 4.5 AEROSOL RESPIRATORY (INHALATION) at 08:38

## 2023-12-05 RX ADMIN — SODIUM CHLORIDE, PRESERVATIVE FREE 10 ML: 5 INJECTION INTRAVENOUS at 08:45

## 2023-12-05 RX ADMIN — TIMOLOL MALEATE 1 DROP: 5 SOLUTION/ DROPS OPHTHALMIC at 08:45

## 2023-12-05 RX ADMIN — FUROSEMIDE 40 MG: 10 INJECTION, SOLUTION INTRAMUSCULAR; INTRAVENOUS at 08:45

## 2023-12-05 ASSESSMENT — PAIN SCALES - GENERAL: PAINLEVEL_OUTOF10: 0

## 2023-12-05 NOTE — CARE COORDINATION
Patient will discharge to [facility]. Phone: [phone]  Fax: [phone]   at 36 via [address] [facility] faxed to facility. Patient/Family informed of discharge and is agreeable.   Bedside RN notified, Call report to [phone] ask for [facility] and left message for son Please let patient know that his chest xray is normal    Electronically signed by: Corky Long PA-C  2/4/2020

## 2023-12-06 LAB
FLOW CYTOMETRY SOURCE: NORMAL
FLOW CYTOMETRY, NODE/FLUID: NORMAL

## 2023-12-07 ENCOUNTER — HOSPITAL ENCOUNTER (OUTPATIENT)
Age: 88
Setting detail: SPECIMEN
Discharge: HOME | End: 2023-12-07

## 2023-12-07 LAB
ALBUMIN SERPL-MCNC: 2.3 G/DL (ref 3.5–5.2)
ALBUMIN/GLOB SERPL: 0.7 {RATIO} (ref 1–2.5)
ALP SERPL-CCNC: 48 U/L (ref 35–104)
ALT SERPL-CCNC: 24 U/L (ref 5–33)
ANION GAP SERPL CALCULATED.3IONS-SCNC: 5 MMOL/L (ref 9–17)
AST SERPL-CCNC: 62 U/L
BILIRUB SERPL-MCNC: 0.5 MG/DL (ref 0.3–1.2)
BUN SERPL-MCNC: 25 MG/DL (ref 8–23)
CALCIUM SERPL-MCNC: 8.4 MG/DL (ref 8.6–10.4)
CHLORIDE SERPL-SCNC: 94 MMOL/L (ref 98–107)
CO2 SERPL-SCNC: 39 MMOL/L (ref 20–31)
CREAT SERPL-MCNC: 0.6 MG/DL (ref 0.5–0.9)
ERYTHROCYTE [DISTWIDTH] IN BLOOD BY AUTOMATED COUNT: 15.2 % (ref 11.8–14.4)
GFR SERPL CREATININE-BSD FRML MDRD: >60 ML/MIN/1.73M2
GLUCOSE SERPL-MCNC: 77 MG/DL (ref 70–99)
HCT VFR BLD AUTO: 35.3 % (ref 36.3–47.1)
HGB BLD-MCNC: 11.8 G/DL (ref 11.9–15.1)
MCH RBC QN AUTO: 33.7 PG (ref 25.2–33.5)
MCHC RBC AUTO-ENTMCNC: 33.4 G/DL (ref 28.4–34.8)
MCV RBC AUTO: 100.9 FL (ref 82.6–102.9)
MICROORGANISM SPEC CULT: NORMAL
MICROORGANISM/AGENT SPEC: NORMAL
NRBC BLD-RTO: 0 PER 100 WBC
PLATELET # BLD AUTO: 382 K/UL (ref 138–453)
PMV BLD AUTO: 12.6 FL (ref 8.1–13.5)
POTASSIUM SERPL-SCNC: 5.5 MMOL/L (ref 3.7–5.3)
PROT SERPL-MCNC: 5.4 G/DL (ref 6.4–8.3)
RBC # BLD AUTO: 3.5 M/UL (ref 3.95–5.11)
SODIUM SERPL-SCNC: 138 MMOL/L (ref 135–144)
SPECIMEN DESCRIPTION: NORMAL
WBC OTHER # BLD: 4.3 K/UL (ref 3.5–11.3)

## 2023-12-07 PROCEDURE — 85027 COMPLETE CBC AUTOMATED: CPT

## 2023-12-07 PROCEDURE — P9603 ONE-WAY ALLOW PRORATED MILES: HCPCS

## 2023-12-07 PROCEDURE — 36415 COLL VENOUS BLD VENIPUNCTURE: CPT

## 2023-12-07 PROCEDURE — 80053 COMPREHEN METABOLIC PANEL: CPT

## 2023-12-08 LAB
MICROORGANISM SPEC CULT: NORMAL
MICROORGANISM/AGENT SPEC: NORMAL
SPECIMEN DESCRIPTION: NORMAL

## 2024-01-10 ENCOUNTER — HOSPITAL ENCOUNTER (OUTPATIENT)
Age: 89
Setting detail: SPECIMEN
Discharge: HOME | End: 2024-01-10

## 2024-01-11 ENCOUNTER — HOSPITAL ENCOUNTER (OUTPATIENT)
Age: 89
Setting detail: SPECIMEN
Discharge: HOME | End: 2024-01-11

## 2024-01-11 LAB
ANION GAP SERPL CALCULATED.3IONS-SCNC: 11 MMOL/L (ref 9–16)
BUN SERPL-MCNC: 39 MG/DL (ref 8–23)
CALCIUM SERPL-MCNC: 9.3 MG/DL (ref 8.6–10.4)
CHLORIDE SERPL-SCNC: 101 MMOL/L (ref 98–107)
CO2 SERPL-SCNC: 29 MMOL/L (ref 20–31)
CREAT SERPL-MCNC: 1.2 MG/DL (ref 0.5–0.9)
GFR SERPL CREATININE-BSD FRML MDRD: 40 ML/MIN/1.73M2
GLUCOSE SERPL-MCNC: 112 MG/DL (ref 74–99)
POTASSIUM SERPL-SCNC: 4 MMOL/L (ref 3.7–5.3)
SODIUM SERPL-SCNC: 141 MMOL/L (ref 136–145)

## 2024-03-01 ENCOUNTER — HOSPITAL ENCOUNTER (OUTPATIENT)
Age: 89
Setting detail: SPECIMEN
Discharge: HOME | End: 2024-03-01

## 2024-03-01 LAB
ALBUMIN SERPL-MCNC: 3.7 G/DL (ref 3.5–5.2)
ALBUMIN/GLOB SERPL: 1 {RATIO} (ref 1–2.5)
ALP SERPL-CCNC: 68 U/L (ref 35–104)
ALT SERPL-CCNC: 15 U/L (ref 5–33)
ANION GAP SERPL CALCULATED.3IONS-SCNC: 13 MMOL/L (ref 9–17)
AST SERPL-CCNC: 25 U/L
BILIRUB SERPL-MCNC: 0.4 MG/DL (ref 0.3–1.2)
BUN SERPL-MCNC: 37 MG/DL (ref 8–23)
CALCIUM SERPL-MCNC: 9.4 MG/DL (ref 8.6–10.4)
CHLORIDE SERPL-SCNC: 99 MMOL/L (ref 98–107)
CHOLEST SERPL-MCNC: 165 MG/DL
CHOLESTEROL/HDL RATIO: 2.3
CO2 SERPL-SCNC: 25 MMOL/L (ref 20–31)
CREAT SERPL-MCNC: 1.3 MG/DL (ref 0.5–0.9)
ERYTHROCYTE [DISTWIDTH] IN BLOOD BY AUTOMATED COUNT: 15.4 % (ref 11.8–14.4)
GFR SERPL CREATININE-BSD FRML MDRD: 37 ML/MIN/1.73M2
GLUCOSE SERPL-MCNC: 151 MG/DL (ref 70–99)
HCT VFR BLD AUTO: 43 % (ref 36.3–47.1)
HDLC SERPL-MCNC: 72 MG/DL
HGB BLD-MCNC: 13.2 G/DL (ref 11.9–15.1)
LDLC SERPL CALC-MCNC: 79 MG/DL (ref 0–130)
MAGNESIUM SERPL-MCNC: 2.4 MG/DL (ref 1.6–2.6)
MCH RBC QN AUTO: 31.7 PG (ref 25.2–33.5)
MCHC RBC AUTO-ENTMCNC: 30.7 G/DL (ref 28.4–34.8)
MCV RBC AUTO: 103.4 FL (ref 82.6–102.9)
NRBC BLD-RTO: 0 PER 100 WBC
PLATELET # BLD AUTO: 192 K/UL (ref 138–453)
PMV BLD AUTO: 10.9 FL (ref 8.1–13.5)
POTASSIUM SERPL-SCNC: 5.1 MMOL/L (ref 3.7–5.3)
PROT SERPL-MCNC: 7.3 G/DL (ref 6.4–8.3)
RBC # BLD AUTO: 4.16 M/UL (ref 3.95–5.11)
SODIUM SERPL-SCNC: 137 MMOL/L (ref 135–144)
T4 FREE SERPL-MCNC: 1.3 NG/DL (ref 0.9–1.7)
TRIGL SERPL-MCNC: 70 MG/DL
TSH SERPL DL<=0.05 MIU/L-ACNC: 3.75 UIU/ML (ref 0.3–5)
WBC OTHER # BLD: 6 K/UL (ref 3.5–11.3)

## 2024-03-02 LAB
EST. AVERAGE GLUCOSE BLD GHB EST-MCNC: 120 MG/DL
HBA1C MFR BLD: 5.8 % (ref 4–6)

## 2024-07-19 ENCOUNTER — TELEPHONE (OUTPATIENT)
Age: 89
End: 2024-07-19

## 2024-07-19 ENCOUNTER — OFFICE VISIT (OUTPATIENT)
Age: 89
End: 2024-07-19

## 2024-07-19 VITALS
SYSTOLIC BLOOD PRESSURE: 108 MMHG | HEART RATE: 54 BPM | WEIGHT: 108.2 LBS | DIASTOLIC BLOOD PRESSURE: 57 MMHG | OXYGEN SATURATION: 98 % | BODY MASS INDEX: 19.79 KG/M2

## 2024-07-19 DIAGNOSIS — I48.21 PERMANENT ATRIAL FIBRILLATION (HCC): Primary | ICD-10-CM

## 2024-07-19 RX ORDER — DILTIAZEM HYDROCHLORIDE 120 MG/1
120 CAPSULE, EXTENDED RELEASE ORAL DAILY
Qty: 30 CAPSULE | Refills: 3 | Status: SHIPPED | OUTPATIENT
Start: 2024-07-19

## 2024-07-19 NOTE — PROGRESS NOTES
98%   BMI 19.79 kg/m²   General appearance: alert and cooperative with exam  HEENT: Head: Normocephalic, no lesions, without obvious abnormality.  Neck: no carotid bruit, no JVD  Lungs:   Diminished air entry at the bases, no wheezing or rales  Heart: irregularly irregular heart rate.  Grade 2 x 6 ejection systolic murmur in the aortic/apical area  Abdomen: soft, non-tender; bowel sounds normal; no masses,  no organomegaly  Extremities:  1+ nonpitting edema  Neurologic: Mental status: Alert, oriented, thought content appropriate    Labs:  Lab Results   Component Value Date    CHOL 165 03/01/2024    TRIG 70 03/01/2024    HDL 72 03/01/2024    VLDL NOT REPORTED 01/22/2022    CHOLHDLRATIO 2.3 03/01/2024       Lab Results   Component Value Date     03/01/2024    K 5.1 03/01/2024    CL 99 03/01/2024    CO2 25 03/01/2024    BUN 37 (H) 03/01/2024    CREATININE 1.3 (H) 03/01/2024    GLUCOSE 151 (H) 03/01/2024    CALCIUM 9.4 03/01/2024    BILITOT 0.4 03/01/2024    ALKPHOS 68 03/01/2024    AST 25 03/01/2024    ALT 15 03/01/2024    LABGLOM 37 (L) 03/01/2024    GFRAA >60 08/20/2022       EKG  7/19/24: Atrial fib with controlled vent response , rightward axis, nonspecific T-wave abnormality    ECHO 04/2023:     Preserved LV systolic function, EF 55%.   Reduced LV diastolic compliance. DD Grade III.   Minimal aortic valve restriction with mean gradient 10 mm Hg   Moderate mitral regurgitation. Minimal anterior leaflet prolapse.   Moderate tricuspid regurgitation.   Increased PAP to 52 mm Hg.     Past Medical and Surgical History, Problem List, Allergies, Medications, Labs, Imaging, all reviewed extensively in EMR and with the patient.    Assessment and Plan:  1. Permanent atrial fibrillation, on anticoagulation with Eliquis 2.5 mg b.i.d. given her age and weight. Currently the heart rate is well controlled. Will continue Cardizem at current dose. Plan to decrease the dose of cardizem to 120 mg qd given borderline bp.     2.

## 2024-07-24 ENCOUNTER — TELEPHONE (OUTPATIENT)
Age: 89
End: 2024-07-24

## 2024-07-24 NOTE — TELEPHONE ENCOUNTER
AT LAST VISIT, I did decrease the dose of cradizem and aldactone. Please stop aldactone and continue lower dose of cardizem.

## 2024-07-24 NOTE — TELEPHONE ENCOUNTER
Spoke to Ritchie (patients son) patient is taking new dose of the diltiazem and stopped the Aldactone per Dr. Dempsey. Patient pharmacy dose her packaging of her pills. Patient did tell her son she did get dizzy after Vishal the nurse left her house. She has only been taking the new dose and stopped Aldactone  since Monday. I did let Ritchie know to give it a couple more days to get out of her system. Did let him know to let use know if she does not getting better.

## 2024-07-24 NOTE — TELEPHONE ENCOUNTER
Vishal from [facility] called stating she is have some low blood pressure. 95/61, 98/42, 84/46, 102/56. When Vishal has been out last few times her pressure has been low. No symptoms besides \"no step in my pep\" per patient. Please advice.

## 2024-09-16 ENCOUNTER — HOSPITAL ENCOUNTER (OUTPATIENT)
Age: 89
Setting detail: SPECIMEN
Discharge: HOME | End: 2024-09-16

## 2024-09-16 LAB
ALBUMIN SERPL-MCNC: 4 G/DL (ref 3.5–5.2)
ALBUMIN/GLOB SERPL: 1 {RATIO} (ref 1–2.5)
ALP SERPL-CCNC: 60 U/L (ref 35–104)
ALT SERPL-CCNC: 13 U/L (ref 10–35)
ANION GAP SERPL CALCULATED.3IONS-SCNC: 12 MMOL/L (ref 9–16)
AST SERPL-CCNC: 27 U/L (ref 10–35)
BILIRUB SERPL-MCNC: 0.7 MG/DL (ref 0–1.2)
BUN SERPL-MCNC: 40 MG/DL (ref 8–23)
CALCIUM SERPL-MCNC: 9.3 MG/DL (ref 8.6–10.4)
CHLORIDE SERPL-SCNC: 101 MMOL/L (ref 98–107)
CO2 SERPL-SCNC: 26 MMOL/L (ref 20–31)
CREAT SERPL-MCNC: 1.6 MG/DL (ref 0.5–0.9)
ERYTHROCYTE [DISTWIDTH] IN BLOOD BY AUTOMATED COUNT: 14.9 % (ref 11.8–14.4)
GFR, ESTIMATED: 28 ML/MIN/1.73M2
GLUCOSE SERPL-MCNC: 85 MG/DL (ref 74–99)
HCT VFR BLD AUTO: 41.9 % (ref 36.3–47.1)
HGB BLD-MCNC: 13.2 G/DL (ref 11.9–15.1)
MAGNESIUM SERPL-MCNC: 2.2 MG/DL (ref 1.7–2.3)
MCH RBC QN AUTO: 31.7 PG (ref 25.2–33.5)
MCHC RBC AUTO-ENTMCNC: 31.5 G/DL (ref 28.4–34.8)
MCV RBC AUTO: 100.5 FL (ref 82.6–102.9)
NRBC BLD-RTO: 0 PER 100 WBC
PLATELET # BLD AUTO: 178 K/UL (ref 138–453)
PMV BLD AUTO: 11 FL (ref 8.1–13.5)
POTASSIUM SERPL-SCNC: 5.2 MMOL/L (ref 3.7–5.3)
PROT SERPL-MCNC: 6.7 G/DL (ref 6.6–8.7)
RBC # BLD AUTO: 4.17 M/UL (ref 3.95–5.11)
SODIUM SERPL-SCNC: 139 MMOL/L (ref 136–145)
WBC OTHER # BLD: 5.4 K/UL (ref 3.5–11.3)

## 2024-10-10 ENCOUNTER — APPOINTMENT (OUTPATIENT)
Dept: CT IMAGING | Age: 89
DRG: 884 | End: 2024-10-10
Payer: MEDICARE

## 2024-10-10 ENCOUNTER — HOSPITAL ENCOUNTER (INPATIENT)
Age: 89
LOS: 5 days | Discharge: NURSING, REHAB OR LONG-TERM CARE | DRG: 884 | End: 2024-10-15
Attending: EMERGENCY MEDICINE | Admitting: FAMILY MEDICINE
Payer: MEDICARE

## 2024-10-10 ENCOUNTER — APPOINTMENT (OUTPATIENT)
Dept: GENERAL RADIOLOGY | Age: 89
DRG: 884 | End: 2024-10-10
Payer: MEDICARE

## 2024-10-10 DIAGNOSIS — N17.9 ACUTE KIDNEY INJURY (HCC): ICD-10-CM

## 2024-10-10 DIAGNOSIS — G45.9 TIA (TRANSIENT ISCHEMIC ATTACK): Primary | ICD-10-CM

## 2024-10-10 DIAGNOSIS — S32.000A COMPRESSION FRACTURE OF LUMBAR VERTEBRA, UNSPECIFIED LUMBAR VERTEBRAL LEVEL, INITIAL ENCOUNTER (HCC): ICD-10-CM

## 2024-10-10 DIAGNOSIS — I63.9 CEREBROVASCULAR ACCIDENT (CVA), UNSPECIFIED MECHANISM (HCC): ICD-10-CM

## 2024-10-10 DIAGNOSIS — I21.4 NSTEMI (NON-ST ELEVATED MYOCARDIAL INFARCTION) (HCC): ICD-10-CM

## 2024-10-10 PROBLEM — R53.1 GENERAL WEAKNESS: Status: ACTIVE | Noted: 2024-10-10

## 2024-10-10 LAB
ALBUMIN SERPL-MCNC: 3.5 G/DL (ref 3.5–5.2)
ALP SERPL-CCNC: 70 U/L (ref 35–104)
ALT SERPL-CCNC: 23 U/L (ref 5–33)
ANION GAP SERPL CALCULATED.3IONS-SCNC: 12 MMOL/L (ref 9–17)
AST SERPL-CCNC: 29 U/L
BASOPHILS # BLD: 0.12 K/UL (ref 0–0.2)
BASOPHILS NFR BLD: 1 % (ref 0–2)
BILIRUB SERPL-MCNC: 0.6 MG/DL (ref 0.3–1.2)
BUN SERPL-MCNC: 63 MG/DL (ref 8–23)
BUN/CREAT SERPL: 32 (ref 9–20)
CALCIUM SERPL-MCNC: 10.5 MG/DL (ref 8.6–10.4)
CHLORIDE SERPL-SCNC: 100 MMOL/L (ref 98–107)
CK SERPL-CCNC: 202 U/L (ref 26–192)
CO2 SERPL-SCNC: 25 MMOL/L (ref 20–31)
CREAT SERPL-MCNC: 2 MG/DL (ref 0.5–0.9)
EOSINOPHIL # BLD: 0 K/UL (ref 0–0.44)
EOSINOPHILS RELATIVE PERCENT: 0 % (ref 1–4)
ERYTHROCYTE [DISTWIDTH] IN BLOOD BY AUTOMATED COUNT: 14.6 % (ref 11.8–14.4)
GFR, ESTIMATED: 22 ML/MIN/1.73M2
GLUCOSE SERPL-MCNC: 122 MG/DL (ref 70–99)
HCT VFR BLD AUTO: 42.2 % (ref 36.3–47.1)
HGB BLD-MCNC: 13.6 G/DL (ref 11.9–15.1)
IMM GRANULOCYTES # BLD AUTO: 0.12 K/UL (ref 0–0.3)
IMM GRANULOCYTES NFR BLD: 1 %
INR PPP: 1.6
LYMPHOCYTES NFR BLD: 0.58 K/UL (ref 1.1–3.7)
LYMPHOCYTES RELATIVE PERCENT: 5 % (ref 24–43)
MCH RBC QN AUTO: 32.3 PG (ref 25.2–33.5)
MCHC RBC AUTO-ENTMCNC: 32.2 G/DL (ref 28.4–34.8)
MCV RBC AUTO: 100.2 FL (ref 82.6–102.9)
MONOCYTES NFR BLD: 0.69 K/UL (ref 0.1–1.2)
MONOCYTES NFR BLD: 6 % (ref 3–12)
NEUTROPHILS NFR BLD: 87 % (ref 36–65)
NEUTS SEG NFR BLD: 9.99 K/UL (ref 1.5–8.1)
NRBC BLD-RTO: 0 PER 100 WBC
PLATELET # BLD AUTO: 143 K/UL (ref 138–453)
PMV BLD AUTO: 10.4 FL (ref 8.1–13.5)
POTASSIUM SERPL-SCNC: 5.3 MMOL/L (ref 3.7–5.3)
PROT SERPL-MCNC: 6.9 G/DL (ref 6.4–8.3)
PROTHROMBIN TIME: 19.2 SEC (ref 11.5–14.2)
RBC # BLD AUTO: 4.21 M/UL (ref 3.95–5.11)
SODIUM SERPL-SCNC: 137 MMOL/L (ref 135–144)
TROPONIN I SERPL HS-MCNC: 142 NG/L (ref 0–14)
TROPONIN I SERPL HS-MCNC: 164 NG/L (ref 0–14)
WBC OTHER # BLD: 11.5 K/UL (ref 3.5–11.3)

## 2024-10-10 PROCEDURE — 84484 ASSAY OF TROPONIN QUANT: CPT

## 2024-10-10 PROCEDURE — 70450 CT HEAD/BRAIN W/O DYE: CPT

## 2024-10-10 PROCEDURE — 85025 COMPLETE CBC W/AUTO DIFF WBC: CPT

## 2024-10-10 PROCEDURE — 2580000003 HC RX 258: Performed by: EMERGENCY MEDICINE

## 2024-10-10 PROCEDURE — 85610 PROTHROMBIN TIME: CPT

## 2024-10-10 PROCEDURE — 1200000000 HC SEMI PRIVATE

## 2024-10-10 PROCEDURE — 72170 X-RAY EXAM OF PELVIS: CPT

## 2024-10-10 PROCEDURE — 99232 SBSQ HOSP IP/OBS MODERATE 35: CPT | Performed by: PSYCHIATRY & NEUROLOGY

## 2024-10-10 PROCEDURE — 70498 CT ANGIOGRAPHY NECK: CPT

## 2024-10-10 PROCEDURE — 80053 COMPREHEN METABOLIC PANEL: CPT

## 2024-10-10 PROCEDURE — 99285 EMERGENCY DEPT VISIT HI MDM: CPT

## 2024-10-10 PROCEDURE — 93005 ELECTROCARDIOGRAM TRACING: CPT | Performed by: EMERGENCY MEDICINE

## 2024-10-10 PROCEDURE — 6360000004 HC RX CONTRAST MEDICATION: Performed by: EMERGENCY MEDICINE

## 2024-10-10 PROCEDURE — 36415 COLL VENOUS BLD VENIPUNCTURE: CPT

## 2024-10-10 PROCEDURE — 72125 CT NECK SPINE W/O DYE: CPT

## 2024-10-10 PROCEDURE — 4A03X5D MEASUREMENT OF ARTERIAL FLOW, INTRACRANIAL, EXTERNAL APPROACH: ICD-10-PCS | Performed by: FAMILY MEDICINE

## 2024-10-10 PROCEDURE — 71045 X-RAY EXAM CHEST 1 VIEW: CPT

## 2024-10-10 PROCEDURE — 82550 ASSAY OF CK (CPK): CPT

## 2024-10-10 PROCEDURE — 72128 CT CHEST SPINE W/O DYE: CPT

## 2024-10-10 PROCEDURE — 72131 CT LUMBAR SPINE W/O DYE: CPT

## 2024-10-10 PROCEDURE — 2580000003 HC RX 258: Performed by: STUDENT IN AN ORGANIZED HEALTH CARE EDUCATION/TRAINING PROGRAM

## 2024-10-10 RX ORDER — IOPAMIDOL 755 MG/ML
75 INJECTION, SOLUTION INTRAVASCULAR
Status: COMPLETED | OUTPATIENT
Start: 2024-10-10 | End: 2024-10-10

## 2024-10-10 RX ORDER — SODIUM CHLORIDE 0.9 % (FLUSH) 0.9 %
10 SYRINGE (ML) INJECTION PRN
Status: DISCONTINUED | OUTPATIENT
Start: 2024-10-10 | End: 2024-10-15 | Stop reason: HOSPADM

## 2024-10-10 RX ORDER — ACETAMINOPHEN 650 MG/1
650 SUPPOSITORY RECTAL EVERY 6 HOURS PRN
Status: DISCONTINUED | OUTPATIENT
Start: 2024-10-10 | End: 2024-10-15 | Stop reason: HOSPADM

## 2024-10-10 RX ORDER — FAMOTIDINE 20 MG/1
20 TABLET, FILM COATED ORAL DAILY
Status: DISCONTINUED | OUTPATIENT
Start: 2024-10-11 | End: 2024-10-15 | Stop reason: HOSPADM

## 2024-10-10 RX ORDER — 0.9 % SODIUM CHLORIDE 0.9 %
1000 INTRAVENOUS SOLUTION INTRAVENOUS ONCE
Status: COMPLETED | OUTPATIENT
Start: 2024-10-10 | End: 2024-10-10

## 2024-10-10 RX ORDER — SODIUM CHLORIDE 9 MG/ML
INJECTION, SOLUTION INTRAVENOUS CONTINUOUS
Status: DISCONTINUED | OUTPATIENT
Start: 2024-10-10 | End: 2024-10-12

## 2024-10-10 RX ORDER — SODIUM CHLORIDE 0.9 % (FLUSH) 0.9 %
10 SYRINGE (ML) INJECTION EVERY 12 HOURS SCHEDULED
Status: DISCONTINUED | OUTPATIENT
Start: 2024-10-11 | End: 2024-10-15 | Stop reason: HOSPADM

## 2024-10-10 RX ORDER — 0.9 % SODIUM CHLORIDE 0.9 %
80 INTRAVENOUS SOLUTION INTRAVENOUS ONCE
Status: COMPLETED | OUTPATIENT
Start: 2024-10-10 | End: 2024-10-10

## 2024-10-10 RX ORDER — HEPARIN SODIUM 5000 [USP'U]/ML
5000 INJECTION, SOLUTION INTRAVENOUS; SUBCUTANEOUS 2 TIMES DAILY
Status: DISCONTINUED | OUTPATIENT
Start: 2024-10-11 | End: 2024-10-15 | Stop reason: HOSPADM

## 2024-10-10 RX ORDER — ONDANSETRON 2 MG/ML
4 INJECTION INTRAMUSCULAR; INTRAVENOUS EVERY 6 HOURS PRN
Status: DISCONTINUED | OUTPATIENT
Start: 2024-10-10 | End: 2024-10-15 | Stop reason: HOSPADM

## 2024-10-10 RX ORDER — ONDANSETRON 4 MG/1
4 TABLET, ORALLY DISINTEGRATING ORAL EVERY 8 HOURS PRN
Status: DISCONTINUED | OUTPATIENT
Start: 2024-10-10 | End: 2024-10-15 | Stop reason: HOSPADM

## 2024-10-10 RX ORDER — SODIUM CHLORIDE 9 MG/ML
INJECTION, SOLUTION INTRAVENOUS PRN
Status: DISCONTINUED | OUTPATIENT
Start: 2024-10-10 | End: 2024-10-15 | Stop reason: HOSPADM

## 2024-10-10 RX ORDER — 0.9 % SODIUM CHLORIDE 0.9 %
500 INTRAVENOUS SOLUTION INTRAVENOUS ONCE
Status: COMPLETED | OUTPATIENT
Start: 2024-10-10 | End: 2024-10-10

## 2024-10-10 RX ORDER — ACETAMINOPHEN 325 MG/1
650 TABLET ORAL EVERY 6 HOURS PRN
Status: DISCONTINUED | OUTPATIENT
Start: 2024-10-10 | End: 2024-10-15 | Stop reason: HOSPADM

## 2024-10-10 RX ADMIN — SODIUM CHLORIDE 500 ML: 9 INJECTION, SOLUTION INTRAVENOUS at 20:21

## 2024-10-10 RX ADMIN — SODIUM CHLORIDE, PRESERVATIVE FREE 10 ML: 5 INJECTION INTRAVENOUS at 20:08

## 2024-10-10 RX ADMIN — SODIUM CHLORIDE 80 ML: 0.9 INJECTION, SOLUTION INTRAVENOUS at 20:08

## 2024-10-10 RX ADMIN — SODIUM CHLORIDE 1000 ML: 9 INJECTION, SOLUTION INTRAVENOUS at 22:06

## 2024-10-10 RX ADMIN — IOPAMIDOL 75 ML: 755 INJECTION, SOLUTION INTRAVENOUS at 20:08

## 2024-10-10 ASSESSMENT — PAIN SCALES - GENERAL
PAINLEVEL_OUTOF10: 8
PAINLEVEL_OUTOF10: 9

## 2024-10-10 ASSESSMENT — PAIN - FUNCTIONAL ASSESSMENT
PAIN_FUNCTIONAL_ASSESSMENT: 0-10
PAIN_FUNCTIONAL_ASSESSMENT: NONE - DENIES PAIN

## 2024-10-10 ASSESSMENT — PAIN DESCRIPTION - LOCATION
LOCATION: BACK
LOCATION: BACK

## 2024-10-10 ASSESSMENT — PAIN DESCRIPTION - FREQUENCY: FREQUENCY: CONTINUOUS

## 2024-10-10 ASSESSMENT — PAIN DESCRIPTION - PAIN TYPE: TYPE: ACUTE PAIN

## 2024-10-10 ASSESSMENT — PAIN DESCRIPTION - DESCRIPTORS: DESCRIPTORS: ACHING;DISCOMFORT

## 2024-10-10 ASSESSMENT — PAIN DESCRIPTION - ORIENTATION: ORIENTATION: LOWER

## 2024-10-10 NOTE — ED PROVIDER NOTES
[facility] ED  eMERGENCY dEPARTMENT eNCOUnter    Pt Name: Edyta Gruber  MRN: [id number]  Birthdate 4/27/1924  Date of evaluation: 10/10/24  CHIEF COMPLAINT       Chief Complaint   Patient presents with    Fall     Was putting clothes away and she turned and her shoes did not go with her body and she fell on her buttock and she had to crawl to a phone to call for help did not hit head and had to use forearms to crawl all bruised on blood thinner    Back Pain     Lower back from fall     HISTORY OF PRESENT ILLNESS   HPI  Patient is 89-year-old female who was hanging up clothing at 2 AM in the morning when she turned around lost her balance and fell.  Patient was unable to get up on her own secondary to weakness in her legs.  Patient states that she landed on her buttocks.  She denies head trauma.  Of note patient is on Eliquis.  Patient was on the floor for approximately 4 hours until her son was able to help her up.  Patient was crawling around on the floor until that point and has bruising to her bilateral forearms.  Presently patient complains of weakness in her legs as well as pain in her buttocks  REVIEW OF SYSTEMS     Constitutional: No fever  Eye: No visual changes  Ear/Nose/Mouth/Throat: No sore throat  Respiratory: No shortness of breath  Cardiovascular: No chest pain  Gastrointestinal: No nausea, no vomiting, no diarrhea  Genitourinary: No dysuria  Musculoskeletal: As above  Integumentary: No rash  Neurologic: No dizziness as above  Psychiatric: No anxiety, no depression  All systems otherwise negative.        PAST MEDICAL HISTORY     Past Medical History:   Diagnosis Date    Acute dermatitis     Arthritis     Asthma     Atrial fibrillation (HCC)     Bursitis     CHF (congestive heart failure) (HCC)     Hearing aid worn     Hyperlipidemia     Hypertension     Lumbar disc disease     Wears glasses     Wound of right leg      SURGICAL HISTORY       Past Surgical History:   Procedure Laterality Date

## 2024-10-10 NOTE — VIRTUAL HEALTH
[facility] Stroke and Telestroke Consult for  [facility] Stroke Alert through [facility] @ 07:49PM  10/10/2024 7:58 PM  Late Note Entry: patient discussed in detail with ER physician day of service 10/10/24  Pt Name: Edyta Gruber  MRN: [id number]  YOB: 1924  Date of evaluation: 10/10/2024  Primary Care Physician: Florencia Talley MD  Reason for Evaluation: Stroke Evaluation with Discussion with Ed or primary team with Telemedicine and stroke evaluation with Review of imaging and labs    Edyta Gruber is a 89 y.o. female who presents with left sided weakness. Reported 2AM she was up to the restroom when she had a fall and could not get back up. 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 Patient presents hypertensive emergency with NSTEMI and stroke like symptoms. Essentially is a wake up stroke however has been up since 2AM when she attempted to get out of bed and was unable to stand. Found by family and brought to EMS this morning around 7AM.         LKW: 02:00AM 10/10/24  NIH:  2-->Drift in left arm and leg only     Allergies  is allergic to shellfish-derived products and tudorza pressair [aclidinium bromide].  Medications  Prior to Admission medications    Medication Sig Start Date End Date Taking? Authorizing Provider   dilTIAZem (DILACOR XR) 120 MG extended release capsule Take 1 capsule by mouth daily 7/19/24   Lianet Dempsey MD   bumetanide (BUMEX) 1 MG tablet Take 1 tablet by mouth daily 12/5/23   Reynaldo Osorio MD   spironolactone (ALDACTONE) 25 MG tablet Take 1 tablet by mouth daily 12/5/23   Reynaldo Osorio MD   budesonide-formoterol (SYMBICORT) 160-4.5 MCG/ACT AERO Inhale 2 puffs into the lungs in the morning and 2 puffs in the evening. 11/14/23   Chavo Sanders MD   latanoprost (XALATAN) 0.005 % ophthalmic solution Place 1 drop into both eyes nightly    Provider,

## 2024-10-11 ENCOUNTER — APPOINTMENT (OUTPATIENT)
Dept: ULTRASOUND IMAGING | Age: 89
DRG: 884 | End: 2024-10-11
Payer: MEDICARE

## 2024-10-11 ENCOUNTER — APPOINTMENT (OUTPATIENT)
Dept: MRI IMAGING | Age: 89
DRG: 884 | End: 2024-10-11
Payer: MEDICARE

## 2024-10-11 ENCOUNTER — APPOINTMENT (OUTPATIENT)
Dept: GENERAL RADIOLOGY | Age: 89
DRG: 884 | End: 2024-10-11
Payer: MEDICARE

## 2024-10-11 PROBLEM — E44.0 MODERATE MALNUTRITION (HCC): Status: ACTIVE | Noted: 2024-10-11

## 2024-10-11 LAB
ANION GAP SERPL CALCULATED.3IONS-SCNC: 10 MMOL/L (ref 9–17)
BACTERIA URNS QL MICRO: ABNORMAL
BASOPHILS # BLD: 0.04 K/UL (ref 0–0.2)
BASOPHILS NFR BLD: 0 % (ref 0–2)
BILIRUB UR QL STRIP: NEGATIVE
BUN SERPL-MCNC: 52 MG/DL (ref 8–23)
BUN/CREAT SERPL: 35 (ref 9–20)
CALCIUM SERPL-MCNC: 9.6 MG/DL (ref 8.6–10.4)
CHLORIDE SERPL-SCNC: 104 MMOL/L (ref 98–107)
CLARITY UR: ABNORMAL
CO2 SERPL-SCNC: 26 MMOL/L (ref 20–31)
COLOR UR: YELLOW
CREAT SERPL-MCNC: 1.5 MG/DL (ref 0.5–0.9)
EOSINOPHIL # BLD: 0.18 K/UL (ref 0–0.44)
EOSINOPHILS RELATIVE PERCENT: 2 % (ref 1–4)
EPI CELLS #/AREA URNS HPF: ABNORMAL /HPF (ref 0–5)
ERYTHROCYTE [DISTWIDTH] IN BLOOD BY AUTOMATED COUNT: 14.6 % (ref 11.8–14.4)
GFR, ESTIMATED: 31 ML/MIN/1.73M2
GLUCOSE SERPL-MCNC: 93 MG/DL (ref 70–99)
GLUCOSE UR STRIP-MCNC: NEGATIVE MG/DL
HCT VFR BLD AUTO: 37.4 % (ref 36.3–47.1)
HGB BLD-MCNC: 12.1 G/DL (ref 11.9–15.1)
HGB UR QL STRIP.AUTO: ABNORMAL
IMM GRANULOCYTES # BLD AUTO: 0.04 K/UL (ref 0–0.3)
IMM GRANULOCYTES NFR BLD: 0 %
KETONES UR STRIP-MCNC: NEGATIVE MG/DL
LEUKOCYTE ESTERASE UR QL STRIP: ABNORMAL
LYMPHOCYTES NFR BLD: 0.85 K/UL (ref 1.1–3.7)
LYMPHOCYTES RELATIVE PERCENT: 9 % (ref 24–43)
MCH RBC QN AUTO: 32.6 PG (ref 25.2–33.5)
MCHC RBC AUTO-ENTMCNC: 32.4 G/DL (ref 28.4–34.8)
MCV RBC AUTO: 100.8 FL (ref 82.6–102.9)
MONOCYTES NFR BLD: 0.74 K/UL (ref 0.1–1.2)
MONOCYTES NFR BLD: 8 % (ref 3–12)
NEUTROPHILS NFR BLD: 81 % (ref 36–65)
NEUTS SEG NFR BLD: 7.7 K/UL (ref 1.5–8.1)
NITRITE UR QL STRIP: NEGATIVE
NRBC BLD-RTO: 0 PER 100 WBC
PH UR STRIP: 5.5 [PH] (ref 5–8)
PLATELET # BLD AUTO: 116 K/UL (ref 138–453)
PMV BLD AUTO: 10.8 FL (ref 8.1–13.5)
POTASSIUM SERPL-SCNC: 4.5 MMOL/L (ref 3.7–5.3)
PROT UR STRIP-MCNC: NEGATIVE MG/DL
RBC # BLD AUTO: 3.71 M/UL (ref 3.95–5.11)
RBC # BLD: ABNORMAL 10*6/UL
RBC #/AREA URNS HPF: ABNORMAL /HPF (ref 0–2)
SODIUM SERPL-SCNC: 140 MMOL/L (ref 135–144)
SP GR UR STRIP: 1.01 (ref 1–1.03)
UROBILINOGEN UR STRIP-ACNC: NORMAL EU/DL (ref 0–1)
WBC #/AREA URNS HPF: ABNORMAL /HPF (ref 0–5)
WBC OTHER # BLD: 9.6 K/UL (ref 3.5–11.3)

## 2024-10-11 PROCEDURE — 1200000000 HC SEMI PRIVATE

## 2024-10-11 PROCEDURE — 6370000000 HC RX 637 (ALT 250 FOR IP): Performed by: NURSE PRACTITIONER

## 2024-10-11 PROCEDURE — 70551 MRI BRAIN STEM W/O DYE: CPT

## 2024-10-11 PROCEDURE — 97167 OT EVAL HIGH COMPLEX 60 MIN: CPT

## 2024-10-11 PROCEDURE — 87086 URINE CULTURE/COLONY COUNT: CPT

## 2024-10-11 PROCEDURE — 99222 1ST HOSP IP/OBS MODERATE 55: CPT

## 2024-10-11 PROCEDURE — 97530 THERAPEUTIC ACTIVITIES: CPT

## 2024-10-11 PROCEDURE — 36415 COLL VENOUS BLD VENIPUNCTURE: CPT

## 2024-10-11 PROCEDURE — 97116 GAIT TRAINING THERAPY: CPT

## 2024-10-11 PROCEDURE — 80048 BASIC METABOLIC PNL TOTAL CA: CPT

## 2024-10-11 PROCEDURE — 2700000000 HC OXYGEN THERAPY PER DAY

## 2024-10-11 PROCEDURE — 97535 SELF CARE MNGMENT TRAINING: CPT

## 2024-10-11 PROCEDURE — 2580000003 HC RX 258: Performed by: NURSE PRACTITIONER

## 2024-10-11 PROCEDURE — 87184 SC STD DISK METHOD PER PLATE: CPT

## 2024-10-11 PROCEDURE — 94760 N-INVAS EAR/PLS OXIMETRY 1: CPT

## 2024-10-11 PROCEDURE — 76770 US EXAM ABDO BACK WALL COMP: CPT

## 2024-10-11 PROCEDURE — 85025 COMPLETE CBC W/AUTO DIFF WBC: CPT

## 2024-10-11 PROCEDURE — 87088 URINE BACTERIA CULTURE: CPT

## 2024-10-11 PROCEDURE — 74230 X-RAY XM SWLNG FUNCJ C+: CPT

## 2024-10-11 PROCEDURE — 97162 PT EVAL MOD COMPLEX 30 MIN: CPT

## 2024-10-11 PROCEDURE — 81001 URINALYSIS AUTO W/SCOPE: CPT

## 2024-10-11 PROCEDURE — 6370000000 HC RX 637 (ALT 250 FOR IP)

## 2024-10-11 PROCEDURE — 92611 MOTION FLUOROSCOPY/SWALLOW: CPT

## 2024-10-11 PROCEDURE — 6360000002 HC RX W HCPCS: Performed by: NURSE PRACTITIONER

## 2024-10-11 RX ORDER — DILTIAZEM HYDROCHLORIDE 120 MG/1
120 CAPSULE, COATED, EXTENDED RELEASE ORAL DAILY
Status: DISCONTINUED | OUTPATIENT
Start: 2024-10-11 | End: 2024-10-15 | Stop reason: HOSPADM

## 2024-10-11 RX ORDER — DILTIAZEM HYDROCHLORIDE 180 MG/1
180 CAPSULE, COATED, EXTENDED RELEASE ORAL DAILY
Status: ON HOLD | COMMUNITY
Start: 2024-10-01 | End: 2024-10-15 | Stop reason: HOSPADM

## 2024-10-11 RX ORDER — FLUTICASONE PROPIONATE AND SALMETEROL XINAFOATE 115; 21 UG/1; UG/1
2 AEROSOL, METERED RESPIRATORY (INHALATION) 2 TIMES DAILY
COMMUNITY
Start: 2024-09-12

## 2024-10-11 RX ADMIN — SODIUM CHLORIDE, PRESERVATIVE FREE 10 ML: 5 INJECTION INTRAVENOUS at 21:09

## 2024-10-11 RX ADMIN — SODIUM CHLORIDE: 9 INJECTION, SOLUTION INTRAVENOUS at 00:13

## 2024-10-11 RX ADMIN — FAMOTIDINE 20 MG: 20 TABLET, FILM COATED ORAL at 09:05

## 2024-10-11 RX ADMIN — HEPARIN SODIUM 5000 UNITS: 5000 INJECTION INTRAVENOUS; SUBCUTANEOUS at 21:08

## 2024-10-11 RX ADMIN — DILTIAZEM HYDROCHLORIDE 120 MG: 120 CAPSULE, COATED, EXTENDED RELEASE ORAL at 16:40

## 2024-10-11 NOTE — ED NOTES
ED TO INPATIENT SBAR HANDOFF    Patient Name: Edyta Gruber   :  1924  89 y.o.   MRN:  [id number]  Preferred Name  Edyta  ED Room #:  STA22/22  Family/Caregiver Present no   Restraints no   Sitter no   Sepsis Risk Score      Situation  Code Status: Prior No additional code details.    Allergies: Shellfish-derived products and Tudorza pressair [aclidinium bromide]  Weight: Patient Vitals for the past 96 hrs (Last 3 readings):   Weight   10/10/24 1917 45.8 kg (101 lb)     Arrived from: home  Chief Complaint:   Chief Complaint   Patient presents with    Fall     Was putting clothes away and she turned and her shoes did not go with her body and she fell on her buttock and she had to crawl to a phone to call for help did not hit head and had to use forearms to crawl all bruised on blood thinner    Back Pain     Lower back from fall     Hospital Problem/Diagnosis:  Principal Problem:    General weakness  Resolved Problems:    * No resolved hospital problems. *    Imaging:   XR PELVIS (1-2 VIEWS)   Final Result   No fractures noted.         XR CHEST PORTABLE   Final Result   1. Residual bilateral mild-to-moderate pleural effusions.  Some apparent   right basal atelectasis.   2. Mild cardiomegaly.         CTA HEAD NECK W CONTRAST   Final Result   1. 50% stenosis of the proximal right ICA and 40% stenosis of the proximal   left ICA.   2. No intracranial arterial stenosis or occlusion.   3. Right-sided pleural effusion with right basal atelectasis.         CT HEAD WO CONTRAST   Final Result   No acute intracranial abnormality.      Old lacunar infarcts in the bilateral basal ganglia.      Mild parenchymal volume loss.      Mild chronic microvascular disease.         CT THORACIC SPINE WO CONTRAST   Final Result   1.  No acute osseous abnormality identified in the thoracic spine.      2.  Bilateral pleural effusions.         CT LUMBAR SPINE WO CONTRAST   Final Result   Indeterminate age compression fracture of the

## 2024-10-11 NOTE — CARE COORDINATION
Social work:  [facility] is able to accept. Needs precert.   Referral also faxed to Cammie Rai for review.

## 2024-10-11 NOTE — H&P
a candidate for TNK.  Her CTA showed 50% stenosis of the proximal right ICA and 40% of the proximal left ICA.  She was found to have elevated troponin as well.  She was given IV fluids in the ER.  Due to multiple abnormalities, fall, concerns for stroke, patient was admitted to the hospital for further evaluation.  Her son is at bedside who also provides history.      Past Medical History:        Diagnosis Date    Acute dermatitis     Arthritis     Asthma     Atrial fibrillation (HCC)     Bursitis     CHF (congestive heart failure) (HCC)     Hearing aid worn     Hyperlipidemia     Hypertension     Lumbar disc disease     Wears glasses     Wound of right leg        Past Surgical History:        Procedure Laterality Date    BACK SURGERY      DEBRIDEMENT Left 03/09/2017    rt. leg    OTHER SURGICAL HISTORY  03/30/2017    Right leg Angio    OTHER SURGICAL HISTORY  03/30/2017    Right leg angio    RI I&D DEEP ABSC BURSA/HEMATOMA THIGH/KNEE REGION Right 3/9/2017    DEBRIDEMENT CALF WOUND  performed by Arthur Delos Reyes, MD at [facility] CVOR    RI I&D DEEP ABSC BURSA/HEMATOMA THIGH/KNEE REGION Right 4/28/2017    RIGHT LOWER EXTREMITY DEBRIDEMENT, INTEGRA APPLICATION, PREVENA VAC APPLICATION LOWR RIGHT LEG performed by Arthur Delos Reyes, MD at [facility] OR       Home Medications:   No current facility-administered medications on file prior to encounter.     Current Outpatient Medications on File Prior to Encounter   Medication Sig Dispense Refill    dilTIAZem (DILACOR XR) 120 MG extended release capsule Take 1 capsule by mouth daily (Patient taking differently: Take 180 mg by mouth daily) 30 capsule 3    bumetanide (BUMEX) 1 MG tablet Take 1 tablet by mouth daily 90 tablet 0    spironolactone (ALDACTONE) 25 MG tablet Take 1 tablet by mouth daily 30 tablet 3    budesonide-formoterol (SYMBICORT) 160-4.5 MCG/ACT AERO Inhale 2 puffs into the lungs in the morning and 2 puffs in the evening. 10.2 g 3    latanoprost (XALATAN) 0.005 %

## 2024-10-11 NOTE — CARE COORDINATION
Case Management Assessment  Initial Evaluation    Date/Time of Evaluation: 10/11/2024 12:57 PM  Assessment Completed by: CHRISTIE Correia    If patient is discharged prior to next notation, then this note serves as note for discharge by case management.    Patient Name: Edyta Gruber                   YOB: 1924  Diagnosis: General weakness [R53.1]  NSTEMI (non-ST elevated myocardial infarction) ([facility]) [I21.4]  Acute kidney injury ([facility]) [N17.9]  Cerebrovascular accident (CVA), unspecified mechanism ([facility]) [I63.9]  Compression fracture of lumbar vertebra, unspecified lumbar vertebral level, initial encounter ([facility]) [S32.000A]                   Date / Time: 10/10/2024  7:21 PM    Patient Admission Status: Inpatient   Readmission Risk (Low < 19, Mod (19-27), High > 27): Readmission Risk Score: 17.4    Current PCP: Florencia Talley MD  PCP verified by CM? Yes    Chart Reviewed: Yes      History Provided by: Patient  Patient Orientation: Alert and Oriented    Patient Cognition: Alert    Hospitalization in the last 30 days (Readmission):  No    If yes, Readmission Assessment in  Navigator will be completed.    Advance Directives:      Code Status: Full Code   Patient's Primary Decision Maker is: Legal Next of Kin    Primary Decision Maker: Ritchie Gruber - Child - [phone]    Discharge Planning:    Patient lives with: Alone Type of Home: Skilled Nursing Facility  Primary Care Giver: Self  Patient Support Systems include: Children   Current Financial resources: None  Current community resources: ECF/Home Care  Current services prior to admission: Durable Medical Equipment            Current DME: Wheelchair, Walker            Type of Home Care services:  PT, OT, Skilled Therapy    ADLS  Prior functional level: Independent in ADLs/IADLs  Current functional level: Assistance with the following:, Mobility    PT AM-PAC: 12 /24  OT AM-PAC: 11 /24    Family can provide assistance at DC: Yes  Would you

## 2024-10-11 NOTE — PLAN OF CARE
Patient resting in bed on room air, alert and oriented x4, purewick in place.  Normal saline running at 50mL/hr.   Patient normally walks at home with a walker, however she had a fall and has not been up during this shift.   Patient has paperwork on file that says DNR-CCA no intubation, however when writer spoke with patient, patient stated she wanted staff to \"give it a try\" referring to chest compressions and intubation.  Patient denied having pain.  Patient safety maintained.     Problem: Safety - Adult  Goal: Free from fall injury  Outcome: Progressing     Problem: Chronic Conditions and Co-morbidities  Goal: Patient's chronic conditions and co-morbidity symptoms are monitored and maintained or improved  Outcome: Progressing     Problem: Discharge Planning  Goal: Discharge to home or other facility with appropriate resources  Outcome: Progressing     Problem: ABCDS Injury Assessment  Goal: Absence of physical injury  Outcome: Progressing     Problem: Skin/Tissue Integrity - Adult  Goal: Skin integrity remains intact  Outcome: Progressing     Problem: Musculoskeletal - Adult  Goal: Return mobility to safest level of function  Outcome: Progressing     Problem: Musculoskeletal - Adult  Goal: Return ADL status to a safe level of function  Outcome: Progressing

## 2024-10-11 NOTE — FLOWSHEET NOTE
Pt noted harshly coughing with consumption of breakfast,states this is the \"norm\" when she develops \"phlegm\" Dr Norris notified and order obtained for barium swallow exam, pt and son updated      10/11/24 1032   Treatment Team Notification   Reason for Communication Evaluate  (swallowing/choking)   Name of Team Member Notified Dr Norris   Treatment Team Role Attending Provider   Method of Communication Secure Message   Response See orders  (barium swallow study)   Notification Time [id number]

## 2024-10-11 NOTE — PROCEDURES
INSTRUMENTAL SWALLOW REPORT  MODIFIED BARIUM SWALLOW    NAME: Edyta Gruber   : 1924  MRN: [id number]       Date of Eval: 10/11/2024              Referring Diagnosis(es):      Past Medical History:  has a past medical history of Acute dermatitis, Arthritis, Asthma, Atrial fibrillation (HCC), Bursitis, CHF (congestive heart failure) (HCC), Hearing aid worn, Hyperlipidemia, Hypertension, Lumbar disc disease, Wears glasses, and Wound of right leg.  Past Surgical History:  has a past surgical history that includes back surgery; Wound debridement (Left, 2017); pr i&d deep absc bursa/hematoma thigh/knee region (Right, 3/9/2017); other surgical history (2017); other surgical history (2017); and pr i&d deep absc bursa/hematoma thigh/knee region (Right, 2017).               Type of Study: Initial MBS       Recent CXR/CT of Chest: 10/10/24   IMPRESSION:  1. Residual bilateral mild-to-moderate pleural effusions.  Some apparent  right basal atelectasis.  2. Mild cardiomegaly.    Patient Complaints/Reason for Referral:  Edyta Gruber was referred for a MBS to assess the efficiency of his/her swallow function, assess for aspiration, and to make recommendations regarding safe dietary consistencies, effective compensatory strategies, and safe eating environment.       Onset of problem:    89-year-old female coming into the hospital for complaints of a fall.  Patient was up at 2 in the morning, hanging up close when she turned around and lost her balance.  She landed on her back.  She denies head trauma.  She is on Eliquis.  Patient was on the floor for 4 hours.  Thereafter she was able to crawl up.  She continued complain of lower extremity weakness.  In the ER, there was complaints of possible left sided weakness including with pronator drift of the left upper extremity.  A code stroke was activated.  Telestroke saw the patient, she was not a candidate for TNK.  Her CTA showed 50%

## 2024-10-12 LAB
ANION GAP SERPL CALCULATED.3IONS-SCNC: 12 MMOL/L (ref 9–17)
BASOPHILS # BLD: 0.04 K/UL (ref 0–0.2)
BASOPHILS NFR BLD: 1 % (ref 0–2)
BUN SERPL-MCNC: 39 MG/DL (ref 8–23)
BUN/CREAT SERPL: 35 (ref 9–20)
CALCIUM SERPL-MCNC: 9.7 MG/DL (ref 8.6–10.4)
CHLORIDE SERPL-SCNC: 109 MMOL/L (ref 98–107)
CHOLEST SERPL-MCNC: 147 MG/DL (ref 0–199)
CHOLESTEROL/HDL RATIO: 3
CO2 SERPL-SCNC: 22 MMOL/L (ref 20–31)
CREAT SERPL-MCNC: 1.1 MG/DL (ref 0.5–0.9)
EKG ATRIAL RATE: 234 BPM
EKG Q-T INTERVAL: 396 MS
EKG QRS DURATION: 82 MS
EKG QTC CALCULATION (BAZETT): 451 MS
EKG R AXIS: 73 DEGREES
EKG T AXIS: 93 DEGREES
EKG VENTRICULAR RATE: 78 BPM
EOSINOPHIL # BLD: 0.22 K/UL (ref 0–0.44)
EOSINOPHILS RELATIVE PERCENT: 3 % (ref 1–4)
ERYTHROCYTE [DISTWIDTH] IN BLOOD BY AUTOMATED COUNT: 15.2 % (ref 11.8–14.4)
GFR, ESTIMATED: 45 ML/MIN/1.73M2
GLUCOSE SERPL-MCNC: 84 MG/DL (ref 70–99)
HCT VFR BLD AUTO: 40.3 % (ref 36.3–47.1)
HDLC SERPL-MCNC: 50 MG/DL
HGB BLD-MCNC: 12.7 G/DL (ref 11.9–15.1)
IMM GRANULOCYTES # BLD AUTO: 0.04 K/UL (ref 0–0.3)
IMM GRANULOCYTES NFR BLD: 1 %
LDLC SERPL CALC-MCNC: 80 MG/DL (ref 0–100)
LYMPHOCYTES NFR BLD: 0.77 K/UL (ref 1.1–3.7)
LYMPHOCYTES RELATIVE PERCENT: 10 % (ref 24–43)
MCH RBC QN AUTO: 32.9 PG (ref 25.2–33.5)
MCHC RBC AUTO-ENTMCNC: 31.5 G/DL (ref 28.4–34.8)
MCV RBC AUTO: 104.4 FL (ref 82.6–102.9)
MONOCYTES NFR BLD: 0.82 K/UL (ref 0.1–1.2)
MONOCYTES NFR BLD: 11 % (ref 3–12)
NEUTROPHILS NFR BLD: 74 % (ref 36–65)
NEUTS SEG NFR BLD: 5.77 K/UL (ref 1.5–8.1)
NRBC BLD-RTO: 0 PER 100 WBC
PLATELET # BLD AUTO: 124 K/UL (ref 138–453)
PMV BLD AUTO: 11.2 FL (ref 8.1–13.5)
POTASSIUM SERPL-SCNC: 4.5 MMOL/L (ref 3.7–5.3)
RBC # BLD AUTO: 3.86 M/UL (ref 3.95–5.11)
RBC # BLD: ABNORMAL 10*6/UL
RBC # BLD: ABNORMAL 10*6/UL
SODIUM SERPL-SCNC: 143 MMOL/L (ref 135–144)
TRIGL SERPL-MCNC: 88 MG/DL
VLDLC SERPL CALC-MCNC: 18 MG/DL
WBC OTHER # BLD: 7.7 K/UL (ref 3.5–11.3)

## 2024-10-12 PROCEDURE — 99223 1ST HOSP IP/OBS HIGH 75: CPT | Performed by: PSYCHIATRY & NEUROLOGY

## 2024-10-12 PROCEDURE — 6370000000 HC RX 637 (ALT 250 FOR IP)

## 2024-10-12 PROCEDURE — 2580000003 HC RX 258: Performed by: NURSE PRACTITIONER

## 2024-10-12 PROCEDURE — 85025 COMPLETE CBC W/AUTO DIFF WBC: CPT

## 2024-10-12 PROCEDURE — 6360000002 HC RX W HCPCS: Performed by: NURSE PRACTITIONER

## 2024-10-12 PROCEDURE — 94761 N-INVAS EAR/PLS OXIMETRY MLT: CPT

## 2024-10-12 PROCEDURE — 93010 ELECTROCARDIOGRAM REPORT: CPT | Performed by: INTERNAL MEDICINE

## 2024-10-12 PROCEDURE — 6360000002 HC RX W HCPCS: Performed by: FAMILY MEDICINE

## 2024-10-12 PROCEDURE — 51702 INSERT TEMP BLADDER CATH: CPT

## 2024-10-12 PROCEDURE — 80048 BASIC METABOLIC PNL TOTAL CA: CPT

## 2024-10-12 PROCEDURE — 36415 COLL VENOUS BLD VENIPUNCTURE: CPT

## 2024-10-12 PROCEDURE — 2580000003 HC RX 258: Performed by: FAMILY MEDICINE

## 2024-10-12 PROCEDURE — 1200000000 HC SEMI PRIVATE

## 2024-10-12 PROCEDURE — 6370000000 HC RX 637 (ALT 250 FOR IP): Performed by: NURSE PRACTITIONER

## 2024-10-12 PROCEDURE — 80061 LIPID PANEL: CPT

## 2024-10-12 RX ORDER — ASPIRIN 81 MG/1
81 TABLET, CHEWABLE ORAL DAILY
Status: DISCONTINUED | OUTPATIENT
Start: 2024-10-12 | End: 2024-10-15 | Stop reason: HOSPADM

## 2024-10-12 RX ADMIN — SODIUM CHLORIDE, PRESERVATIVE FREE 10 ML: 5 INJECTION INTRAVENOUS at 10:11

## 2024-10-12 RX ADMIN — SODIUM CHLORIDE, PRESERVATIVE FREE 10 ML: 5 INJECTION INTRAVENOUS at 14:36

## 2024-10-12 RX ADMIN — ACETAMINOPHEN 650 MG: 325 TABLET ORAL at 13:01

## 2024-10-12 RX ADMIN — DILTIAZEM HYDROCHLORIDE 120 MG: 120 CAPSULE, COATED, EXTENDED RELEASE ORAL at 10:11

## 2024-10-12 RX ADMIN — HEPARIN SODIUM 5000 UNITS: 5000 INJECTION INTRAVENOUS; SUBCUTANEOUS at 20:30

## 2024-10-12 RX ADMIN — FAMOTIDINE 20 MG: 20 TABLET, FILM COATED ORAL at 10:11

## 2024-10-12 RX ADMIN — ASPIRIN 81 MG CHEWABLE TABLET 81 MG: 81 TABLET CHEWABLE at 10:11

## 2024-10-12 RX ADMIN — WATER 1000 MG: 1 INJECTION INTRAMUSCULAR; INTRAVENOUS; SUBCUTANEOUS at 14:35

## 2024-10-12 RX ADMIN — HEPARIN SODIUM 5000 UNITS: 5000 INJECTION INTRAVENOUS; SUBCUTANEOUS at 10:11

## 2024-10-12 RX ADMIN — SODIUM CHLORIDE, PRESERVATIVE FREE 10 ML: 5 INJECTION INTRAVENOUS at 19:59

## 2024-10-12 NOTE — CARE COORDINATION
Social work: called son Ritchie to see which was first choice. [facility] has accepted. Cammie Rai spoke to khushbu she is reviewing today. Son will advise on Monday morning as he wants to chat with his mom again. Faxed referral again to update cammie rai. Radha lyon

## 2024-10-12 NOTE — PLAN OF CARE
Pt is currently resting in bed A&OX4. VSS. Pure-wick replaced and dandy care completed. All scheduled medications given. Pt had minimal urine output ovenight. Bladder scan revealed 381 being retained. On call NP notified. Awaiting orders. Order placed for rogers cath. 240 ml urine drained. Repeat bladder scan resulted as 0 ml.    Standard safety measures in place. Call light within reach. Bed in locked and lowest position. Safety has been maintained throughout this shift.       Problem: Safety - Adult  Goal: Free from fall injury  Outcome: Progressing     Problem: Chronic Conditions and Co-morbidities  Goal: Patient's chronic conditions and co-morbidity symptoms are monitored and maintained or improved  Outcome: Progressing     Problem: Discharge Planning  Goal: Discharge to home or other facility with appropriate resources  Outcome: Progressing     Problem: ABCDS Injury Assessment  Goal: Absence of physical injury  Outcome: Progressing     Problem: Skin/Tissue Integrity - Adult  Goal: Skin integrity remains intact  Outcome: Progressing     Problem: Musculoskeletal - Adult  Goal: Return mobility to safest level of function  Outcome: Progressing  Goal: Return ADL status to a safe level of function  Outcome: Progressing     Problem: Nutrition Deficit:  Goal: Optimize nutritional status  Outcome: Progressing     Problem: Skin/Tissue Integrity  Goal: Absence of new skin breakdown  Description: 1.  Monitor for areas of redness and/or skin breakdown  2.  Assess vascular access sites hourly  3.  Every 4-6 hours minimum:  Change oxygen saturation probe site  4.  Every 4-6 hours:  If on nasal continuous positive airway pressure, respiratory therapy assess nares and determine need for appliance change or resting period.  Outcome: Progressing

## 2024-10-13 LAB
ANION GAP SERPL CALCULATED.3IONS-SCNC: 10 MMOL/L (ref 9–17)
BASOPHILS # BLD: 0.06 K/UL (ref 0–0.2)
BASOPHILS NFR BLD: 1 % (ref 0–2)
BUN SERPL-MCNC: 35 MG/DL (ref 8–23)
BUN/CREAT SERPL: 32 (ref 9–20)
CALCIUM SERPL-MCNC: 9 MG/DL (ref 8.6–10.4)
CHLORIDE SERPL-SCNC: 108 MMOL/L (ref 98–107)
CO2 SERPL-SCNC: 22 MMOL/L (ref 20–31)
CREAT SERPL-MCNC: 1.1 MG/DL (ref 0.5–0.9)
EOSINOPHIL # BLD: 0.23 K/UL (ref 0–0.44)
EOSINOPHILS RELATIVE PERCENT: 4 % (ref 1–4)
ERYTHROCYTE [DISTWIDTH] IN BLOOD BY AUTOMATED COUNT: 15.3 % (ref 11.8–14.4)
GFR, ESTIMATED: 45 ML/MIN/1.73M2
GLUCOSE SERPL-MCNC: 92 MG/DL (ref 70–99)
HCT VFR BLD AUTO: 39 % (ref 36.3–47.1)
HGB BLD-MCNC: 12.2 G/DL (ref 11.9–15.1)
IMM GRANULOCYTES # BLD AUTO: 0.05 K/UL (ref 0–0.3)
IMM GRANULOCYTES NFR BLD: 1 %
LYMPHOCYTES NFR BLD: 1.06 K/UL (ref 1.1–3.7)
LYMPHOCYTES RELATIVE PERCENT: 17 % (ref 24–43)
MCH RBC QN AUTO: 32.7 PG (ref 25.2–33.5)
MCHC RBC AUTO-ENTMCNC: 31.3 G/DL (ref 28.4–34.8)
MCV RBC AUTO: 104.6 FL (ref 82.6–102.9)
MONOCYTES NFR BLD: 0.72 K/UL (ref 0.1–1.2)
MONOCYTES NFR BLD: 12 % (ref 3–12)
NEUTROPHILS NFR BLD: 65 % (ref 36–65)
NEUTS SEG NFR BLD: 3.99 K/UL (ref 1.5–8.1)
NRBC BLD-RTO: 0 PER 100 WBC
PLATELET # BLD AUTO: 149 K/UL (ref 138–453)
PMV BLD AUTO: 11 FL (ref 8.1–13.5)
POTASSIUM SERPL-SCNC: 4.1 MMOL/L (ref 3.7–5.3)
RBC # BLD AUTO: 3.73 M/UL (ref 3.95–5.11)
RBC # BLD: ABNORMAL 10*6/UL
RBC # BLD: ABNORMAL 10*6/UL
SODIUM SERPL-SCNC: 140 MMOL/L (ref 135–144)
WBC OTHER # BLD: 6.1 K/UL (ref 3.5–11.3)

## 2024-10-13 PROCEDURE — 6360000002 HC RX W HCPCS: Performed by: NURSE PRACTITIONER

## 2024-10-13 PROCEDURE — 36415 COLL VENOUS BLD VENIPUNCTURE: CPT

## 2024-10-13 PROCEDURE — 6370000000 HC RX 637 (ALT 250 FOR IP)

## 2024-10-13 PROCEDURE — 85025 COMPLETE CBC W/AUTO DIFF WBC: CPT

## 2024-10-13 PROCEDURE — 97530 THERAPEUTIC ACTIVITIES: CPT

## 2024-10-13 PROCEDURE — 94640 AIRWAY INHALATION TREATMENT: CPT

## 2024-10-13 PROCEDURE — 80048 BASIC METABOLIC PNL TOTAL CA: CPT

## 2024-10-13 PROCEDURE — 2580000003 HC RX 258: Performed by: EMERGENCY MEDICINE

## 2024-10-13 PROCEDURE — 6370000000 HC RX 637 (ALT 250 FOR IP): Performed by: NURSE PRACTITIONER

## 2024-10-13 PROCEDURE — 2580000003 HC RX 258: Performed by: FAMILY MEDICINE

## 2024-10-13 PROCEDURE — 6360000002 HC RX W HCPCS: Performed by: FAMILY MEDICINE

## 2024-10-13 PROCEDURE — 97110 THERAPEUTIC EXERCISES: CPT

## 2024-10-13 PROCEDURE — 2580000003 HC RX 258: Performed by: NURSE PRACTITIONER

## 2024-10-13 PROCEDURE — 1200000000 HC SEMI PRIVATE

## 2024-10-13 RX ORDER — CETIRIZINE HYDROCHLORIDE 10 MG/1
10 TABLET ORAL DAILY
Status: DISCONTINUED | OUTPATIENT
Start: 2024-10-13 | End: 2024-10-15 | Stop reason: HOSPADM

## 2024-10-13 RX ORDER — LATANOPROST 50 UG/ML
1 SOLUTION/ DROPS OPHTHALMIC NIGHTLY
Status: DISCONTINUED | OUTPATIENT
Start: 2024-10-13 | End: 2024-10-15 | Stop reason: HOSPADM

## 2024-10-13 RX ORDER — TIMOLOL MALEATE 5 MG/ML
1 SOLUTION/ DROPS OPHTHALMIC 2 TIMES DAILY
Status: DISCONTINUED | OUTPATIENT
Start: 2024-10-13 | End: 2024-10-15 | Stop reason: HOSPADM

## 2024-10-13 RX ORDER — BUDESONIDE AND FORMOTEROL FUMARATE DIHYDRATE 160; 4.5 UG/1; UG/1
2 AEROSOL RESPIRATORY (INHALATION)
Status: DISCONTINUED | OUTPATIENT
Start: 2024-10-13 | End: 2024-10-15 | Stop reason: HOSPADM

## 2024-10-13 RX ADMIN — TIMOLOL MALEATE 1 DROP: 5 SOLUTION OPHTHALMIC at 21:14

## 2024-10-13 RX ADMIN — BUDESONIDE AND FORMOTEROL FUMARATE DIHYDRATE 2 PUFF: 160; 4.5 AEROSOL RESPIRATORY (INHALATION) at 21:27

## 2024-10-13 RX ADMIN — DILTIAZEM HYDROCHLORIDE 120 MG: 120 CAPSULE, COATED, EXTENDED RELEASE ORAL at 10:09

## 2024-10-13 RX ADMIN — SODIUM CHLORIDE, PRESERVATIVE FREE 10 ML: 5 INJECTION INTRAVENOUS at 21:13

## 2024-10-13 RX ADMIN — HEPARIN SODIUM 5000 UNITS: 5000 INJECTION INTRAVENOUS; SUBCUTANEOUS at 10:11

## 2024-10-13 RX ADMIN — SODIUM CHLORIDE, PRESERVATIVE FREE 10 ML: 5 INJECTION INTRAVENOUS at 10:12

## 2024-10-13 RX ADMIN — WATER 1000 MG: 1 INJECTION INTRAMUSCULAR; INTRAVENOUS; SUBCUTANEOUS at 14:28

## 2024-10-13 RX ADMIN — FAMOTIDINE 20 MG: 20 TABLET, FILM COATED ORAL at 10:08

## 2024-10-13 RX ADMIN — HEPARIN SODIUM 5000 UNITS: 5000 INJECTION INTRAVENOUS; SUBCUTANEOUS at 21:11

## 2024-10-13 RX ADMIN — LATANOPROST 1 DROP: 50 SOLUTION OPHTHALMIC at 21:14

## 2024-10-13 RX ADMIN — ASPIRIN 81 MG CHEWABLE TABLET 81 MG: 81 TABLET CHEWABLE at 10:09

## 2024-10-13 RX ADMIN — SODIUM CHLORIDE, PRESERVATIVE FREE 10 ML: 5 INJECTION INTRAVENOUS at 14:29

## 2024-10-13 NOTE — PLAN OF CARE
Shift uneventful, room air, a-fib/rate controlled, was receiving eliquis prior to admit-stopped per cardiology, wide spread bruising present, various staging of healing, rogers in place    Problem: Safety - Adult  Goal: Free from fall injury  10/13/2024 0759 by Robert Eastman, RN  Outcome: Progressing   Alert and oriented, hearing deficit present with bilat hearing aids, aware of safety boundaries and makes no attempts to exceed, call light at reach and utilized appropriately, safety maintained     Problem: Chronic Conditions and Co-morbidities  Goal: Patient's chronic conditions and co-morbidity symptoms are monitored and maintained or improved  10/13/2024 0759 by Robert Eastman, RN  Outcome: Progressing     Problem: Discharge Planning  Goal: Discharge to home or other facility with appropriate resources  10/13/2024 0759 by Robert Eastman, RN  Outcome: Progressing  When medically stable place of discharge and needs to be determined      Problem: Musculoskeletal - Adult  Goal: Return mobility to safest level of function  10/13/2024 0759 by Robert Eastman, RN  Outcome: Progressing  Admitted after fall, generalized weakness cont, up with 1-2 assist and use of walker     Problem: Nutrition Deficit:  Goal: Optimize nutritional status  10/13/2024 0759 by Robert Eastman, RN  Outcome: Progressing

## 2024-10-13 NOTE — PLAN OF CARE
Pt is currently resting in bed A&OX4. All scheduled medications given, see MAR. Lacey patent and draining. Urine output is dark yellow in color with sediment. Mepilex replaced on right elbow and mid back. Lipid panel resulted WNL, see results. Uneventful night.      Standard safety measures in place. Call light within reach. Bed in locked and lowest position. Chair alarm on.       Problem: Safety - Adult  Goal: Free from fall injury  Outcome: Progressing     Problem: Chronic Conditions and Co-morbidities  Goal: Patient's chronic conditions and co-morbidity symptoms are monitored and maintained or improved  Outcome: Progressing     Problem: Discharge Planning  Goal: Discharge to home or other facility with appropriate resources  Outcome: Progressing     Problem: ABCDS Injury Assessment  Goal: Absence of physical injury  Outcome: Progressing     Problem: Skin/Tissue Integrity - Adult  Goal: Skin integrity remains intact  Outcome: Progressing     Problem: Musculoskeletal - Adult  Goal: Return mobility to safest level of function  Outcome: Progressing  Goal: Return ADL status to a safe level of function  Outcome: Progressing     Problem: Nutrition Deficit:  Goal: Optimize nutritional status  Outcome: Progressing     Problem: Skin/Tissue Integrity  Goal: Absence of new skin breakdown  Description: 1.  Monitor for areas of redness and/or skin breakdown  2.  Assess vascular access sites hourly  3.  Every 4-6 hours minimum:  Change oxygen saturation probe site  4.  Every 4-6 hours:  If on nasal continuous positive airway pressure, respiratory therapy assess nares and determine need for appliance change or resting period.  Outcome: Progressing

## 2024-10-13 NOTE — PLAN OF CARE
Shift uneventful, rogers draining without difficulty, IV rocephin cont for UTI, urine culture pending, afebrile, no complaints voiced, Son Ritchie in and updated on plan of care    Problem: Safety - Adult  Goal: Free from fall injury  10/13/2024 1059 by Robert Eastman, RN  Outcome: Progressing    Problem: Chronic Conditions and Co-morbidities  Goal: Patient's chronic conditions and co-morbidity symptoms are monitored and maintained or improved  10/13/2024 1059 by Robert Eastman, RN  Outcome: Progressing     Problem: Discharge Planning  Goal: Discharge to home or other facility with appropriate resources  10/13/2024 1059 by Robert Eastman, RN  Outcome: Progressing     Problem: Musculoskeletal - Adult  Goal: Return mobility to safest level of function  10/13/2024 1059 by Robert Eastman, RN  Outcome: Progressing     Problem: Nutrition Deficit:  Goal: Optimize nutritional status  Outcome: Progressing

## 2024-10-14 ENCOUNTER — APPOINTMENT (OUTPATIENT)
Age: 89
DRG: 884 | End: 2024-10-14
Attending: PSYCHIATRY & NEUROLOGY
Payer: MEDICARE

## 2024-10-14 LAB
ANION GAP SERPL CALCULATED.3IONS-SCNC: 10 MMOL/L (ref 9–17)
BASOPHILS # BLD: 0.07 K/UL (ref 0–0.2)
BASOPHILS NFR BLD: 1 % (ref 0–2)
BUN SERPL-MCNC: 30 MG/DL (ref 8–23)
BUN/CREAT SERPL: 33 (ref 9–20)
CALCIUM SERPL-MCNC: 9 MG/DL (ref 8.6–10.4)
CHLORIDE SERPL-SCNC: 109 MMOL/L (ref 98–107)
CO2 SERPL-SCNC: 24 MMOL/L (ref 20–31)
CREAT SERPL-MCNC: 0.9 MG/DL (ref 0.5–0.9)
ECHO AO ROOT DIAM: 2.6 CM
ECHO AO ROOT INDEX: 1.81 CM/M2
ECHO AV AREA PEAK VELOCITY: 0.5 CM2
ECHO AV AREA VTI: 0.6 CM2
ECHO AV AREA/BSA PEAK VELOCITY: 0.3 CM2/M2
ECHO AV AREA/BSA VTI: 0.4 CM2/M2
ECHO AV MEAN GRADIENT: 11 MMHG
ECHO AV MEAN VELOCITY: 1.5 M/S
ECHO AV PEAK GRADIENT: 26 MMHG
ECHO AV PEAK VELOCITY: 2.5 M/S
ECHO AV VELOCITY RATIO: 0.32
ECHO AV VTI: 46.2 CM
ECHO BSA: 1.43 M2
ECHO EST RA PRESSURE: 3 MMHG
ECHO LA AREA 2C: 25.1 CM2
ECHO LA AREA 4C: 26.6 CM2
ECHO LA DIAMETER INDEX: 2.29 CM/M2
ECHO LA DIAMETER: 3.3 CM
ECHO LA MAJOR AXIS: 7.9 CM
ECHO LA MINOR AXIS: 6.9 CM
ECHO LA TO AORTIC ROOT RATIO: 1.27
ECHO LA VOL BP: 80 ML (ref 22–52)
ECHO LA VOL MOD A2C: 77 ML (ref 22–52)
ECHO LA VOL MOD A4C: 74 ML (ref 22–52)
ECHO LA VOL/BSA BIPLANE: 56 ML/M2 (ref 16–34)
ECHO LA VOLUME INDEX MOD A2C: 53 ML/M2 (ref 16–34)
ECHO LA VOLUME INDEX MOD A4C: 51 ML/M2 (ref 16–34)
ECHO LV E' LATERAL VELOCITY: 6.5 CM/S
ECHO LV E' SEPTAL VELOCITY: 6.6 CM/S
ECHO LV EF PHYSICIAN: 60 %
ECHO LV FRACTIONAL SHORTENING: 42 % (ref 28–44)
ECHO LV INTERNAL DIMENSION DIASTOLE INDEX: 2.5 CM/M2
ECHO LV INTERNAL DIMENSION DIASTOLIC: 3.6 CM (ref 3.9–5.3)
ECHO LV INTERNAL DIMENSION SYSTOLIC INDEX: 1.46 CM/M2
ECHO LV INTERNAL DIMENSION SYSTOLIC: 2.1 CM
ECHO LV IVSD: 0.9 CM (ref 0.6–0.9)
ECHO LV MASS 2D: 85.6 G (ref 67–162)
ECHO LV MASS INDEX 2D: 59.5 G/M2 (ref 43–95)
ECHO LV POSTERIOR WALL DIASTOLIC: 0.8 CM (ref 0.6–0.9)
ECHO LV RELATIVE WALL THICKNESS RATIO: 0.44
ECHO LVOT AREA: 1.5 CM2
ECHO LVOT AV VTI INDEX: 0.38
ECHO LVOT DIAM: 1.4 CM
ECHO LVOT MEAN GRADIENT: 1 MMHG
ECHO LVOT PEAK GRADIENT: 2 MMHG
ECHO LVOT PEAK VELOCITY: 0.8 M/S
ECHO LVOT STROKE VOLUME INDEX: 18.6 ML/M2
ECHO LVOT SV: 26.8 ML
ECHO LVOT VTI: 17.4 CM
ECHO MV E DECELERATION TIME (DT): 239 MS
ECHO MV E VELOCITY: 1.2 M/S
ECHO MV E/E' LATERAL: 18.46
ECHO MV E/E' RATIO (AVERAGED): 18.32
ECHO MV E/E' SEPTAL: 18.18
ECHO RIGHT VENTRICULAR SYSTOLIC PRESSURE (RVSP): 43 MMHG
ECHO TV REGURGITANT MAX VELOCITY: 3.16 M/S
ECHO TV REGURGITANT PEAK GRADIENT: 40 MMHG
EOSINOPHIL # BLD: 0.28 K/UL (ref 0–0.44)
EOSINOPHILS RELATIVE PERCENT: 4 % (ref 1–4)
ERYTHROCYTE [DISTWIDTH] IN BLOOD BY AUTOMATED COUNT: 15 % (ref 11.8–14.4)
GFR, ESTIMATED: 57 ML/MIN/1.73M2
GLUCOSE SERPL-MCNC: 119 MG/DL (ref 70–99)
HCT VFR BLD AUTO: 37 % (ref 36.3–47.1)
HGB BLD-MCNC: 11.8 G/DL (ref 11.9–15.1)
IMM GRANULOCYTES # BLD AUTO: 0.08 K/UL (ref 0–0.3)
IMM GRANULOCYTES NFR BLD: 1 %
LYMPHOCYTES NFR BLD: 1.2 K/UL (ref 1.1–3.7)
LYMPHOCYTES RELATIVE PERCENT: 18 % (ref 24–43)
MCH RBC QN AUTO: 32.7 PG (ref 25.2–33.5)
MCHC RBC AUTO-ENTMCNC: 31.9 G/DL (ref 28.4–34.8)
MCV RBC AUTO: 102.5 FL (ref 82.6–102.9)
MONOCYTES NFR BLD: 0.81 K/UL (ref 0.1–1.2)
MONOCYTES NFR BLD: 12 % (ref 3–12)
NEUTROPHILS NFR BLD: 64 % (ref 36–65)
NEUTS SEG NFR BLD: 4.18 K/UL (ref 1.5–8.1)
NRBC BLD-RTO: 0 PER 100 WBC
PLATELET # BLD AUTO: 131 K/UL (ref 138–453)
PMV BLD AUTO: 10.7 FL (ref 8.1–13.5)
POTASSIUM SERPL-SCNC: 4.1 MMOL/L (ref 3.7–5.3)
RBC # BLD AUTO: 3.61 M/UL (ref 3.95–5.11)
RBC # BLD: ABNORMAL 10*6/UL
SODIUM SERPL-SCNC: 143 MMOL/L (ref 135–144)
WBC OTHER # BLD: 6.6 K/UL (ref 3.5–11.3)

## 2024-10-14 PROCEDURE — 1200000000 HC SEMI PRIVATE

## 2024-10-14 PROCEDURE — 6360000002 HC RX W HCPCS: Performed by: NURSE PRACTITIONER

## 2024-10-14 PROCEDURE — 2580000003 HC RX 258: Performed by: FAMILY MEDICINE

## 2024-10-14 PROCEDURE — 6370000000 HC RX 637 (ALT 250 FOR IP): Performed by: UROLOGY

## 2024-10-14 PROCEDURE — 97535 SELF CARE MNGMENT TRAINING: CPT

## 2024-10-14 PROCEDURE — 80048 BASIC METABOLIC PNL TOTAL CA: CPT

## 2024-10-14 PROCEDURE — 94640 AIRWAY INHALATION TREATMENT: CPT

## 2024-10-14 PROCEDURE — 6370000000 HC RX 637 (ALT 250 FOR IP)

## 2024-10-14 PROCEDURE — 85025 COMPLETE CBC W/AUTO DIFF WBC: CPT

## 2024-10-14 PROCEDURE — 6370000000 HC RX 637 (ALT 250 FOR IP): Performed by: NURSE PRACTITIONER

## 2024-10-14 PROCEDURE — 94760 N-INVAS EAR/PLS OXIMETRY 1: CPT

## 2024-10-14 PROCEDURE — 6360000002 HC RX W HCPCS: Performed by: FAMILY MEDICINE

## 2024-10-14 PROCEDURE — 92526 ORAL FUNCTION THERAPY: CPT

## 2024-10-14 PROCEDURE — 2580000003 HC RX 258: Performed by: NURSE PRACTITIONER

## 2024-10-14 PROCEDURE — 36415 COLL VENOUS BLD VENIPUNCTURE: CPT

## 2024-10-14 PROCEDURE — 93306 TTE W/DOPPLER COMPLETE: CPT

## 2024-10-14 RX ORDER — TAMSULOSIN HYDROCHLORIDE 0.4 MG/1
0.4 CAPSULE ORAL DAILY
Status: DISCONTINUED | OUTPATIENT
Start: 2024-10-14 | End: 2024-10-15 | Stop reason: HOSPADM

## 2024-10-14 RX ADMIN — SODIUM CHLORIDE, PRESERVATIVE FREE 10 ML: 5 INJECTION INTRAVENOUS at 07:47

## 2024-10-14 RX ADMIN — DILTIAZEM HYDROCHLORIDE 120 MG: 120 CAPSULE, COATED, EXTENDED RELEASE ORAL at 07:47

## 2024-10-14 RX ADMIN — LATANOPROST 1 DROP: 50 SOLUTION OPHTHALMIC at 20:33

## 2024-10-14 RX ADMIN — WATER 1000 MG: 1 INJECTION INTRAMUSCULAR; INTRAVENOUS; SUBCUTANEOUS at 12:40

## 2024-10-14 RX ADMIN — ASPIRIN 81 MG CHEWABLE TABLET 81 MG: 81 TABLET CHEWABLE at 07:47

## 2024-10-14 RX ADMIN — HEPARIN SODIUM 5000 UNITS: 5000 INJECTION INTRAVENOUS; SUBCUTANEOUS at 20:31

## 2024-10-14 RX ADMIN — TAMSULOSIN HYDROCHLORIDE 0.4 MG: 0.4 CAPSULE ORAL at 07:47

## 2024-10-14 RX ADMIN — TIMOLOL MALEATE 1 DROP: 5 SOLUTION OPHTHALMIC at 07:47

## 2024-10-14 RX ADMIN — HEPARIN SODIUM 5000 UNITS: 5000 INJECTION INTRAVENOUS; SUBCUTANEOUS at 07:47

## 2024-10-14 RX ADMIN — BUDESONIDE AND FORMOTEROL FUMARATE DIHYDRATE 2 PUFF: 160; 4.5 AEROSOL RESPIRATORY (INHALATION) at 21:40

## 2024-10-14 RX ADMIN — CETIRIZINE HYDROCHLORIDE 10 MG: 10 TABLET, FILM COATED ORAL at 07:47

## 2024-10-14 RX ADMIN — SODIUM CHLORIDE, PRESERVATIVE FREE 10 ML: 5 INJECTION INTRAVENOUS at 20:35

## 2024-10-14 RX ADMIN — FAMOTIDINE 20 MG: 20 TABLET, FILM COATED ORAL at 07:47

## 2024-10-14 ASSESSMENT — PAIN SCALES - GENERAL: PAINLEVEL_OUTOF10: 0

## 2024-10-14 NOTE — DISCHARGE INSTR - COC
Continuity of Care Form    Patient Name: Edyta Gruber   :  1924  MRN:  [id number]    Admit date:  10/10/2024  Discharge date:  10/15/24    Code Status Order: Full Code   Advance Directives:   Advance Care Flowsheet Documentation             Admitting Physician:  Mohsin Mohammad Reza, MD  PCP: Florencia Talley MD    Discharging [facility]  Discharging Unit Phone Number: [phone]    Emergency Contact:   Extended Emergency Contact Information  Primary Emergency Contact: Ritchie Gruber   [facility]  Home Phone: [phone]  Work Phone: [phone]  Relation: Child  Secondary Emergency Contact: Adriana Sifuentes   [facility]  Home Phone: [phone]  Relation: Niece/Nephew    Past Surgical History:  Past Surgical History:   Procedure Laterality Date    BACK SURGERY      DEBRIDEMENT Left 2017    rt. leg    OTHER SURGICAL HISTORY  2017    Right leg Angio    OTHER SURGICAL HISTORY  2017    Right leg angio    ID I&D DEEP ABSC BURSA/HEMATOMA THIGH/KNEE REGION Right 3/9/2017    DEBRIDEMENT CALF WOUND  performed by Arthur Delos Reyes, MD at [facility] CVOR    ID I&D DEEP ABSC BURSA/HEMATOMA THIGH/KNEE REGION Right 2017    RIGHT LOWER EXTREMITY DEBRIDEMENT, INTEGRA APPLICATION, PREVENA VAC APPLICATION LOWR RIGHT LEG performed by Arthur Delos Reyes, MD at [facility] OR       Immunization History:   Immunization History   Administered Date(s) Administered    COVID-19, PFIZER GRAY top, DO NOT Dilute, (age 12 y+), IM, 30 mcg/0.3 mL 2022    COVID-19, PFIZER PURPLE top, DILUTE for use, (age 12 y+), 30mcg/0.3mL 2021, 2021, 10/28/2021    Influenza, FLUZONE High Dose (age 65 y+), IM, Quadv, 0.7mL 2020, 2023    TDaP, ADACEL (age 10y-64y), BOOSTRIX (age 10y+), IM, 0.5mL 2020       Active Problems:  Patient Active Problem List   Diagnosis Code    Non-pressure ulcer of left lower extremity, limited to breakdown of

## 2024-10-14 NOTE — PLAN OF CARE
Problem: Safety - Adult  Goal: Free from fall injury  Outcome: Progressing  Note: Pt fall risk, fall band present, falling star, safety alarm activated and in use as needed. Hourly rounding performed. Pt encouraged to use call light. See Ellie fall risk assessment.     Problem: Chronic Conditions and Co-morbidities  Goal: Patient's chronic conditions and co-morbidity symptoms are monitored and maintained or improved  Outcome: Progressing     Problem: Discharge Planning  Goal: Discharge to home or other facility with appropriate resources  Outcome: Progressing  Note: Discharge teaching and instructions for diagnosis/procedure explained with patient using teachback method.  Patient voiced understanding regarding prescriptions, follow up appointments, and care of self at home.      Problem: ABCDS Injury Assessment  Goal: Absence of physical injury  Outcome: Progressing  Note: Non-skid socks in place, up with assistance, bed in lowest position, bed exit & alarm as needed, provide toileting every 2 hours an d as needed.     Problem: Skin/Tissue Integrity - Adult  Goal: Skin integrity remains intact  Outcome: Progressing  Note: Continuing to monitor for skin integrity risks. Patient independent with turning/repositioning. Turning/repositioning encouraged at least once every 2 hrs, and prn basis. Hygiene care being completed independently per patient; assistance provided when deemed necessary.     Problem: Musculoskeletal - Adult  Goal: Return mobility to safest level of function  Outcome: Progressing  Note: Assessed musculoskeletal functional level.  Assessed ROM & ability to care for self.  Goal: Return ADL status to a safe level of function  Outcome: Progressing     Problem: Nutrition Deficit:  Goal: Optimize nutritional status  Outcome: Progressing  Note: Review diet daily and as needed with pt.  Provide supplement nutrition as ordered by physician & educate pt.  Review nutritional guidelines with pt.     Problem:

## 2024-10-14 NOTE — CONSULTS
Orlando Cano MD  Urology Consultation    Patient:  Edyta Gruber  MRN: [id number]  YOB: 1924    CHIEF COMPLAINT: Urinary retention    HISTORY OF PRESENT ILLNESS:   The patient is a 89 y.o. female who presents with weakness, loss of balance leading to a fall the patient had a compression fracture of lumbar vertebrae she was admitted for further management    The patient developed urinary retention, she has no history of UTIs and no history of retention according to the patient's son at bedside    Patient's old records, notes and chart reviewed and summarized above.    Past Medical History:    Past Medical History:   Diagnosis Date    Acute dermatitis     Arthritis     Asthma     Atrial fibrillation (HCC)     Bursitis     CHF (congestive heart failure) (HCC)     Hearing aid worn     Hyperlipidemia     Hypertension     Lumbar disc disease     Wears glasses     Wound of right leg        Past Surgical History:    Past Surgical History:   Procedure Laterality Date    BACK SURGERY      DEBRIDEMENT Left 03/09/2017    rt. leg    OTHER SURGICAL HISTORY  03/30/2017    Right leg Angio    OTHER SURGICAL HISTORY  03/30/2017    Right leg angio    MA I&D DEEP ABSC BURSA/HEMATOMA THIGH/KNEE REGION Right 3/9/2017    DEBRIDEMENT CALF WOUND  performed by Arthur Delos Reyes, MD at [facility] CVOR    MA I&D DEEP ABSC BURSA/HEMATOMA THIGH/KNEE REGION Right 4/28/2017    RIGHT LOWER EXTREMITY DEBRIDEMENT, INTEGRA APPLICATION, PREVENA VAC APPLICATION LOWR RIGHT LEG performed by Arthur Delos Reyes, MD at [facility] OR     Previous  surgery: none   Medications:    Scheduled Meds:   budesonide-formoterol  2 puff Inhalation BID RT    cetirizine  10 mg Oral Daily    latanoprost  1 drop Both Eyes Nightly    timolol  1 drop Both Eyes BID    aspirin  81 mg Oral Daily    cefTRIAXone (ROCEPHIN) IV  1,000 mg IntraVENous Q24H    dilTIAZem  120 mg Oral Daily    sodium chloride flush  10 mL IntraVENous 2 times per day    heparin (porcine)  
  NEUROLOGY INPATIENT CONSULT NOTE    10/12/2024         Edyta Gruber is a  89 y.o. female admitted on 10/10/2024 with  General weakness [R53.1]  NSTEMI (non-ST elevated myocardial infarction) ([facility]) [I21.4]  Acute kidney injury ([facility]) [N17.9]  Cerebrovascular accident (CVA), unspecified mechanism ([facility]) [I63.9]  Compression fracture of lumbar vertebra, unspecified lumbar vertebral level, initial encounter ([facility]) [S32.000A]    Reason for consult: CVA  History is obtained mostly from the patient and the medical record and from the caregivers. Chart is reviewed and patient is examined.   Briefly, this is a  89 y.o. female admitted on 10/10/2024 with acute onset of bilateral lower extremity weakness after a fall at home.   Reportedly patient was putting clothes away and she turned and her shoes did not go with her body and she fell on her buttock and she had to crawl to a phone to call for help; did not hit her head and had to use forearms to crawl and also c/o back pain located in lower back since after the fall.  On admit to ER; noted to be weaker on the left side for which stroke alert initiated.  Evaluated by stroke team on admit;  Vitals on admit 98/74, 84/min, 98.3 °F  CT head on admit negative for acute intracranial abnormalities.  Showed old lacunar infarcts in bilateral basal ganglia.  CTA head and neck showed 50% stenosis of proximal right ICA otherwise no intracranial arterial stenosis or occlusion.  Patient stated that lower extremity weakness is definitely improving but still feeling weaker on the left leg.  She also added that she was told by her therapist that she does have chronic weakness on left side of the body.  Denied associated numbness.  Denied headaches, dizziness and vision changes.  Denied speech and swallowing difficulties.  Home meds reviewed; she was not on antiplatelets prior to admit.  Was on pravastatin 20 mg daily.     Also has been on apixaban for atrial fibrillation.         No 
Consult Note    Reason for Consult: Renal dysfunction    Requesting Physician:  Reza, Mohsin Mohammad, MD    HISTORY OF PRESENT ILLNESS:    The patient is a 89 y.o. female who presents with a fall..  She was noted to have abnormal renal function test upon presentation to the ER yesterday.  She denies having any history of chronic kidney disease.  In December 2023 she had a creatinine of 0.6.  Subsequently in January and in March however the creatinine was 1.3.  She denies any nausea vomiting diarrhea or difficulties with urination.  She has discomfort in the areas where she fell.    Review Of Systems:    All other ROS is negative.      Past Medical History:   Diagnosis Date    Acute dermatitis     Arthritis     Asthma     Atrial fibrillation (HCC)     Bursitis     CHF (congestive heart failure) (HCC)     Hearing aid worn     Hyperlipidemia     Hypertension     Lumbar disc disease     Wears glasses     Wound of right leg        Past Surgical History:   Procedure Laterality Date    BACK SURGERY      DEBRIDEMENT Left 03/09/2017    rt. leg    OTHER SURGICAL HISTORY  03/30/2017    Right leg Angio    OTHER SURGICAL HISTORY  03/30/2017    Right leg angio    NE I&D DEEP ABSC BURSA/HEMATOMA THIGH/KNEE REGION Right 3/9/2017    DEBRIDEMENT CALF WOUND  performed by Arthur Delos Reyes, MD at [facility] CVOR    NE I&D DEEP ABSC BURSA/HEMATOMA THIGH/KNEE REGION Right 4/28/2017    RIGHT LOWER EXTREMITY DEBRIDEMENT, INTEGRA APPLICATION, PREVENA VAC APPLICATION LOWR RIGHT LEG performed by Arthur Delos Reyes, MD at [facility] OR       Prior to Admission medications    Medication Sig Start Date End Date Taking? Authorizing Provider   dilTIAZem (DILACOR XR) 120 MG extended release capsule Take 1 capsule by mouth daily  Patient taking differently: Take 180 mg by mouth daily 7/19/24  Yes Lianet Dempsey MD   bumetanide (BUMEX) 1 MG tablet Take 1 tablet by mouth daily 12/5/23  Yes Reynaldo Osorio MD   spironolactone (ALDACTONE) 25 MG tablet 
        IMPRESSION:    CVA  Compression fracture of lumbar vertebra  NSTEMI  ESSENCE      PLAN:  Troponin down trending, pt denies CP/ pressure. Given patient age and current comorbidity,will proceed with conservative management.   We had a long discussion about her eliquis, pt weak and frail with recent fall and extensive bruising on her BL UE. Plan to discontinue eliquis at this time. Will used ASA 81mg POD for prophylaxis.  Continue home dose of Cardizem, 120mg POD if BP will tolerate  ESSENCE, crt 1.5 appreciated nephrology recommendations  Supportive care per primary  Will sign off at this time. Please call with any questions.    Plan discussed and formulated with Dr. Drew Mcnulty, APRN - CNP        [facility] [place]

## 2024-10-14 NOTE — PLAN OF CARE
Pt oriented x 4. Up with assist. Dr Cano rounded this AM and wants to keep rogers for another day or so. Rocephin continued. ECHO completed today.  Mepilex to back and coccyx changed. Flomax added per Dr Cano.    Problem: Safety - Adult  Goal: Free from fall injury  10/14/2024 1231 by Sheela Akins, RN  Outcome: Progressing     Problem: Chronic Conditions and Co-morbidities  Goal: Patient's chronic conditions and co-morbidity symptoms are monitored and maintained or improved  10/14/2024 1231 by Sheela Akins, RN  Outcome: Progressing     Problem: Discharge Planning  Goal: Discharge to home or other facility with appropriate resources  10/14/2024 1231 by Sheela Akins RN  Outcome: Progressing     Problem: ABCDS Injury Assessment  Goal: Absence of physical injury  10/14/2024 1231 by Sheela Akins, RN  Outcome: Progressing     Problem: Skin/Tissue Integrity - Adult  Goal: Skin integrity remains intact  10/14/2024 1231 by Sheela Akins, RN  Outcome: Progressing  Flowsheets (Taken 10/14/2024 0804)  Skin Integrity Remains Intact: Monitor for areas of redness and/or skin breakdown     Problem: Musculoskeletal - Adult  Goal: Return mobility to safest level of function  10/14/2024 1231 by Sheela Akins, RN  Outcome: Progressing     Problem: Nutrition Deficit:  Goal: Optimize nutritional status  10/14/2024 1231 by Sheela Akins, RN  Outcome: Progressing     Problem: Respiratory - Adult  Goal: Achieves optimal ventilation and oxygenation  10/14/2024 1231 by Sheela Akins, RN  Outcome: Progressing

## 2024-10-14 NOTE — CARE COORDINATION
Social work:  Authorization obtained for Abdi Thomson.   Spoke to RN, patient not ready today.   Erika/xiomara updated.

## 2024-10-15 VITALS
SYSTOLIC BLOOD PRESSURE: 126 MMHG | HEART RATE: 76 BPM | TEMPERATURE: 97.5 F | OXYGEN SATURATION: 93 % | WEIGHT: 103 LBS | HEIGHT: 62 IN | RESPIRATION RATE: 18 BRPM | DIASTOLIC BLOOD PRESSURE: 78 MMHG | BODY MASS INDEX: 18.95 KG/M2

## 2024-10-15 LAB
ANION GAP SERPL CALCULATED.3IONS-SCNC: 7 MMOL/L (ref 9–17)
BASOPHILS # BLD: 0.07 K/UL (ref 0–0.2)
BASOPHILS NFR BLD: 1 % (ref 0–2)
BUN SERPL-MCNC: 23 MG/DL (ref 8–23)
BUN/CREAT SERPL: 29 (ref 9–20)
CALCIUM SERPL-MCNC: 9.3 MG/DL (ref 8.6–10.4)
CHLORIDE SERPL-SCNC: 108 MMOL/L (ref 98–107)
CO2 SERPL-SCNC: 26 MMOL/L (ref 20–31)
CREAT SERPL-MCNC: 0.8 MG/DL (ref 0.5–0.9)
EOSINOPHIL # BLD: 0.27 K/UL (ref 0–0.44)
EOSINOPHILS RELATIVE PERCENT: 5 % (ref 1–4)
ERYTHROCYTE [DISTWIDTH] IN BLOOD BY AUTOMATED COUNT: 15.1 % (ref 11.8–14.4)
GFR, ESTIMATED: 66 ML/MIN/1.73M2
GLUCOSE SERPL-MCNC: 90 MG/DL (ref 70–99)
HCT VFR BLD AUTO: 38.6 % (ref 36.3–47.1)
HGB BLD-MCNC: 12.4 G/DL (ref 11.9–15.1)
IMM GRANULOCYTES # BLD AUTO: 0.07 K/UL (ref 0–0.3)
IMM GRANULOCYTES NFR BLD: 1 %
LYMPHOCYTES NFR BLD: 1.06 K/UL (ref 1.1–3.7)
LYMPHOCYTES RELATIVE PERCENT: 20 % (ref 24–43)
MCH RBC QN AUTO: 32.7 PG (ref 25.2–33.5)
MCHC RBC AUTO-ENTMCNC: 32.1 G/DL (ref 28.4–34.8)
MCV RBC AUTO: 101.8 FL (ref 82.6–102.9)
MONOCYTES NFR BLD: 0.56 K/UL (ref 0.1–1.2)
MONOCYTES NFR BLD: 11 % (ref 3–12)
NEUTROPHILS NFR BLD: 62 % (ref 36–65)
NEUTS SEG NFR BLD: 3.18 K/UL (ref 1.5–8.1)
NRBC BLD-RTO: 0 PER 100 WBC
PLATELET # BLD AUTO: 125 K/UL (ref 138–453)
PMV BLD AUTO: 10.5 FL (ref 8.1–13.5)
POTASSIUM SERPL-SCNC: 4.4 MMOL/L (ref 3.7–5.3)
RBC # BLD AUTO: 3.79 M/UL (ref 3.95–5.11)
RBC # BLD: ABNORMAL 10*6/UL
SODIUM SERPL-SCNC: 141 MMOL/L (ref 135–144)
WBC OTHER # BLD: 5.2 K/UL (ref 3.5–11.3)

## 2024-10-15 PROCEDURE — 97530 THERAPEUTIC ACTIVITIES: CPT

## 2024-10-15 PROCEDURE — 2580000003 HC RX 258: Performed by: FAMILY MEDICINE

## 2024-10-15 PROCEDURE — 85025 COMPLETE CBC W/AUTO DIFF WBC: CPT

## 2024-10-15 PROCEDURE — 6360000002 HC RX W HCPCS: Performed by: NURSE PRACTITIONER

## 2024-10-15 PROCEDURE — 6370000000 HC RX 637 (ALT 250 FOR IP)

## 2024-10-15 PROCEDURE — 6370000000 HC RX 637 (ALT 250 FOR IP): Performed by: UROLOGY

## 2024-10-15 PROCEDURE — 36415 COLL VENOUS BLD VENIPUNCTURE: CPT

## 2024-10-15 PROCEDURE — 2580000003 HC RX 258: Performed by: NURSE PRACTITIONER

## 2024-10-15 PROCEDURE — 6370000000 HC RX 637 (ALT 250 FOR IP): Performed by: NURSE PRACTITIONER

## 2024-10-15 PROCEDURE — 97110 THERAPEUTIC EXERCISES: CPT

## 2024-10-15 PROCEDURE — 94640 AIRWAY INHALATION TREATMENT: CPT

## 2024-10-15 PROCEDURE — 97116 GAIT TRAINING THERAPY: CPT

## 2024-10-15 PROCEDURE — 80048 BASIC METABOLIC PNL TOTAL CA: CPT

## 2024-10-15 PROCEDURE — 6360000002 HC RX W HCPCS: Performed by: FAMILY MEDICINE

## 2024-10-15 PROCEDURE — 97112 NEUROMUSCULAR REEDUCATION: CPT

## 2024-10-15 RX ORDER — CIPROFLOXACIN 250 MG/1
250 TABLET, FILM COATED ORAL 2 TIMES DAILY
Qty: 14 TABLET | Refills: 0 | Status: SHIPPED | OUTPATIENT
Start: 2024-10-15 | End: 2024-10-22

## 2024-10-15 RX ORDER — BUDESONIDE AND FORMOTEROL FUMARATE DIHYDRATE 160; 4.5 UG/1; UG/1
2 AEROSOL RESPIRATORY (INHALATION)
Qty: 10.2 G | Refills: 3 | Status: SHIPPED | OUTPATIENT
Start: 2024-10-15

## 2024-10-15 RX ORDER — ASPIRIN 81 MG/1
81 TABLET, CHEWABLE ORAL DAILY
Qty: 30 TABLET | Refills: 3 | Status: SHIPPED | OUTPATIENT
Start: 2024-10-16

## 2024-10-15 RX ORDER — TAMSULOSIN HYDROCHLORIDE 0.4 MG/1
0.4 CAPSULE ORAL DAILY
Qty: 30 CAPSULE | Refills: 3 | Status: SHIPPED | OUTPATIENT
Start: 2024-10-16

## 2024-10-15 RX ORDER — DILTIAZEM HYDROCHLORIDE 120 MG/1
120 CAPSULE, COATED, EXTENDED RELEASE ORAL DAILY
Qty: 30 CAPSULE | Refills: 3 | Status: SHIPPED | OUTPATIENT
Start: 2024-10-16

## 2024-10-15 RX ADMIN — TAMSULOSIN HYDROCHLORIDE 0.4 MG: 0.4 CAPSULE ORAL at 09:03

## 2024-10-15 RX ADMIN — WATER 1000 MG: 1 INJECTION INTRAMUSCULAR; INTRAVENOUS; SUBCUTANEOUS at 13:56

## 2024-10-15 RX ADMIN — ACETAMINOPHEN 650 MG: 325 TABLET ORAL at 09:02

## 2024-10-15 RX ADMIN — ASPIRIN 81 MG CHEWABLE TABLET 81 MG: 81 TABLET CHEWABLE at 09:03

## 2024-10-15 RX ADMIN — DILTIAZEM HYDROCHLORIDE 120 MG: 120 CAPSULE, COATED, EXTENDED RELEASE ORAL at 09:03

## 2024-10-15 RX ADMIN — HEPARIN SODIUM 5000 UNITS: 5000 INJECTION INTRAVENOUS; SUBCUTANEOUS at 09:04

## 2024-10-15 RX ADMIN — SODIUM CHLORIDE, PRESERVATIVE FREE 10 ML: 5 INJECTION INTRAVENOUS at 09:04

## 2024-10-15 RX ADMIN — BUDESONIDE AND FORMOTEROL FUMARATE DIHYDRATE 2 PUFF: 160; 4.5 AEROSOL RESPIRATORY (INHALATION) at 10:59

## 2024-10-15 RX ADMIN — CETIRIZINE HYDROCHLORIDE 10 MG: 10 TABLET, FILM COATED ORAL at 09:03

## 2024-10-15 RX ADMIN — FAMOTIDINE 20 MG: 20 TABLET, FILM COATED ORAL at 09:03

## 2024-10-15 RX ADMIN — TIMOLOL MALEATE 1 DROP: 5 SOLUTION OPHTHALMIC at 10:10

## 2024-10-15 ASSESSMENT — PAIN SCALES - GENERAL
PAINLEVEL_OUTOF10: 3
PAINLEVEL_OUTOF10: 1

## 2024-10-15 NOTE — DISCHARGE SUMMARY
mouth daily  Start taking on: October 16, 2024  What changed:   medication strength  how much to take            CONTINUE taking these medications      Advair -21 MCG/ACT inhaler  Generic drug: fluticasone-salmeterol     budesonide-formoterol 160-4.5 MCG/ACT Aero  Commonly known as: SYMBICORT  Inhale 2 puffs into the lungs in the morning and 2 puffs in the evening.     bumetanide 1 MG tablet  Commonly known as: BUMEX  Take 1 tablet by mouth daily     cetirizine 10 MG tablet  Commonly known as: ZYRTEC     latanoprost 0.005 % ophthalmic solution  Commonly known as: XALATAN     pravastatin 20 MG tablet  Commonly known as: PRAVACHOL     timolol 0.5 % ophthalmic solution  Commonly known as: TIMOPTIC            STOP taking these medications      apixaban 2.5 MG Tabs tablet  Commonly known as: Eliquis     spironolactone 25 MG tablet  Commonly known as: Aldactone               Where to Get Your Medications        These medications were sent to [facility] [address] - P [phone] - F [phone]  [address] [place], [place] [address] [address]      Phone: [phone]   aspirin 81 MG chewable tablet  budesonide-formoterol 160-4.5 MCG/ACT Aero  ciprofloxacin 250 MG tablet  dilTIAZem 120 MG extended release capsule  tamsulosin 0.4 MG capsule         Code Status:  Full Code    Time Spent on discharge is   40 mins  in patient examination, evaluation, counseling as well as medication reconciliation, prescriptions for required medications, discharge plan and follow up.    Electronically signed by Chavo Sanders MD on 10/15/2024 at 4:01 PM     Thank you Florencia Manuel MD for the opportunity to be involved in this patient's care.    This note was created with the assistance of a speech-recognition program.  Although the intention is to generate a document that actually reflects the content of the visit, no guarantees can be provided that every mistake has been identified and corrected by

## 2024-10-15 NOTE — PLAN OF CARE
Problem: Respiratory - Adult  Goal: Achieves optimal ventilation and oxygenation  10/14/2024 2144 by Fausto Redd RCP  Outcome: Progressing  10/14/2024 1231 by Sheela Akins RN  Outcome: Progressing

## 2024-10-15 NOTE — PLAN OF CARE
Problem: Safety - Adult  Goal: Free from fall injury  Outcome: Progressing  Note: Patient admitted s/p fall. Patient alert and oriented. PT/OT following. Patient remains free from falls. Patient expected to go to rehab at discharge

## 2024-10-15 NOTE — DISCHARGE INSTR - DIET

## 2024-10-15 NOTE — PROGRESS NOTES
Orlando Cano MD   Urology Progress Note            Subjective: Follow-up urinary retention,    Patient Vitals for the past 24 hrs:   BP Temp Temp src Pulse Resp SpO2 Height Weight   10/14/24 2143 -- -- -- 80 18 95 % -- --   10/14/24 1959 112/60 97.3 °F (36.3 °C) Oral 55 18 95 % -- --   10/14/24 1520 108/61 97.5 °F (36.4 °C) Axillary 67 18 93 % -- --   10/14/24 1118 114/76 97.3 °F (36.3 °C) Axillary 70 18 92 % -- --   10/14/24 1116 -- -- -- -- -- -- 1.575 m (5' 2\") 46.7 kg (103 lb)   10/14/24 0800 -- -- -- -- -- 92 % -- --   10/14/24 0735 109/67 97.7 °F (36.5 °C) Oral 68 18 (!) 88 % -- --       Intake/Output Summary (Last 24 hours) at 10/15/2024 0636  Last data filed at 10/15/2024 0448  Gross per 24 hour   Intake 120 ml   Output 800 ml   Net -680 ml       Recent Labs     10/13/24  0537 10/14/24  0459   WBC 6.1 6.6   HGB 12.2 11.8*   HCT 39.0 37.0   .6* 102.5    131*     Recent Labs     10/13/24  0537 10/14/24  0459    143   K 4.1 4.1   * 109*   CO2 22 24   BUN 35* 30*   CREATININE 1.1* 0.9       No results for input(s): \"COLORU\", \"PHUR\", \"LABCAST\", \"WBCUA\", \"RBCUA\", \"MUCUS\", \"TRICHOMONAS\", \"YEAST\", \"BACTERIA\", \"CLARITYU\", \"SPECGRAV\", \"LEUKOCYTESUR\", \"UROBILINOGEN\", \"BILIRUBINUR\", \"BLOODU\" in the last 72 hours.    Invalid input(s): \"NITRATE\", \"GLUCOSEUKETONESUAMORPHOUS\"    Additional Lab/culture results:    Physical Exam: Patient with traumatic injury and fall she has an indwelling Lacey she is tolerating the catheter, urinary tract infection E. coli, patient being transferred to skilled nursing facility.    We are recommending Cipro 250 mg  Sensitivity on the culture is not available yet    Interval Imaging Findings:    Impression:    Patient Active Problem List   Diagnosis    Non-pressure ulcer of left lower extremity, limited to breakdown of skin (HCC)    Persistent atrial fibrillation (HCC)    Venous ulcer of left leg (HCC)    Essential hypertension    
        [facility] Neurology   IN-PATIENT SERVICE      NEUROLOGY PROGRESS  NOTE                Interval History:     Resting comfortably.   No current complaints.   Awaiting placement.     History of Present Illness:     The patient is a 89 y.o. female who presents with Fall (Was putting clothes away and she turned and her shoes did not go with her body and she fell on her buttock and she had to crawl to a phone to call for help did not hit head and had to use forearms to crawl all bruised on blood thinner) and Back Pain (Lower back from fall)  .  The patient was seen and examined and the chart was reviewed.     Physical Exam:   /60   Pulse 80   Temp 97.3 °F (36.3 °C) (Oral)   Resp 18   Ht 1.575 m (5' 2\")   Wt 46.7 kg (103 lb)   SpO2 95%   BMI 18.84 kg/m²   Temp (24hrs), Av.5 °F (36.4 °C), Min:97.3 °F (36.3 °C), Max:97.7 °F (36.5 °C)      Neurological examination:    Mental status   Alert and oriented x 3; following all commands; speech is fluent.    Cranial nerves   CN II - XII intact   Motor function  Strength: 5/5 RUE, 5/5 RLE, 5/5 LUE, 5/5  LLE                  Sensory function Intact to touch throughout     Cerebellar Intact finger-nose-finger testing. Intact heel-shin testing.      Reflex function 2/4 symmetric throughout .      Gait                  Not assessed           Diagnostics:      Laboratory Testing:  CBC:   Recent Labs     10/12/24  0502 10/13/24  0537 10/14/24  0459   WBC 7.7 6.1 6.6   HGB 12.7 12.2 11.8*   * 149 131*     BMP:    Recent Labs     10/12/24  0502 10/13/24  0537 10/14/24  0459    140 143   K 4.5 4.1 4.1   * 108* 109*   CO2 22 22 24   BUN 39* 35* 30*   CREATININE 1.1* 1.1* 0.9   GLUCOSE 84 92 119*         Lab Results   Component Value Date    CHOL 147 10/12/2024    HDL 50 10/12/2024    TRIG 88 10/12/2024    ALT 23 10/10/2024    AST 29 10/10/2024    TSH 3.75 2024    INR 1.6 10/10/2024    GLUF 111 (H) 2018    LABA1C 5.8 2024    MG 2.2 
      Hospitalist - Progress Note      Patient: Edyta Gruber    Unit/Bed:1015/1015-01  YOB: 1924  MRN: [id number]   Acct: [id number]   PCP: Florencia Talley MD    Date of Service: Pt seen/examined on 10/12/24  and Admitted to Inpatient with expected LOS greater than two midnights due to medical therapy.     Chief Complaint: Weakness    Assessment and Plan:-  Left Sided weakness - ?TIA  Code stroke in the ER.  Video swallow study - pt passed study  PT/OT/ST  CT head, CTA of the head and neck reviewed.  MRI of the brain - no new cva  Teleneuro saw the patient in the ER.  Neurology consulted.     NSTEMI  Elevated troponin although no complaints of chest pain.  Cardiology consulted.  Telemetry monitoring  Conservative management    Acute kidney injury  Creatinine 2-->1.  Patient was dry on exam.  S/p IV fluids  UA abnormal - pt with low bps, fall prior to admission. Will treat with Rocephin x 3 days  Nephrology consulted and following    Leukocytosis  Improving    Carotid stenosis  Noted on CTA.    Bilateral pleural effusions  Noted on imaging.  Cardiology consulted.  O2 weaned off  May need thoracentesis.  Per son pt has had these in the past.     Old lacunar infarcts in the bilateral basal ganglia  Compression fracture L2 vertebral body  Denies pain    Atrial fibrillation  on Eliquis but increased risk of falls  Cardiology consulted and following  Eliquis discontinued due to high fall risk, advanced age, significant bruising on B/L UE  Started on asa 81mg every other day for prophylaxis.    Subjective:  Patient seen in room. Off o2. Sitting up in room.  Denies cp, sob.  Poor historian with sxms  [facility]      History Of Present Illness:    89-year-old female coming into the hospital for complaints of a fall.  Patient was up at 2 in the morning, hanging up close when she turned around and lost her balance.  She landed on her back.  She denies head trauma.  She is on Eliquis.  Patient was on the floor 
  Physician Progress Note      PATIENT:               MINESH ZALDIVAR  CSN #:                  [id number]  :                       1924  ADMIT DATE:       10/10/2024 7:21 PM  DISCH DATE:  RESPONDING  PROVIDER #:        Chavo Sanders MD          QUERY TEXT:    Pt admitted with General weakness. 10/10 ED note states \"was hanging up   clothing at 2 AM in the morning when she turned around lost her balance and   fell.  Patient was unable to get up on her own secondary to weakness in her   legs\" and \"was on the floor for approximately 4 hours until her son was able   to help her up.  Patient was crawling around on the floor until that point and   has bruising to her bilateral forearms\". 10/14 OT note states \"Pt remains   limited by global weakness, balance/safety deficits, decreased activity   tolerance, cognitive deficits and fatigue\". 10/11 PT no    The medical record reflects the following:  Risk Factors: 89yo  Clinical Indicators: 10/10 ED note states \"was hanging up clothing at 2 AM in   the morning when she turned around lost her balance and fell.  Patient was   unable to get up on her own secondary to weakness in her legs\" and \"was on the   floor for approximately 4 hours until her son was able to help her up.    Patient was crawling around on the floor until that point and has bruising to   her bilateral forearms\". 10/14 OT note states \"Pt remains limited by global   weakness, balance/safety deficits, decreased activity tolerance, cognitive   deficits and fatigue\". 10/11 PT note states \"Dec  Treatment: labs, imaging, PT, OT, Walker, rolling;Gait belt, Bed/Chair alarm   on; Bed/Chair-Wheels locked; Bed in low position; Call light within reach,   fall precautions, Socks; Fall sign posted, up with assistance    Thank you,  Adriane Lagunas (Gabriel), RN BSN  Clinical   Options provided:  -- Age Related Physical Debility  -- Other - I will add my own diagnosis  -- Disagree - Not applicable 
  Physician Progress Note      PATIENT:               MINESH ZALDIVAR  CSN #:                  [id number]  :                       1924  ADMIT DATE:       10/10/2024 7:21 PM  DISCH DATE:  RESPONDING  PROVIDER #:        Chavo Sanders MD          QUERY TEXT:    Pt admitted with General weakness. 10/10 ED note states \"was hanging up   clothing at 2 AM in the morning when she turned around lost her balance and   fell.  Patient was unable to get up on her own secondary to weakness in her   legs\" and \"was on the floor for approximately 4 hours until her son was able   to help her up.  Patient was crawling around on the floor until that point and   has bruising to her bilateral forearms\". 10/14 OT note states \"Pt remains   limited by global weakness, balance/safety deficits, decreased activity   tolerance, cognitive deficits and fatigue\". 10/11 PT no    The medical record reflects the following:  Risk Factors: 89yo  Clinical Indicators: 10/10 ED note states \"was hanging up clothing at 2 AM in   the morning when she turned around lost her balance and fell.  Patient was   unable to get up on her own secondary to weakness in her legs\" and \"was on the   floor for approximately 4 hours until her son was able to help her up.    Patient was crawling around on the floor until that point and has bruising to   her bilateral forearms\". 10/14 OT note states \"Pt remains limited by global   weakness, balance/safety deficits, decreased activity tolerance, cognitive   deficits and fatigue\". 10/11 PT note states \"Dec  Treatment: labs, imaging, PT, OT, Walker, rolling;Gait belt, Bed/Chair alarm   on; Bed/Chair-Wheels locked; Bed in low position; Call light within reach,   fall precautions, Socks; Fall sign posted, up with assistance    Thank you,  Adriane Lagunas (Gabriel), RN BSN  Clinical   Options provided:  -- Age Related Physical Debility  -- Other - I will add my own diagnosis  -- Disagree - Not applicable 
Comprehensive Nutrition Assessment    Type and Reason for Visit:  Positive Nutrition Screen (Unsure of amount of weight loss)    Nutrition Recommendations/Plan:   ADULT DIET; Regular  Start Ensure Clear 2x/day  Monitor p.o intakes, diet tolerance and labs     Malnutrition Assessment:  Malnutrition Status:  Moderate malnutrition (10/11/24 1722)    Context:  Acute Illness     Findings of the 6 clinical characteristics of malnutrition:  Energy Intake:  50% or less of estimated energy requirements for 5 or more days  Weight Loss:  No significant weight loss (4.1% loss over 3 months)     Body Fat Loss:  Mild body fat loss Triceps   Muscle Mass Loss:  Mild muscle mass loss Clavicles (pectoralis & deltoids), Temples (temporalis)  Fluid Accumulation:  Mild Extremities   Strength:  Not Performed    Nutrition Assessment:    Patient admission is related to NSTEMI, ESSENCE, CVA and compression fracture of lumbar vertebra from fall (10/8). Positive nutrition screen received for unsure of amount of weight loss. Patient reports weight loss related to difficulty keeping food down due to thick phlegm that is causing her to spit up food consumed. Patient states she was not able to eat breakfast and would not keep down tomato soup. At time of visit patient was sipping on hot tea. Patient states she has been having difficulty eating for a while and agreed to try Ensure Clear supplements. Electronic weight records indicate patient was at 108 lbs on 7/19/24, current weight is recorded as 103 lb. Patient states her usual weight is 101 lbs. Continue Regular diet and start Ensure Clear 2x/day. Monitor p.o intakes, diet tolerance and labs    Nutrition Related Findings:    Edema: +1 non-pitting BUE. Active bowel sounds. Thick phlegm Wound Type: None       Current Nutrition Intake & Therapies:    Average Meal Intake: 0%, 1-25%  Average Supplements Intake: None Ordered  ADULT DIET; Regular  ADULT ORAL NUTRITION SUPPLEMENT; Breakfast, Dinner; 
Dr Cano in, updated, informed that urine culture was pending, states he will wait for cx results, keep rogers in for now and perform trial void in \"a few days\" to determine needs  
Patient admitted to room 1015  VS taken, telemetry placed and call light given/within reach. Patient A&O and given room orientation. Orders released/reviewed, initial assessment completed and navigator started. See chart for more detail.       [x] Medication Reconciliation was completed and the patient's home medication list was verified. The Med List Status is \"Complete\". The following sources were used to assist with Medication Reconciliation:    [] Med Rec Pharmacist already completed   [x] Patient had a list of medications which was transcribed into the EHR.  [x] Patient provided bottles of their medications  [x] Home medications reviewed and confirmed with patient  [] Contacted patient's pharmacy to confirm home medications  [] Contacted patient's physician office to confirm home medications  [] Medical Records from another facility and/or Care Everywhere were reviewed  [] MAR from facility requested to be faxed over  [] Unable to complete due to patient condition  [] Unable to validate home medications      [] There are one or more home medications that need clarification before Medication Reconciliation can be completed. The Med List Status has been marked as In Progress.     To assist with Home Medication Reconciliation the following actions have been taken:    [] Pharmacy medication reconciliation service requested. (Note: This can be done by sending a Perfect Serve message to The Med Rec Pharmacist or by phoning [phone].)  [] Family requested to bring medications into the hospital  [] Family requested to call hospital with medication list  [] Message left with physician office  [] Request for medical records made to   [] Other        
[facility] no [facility]  
Patient had relatively uneventful evening.  Hard of hearing.  Lacey draining urine which is more clear in appearance.  Awaiting placement.  Side rails x2 raised & bed alarm activated for patient safety.  
Patient transferred in bed with belongings to Med-Surg, room 2018.  Report given to ELMER Santamaria.  
Physical Therapy  Facility/Department: [facility] PROGRESSIVE CARE  Daily Treatment Note  NAME: Edyta Gruber  : 1924  MRN: [id number]    Date of Service: 10/13/2024    Discharge Recommendations:  Patient would benefit from continued therapy after discharge        Patient Diagnosis(es): The primary encounter diagnosis was TIA (transient ischemic attack). Diagnoses of Cerebrovascular accident (CVA), unspecified mechanism ([facility]), Compression fracture of lumbar vertebra, unspecified lumbar vertebral level, initial encounter ([facility]), NSTEMI (non-ST elevated myocardial infarction) ([facility]), and Acute kidney injury ([facility]) were also pertinent to this visit.    Assessment  Assessment: Patient continues to benefit from skilled physical therapy for increase strength, endurance and mobility. She showed good tolerance to today's session however was fatigued at end of session. Plan to continue to progress program as able per POC for continued progress towards all goals.  Activity Tolerance: Patient tolerated treatment well;Patient limited by pain    Plan  Physical Therapy Plan  General Plan: 5-7 times per week  Current Treatment Recommendations: Strengthening;Balance training;Functional mobility training;Transfer training;Stair training;Gait training;Home exercise program;Safety education & training;Return to work related activity;Patient/Caregiver education & training;Therapeutic activities    Restrictions  Restrictions/Precautions  Restrictions/Precautions: General Precautions, Up as Tolerated, Bed Alarm  Position Activity Restriction  Other position/activity restrictions: L UE IV; Telemetry; ALARMS; Fragile Skin, up as maximo     Subjective   Subjective  Subjective: Patient in bed upon entering and agreeable to PT however wishes to return to bed due to still very sleepy. Patient is [place]. RN reports patient is appropriate for therapy.  Orientation  Overall Orientation Status: Within Functional Limits    Objective     Bed Mobility 
Physical Therapy  Facility/Department: [facility]  Daily Treatment Note  NAME: Edyta Gruber  : 1924  MRN: [id number]    Date of Service: 10/15/2024    Discharge Recommendations:  Patient would benefit from continued therapy after discharge      Pt currently functioning below baseline.  Recommend daily inpatient skilled therapy at time of discharge to maximize long term outcomes and prevent re-admission. Please refer to AM-PAC score for current level of function.     Patient Diagnosis(es): The primary encounter diagnosis was TIA (transient ischemic attack). Diagnoses of Cerebrovascular accident (CVA), unspecified mechanism (HCC), Compression fracture of lumbar vertebra, unspecified lumbar vertebral level, initial encounter ([facility]), NSTEMI (non-ST elevated myocardial infarction) ([facility]), and Acute kidney injury ([facility]) were also pertinent to this visit.    Assessment  Assessment: Patient continues to benefit from skilled physical therapy for increase strength, endurance and mobility. She showed good tolerance to today's session however was fagiued at end of session. Plan to continue to progress program as able per POC for continued progress towards all goals.  Activity Tolerance: Patient limited by pain;Patient limited by endurance    Plan  Physical Therapy Plan  General Plan: 5-7 times per week  Current Treatment Recommendations: Strengthening;Balance training;Functional mobility training;Transfer training;Stair training;Gait training;Home exercise program;Safety education & training;Return to work related activity;Patient/Caregiver education & training;Therapeutic activities    Restrictions  Restrictions/Precautions  Restrictions/Precautions: General Precautions, Up as Tolerated, Bed Alarm, Fall Risk  Required Braces or Orthoses?: No  Position Activity Restriction  Other position/activity restrictions: L UE IV; Telemetry; ALARMS; Fragile Skin, up as maximo     Subjective   Subjective  Subjective: Patient 
RN received patient as a transfer from [place] [id number] to room 2018.   Oriented to room and call light/tv controls.  Bed in lowest position, wheels locked, 2/4 side rails up  Call light in reach, room free of clutter, adequate lighting provided.    
Reason for Follow up: ESSENCE    HISTORY:      Patient reports no new symptoms.  Denies any shortness of breath.            Scheduled Meds:   aspirin  81 mg Oral Daily    cefTRIAXone (ROCEPHIN) IV  1,000 mg IntraVENous Q24H    dilTIAZem  120 mg Oral Daily    sodium chloride flush  10 mL IntraVENous 2 times per day    heparin (porcine)  5,000 Units SubCUTAneous BID    famotidine  20 mg Oral Daily   Continuous Infusions:   sodium chloride       PRN Meds:sodium chloride flush, sodium chloride flush, sodium chloride, ondansetron **OR** ondansetron, acetaminophen **OR** acetaminophen    Allergies   Allergen Reactions    Shellfish-Derived Products Swelling    Tudorza Pressair [Aclidinium Bromide]      LISTED AS ALLERGY, UNKNOWN REACTION  LISTED AS ALLERGY, UNKNOWN REACTION       Physical Exam:  Blood pressure 120/68, pulse 79, temperature 97.5 °F (36.4 °C), temperature source Axillary, resp. rate 15, height 1.575 m (5' 2\"), weight 46.8 kg (103 lb 3.2 oz), SpO2 92%.  In: 480 [P.O.:480]  Out: 990   In: 480   Out: 990 [Urine:990]    General:  Awake, alert, not in distress.  Hard of hearing  HEENT: Atraumatic, normocephalic. Anicteric sclera. Pink and moist oral mucosa.  No JVD.  Chest: Bilateral air entry, clear to auscultation, no wheezing, rhonchi or rales.  Cardiovascular: RRR, S1S2, no murmur, rub or gallop. No lower extremity edema.    Abdomen: Soft, non tender to palpation.   Integumentary:  Skin turgor normal.  CNS: Oriented to person, place and time. Speech clear. Face symmetrical. No tremor.     Data:  CBC:   Lab Results   Component Value Date    WBC 6.1 10/13/2024    HGB 12.2 10/13/2024    HCT 39.0 10/13/2024    .6 (H) 10/13/2024     10/13/2024     BMP:    Lab Results   Component Value Date     10/13/2024    K 4.1 10/13/2024     (H) 10/13/2024    CO2 22 10/13/2024    BUN 35 (H) 10/13/2024    CREATININE 1.1 (H) 10/13/2024    GLUCOSE 92 10/13/2024      Latest Reference Range & Units 10/11/24 
Reason for Follow up: ESSENCE    HISTORY:      Patient reports no new symptoms.  Denies any shortness of breath.            Scheduled Meds:   aspirin  81 mg Oral Daily    dilTIAZem  120 mg Oral Daily    sodium chloride flush  10 mL IntraVENous 2 times per day    heparin (porcine)  5,000 Units SubCUTAneous BID    famotidine  20 mg Oral Daily   Continuous Infusions:   sodium chloride      sodium chloride 50 mL/hr at 10/11/24 0013     PRN Meds:sodium chloride flush, sodium chloride flush, sodium chloride, ondansetron **OR** ondansetron, acetaminophen **OR** acetaminophen    Allergies   Allergen Reactions    Shellfish-Derived Products Swelling    Tudorza Pressair [Aclidinium Bromide]      LISTED AS ALLERGY, UNKNOWN REACTION  LISTED AS ALLERGY, UNKNOWN REACTION       Physical Exam:  Blood pressure 123/83, pulse 78, temperature 97.5 °F (36.4 °C), temperature source Oral, resp. rate 16, height 1.575 m (5' 2\"), weight 46.8 kg (103 lb 3.2 oz), SpO2 94%.  In: -   Out: 240   In: -   Out: 240 [Urine:240]    General:  Awake, alert, not in distress.  Hard of hearing  HEENT: Atraumatic, normocephalic. Anicteric sclera. Pink and moist oral mucosa.  No JVD.  Chest: Bilateral air entry, clear to auscultation, no wheezing, rhonchi or rales.  Cardiovascular: RRR, S1S2, no murmur, rub or gallop. No lower extremity edema.    Abdomen: Soft, non tender to palpation.   Integumentary:  Skin turgor normal.  CNS: Oriented to person, place and time. Speech clear. Face symmetrical. No tremor.     Data:  CBC:   Lab Results   Component Value Date    WBC 7.7 10/12/2024    HGB 12.7 10/12/2024    HCT 40.3 10/12/2024    .4 (H) 10/12/2024     (L) 10/12/2024     BMP:    Lab Results   Component Value Date     10/12/2024    K 4.5 10/12/2024     (H) 10/12/2024    CO2 22 10/12/2024    BUN 39 (H) 10/12/2024    CREATININE 1.1 (H) 10/12/2024    GLUCOSE 84 10/12/2024      Latest Reference Range & Units 10/11/24 18:10   Color, UA Yellow  
Report called to Yumi cabrera at [facility]  
SPIRITUAL CARE DEPARTMENT [facility]     Stroke/STEMI/Arrest Alert Note    PATIENT NAME: Edyta Gruber    Room # STA22/22   Name: Edyta Gruber            Age: 89 y.o.  Gender: female          Anabaptism: Congregation Congregation   Place of Congregational: Unknown    Alert type: Stroke Alert    Admit Date & Time: 10/10/2024  7:21 PM    ADVANCE DIRECTIVES IN CHART?  Yes    NAME OF DECISION MAKER: Ritchie Farmerak    RELATIONSHIP OF DECISION MAKER TO PATIENT: Son    PATIENT/EVENT DESCRIPTION:  Edyta Gruber is a 89 y.o. female who arrived via (car) from (home) as a (possible stroke victim). (Son described a fall in a bathroom). Pt to [facility].         SPIRITUAL ASSESSMENT/INTERVENTION:  Writer spoke with Son, about fall in bathroom, of 89 year-old woman (mother)     PATIENT BELONGINGS:  No belongings noted    ANY BELONGINGS OF SIGNIFICANT VALUE NOTED:  No    REGISTRATION STAFF NOTIFIED?  Yes      WHAT IS YOUR SPIRITUAL CARE PLAN FOR THIS PATIENT?:   Follow-up if necessary with mother    Electronically signed by Chaplain Jumana, on 10/10/2024 at 7:59 PM.  Spiritual Care Department  [facility]  [phone]        10/10/24 1956   Encounter Summary   Encounter Overview/Reason Initial Encounter;Crisis;Spiritual/Emotional Needs   Service Provided For Family   Referral/Consult From Nurse   Support System Children   Last Encounter  10/10/24   Complexity of Encounter Low   Begin Time 1945   End Time  1955   Total Time Calculated 10 min   Spiritual/Emotional needs   Type Spiritual Support   Assessment/Intervention/Outcome   Assessment Calm;Coping;Hopeful;Peaceful   Intervention Active listening;Prayer (assurance of)/Uriah;Sustaining Presence/Ministry of presence   Outcome Engaged in conversation;Connection/Belonging;Acceptance;Less anxious, Less agitated   Plan and Referrals   Plan/Referrals Continue Support (comment)       
Speech Language Pathology  [facility] PROGRESSIVE CARE  Dysphagia Treatment Note    Diet Recommendations: Regular/thins, upright at 90 degrees for all meals  *Consider GI consult and/or barium esophagram if there are further concerns regarding dysphagia. O/P swallow is functional, however, there was a large amount of barium contrast on the MBS cine runs, concerning for potential esophageal pathology. Pt also belches frequently after the swallow and c/o regurgitation of food/liquids which is more consistent with esophageal dysphagia.     SLP will sign off at this time. RN has been educated on the above and is in agreement with the plan.     Date: 10/14/2024  Patient’s Name: Edyta Gruber  MRN: [id number]  Diagnosis: General weakness [R53.1]  NSTEMI (non-ST elevated myocardial infarction) ([facility]) [I21.4]  Acute kidney injury ([facility]) [N17.9]  Cerebrovascular accident (CVA), unspecified mechanism ([facility]) [I63.9]  Compression fracture of lumbar vertebra, unspecified lumbar vertebral level, initial encounter ([facility]) [S32.000A]    Pain: denies      Dysphagia Treatment  Treatment time:0833-0843    Current Diet: regular/thins    Subjective: [x] Alert [x] Cooperative     [] Confused     [] Agitated    [] Lethargic    Objective/Assessment:    Goal: The patient will tolerate recommended diet without observed clinical signs of aspiration   Outcome: Ongoing  Tx: Pt is tolerating current diet well per RN. SLP provided education to pt re safe swallow strategies. Discussed the importance of sitting upright for all meals and taking small bites/sips. Pt is receptive. Observed pt drinking thin liquids per straw without overt s/s of aspiration, although there was belching.     Assessment: [x] Progressing toward goals.    [] No change.     [] Other:    Plan:  [] Continue ST services at current frequency toward long/short term goals   [x] Discharge from ST:      Discharge recommendations: []  Further therapy recommended 
Swallow study and MRI complete, pt returned to room, no complaints voiced, telemetry in place, assisted with positioning for comfort, hearing aids and glasses put in place, call light at reach  
Transitions of Care Pharmacy Service   Medication Review    The patient's list of current home medications has been reviewed.     Source(s) of information: Surescripts, patient, son, med packages    Based on information provided by the above source(s), I have updated the patient's home med list as described below.     Please review the ACTION REQUESTED section of this note below for any discrepancies on current hospital orders.      I changed or updated the following medications on the patient's home medication list:  Removed Symbicort     Added Advair     Adjusted   Diltiazem    Other Notes          PROVIDER ACTION REQUESTED  Medications that need to be addressed by a physician/nurse practitioner:    Current inpatient order(s) Action requested by pharmacy   Diltiazem 120mg   Home dose has increased to 180mg daily. Please consider adjusting if appropriate.          Please feel free to call me with any questions about this encounter. Thank you.    Ele Hooker RPH   Transitions of Care Pharmacy Service  Phone:  [phone]  Fax: [phone]      Electronically signed by Ele Hooker RPH on 10/11/2024 at 3:49 PM     Prior to Admission medications    Medication Sig Start Date End Date Taking? Authorizing Provider   ADV -21 MCG/ACT inhaler Inhale 2 puffs into the lungs 2 times daily 9/12/24  Yes ProviderJaclyn MD   dilTIAZem (CARDIZEM CD) 180 MG extended release capsule Take 1 capsule by mouth daily 10/1/24  Yes Jaclyn Hall MD   bumetanide (BUMEX) 1 MG tablet Take 1 tablet by mouth daily 12/5/23  Yes Reynaldo Osorio MD   spironolactone (ALDACTONE) 25 MG tablet Take 1 tablet by mouth daily 12/5/23  Yes Reynaldo Osorio MD   latanoprost (XALATAN) 0.005 % ophthalmic solution Place 1 drop into both eyes nightly   Yes ProviderJaclyn MD   cetirizine (ZYRTEC) 10 MG tablet Take 1 tablet by mouth daily   Yes Jaclyn Hall MD   timolol (TIMOPTIC) 0.5 % ophthalmic solution 
65    Estimated Daily Nutrient Needs:  Energy Requirements Based On: Kcal/kg  Weight Used for Energy Requirements: Current  Energy (kcal/day): 6644-2523 kcal (30-32 kcal/kg)  Weight Used for Protein Requirements: Current  Protein (g/day): 61-66 gm of protein (1.3-1.4 gm/kg)  Method Used for Fluid Requirements: 1 ml/kcal  Fluid (ml/day): 0749-3615 mL    Nutrition Diagnosis:   Moderate malnutrition related to inadequate protein-energy intake as evidenced by intake 0-25%, mild loss of subcutaneous fat, mild muscle loss    Nutrition Interventions:   Food and/or Nutrient Delivery: Continue Current Diet, Start Oral Nutrition Supplement  Nutrition Education/Counseling: Education not indicated  Coordination of Nutrition Care: Continue to monitor while inpatient       Goals:  Previous Goal Met: Progressing toward Goal(s)  Goals: PO intake 50% or greater       Nutrition Monitoring and Evaluation:      Food/Nutrient Intake Outcomes: Food and Nutrient Intake, Supplement Intake  Physical Signs/Symptoms Outcomes: Biochemical Data, Fluid Status or Edema, Skin, Weight, GI Status    Discharge Planning:    Continue current diet, Continue Oral Nutrition Supplement     Radhika Davenport RD  Contact: [phone]    
and hypercapnia    Heart failure with preserved left ventricular function (HFpEF) (HCC)    Fall at home, initial encounter    Chronic anticoagulation    Acute on chronic congestive heart failure (HCC)    Mild protein-calorie malnutrition (HCC)    Pneumonia    Carotid atherosclerosis    Chronic skin ulcer (HCC)    Hyperlipidemia    Hypernatremia    Hypoxia    COVID-19    Goals of care, counseling/discussion    DNR (do not resuscitate) discussion    ACP (advance care planning)    Palliative care encounter    Bilateral pleural effusion    Permanent atrial fibrillation (HCC)    Bradycardia    General weakness    Moderate malnutrition (HCC)       Plan: Maintain indwelling Lacey antibiotic therapy for heavy pyuria and bacteriuria    Orlando Cano MD  7:23 AM 10/14/2024  
mg IntraVENous Q24H Reza, Mohsin Mohammad, MD   1,000 mg at 10/13/24 1428    dilTIAZem (CARDIZEM CD) extended release capsule 120 mg  120 mg Oral Daily Shea Mcnulty, APRN - CNP   120 mg at 10/14/24 0747    sodium chloride flush 0.9 % injection 10 mL  10 mL IntraVENous PRN Baudilio Hall MD   10 mL at 10/13/24 1429    sodium chloride flush 0.9 % injection 10 mL  10 mL IntraVENous 2 times per day Vance Elmore APRN - CNP   10 mL at 10/14/24 0747    sodium chloride flush 0.9 % injection 10 mL  10 mL IntraVENous PRN Joyce Elmorea, APRN - CNP   10 mL at 10/12/24 1436    0.9 % sodium chloride infusion   IntraVENous PRN Ramírez Atria APRN - CNP        heparin (porcine) injection 5,000 Units  5,000 Units SubCUTAneous BID Vance Elmore APRN - CNP   5,000 Units at 10/14/24 0747    ondansetron (ZOFRAN-ODT) disintegrating tablet 4 mg  4 mg Oral Q8H PRN Vance Elmore APRN - CNP        Or    ondansetron (ZOFRAN) injection 4 mg  4 mg IntraVENous Q6H PRN Ramírez Atria, APRN - CNP        acetaminophen (TYLENOL) tablet 650 mg  650 mg Oral Q6H PRN Ramírez, Atria, APRN - CNP   650 mg at 10/12/24 1301    Or    acetaminophen (TYLENOL) suppository 650 mg  650 mg Rectal Q6H PRN Vance Elmore, APRN - CNP        famotidine (PEPCID) tablet 20 mg  20 mg Oral Daily Vance Elmore APRN - CNP   20 mg at 10/14/24 0747       REVIEW OF SYSTEMS     Constitutional: No fever/chills  Cardiac: No chest pain,no dyspnea,no orthopnea.  Chest: No wheezing, cough  Abdomen: No pain, no constipation , no diarrhea  : No difficultly passing urine        SUBJECTIVE     No events overnight. No complaints. Doing well    Vitals:    10/14/24 1118   BP: 114/76   Pulse: 70   Resp: 18   Temp: 97.3 °F (36.3 °C)   SpO2: 92%     Wt Readings from Last 2 Encounters:   10/14/24 46.7 kg (103 lb)   07/19/24 49.1 kg (108 lb 3.2 oz)     In: 120 [P.O.:120]  Out: 970   In: 120   Out: 970 [Urine:970]     PHYSICAL EXAM      GENERAL APPEARANCE: Frail, in no acute distress  SKIN: warm and 
  Component Value Date    AST 29 10/10/2024    ALT 23 10/10/2024    BILITOT 0.6 10/10/2024    ALKPHOS 70 10/10/2024     BNP: No results found for: \"BNP\"  Lipids:   Lab Results   Component Value Date    CHOL 147 10/12/2024    HDL 50 10/12/2024     INR:   Lab Results   Component Value Date    INR 1.6 10/10/2024     PTH: No results found for: \"PTH\"  Phosphorus:    Lab Results   Component Value Date    PHOS 3.2 04/09/2020     Ionized Calcium: No components found for: \"IONCA\"  Magnesium:   Lab Results   Component Value Date    MG 2.2 09/16/2024     Albumin: No results found for: \"LABALBU\"  Last 3 CK, CKMB, Troponin: @LABRCNT(CKTOTAL:3,CKMB:3,TROPONINI:3)       URINE:)No results found for: \"NAUR\", \"PROTUR\"    Radiology:   Reviewed.    Assessment:  Acute kidney injury resolving  History of fall  Atrial fibrillation  L2 compression fracture  Right renal cysts  Urinary retention status post Lacey catheter placement  Pyuria and bacteriuria    Plan: Continue present management  Monitor urine output and renal function  Sliding scale electrolyte replacement  Will need outpatient follow-up with urology  Antibiotics to continue  Avoid hypotension, nephrotoxic drugs, Lovenox, Fleets enema and IV contrast exposure.  Follow up labs ordered for AM. Please call on call nephrologist if iv contrast is needed    Please do not hesitate to call with questions. We will follow with you.      Electronically signed by Bridger Ordoñez MD  on 10/15/2024 at 2:20 PM  
controlled stand to sit and overall safety with poor follow thru.   Neuromuscular Education  Neuromuscular education: Yes  NDT Treatment: Gait ;Sitting;Standing     Assessment  Assessment  Assessment: Pt tolerated session fair and is slowly progressing towards goals. Pt req'd Min-Mod A with bed mobility and Min A with stand step transfer using RW from EOB to recliner. Pt req'd SBA with grooming tasks, Mod A with UB care and DEP with LB care. Pt remains limited by global weakness, balance/safety deficits, decreased activity tolerance, cognitive deficits and fatigue. Pt would benefit from continued OT to increase indep with ADLs/functional mobility for d/c.  Activity Tolerance: Patient limited by fatigue;Patient limited by endurance  Discharge Recommendations: Patient would benefit from continued therapy after discharge  Safety Devices  Safety Devices in place: Yes  Type of devices: All fall risk precautions in place;Left in chair;Nurse notified;Call light within reach;Chair alarm in place;Gait belt;Patient at risk for falls    Patient Education  Education  Education Given To: Patient  Education Provided: Mobility Training;Fall Prevention Strategies;Transfer Training;Energy Conservation;Precautions;Safety;Equipment;ADL Function  Education Method: Verbal;Teach Back  Barriers to Learning: Cognition  Education Outcome: Verbalized understanding;Continued education needed    Plan  Occupational Therapy Plan  Times Per Week: 5x/week 1x/day as maximo  Current Treatment Recommendations: Strengthening;ROM;Balance training;Functional mobility training;Endurance training;Safety education & training;Patient/Caregiver education & training;Equipment evaluation, education, & procurement;Self-Care / ADL;Home management training;Cognitive/Perceptual training;Coordination training;Neuromuscular re-education;Pain management    Goals  Patient Goals   Patient goals : Pt states she wants to get stronger to be able to go home!  Short Term 
work on improving balance and posture. Pt req'd CGA-MIN-MOD A while attempting to reach out of CONNIE alternating UEs, weight shifting side to side and attempting to flex forward at waist due to posterior lean/LOB. Pt able to stand 1-2 min.     Assessment  Assessment  Assessment: Pt tolerated session fair and is slowly progressing towards goals. Pt req'd Min-Mod A with bed mobility and CGA-Min A with RW during functional transfers/mobility. Pt able to stand 1-2 min during standing activites with varied level of assist due to posterior lean/LOB, CGA-MIN-MOD A. Pt remains limited by chronic back pain, global weakness, balance/safety deficits, decreased activity tolerance, cognitive deficits and fatigue. Pt would benefit from continued OT to increase indep with ADLs/functional mobility for d/c.  Activity Tolerance: Patient limited by pain;Patient limited by endurance  Discharge Recommendations: Patient would benefit from continued therapy after discharge  Safety Devices  Safety Devices in place: Yes  Type of devices: All fall risk precautions in place;Left in chair;Nurse notified;Call light within reach;Chair alarm in place;Gait belt;Patient at risk for falls    Patient Education  Education  Education Given To: Patient  Education Provided: Mobility Training;Fall Prevention Strategies;Transfer Training;Energy Conservation;Precautions;Safety;Equipment  Education Method: Demonstration;Verbal;Teach Back;Printed Information/Hand-outs  Barriers to Learning: Cognition  Education Outcome: Verbalized understanding;Continued education needed  Skilled Clinical Factors: Left handouts in room for pt's caregivers to review.  Family Education  - Low Back Pain Handout  - Low Back Pain  - Managing Back Pain  - Rolling From Your Back to Your Side  - Back Pain: My Plan of Action  - What Is a Pressure Injury?  - Preventing Pressure Injuries  - Bed Positioning for Pressure Relief: Lying on Your Back  - Bed Positioning for Pressure Relief: 
<> sit EOB.  TD with line management  Transfers  Sit to Stand: 2 Person Assistance;Minimal Assistance  Stand to Sit: 2 Person Assistance;Minimal Assistance  Bed to Chair: 2 Person Assistance;Minimal assistance  Comments: STS from EOB with RW for immediate UE support; patient demonstrate posterior lean and kyphotic posture with initial standing balance; VC to increase CONNIE for increased stability with carryover.   Ambulation  WB Status: as tolerated  Ambulation  Surface: Level tile  Device: Rolling Walker  Assistance: 2 Person assistance;Minimal assistance  Distance: 10'  Comments: Ambulation within room using RW; Assist with AD management; TD with line management; slow claudia;        Circulation/Endurance Exercises: Ankle pumps x 10  Pressure Relief Exercises: Therapist placed waffle cushion on bedside chair to reduce risk of pressure sores.     Pt is at risk for skin breakdown.  Pt educated on pressure relief measures.  Pt reports being comfortable at end of treatment.        Functional Mobility Circuit Training: Edu provided on complications resulting from immobility and decreased participation such as increased risk for blood clots, pneumonia, constipation, muscle atrophy, and decreased independence. Pt verbalized good understanding.   Postural Correction Exercises: Upright posture                         AM-PAC - Mobility  AM-PAC Basic Mobility - Inpatient   How much help is needed turning from your back to your side while in a flat bed without using bedrails?: A Lot  How much help is needed moving from lying on your back to sitting on the side of a flat bed without using bedrails?: A Lot  How much help is needed moving to and from a bed to a chair?: A Lot  How much help is needed standing up from a chair using your arms?: A Lot  How much help is needed walking in hospital room?: A Lot  How much help is needed climbing 3-5 steps with a railing?: A Lot  AM-PAC Inpatient Mobility Raw Score : 12  AM-PAC Inpatient 
generate solutions;Assistance required to identify errors made;Assistance required to implement solutions;Decreased awareness of errors  Insights: Decreased awareness of deficits  Initiation: Requires cues for all  Sequencing: Requires cues for all  Orientation  Overall Orientation Status: Within Functional Limits  Orientation Level: Oriented X4  Perception  Overall Perceptual Status: WFL               Education Given To: Patient  Education Provided: Role of Therapy;Transfer Training;Mobility Training;Plan of Care;Energy Conservation;Fall Prevention Strategies  Education Provided Comments: pressure relief, safety in function, call light use, proper bed mob tech, benefits of being up OOB/recommendations for continued therapy, postural control and weight shifting, pursed lip breathing  Education Method: Verbal;Demonstration  Barriers to Learning: Cognition;Hearing;Vision  Education Outcome: Continued education needed                 Co-treatment with PT warranted secondary to decreased safety and independence requiring 2 skilled therapy professionals to address individual discipline's goals. OT addressing preparation for ADL transfer, sitting/standing balance for increased ADL performance, sitting/activity tolerance, functional reaching, environmental safety/scanning, fall prevention, functional mobility for ADL transfers, ability to sequence and follow directions, bed mobility tech, and functional UE strength.                                    AM-PAC - ADL   11         Goals  Short Term Goals  Time Frame for Short Term Goals: by discharge, pt to demo  Short Term Goal 1: bed mob tasks with use of rail as needed to min assist.  Short Term Goal 2: UB ADL to set up and LB ADL to min assist and use of AD/AE as needed.  Short Term Goal 3: ADL transfers and functional mob with AD to SBA level.  Short Term Goal 4: toileting tasks with use of BSC/grab bar and AD to min assist.   Short Term Goal 5: increase B shld AROM by 
involving the S2 segment.  Further characterization with MRI may be helpful. Multilevel spondylosis of the lumbar spine described above.     CT CERVICAL SPINE WO CONTRAST    Result Date: 10/10/2024  EXAMINATION: CT OF THE CERVICAL SPINE WITHOUT CONTRAST 10/10/2024 6:56 pm TECHNIQUE: CT of the cervical spine was performed without the administration of intravenous contrast. Multiplanar reformatted images are provided for review. Automated exposure control, iterative reconstruction, and/or weight based adjustment of the mA/kV was utilized to reduce the radiation dose to as low as reasonably achievable. COMPARISON: None. HISTORY: ORDERING SYSTEM PROVIDED HISTORY: trauma TECHNOLOGIST PROVIDED HISTORY: trauma Decision Support Exception - unselect if not a suspected or confirmed emergency medical condition->Emergency Medical Condition (MA) Reason for Exam: trauma FINDINGS: BONES/ALIGNMENT: There is no acute fracture or traumatic malalignment. DEGENERATIVE CHANGES: Severe multilevel degenerative changes are seen. SOFT TISSUES: There is no prevertebral soft tissue swelling.     No acute abnormality of the cervical spine.     XR PELVIS (1-2 VIEWS)    Result Date: 10/10/2024  EXAMINATION: ONE XRAY VIEW OF THE PELVIS 10/10/2024 8:17 pm COMPARISON: None. HISTORY: ORDERING SYSTEM PROVIDED HISTORY: Trauma TECHNOLOGIST PROVIDED HISTORY: Trauma Reason for Exam: Fall, pain FINDINGS: The pelvis appears unremarkable, with no lytic lesions, blastic lesions, or fractures. The hip joints, SI joints, and symphysis pubis appear normal. Sacrum appears normal.  There is narrowing of the right side of the L4-L5 disc.     No fractures noted.     XR CHEST PORTABLE    Result Date: 10/10/2024  EXAMINATION: ONE XRAY VIEW OF THE CHEST 10/10/2024 8:17 pm COMPARISON: 12/04/2023 HISTORY: ORDERING SYSTEM PROVIDED HISTORY: Trauma TECHNOLOGIST PROVIDED HISTORY: Trauma Reason for Exam: Fall, pain FINDINGS: There are residual bilateral mild-to-moderate 
normal..     1. Residual bilateral mild-to-moderate pleural effusions.  Some apparent right basal atelectasis. 2. Mild cardiomegaly.     CT HEAD WO CONTRAST    Result Date: 10/10/2024  EXAMINATION: CT OF THE HEAD WITHOUT CONTRAST  10/10/2024 7:57 pm TECHNIQUE: CT of the head was performed without the administration of intravenous contrast. Automated exposure control, iterative reconstruction, and/or weight based adjustment of the mA/kV was utilized to reduce the radiation dose to as low as reasonably achievable. COMPARISON: CT head January 4, 2020 HISTORY: ORDERING SYSTEM PROVIDED HISTORY: Stroke Symptoms TECHNOLOGIST PROVIDED HISTORY: Stroke Symptoms Decision Support Exception - unselect if not a suspected or confirmed emergency medical condition->Emergency Medical Condition (MA) Reason for Exam: Stroke Symptoms FINDINGS: BRAIN/VENTRICLES: There are old lacunar infarcts in the bilateral basal ganglia.  There is mild parenchymal volume loss.  There is periventricular white matter low attenuation, likely related to mild chronic microvascular disease.  There is no acute intracranial hemorrhage, mass effect or midline shift.  No abnormal extra-axial fluid collection.  The gray-white differentiation is maintained without evidence of an acute infarct.  There is no hydrocephalus. ORBITS: The visualized portion of the orbits demonstrate no acute abnormality. SINUSES: The visualized paranasal sinuses and mastoid air cells demonstrate no acute abnormality. SOFT TISSUES/SKULL:  No acute abnormality of the visualized skull or soft tissues.     No acute intracranial abnormality. Old lacunar infarcts in the bilateral basal ganglia. Mild parenchymal volume loss. Mild chronic microvascular disease.         EKG:     Electronically signed by Mohsin Mohammad Reza, MD on 10/13/2024 at 11:30 AM

## 2024-10-15 NOTE — PLAN OF CARE
Problem: Skin/Tissue Integrity - Adult  Goal: Skin integrity remains intact  10/14/2024 2328 by Lorraine Espinoza, RN  Outcome: Progressing  Flowsheets (Taken 10/14/2024 2328)  Skin Integrity Remains Intact:   Monitor for areas of redness and/or skin breakdown   Assess vascular access sites hourly  Note: Checked for incontinence every 2 hours and prn.  Pericare as needed.  Assisted to reposition off back frequently.  On waffle mattress.  Heels off bed with pillows.

## 2024-10-15 NOTE — CARE COORDINATION
Social work:  spoke to son and advised pt going at 5:30 pm today to [facility] [place] today. Spoke to Hortensia mendez for snf and to josé Ibrahim to transport. Faxed eber and larry done.   Report [phone]  Fax [phone]  Radha marie

## 2024-10-16 PROBLEM — I16.1 HYPERTENSIVE EMERGENCY: Status: ACTIVE | Noted: 2024-10-16

## 2024-10-16 PROBLEM — R29.90 STROKE-LIKE SYMPTOMS: Status: ACTIVE | Noted: 2024-10-16

## 2024-10-16 PROBLEM — R53.1 ACUTE LEFT-SIDED WEAKNESS: Status: ACTIVE | Noted: 2024-10-16

## 2024-10-16 PROBLEM — I21.4 NSTEMI (NON-ST ELEVATED MYOCARDIAL INFARCTION) (HCC): Status: ACTIVE | Noted: 2024-10-16

## 2024-10-16 LAB
MICROORGANISM SPEC CULT: ABNORMAL
SPECIMEN DESCRIPTION: ABNORMAL

## 2024-10-17 ENCOUNTER — HOSPITAL ENCOUNTER (OUTPATIENT)
Age: 89
Setting detail: SPECIMEN
Discharge: HOME | End: 2024-10-17

## 2024-10-17 LAB
25(OH)D3 SERPL-MCNC: 14.1 NG/ML (ref 30–100)
CHOLEST SERPL-MCNC: 165 MG/DL (ref 0–199)
CHOLESTEROL/HDL RATIO: 4
ERYTHROCYTE [DISTWIDTH] IN BLOOD BY AUTOMATED COUNT: 15.3 % (ref 11.8–14.4)
EST. AVERAGE GLUCOSE BLD GHB EST-MCNC: 128 MG/DL
HBA1C MFR BLD: 6.1 % (ref 4–6)
HCT VFR BLD AUTO: 38.3 % (ref 36.3–47.1)
HDLC SERPL-MCNC: 44 MG/DL
HGB BLD-MCNC: 12.1 G/DL (ref 11.9–15.1)
LDLC SERPL CALC-MCNC: 100 MG/DL (ref 0–100)
MAGNESIUM SERPL-MCNC: 1.9 MG/DL (ref 1.7–2.3)
MCH RBC QN AUTO: 32.4 PG (ref 25.2–33.5)
MCHC RBC AUTO-ENTMCNC: 31.6 G/DL (ref 28.4–34.8)
MCV RBC AUTO: 102.7 FL (ref 82.6–102.9)
NRBC BLD-RTO: 0 PER 100 WBC
PLATELET # BLD AUTO: 148 K/UL (ref 138–453)
PMV BLD AUTO: 10.8 FL (ref 8.1–13.5)
RBC # BLD AUTO: 3.73 M/UL (ref 3.95–5.11)
TRIGL SERPL-MCNC: 103 MG/DL
VLDLC SERPL CALC-MCNC: 21 MG/DL
WBC OTHER # BLD: 7.5 K/UL (ref 3.5–11.3)

## 2024-10-17 PROCEDURE — 85027 COMPLETE CBC AUTOMATED: CPT

## 2024-10-17 PROCEDURE — 82306 VITAMIN D 25 HYDROXY: CPT

## 2024-10-17 PROCEDURE — 83735 ASSAY OF MAGNESIUM: CPT

## 2024-10-17 PROCEDURE — 36415 COLL VENOUS BLD VENIPUNCTURE: CPT

## 2024-10-17 PROCEDURE — P9603 ONE-WAY ALLOW PRORATED MILES: HCPCS

## 2024-10-17 PROCEDURE — 80061 LIPID PANEL: CPT

## 2024-10-17 PROCEDURE — 83036 HEMOGLOBIN GLYCOSYLATED A1C: CPT

## 2024-11-18 ENCOUNTER — APPOINTMENT (OUTPATIENT)
Dept: CT IMAGING | Age: 89
End: 2024-11-18
Payer: MEDICARE

## 2024-11-18 ENCOUNTER — APPOINTMENT (OUTPATIENT)
Dept: GENERAL RADIOLOGY | Age: 89
End: 2024-11-18
Payer: MEDICARE

## 2024-11-18 ENCOUNTER — HOSPITAL ENCOUNTER (INPATIENT)
Age: 89
LOS: 5 days | Discharge: NURSING, REHAB OR LONG-TERM CARE | End: 2024-11-23
Attending: EMERGENCY MEDICINE | Admitting: FAMILY MEDICINE
Payer: MEDICARE

## 2024-11-18 DIAGNOSIS — R09.02 HYPOXEMIA: Primary | ICD-10-CM

## 2024-11-18 DIAGNOSIS — J90 BILATERAL PLEURAL EFFUSION: ICD-10-CM

## 2024-11-18 PROBLEM — I50.9 ACUTE EXACERBATION OF CHRONIC HEART FAILURE (HCC): Status: ACTIVE | Noted: 2024-11-18

## 2024-11-18 LAB
ANION GAP SERPL CALCULATED.3IONS-SCNC: 11 MMOL/L (ref 9–16)
BASOPHILS # BLD: 0 K/UL (ref 0–0.2)
BASOPHILS NFR BLD: 0 %
BNP SERPL-MCNC: 2328 PG/ML (ref 0–450)
BUN SERPL-MCNC: 32 MG/DL (ref 8–23)
CALCIUM SERPL-MCNC: 9.9 MG/DL (ref 8.2–9.6)
CHLORIDE SERPL-SCNC: 104 MMOL/L (ref 98–107)
CO2 SERPL-SCNC: 30 MMOL/L (ref 20–31)
CREAT SERPL-MCNC: 1.1 MG/DL (ref 0.5–0.9)
EOSINOPHIL # BLD: 0.06 K/UL (ref 0–0.4)
EOSINOPHILS RELATIVE PERCENT: 1 % (ref 1–4)
ERYTHROCYTE [DISTWIDTH] IN BLOOD BY AUTOMATED COUNT: 15.1 % (ref 11.8–14.4)
FLUAV RNA RESP QL NAA+PROBE: NOT DETECTED
FLUBV RNA RESP QL NAA+PROBE: NOT DETECTED
GFR, ESTIMATED: 43 ML/MIN/1.73M2
GLUCOSE SERPL-MCNC: 98 MG/DL (ref 75–121)
HCT VFR BLD AUTO: 42.1 % (ref 36.3–47.1)
HGB BLD-MCNC: 13.3 G/DL (ref 11.9–15.1)
IMM GRANULOCYTES # BLD AUTO: 0 K/UL (ref 0–0.3)
IMM GRANULOCYTES NFR BLD: 0 %
LYMPHOCYTES NFR BLD: 0.56 K/UL (ref 1–4.8)
LYMPHOCYTES RELATIVE PERCENT: 10 % (ref 24–44)
MCH RBC QN AUTO: 33.2 PG (ref 25.2–33.5)
MCHC RBC AUTO-ENTMCNC: 31.6 G/DL (ref 28.4–34.8)
MCV RBC AUTO: 105 FL (ref 82.6–102.9)
MONOCYTES NFR BLD: 0.17 K/UL (ref 0.2–0.8)
MONOCYTES NFR BLD: 3 % (ref 1–7)
MORPHOLOGY: ABNORMAL
MORPHOLOGY: ABNORMAL
NEUTROPHILS NFR BLD: 86 % (ref 36–66)
NEUTS SEG NFR BLD: 4.81 K/UL (ref 1.8–7.7)
NRBC BLD-RTO: 0 PER 100 WBC
PLATELET # BLD AUTO: 118 K/UL (ref 138–453)
PMV BLD AUTO: 11.6 FL (ref 8.1–13.5)
POTASSIUM SERPL-SCNC: 4.1 MMOL/L (ref 3.7–5.3)
RBC # BLD AUTO: 4.01 M/UL (ref 3.95–5.11)
SARS-COV-2 RNA RESP QL NAA+PROBE: NOT DETECTED
SODIUM SERPL-SCNC: 145 MMOL/L (ref 136–145)
SOURCE: NORMAL
SPECIMEN DESCRIPTION: NORMAL
TROPONIN I SERPL HS-MCNC: 39 NG/L (ref 0–14)
WBC OTHER # BLD: 5.6 K/UL (ref 3.5–11.3)

## 2024-11-18 PROCEDURE — 6360000002 HC RX W HCPCS: Performed by: NURSE PRACTITIONER

## 2024-11-18 PROCEDURE — 71046 X-RAY EXAM CHEST 2 VIEWS: CPT

## 2024-11-18 PROCEDURE — 87636 SARSCOV2 & INF A&B AMP PRB: CPT

## 2024-11-18 PROCEDURE — 85025 COMPLETE CBC W/AUTO DIFF WBC: CPT

## 2024-11-18 PROCEDURE — 71260 CT THORAX DX C+: CPT

## 2024-11-18 PROCEDURE — 84484 ASSAY OF TROPONIN QUANT: CPT

## 2024-11-18 PROCEDURE — 83880 ASSAY OF NATRIURETIC PEPTIDE: CPT

## 2024-11-18 PROCEDURE — 80048 BASIC METABOLIC PNL TOTAL CA: CPT

## 2024-11-18 PROCEDURE — 2060000000 HC ICU INTERMEDIATE R&B

## 2024-11-18 PROCEDURE — 99285 EMERGENCY DEPT VISIT HI MDM: CPT

## 2024-11-18 PROCEDURE — 6360000004 HC RX CONTRAST MEDICATION: Performed by: FAMILY MEDICINE

## 2024-11-18 PROCEDURE — 93005 ELECTROCARDIOGRAM TRACING: CPT | Performed by: EMERGENCY MEDICINE

## 2024-11-18 PROCEDURE — 2580000003 HC RX 258: Performed by: FAMILY MEDICINE

## 2024-11-18 PROCEDURE — 94761 N-INVAS EAR/PLS OXIMETRY MLT: CPT

## 2024-11-18 PROCEDURE — 2700000000 HC OXYGEN THERAPY PER DAY

## 2024-11-18 RX ORDER — ONDANSETRON 4 MG/1
4 TABLET, ORALLY DISINTEGRATING ORAL EVERY 8 HOURS PRN
Status: DISCONTINUED | OUTPATIENT
Start: 2024-11-18 | End: 2024-11-23 | Stop reason: HOSPADM

## 2024-11-18 RX ORDER — POLYETHYLENE GLYCOL 3350 17 G/17G
17 POWDER, FOR SOLUTION ORAL DAILY PRN
Status: DISCONTINUED | OUTPATIENT
Start: 2024-11-18 | End: 2024-11-23 | Stop reason: HOSPADM

## 2024-11-18 RX ORDER — SODIUM CHLORIDE 9 MG/ML
INJECTION, SOLUTION INTRAVENOUS PRN
Status: DISCONTINUED | OUTPATIENT
Start: 2024-11-18 | End: 2024-11-23 | Stop reason: HOSPADM

## 2024-11-18 RX ORDER — ACETAMINOPHEN 325 MG/1
650 TABLET ORAL EVERY 6 HOURS PRN
Status: DISCONTINUED | OUTPATIENT
Start: 2024-11-18 | End: 2024-11-23 | Stop reason: HOSPADM

## 2024-11-18 RX ORDER — 0.9 % SODIUM CHLORIDE 0.9 %
80 INTRAVENOUS SOLUTION INTRAVENOUS ONCE
Status: COMPLETED | OUTPATIENT
Start: 2024-11-18 | End: 2024-11-18

## 2024-11-18 RX ORDER — FUROSEMIDE 10 MG/ML
20 INJECTION INTRAMUSCULAR; INTRAVENOUS DAILY
Status: DISCONTINUED | OUTPATIENT
Start: 2024-11-18 | End: 2024-11-23 | Stop reason: HOSPADM

## 2024-11-18 RX ORDER — POTASSIUM CHLORIDE 7.45 MG/ML
10 INJECTION INTRAVENOUS PRN
Status: DISCONTINUED | OUTPATIENT
Start: 2024-11-18 | End: 2024-11-18

## 2024-11-18 RX ORDER — ACETAMINOPHEN 650 MG/1
650 SUPPOSITORY RECTAL EVERY 6 HOURS PRN
Status: DISCONTINUED | OUTPATIENT
Start: 2024-11-18 | End: 2024-11-23 | Stop reason: HOSPADM

## 2024-11-18 RX ORDER — MAGNESIUM SULFATE 1 G/100ML
1000 INJECTION INTRAVENOUS PRN
Status: DISCONTINUED | OUTPATIENT
Start: 2024-11-18 | End: 2024-11-18

## 2024-11-18 RX ORDER — SODIUM CHLORIDE 0.9 % (FLUSH) 0.9 %
10 SYRINGE (ML) INJECTION PRN
Status: DISCONTINUED | OUTPATIENT
Start: 2024-11-18 | End: 2024-11-23 | Stop reason: HOSPADM

## 2024-11-18 RX ORDER — SODIUM CHLORIDE 0.9 % (FLUSH) 0.9 %
5-40 SYRINGE (ML) INJECTION EVERY 12 HOURS SCHEDULED
Status: DISCONTINUED | OUTPATIENT
Start: 2024-11-18 | End: 2024-11-23 | Stop reason: HOSPADM

## 2024-11-18 RX ORDER — IOPAMIDOL 755 MG/ML
75 INJECTION, SOLUTION INTRAVASCULAR
Status: COMPLETED | OUTPATIENT
Start: 2024-11-18 | End: 2024-11-18

## 2024-11-18 RX ORDER — HEPARIN SODIUM 5000 [USP'U]/ML
5000 INJECTION, SOLUTION INTRAVENOUS; SUBCUTANEOUS 2 TIMES DAILY
Status: DISCONTINUED | OUTPATIENT
Start: 2024-11-18 | End: 2024-11-23 | Stop reason: HOSPADM

## 2024-11-18 RX ORDER — ONDANSETRON 2 MG/ML
4 INJECTION INTRAMUSCULAR; INTRAVENOUS EVERY 6 HOURS PRN
Status: DISCONTINUED | OUTPATIENT
Start: 2024-11-18 | End: 2024-11-23 | Stop reason: HOSPADM

## 2024-11-18 RX ORDER — POTASSIUM CHLORIDE 1500 MG/1
40 TABLET, EXTENDED RELEASE ORAL PRN
Status: DISCONTINUED | OUTPATIENT
Start: 2024-11-18 | End: 2024-11-18

## 2024-11-18 RX ORDER — ENOXAPARIN SODIUM 100 MG/ML
30 INJECTION SUBCUTANEOUS DAILY
Status: DISCONTINUED | OUTPATIENT
Start: 2024-11-18 | End: 2024-11-18

## 2024-11-18 RX ADMIN — FUROSEMIDE 20 MG: 10 INJECTION, SOLUTION INTRAMUSCULAR; INTRAVENOUS at 23:52

## 2024-11-18 RX ADMIN — SODIUM CHLORIDE, PRESERVATIVE FREE 10 ML: 5 INJECTION INTRAVENOUS at 22:48

## 2024-11-18 RX ADMIN — IOPAMIDOL 75 ML: 755 INJECTION, SOLUTION INTRAVENOUS at 22:48

## 2024-11-18 RX ADMIN — SODIUM CHLORIDE 80 ML: 9 INJECTION, SOLUTION INTRAVENOUS at 22:48

## 2024-11-18 ASSESSMENT — PAIN - FUNCTIONAL ASSESSMENT: PAIN_FUNCTIONAL_ASSESSMENT: NONE - DENIES PAIN

## 2024-11-19 LAB
ANION GAP SERPL CALCULATED.3IONS-SCNC: 9 MMOL/L (ref 9–16)
BASOPHILS # BLD: 0.04 K/UL (ref 0–0.2)
BASOPHILS NFR BLD: 1 %
BUN SERPL-MCNC: 29 MG/DL (ref 8–23)
CALCIUM SERPL-MCNC: 9.4 MG/DL (ref 8.2–9.6)
CHLORIDE SERPL-SCNC: 105 MMOL/L (ref 98–107)
CO2 SERPL-SCNC: 32 MMOL/L (ref 20–31)
CREAT SERPL-MCNC: 1 MG/DL (ref 0.5–0.9)
EOSINOPHIL # BLD: 0.11 K/UL (ref 0–0.4)
EOSINOPHILS RELATIVE PERCENT: 3 % (ref 1–4)
ERYTHROCYTE [DISTWIDTH] IN BLOOD BY AUTOMATED COUNT: 15.1 % (ref 11.8–14.4)
GFR, ESTIMATED: 49 ML/MIN/1.73M2
GLUCOSE SERPL-MCNC: 76 MG/DL (ref 75–121)
HCT VFR BLD AUTO: 39.4 % (ref 36.3–47.1)
HGB BLD-MCNC: 12.2 G/DL (ref 11.9–15.1)
IMM GRANULOCYTES # BLD AUTO: 0.04 K/UL (ref 0–0.3)
IMM GRANULOCYTES NFR BLD: 1 %
INR PPP: 1.1
LYMPHOCYTES NFR BLD: 0.65 K/UL (ref 1–4.8)
LYMPHOCYTES RELATIVE PERCENT: 17 % (ref 24–44)
MCH RBC QN AUTO: 32.9 PG (ref 25.2–33.5)
MCHC RBC AUTO-ENTMCNC: 31 G/DL (ref 28.4–34.8)
MCV RBC AUTO: 106.2 FL (ref 82.6–102.9)
MONOCYTES NFR BLD: 0.42 K/UL (ref 0.2–0.8)
MONOCYTES NFR BLD: 11 % (ref 1–7)
NEUTROPHILS NFR BLD: 67 % (ref 36–66)
NEUTS SEG NFR BLD: 2.54 K/UL (ref 1.8–7.7)
NRBC BLD-RTO: 0 PER 100 WBC
PLATELET # BLD AUTO: 104 K/UL (ref 138–453)
PMV BLD AUTO: 11.5 FL (ref 8.1–13.5)
POTASSIUM SERPL-SCNC: 4.2 MMOL/L (ref 3.7–5.3)
PROTHROMBIN TIME: 14.1 SEC (ref 11.5–14.2)
RBC # BLD AUTO: 3.71 M/UL (ref 3.95–5.11)
SODIUM SERPL-SCNC: 146 MMOL/L (ref 136–145)
WBC OTHER # BLD: 3.8 K/UL (ref 3.5–11.3)

## 2024-11-19 PROCEDURE — 97535 SELF CARE MNGMENT TRAINING: CPT

## 2024-11-19 PROCEDURE — 6370000000 HC RX 637 (ALT 250 FOR IP): Performed by: FAMILY MEDICINE

## 2024-11-19 PROCEDURE — 97163 PT EVAL HIGH COMPLEX 45 MIN: CPT

## 2024-11-19 PROCEDURE — 2060000000 HC ICU INTERMEDIATE R&B

## 2024-11-19 PROCEDURE — 97116 GAIT TRAINING THERAPY: CPT

## 2024-11-19 PROCEDURE — 94640 AIRWAY INHALATION TREATMENT: CPT

## 2024-11-19 PROCEDURE — 85610 PROTHROMBIN TIME: CPT

## 2024-11-19 PROCEDURE — 97167 OT EVAL HIGH COMPLEX 60 MIN: CPT

## 2024-11-19 PROCEDURE — 97530 THERAPEUTIC ACTIVITIES: CPT

## 2024-11-19 PROCEDURE — 6360000002 HC RX W HCPCS: Performed by: NURSE PRACTITIONER

## 2024-11-19 PROCEDURE — 2580000003 HC RX 258: Performed by: NURSE PRACTITIONER

## 2024-11-19 PROCEDURE — 2700000000 HC OXYGEN THERAPY PER DAY

## 2024-11-19 PROCEDURE — 80048 BASIC METABOLIC PNL TOTAL CA: CPT

## 2024-11-19 PROCEDURE — 36415 COLL VENOUS BLD VENIPUNCTURE: CPT

## 2024-11-19 PROCEDURE — 85025 COMPLETE CBC W/AUTO DIFF WBC: CPT

## 2024-11-19 PROCEDURE — 97110 THERAPEUTIC EXERCISES: CPT

## 2024-11-19 RX ORDER — TIMOLOL MALEATE 5 MG/ML
1 SOLUTION/ DROPS OPHTHALMIC 2 TIMES DAILY
Status: DISCONTINUED | OUTPATIENT
Start: 2024-11-19 | End: 2024-11-23 | Stop reason: HOSPADM

## 2024-11-19 RX ORDER — PRAVASTATIN SODIUM 40 MG
20 TABLET ORAL DAILY
Status: DISCONTINUED | OUTPATIENT
Start: 2024-11-19 | End: 2024-11-23 | Stop reason: HOSPADM

## 2024-11-19 RX ORDER — ASPIRIN 81 MG/1
81 TABLET, CHEWABLE ORAL DAILY
Status: DISCONTINUED | OUTPATIENT
Start: 2024-11-19 | End: 2024-11-23 | Stop reason: HOSPADM

## 2024-11-19 RX ORDER — ERGOCALCIFEROL 1.25 MG/1
50000 CAPSULE, LIQUID FILLED ORAL WEEKLY
Status: ON HOLD | COMMUNITY

## 2024-11-19 RX ORDER — METOPROLOL TARTRATE 25 MG/1
25 TABLET, FILM COATED ORAL 2 TIMES DAILY
Status: ON HOLD | COMMUNITY
End: 2024-11-22 | Stop reason: HOSPADM

## 2024-11-19 RX ORDER — TAMSULOSIN HYDROCHLORIDE 0.4 MG/1
0.4 CAPSULE ORAL DAILY
Status: DISCONTINUED | OUTPATIENT
Start: 2024-11-19 | End: 2024-11-23 | Stop reason: HOSPADM

## 2024-11-19 RX ORDER — FUROSEMIDE 20 MG/1
20 TABLET ORAL DAILY
Status: ON HOLD | COMMUNITY

## 2024-11-19 RX ORDER — BUDESONIDE AND FORMOTEROL FUMARATE DIHYDRATE 160; 4.5 UG/1; UG/1
2 AEROSOL RESPIRATORY (INHALATION)
Status: DISCONTINUED | OUTPATIENT
Start: 2024-11-19 | End: 2024-11-23 | Stop reason: HOSPADM

## 2024-11-19 RX ADMIN — FUROSEMIDE 20 MG: 10 INJECTION, SOLUTION INTRAMUSCULAR; INTRAVENOUS at 10:52

## 2024-11-19 RX ADMIN — HEPARIN SODIUM 5000 UNITS: 5000 INJECTION INTRAVENOUS; SUBCUTANEOUS at 10:52

## 2024-11-19 RX ADMIN — PRAVASTATIN SODIUM 20 MG: 40 TABLET ORAL at 20:44

## 2024-11-19 RX ADMIN — SODIUM CHLORIDE, PRESERVATIVE FREE 10 ML: 5 INJECTION INTRAVENOUS at 20:47

## 2024-11-19 RX ADMIN — BUDESONIDE AND FORMOTEROL FUMARATE DIHYDRATE 2 PUFF: 160; 4.5 AEROSOL RESPIRATORY (INHALATION) at 20:52

## 2024-11-19 RX ADMIN — TIMOLOL MALEATE 1 DROP: 5 SOLUTION OPHTHALMIC at 22:17

## 2024-11-19 RX ADMIN — TAMSULOSIN HYDROCHLORIDE 0.4 MG: 0.4 CAPSULE ORAL at 20:44

## 2024-11-19 RX ADMIN — HEPARIN SODIUM 5000 UNITS: 5000 INJECTION INTRAVENOUS; SUBCUTANEOUS at 20:44

## 2024-11-19 RX ADMIN — SODIUM CHLORIDE, PRESERVATIVE FREE 10 ML: 5 INJECTION INTRAVENOUS at 10:52

## 2024-11-19 RX ADMIN — ASPIRIN 81 MG 81 MG: 81 TABLET ORAL at 20:44

## 2024-11-19 NOTE — CONSULTS
Reason for Consult:  afib   Requesting Physician: Earnestine Ibanez MD    CHIEF COMPLAINT:  dyspnea     History Obtained From:  chart     HISTORY OF PRESENT ILLNESS:      89-year-old female with history of arthritis, asthma, atrial fibrillation, CHF, hypertension and hyperlipidemia who presents to the ED for shortness of breath.  Patient states symptoms started 2 days ago.  At first, shortness of breath was exertional, now she also feels short of breath at rest.       Past Medical History:    Past Medical History:   Diagnosis Date    Acute dermatitis     Arthritis     Asthma     Atrial fibrillation (HCC)     Bursitis     CHF (congestive heart failure) (HCC)     Hearing aid worn     Hyperlipidemia     Hypertension     Lumbar disc disease     Wears glasses     Wound of right leg      Past Surgical History:    Past Surgical History:   Procedure Laterality Date    BACK SURGERY      DEBRIDEMENT Left 03/09/2017    rt. leg    OTHER SURGICAL HISTORY  03/30/2017    Right leg Angio    OTHER SURGICAL HISTORY  03/30/2017    Right leg angio    AZ I&D DEEP ABSC BURSA/HEMATOMA THIGH/KNEE REGION Right 3/9/2017    DEBRIDEMENT CALF WOUND  performed by Arthur Delos Reyes, MD at [facility] CVOR    AZ I&D DEEP ABSC BURSA/HEMATOMA THIGH/KNEE REGION Right 4/28/2017    RIGHT LOWER EXTREMITY DEBRIDEMENT, INTEGRA APPLICATION, PREVENA VAC APPLICATION LOWR RIGHT LEG performed by Arthur Delos Reyes, MD at [facility] OR     Home Medications:  Prior to Admission medications    Medication Sig Start Date End Date Taking? Authorizing Provider   vitamin D (ERGOCALCIFEROL) 1.25 MG (91766 UT) CAPS capsule Take 1 capsule by mouth once a week Monday   Yes Jaclyn Hall MD   furosemide (LASIX) 20 MG tablet Take 1 tablet by mouth daily   Yes Jaclyn Hall MD   metoprolol tartrate (LOPRESSOR) 25 MG tablet Take 1 tablet by mouth 2 times daily   Yes Jaclyn Hall MD   budesonide-formoterol (SYMBICORT) 160-4.5 MCG/ACT AERO Inhale 2 puffs

## 2024-11-19 NOTE — PROGRESS NOTES
4 Eyes Skin Assessment     NAME:  Edyta Gruber  YOB: 1924  MEDICAL RECORD NUMBER:  [id number]    The patient is being assessed for  Admission    I agree that at least one RN has performed a thorough Head to Toe Skin Assessment on the patient. ALL assessment sites listed below have been assessed.      Areas assessed by both nurses:    Head, Face, Ears, Shoulders, Back, Chest, Arms, Elbows, Hands, Sacrum. Buttock, Coccyx, Ischium, and Legs. Feet and Heels        Does the Patient have a Wound? No noted wound(s)       Zoltan Prevention initiated by RN: Yes  Wound Care Orders initiated by RN: No    Pressure Injury (Stage 3,4, Unstageable, DTI, NWPT, and Complex wounds) if present, place Wound referral order by RN under : No    New Ostomies, if present place, Ostomy referral order under : No     Nurse 1 eSignature: Electronically signed by Yuridia Colon RN on 11/19/24 at 4:48 AM EST    **SHARE this note so that the co-signing nurse can place an eSignature**    Nurse 2 eSignature: Electronically signed by Edu Gimenez RN on 11/19/24 at 4:54 AM EST

## 2024-11-19 NOTE — ED NOTES
ED TO INPATIENT SBAR HANDOFF    Patient Name: Edyta Gruber   :  1924  89 y.o.   MRN:  [id number]  Preferred Name  Edyta  ED Room #:  [facility]17/17  Family/Caregiver Present no   Restraints no   Sitter no   Sepsis Risk Score      Situation  Code Status: Full Code No additional code details.    Allergies: Shellfish-derived products and Tudorza pressair [aclidinium bromide]  Weight: Patient Vitals for the past 96 hrs (Last 3 readings):   Weight   24 1850 47.6 kg (105 lb)     Arrived from: home  Chief Complaint:   Chief Complaint   Patient presents with    Shortness of Breath     Hospital Problem/Diagnosis:  Principal Problem:    Acute exacerbation of chronic heart failure (HCC)  Resolved Problems:    * No resolved hospital problems. *    Imaging:   XR CHEST (2 VW)   Final Result   Bilateral pleural effusions.           Abnormal labs:   Abnormal Labs Reviewed   BASIC METABOLIC PANEL - Abnormal; Notable for the following components:       Result Value    BUN 32 (*)     Creatinine 1.1 (*)     Est, Glom Filt Rate 43 (*)     Calcium 9.9 (*)     All other components within normal limits   CBC WITH AUTO DIFFERENTIAL - Abnormal; Notable for the following components:    .0 (*)     RDW 15.1 (*)     Platelets 118 (*)     Neutrophils % 86 (*)     Lymphocytes % 10 (*)     Lymphocytes Absolute 0.56 (*)     Monocytes Absolute 0.17 (*)     All other components within normal limits   TROPONIN - Abnormal; Notable for the following components:    Troponin, High Sensitivity 39 (*)     All other components within normal limits   BRAIN NATRIURETIC PEPTIDE - Abnormal; Notable for the following components:    NT Pro-BNP 2,328 (*)     All other components within normal limits     Critical values: yes - BNP    Abnormal Assessment Findings: oxygen saturation 86%, no home O2 use    Background  History:   Past Medical History:   Diagnosis Date    Acute dermatitis     Arthritis     Asthma     Atrial fibrillation (HCC)

## 2024-11-19 NOTE — PROGRESS NOTES
End Of Shift Note  [facility]  Summary of shift: new admit. Pt SOB for 2 days, very hard of hearing. Lasix given in ER. Purewick in place. Upon admission to floor brief changed, very wet.Denies pain Difficult to get temp, multiple attempts made orally and axillary.Pt states she runs low.    Vitals:    Vitals:    11/19/24 0022 11/19/24 0032 11/19/24 0412 11/19/24 0424   BP: (!) 134/94   107/83   Pulse: 89  68 83   Resp: 18   18   Temp: (!) 94.5 °F (34.7 °C)   (!) 94.1 °F (34.5 °C)   TempSrc: Axillary   Axillary   SpO2: 100% 100%  100%   Weight:    48.9 kg (107 lb 12.8 oz)   Height:            I&O: No intake or output data in the 24 hours ending 11/19/24 0508    Resp Status: O2 @ 2 L    Ventilator Settings:     / / /     Critical Care IV infusions:   sodium chloride          LDA:   Peripheral IV 11/18/24 Distal;Left Forearm (Active)   Number of days: 0       Wound 11/30/23 Sacrum (Active)   Number of days: 354

## 2024-11-19 NOTE — PROGRESS NOTES
ADMISSION NOTE         Patient admitted to room: 2037     Time of admit: 2335     Admit from: er      Reason for admission: CHF     Where patient has been residing for the last 24 hrs: home     Has the patient been admitted to any facility in the last 4 weeks, which one:  no      Family at bedside: No     Patient is currently: resting in bed comfortably, vitals obtained, telemetry placed on pt. No distress noted. Patient has been oriented to room, educated on how to use call light, and to call for assistance prior to getting up. Bed in lowest and locked position. 2 siderails up for safety. Call light within reach.

## 2024-11-19 NOTE — ACP (ADVANCE CARE PLANNING)
Advance Care Planning     Advance Care Planning Activator (Inpatient)  Conversation Note      Date of ACP Conversation: 11/19/2024     Conversation Conducted with: Patient with Decision Making Capacity and Healthcare Decision Maker    ACP Activator: VICKIE GREENBERG RN      Health Care Decision Maker:     Current Designated Health Care Decision Maker:     Primary Decision Maker: Ritchie Gruber - Jorge - [phone]      Care Preferences    Ventilation:  \"If you were in your present state of health and suddenly became very ill and were unable to breathe on your own, what would your preference be about the use of a ventilator (breathing machine) if it were available to you?\"      Would the patient desire the use of ventilator (breathing machine)?: no    \"If your health worsens and it becomes clear that your chance of recovery is unlikely, what would your preference be about the use of a ventilator (breathing machine) if it were available to you?\"     Would the patient desire the use of ventilator (breathing machine)?: No      Resuscitation  \"CPR works best to restart the heart when there is a sudden event, like a heart attack, in someone who is otherwise healthy. Unfortunately, CPR does not typically restart the heart for people who have serious health conditions or who are very sick.\"    \"In the event your heart stopped as a result of an underlying serious health condition, would you want attempts to be made to restart your heart (answer \"yes\" for attempt to resuscitate) or would you prefer a natural death (answer \"no\" for do not attempt to resuscitate)?\" yes       [] Yes   [] No   Educated Patient / Decision Maker regarding differences between Advance Directives and portable DNR orders.    Length of ACP Conversation in minutes:      Conversation Outcomes:  ACP discussion completed

## 2024-11-19 NOTE — PROGRESS NOTES
The potassium/magnesium sliding scale has been automatically discontinued per [facility] approved policy because the patient has decreased renal function (CrCl<30 ml/min).      The patient's current K/Mag levels are:  Recent Labs     11/18/24 1953   K 4.1       Estimated Creatinine Clearance: 20 mL/min (A) (based on SCr of 1.1 mg/dL (H)).  For patients with decreased renal function (below 30ml/min) needing potassium/magnesium supplementation, please order individual bolus doses with appropriate monitoring.      Please contact the inpatient pharmacy at [phone] with any concerns.  Thank you.    Stephania Bradley [facility]  11/18/2024  8:35 PM

## 2024-11-19 NOTE — PLAN OF CARE
Problem: Discharge Planning  Goal: Discharge to home or other facility with appropriate resources  Outcome: Progressing  Flowsheets (Taken 11/19/2024 0051)  Discharge to home or other facility with appropriate resources:   Identify barriers to discharge with patient and caregiver   Arrange for needed discharge resources and transportation as appropriate   Refer to discharge planning if patient needs post-hospital services based on physician order or complex needs related to functional status, cognitive ability or social support system     Problem: Skin/Tissue Integrity  Goal: Absence of new skin breakdown  Description: 1.  Monitor for areas of redness and/or skin breakdown  2.  Assess vascular access sites hourly  3.  Every 4-6 hours minimum:  Change oxygen saturation probe site  4.  Every 4-6 hours:  If on nasal continuous positive airway pressure, respiratory therapy assess nares and determine need for appliance change or resting period.  Outcome: Progressing     Problem: Safety - Adult  Goal: Free from fall injury  Outcome: Progressing     Problem: ABCDS Injury Assessment  Goal: Absence of physical injury  Outcome: Progressing  Flowsheets (Taken 11/19/2024 0022)  Absence of Physical Injury: Implement safety measures based on patient assessment     Problem: Respiratory - Adult  Goal: Achieves optimal ventilation and oxygenation  Outcome: Progressing     Problem: Cardiovascular - Adult  Goal: Maintains optimal cardiac output and hemodynamic stability  Outcome: Progressing

## 2024-11-19 NOTE — CARE COORDINATION
Social work: Spoke to intake/[facility], referral is being reviewed; anticipate acceptance as patient has been to the facility in the past.   HENS started; will need precert.

## 2024-11-19 NOTE — PLAN OF CARE
Problem: Discharge Planning  Goal: Discharge to home or other facility with appropriate resources  Outcome: Progressing  Flowsheets (Taken 11/19/2024 0800)  Discharge to home or other facility with appropriate resources: Identify barriers to discharge with patient and caregiver     Problem: Skin/Tissue Integrity  Goal: Absence of new skin breakdown  Description: 1.  Monitor for areas of redness and/or skin breakdown  2.  Assess vascular access sites hourly  3.  Every 4-6 hours minimum:  Change oxygen saturation probe site  4.  Every 4-6 hours:  If on nasal continuous positive airway pressure, respiratory therapy assess nares and determine need for appliance change or resting period.  Outcome: Progressing     Problem: Safety - Adult  Goal: Free from fall injury  Outcome: Progressing     Problem: ABCDS Injury Assessment  Goal: Absence of physical injury  Outcome: Progressing     Problem: Respiratory - Adult  Goal: Achieves optimal ventilation and oxygenation  Outcome: Progressing  Flowsheets (Taken 11/19/2024 0800)  Achieves optimal ventilation and oxygenation: Assess for changes in respiratory status     Problem: Cardiovascular - Adult  Goal: Maintains optimal cardiac output and hemodynamic stability  Outcome: Progressing  Flowsheets (Taken 11/19/2024 0800)  Maintains optimal cardiac output and hemodynamic stability: Monitor blood pressure and heart rate

## 2024-11-19 NOTE — PROGRESS NOTES
Transitions of Care Pharmacy Service   Medication Review    The patient's list of current home medications has been reviewed.     Source(s) of information:   [facility] Medication compliance Pack active as of 11/8/2024, patient, Care Everywhere, and Surescripts refill report    Based on information provided by the above source(s), I have updated the patient's home med list as described below.       I changed or updated the following medications on the patient's home medication list:  Removed latanoprost (XALATAN) 0.005 % ophthalmic solution  bumetanide (BUMEX) 1 MG tablet  ADVAIR -21 MCG/ACT inhaler     Added vitamin D (ERGOCALCIFEROL) 1.25 MG (25602 UT) CAPS capsule  furosemide (LASIX) 20 MG tablet  metoprolol tartrate (LOPRESSOR) 25 MG tablet     Adjusted   None      Other Notes None      Are any of the medications noted above considered a 'high alert' medication? no      Is the patient on warfarin at home? No            Please feel free to call me with any questions about this encounter. Thank you.    This note will be reviewed and co-signed by the [facility] Pharmacist. The pharmacist will review inpatient orders and contact the physician about any discrepancies.    Paolo Antunez, pharmacy technician  [facility] [place] Pharmacy Service  Phone:  [phone]  Fax: [phone]      Electronically signed by Paolo Antunez on 11/19/2024 at 12:47 PM       Prior to Admission medications    Medication Sig   vitamin D (ERGOCALCIFEROL) 1.25 MG (78032 UT) CAPS capsule Take 1 capsule by mouth once a week Monday   furosemide (LASIX) 20 MG tablet Take 1 tablet by mouth daily   metoprolol tartrate (LOPRESSOR) 25 MG tablet Take 1 tablet by mouth 2 times daily   budesonide-formoterol (SYMBICORT) 160-4.5 MCG/ACT AERO Inhale 2 puffs into the lungs in the morning and 2 puffs in the evening.   dilTIAZem (CARDIZEM CD) 120 MG extended release capsule Take 1 capsule by mouth daily  Patient not taking:

## 2024-11-19 NOTE — CARE COORDINATION
Post Acute Facility/Agency List     Provided child with the following list, the list includes the overall star ratings obtained from CMS per the Medicare Web site (www.Medicare.gov):     [] Long Term Acute Care Facilities  [] Acute Inpatient Rehabilitation Facilities  [x] Skilled Nursing Facilities  [] Hospice Facilities  [] Home Care    Provided verbal instructions on how to utilize the QR Code to obtain additional detailed star ratings from www.Medicare.gov     offered to print and provide the detailed list:    []Accepted   [x]Declined    Plan is [facility] at [facility] [place] in [place]. Has been there before. Sw to make referral.

## 2024-11-19 NOTE — CARE COORDINATION
Case Management Assessment  Initial Evaluation    Date/Time of Evaluation: 11/19/2024 2:21 PM  Assessment Completed by: VICKIE GREENBERG RN    If patient is discharged prior to next notation, then this note serves as note for discharge by case management.    Patient Name: Edyta Gruber                   YOB: 1924  Diagnosis: Hypoxemia [R09.02]  Bilateral pleural effusion [J90]  Acute exacerbation of chronic heart failure (HCC) [I50.9]                   Date / Time: 11/18/2024  6:55 PM    Patient Admission Status: Inpatient   Readmission Risk (Low < 19, Mod (19-27), High > 27): Readmission Risk Score: 17.4    Current PCP: Florencia Talley MD  PCP verified by CM? Yes    Chart Reviewed: Yes      History Provided by: Child/Family  Patient Orientation: Alert and Oriented, Person, Self    Patient Cognition: Alert    Hospitalization in the last 30 days (Readmission):  No    If yes, Readmission Assessment in  Navigator will be completed.    Advance Directives:      Code Status: Full Code   Patient's Primary Decision Maker is: Legal Next of Kin    Primary Decision Maker: Ritchie Gruber - Child - [phone]    Discharge Planning:    Patient lives with: Alone Type of Home: House  Primary Care Giver: Family  Patient Support Systems include: Children   Current Financial resources: None  Current community resources: None  Current services prior to admission: Durable Medical Equipment            Current DME: Walker, Shower Chair, Cane, Wheelchair            Type of Home Care services:  OT, PT, Skilled Therapy, Aide Services, Nursing Services    ADLS  Prior functional level: Assistance with the following:, Bathing, Dressing, Toileting, Cooking, Housework, Shopping, Mobility  Current functional level: Assistance with the following:, Bathing, Dressing, Toileting, Cooking, Housework, Shopping, Mobility    PT AM-PAC: 12 /24  OT AM-PAC: 10 /24    Family can provide assistance at DC: Yes  Would you like Case  Patient and/or patient representative Edyta and her family were provided with a choice of provider and agrees with the discharge plan. Freedom of choice list with basic dialogue that supports the patient's individualized plan of care/goals and shares the quality data associated with the providers was provided to: Patient, Patient Representative   Patient Representative Name: dereck Dugan     The Patient and/or Patient Representative Agree with the Discharge Plan? Yes    VICKIE GREENBERG RN  Case Management Department

## 2024-11-19 NOTE — PROGRESS NOTES
Occupational Therapy  Facility/Department: [facility]  Occupational Therapy Initial Assessment    Name: Edyta Gruber  : 1924  MRN: [id number]  Date of Service: 2024    ELMER MELO reports patient is medically stable for therapy treatment this date.    Chart reviewed prior to treatment and patient is agreeable for therapy.  All lines intact and patient positioned comfortably at end of treatment.  All patient needs addressed prior to ending therapy session.      Pt currently functioning below baseline.  Recommend daily inpatient skilled therapy at time of discharge to maximize long term outcomes and prevent re-admission. Please refer to AM-PAC score for current level of function.        Discharge Recommendations:  Patient would benefit from continued therapy after discharge  OT Equipment Recommendations  Equipment Needed:  (CTA)       Patient Diagnosis(es): The primary encounter diagnosis was Hypoxemia. A diagnosis of Bilateral pleural effusion was also pertinent to this visit.  Past Medical History:  has a past medical history of Acute dermatitis, Arthritis, Asthma, Atrial fibrillation (HCC), Bursitis, CHF (congestive heart failure) (HCC), Hearing aid worn, Hyperlipidemia, Hypertension, Lumbar disc disease, Wears glasses, and Wound of right leg.  Past Surgical History:  has a past surgical history that includes back surgery; Wound debridement (Left, 2017); pr i&d deep absc bursa/hematoma thigh/knee region (Right, 3/9/2017); other surgical history (2017); other surgical history (2017); and pr i&d deep absc bursa/hematoma thigh/knee region (Right, 2017).      PER chart:    The history is provided by the patient and medical records.  The patient is a 89-year-old female with history of arthritis, asthma, atrial fibrillation, CHF, hypertension and hyperlipidemia who presents to the ED for shortness of breath.  Patient states symptoms started 2 days ago.  At first, shortness of breath  skin issues)      Bed Mobility Training  Bed Mobility Training: Yes  Overall Level of Assistance: Moderate assistance;Assist X2;Additional time  Interventions: Verbal cues;Safety awareness training;Tactile cues  Rolling: Moderate assistance;Assist X2 (MAX cues/tactile assist and demo to initiate movements, pacing, pursed lip breathing, hand placement on bed rail to assist, use of BUE's to assist with upright position/full scoot to EOB for safe sitting   Supine to Sit: Moderate assistance;Assist X2  Scooting: Moderate assistance;Assist X2      Balance  Sitting:  (MIN to CGA)  Standing:  (MOD x2)  Transfer Training  Transfer Training: Yes  Overall Level of Assistance: Moderate assistance;Assist X2;Additional time (with RW)  Interventions: Demonstration;Safety awareness training;Tactile cues (MAX cues/tactile assist and demo for B hand placement\, nose over toes tech, upright posture/weight shifting, pacing, pursed lip breathing, squaring self/AD up to surface prior to sitting/controlled stand to sits and awareness/assist with lines to increase overalls safety   Sit to Stand: Moderate assistance;Assist X2  Stand to Sit: Moderate assistance;Assist X2  Bed to Chair: Moderate assistance;Assist X2  Toilet Transfer:  (N/T and pt has ext cath)      Pt was edu on benefits of elevation and B ankle AROM/pumping as able to reduce swelling as well as importance of weight shifting/frequent change of position and moving what moves all to prevent skin issues as able; poor understanding noted      AROM: Within functional limits  Strength: Generally decreased, functional (BUE strength grossly 4 minus/5)  Coordination: Generally decreased, functional (slow and delayed B opposition)  Tone: Normal  Sensation: Intact      ADL  Feeding: Setup/SUP with increased time needed/struggle noted (red built up foam added to silverwear)  Feeding Skilled Clinical Factors: Pt was issued and edu on red recommendations and proper use of built up foam

## 2024-11-19 NOTE — PROGRESS NOTES
techniques, prevention of sedentary complications, and PT POC.  Pt demonstrated FAIR carryover  Pt requires continued reinforcement of education.  Education Method: Demonstration;Verbal  Education Outcome: Continued education needed      Therapy Time   Individual Concurrent Group Co-treatment   Time In 1230         Time Out 1317         Minutes 47+10=57             Treatment time: 41 minutes    Additional 10 minutes for chart review    Co-treatment with OT warranted secondary to decreased safety and independence requiring 2 skilled therapy professionals to address individual discipline's goals. PT addressing core control in transitions with bed mobility & transfers, seated/standing posture, pressure relief, seated & standing postural alignment and breathing techniques, safety/scanning, & fall prevention in ambulation techniques.          MYNOR GOLDEN, PT

## 2024-11-19 NOTE — ED PROVIDER NOTES
EMERGENCY DEPARTMENT ENCOUNTER    Pt Name: Edyta Gruber  MRN: [id number]  Birthdate 4/27/1924  Date of evaluation: 11/18/24  CHIEF COMPLAINT       Chief Complaint   Patient presents with    Shortness of Breath     HISTORY OF PRESENT ILLNESS   The history is provided by the patient and medical records.  The patient is a 89-year-old female with history of arthritis, asthma, atrial fibrillation, CHF, hypertension and hyperlipidemia who presents to the ED for shortness of breath.  Patient states symptoms started 2 days ago.  At first, shortness of breath was exertional, now she also feels short of breath at rest.  Son at bedside states that she was coughing yellow mucus yesterday.  No reports of fevers, runny nose, nasal congestion, cough.  Patient states lower extremity edema is stable.    REVIEW OF SYSTEMS     Review of Systems  All other systems reviewed and are negative.    PASTMEDICAL HISTORY     Past Medical History:   Diagnosis Date    Acute dermatitis     Arthritis     Asthma     Atrial fibrillation (HCC)     Bursitis     CHF (congestive heart failure) ([facility])     Hearing aid worn     Hyperlipidemia     Hypertension     Lumbar disc disease     Wears glasses     Wound of right leg      Past Problem List  Patient Active Problem List   Diagnosis Code    Non-pressure ulcer of left lower extremity, limited to breakdown of skin ([facility]) L97.921    Persistent atrial fibrillation ([facility]) I48.19    Venous ulcer of left leg ([facility]) I83.029, L97.929    Essential hypertension I10    Acute respiratory failure with hypoxia and hypercapnia J96.01, J96.02    Heart failure with preserved left ventricular function (HFpEF) ([facility]) I50.30    Fall at home, initial encounter W19.XXXA, Y92.009    Chronic anticoagulation Z79.01    Acute on chronic congestive heart failure ([facility]) I50.9    Mild protein-calorie malnutrition ([facility]) E44.1    Pneumonia J18.9    Carotid atherosclerosis I65.29    Chronic skin ulcer ([facility]) L98.499    Hyperlipidemia E78.5  potassium bicarb-citric acid (EFFER-K) effervescent tablet 40 mEq    DISCONTD: potassium chloride 10 mEq/100 mL IVPB (Peripheral Line)    DISCONTD: magnesium sulfate 1000 mg in dextrose 5% 100 mL IVPB    DISCONTD: enoxaparin Sodium (LOVENOX) injection 30 mg     Order Specific Question:   Indication of Use     Answer:   Prophylaxis-DVT/PE    OR Linked Order Group     ondansetron (ZOFRAN-ODT) disintegrating tablet 4 mg     ondansetron (ZOFRAN) injection 4 mg    polyethylene glycol (GLYCOLAX) packet 17 g    OR Linked Order Group     acetaminophen (TYLENOL) tablet 650 mg     acetaminophen (TYLENOL) suppository 650 mg    furosemide (LASIX) injection 20 mg    heparin (porcine) injection 5,000 Units     DISCHARGE PRESCRIPTIONS:  New Prescriptions    No medications on file     PHYSICIAN CONSULTS ORDERED THIS ENCOUNTER:  IP CONSULT TO INTERNAL MEDICINE  IP CONSULT TO CARDIOLOGY  FINAL IMPRESSION      1. Hypoxemia    2. Bilateral pleural effusion          DISPOSITION/PLAN   DISPOSITION Admitted 11/18/2024 08:28:09 PM           OUTPATIENT FOLLOW UP THE PATIENT:  No follow-up provider specified.    MD Elizabeth Cornelius Joseph R, MD  11/18/24 7175

## 2024-11-20 LAB
ANION GAP SERPL CALCULATED.3IONS-SCNC: 7 MMOL/L (ref 9–16)
BASOPHILS # BLD: 0.05 K/UL (ref 0–0.2)
BASOPHILS NFR BLD: 1 %
BUN SERPL-MCNC: 29 MG/DL (ref 8–23)
CALCIUM SERPL-MCNC: 9.3 MG/DL (ref 8.2–9.6)
CHLORIDE SERPL-SCNC: 105 MMOL/L (ref 98–107)
CO2 SERPL-SCNC: 35 MMOL/L (ref 20–31)
CREAT SERPL-MCNC: 1.1 MG/DL (ref 0.5–0.9)
EOSINOPHIL # BLD: 0.14 K/UL (ref 0–0.4)
EOSINOPHILS RELATIVE PERCENT: 3 % (ref 1–4)
ERYTHROCYTE [DISTWIDTH] IN BLOOD BY AUTOMATED COUNT: 15.1 % (ref 11.8–14.4)
GFR, ESTIMATED: 47 ML/MIN/1.73M2
GLUCOSE SERPL-MCNC: 70 MG/DL (ref 75–121)
HCT VFR BLD AUTO: 39.5 % (ref 36.3–47.1)
HGB BLD-MCNC: 12.1 G/DL (ref 11.9–15.1)
IMM GRANULOCYTES # BLD AUTO: 0 K/UL (ref 0–0.3)
IMM GRANULOCYTES NFR BLD: 0 %
LYMPHOCYTES NFR BLD: 0.68 K/UL (ref 1–4.8)
LYMPHOCYTES RELATIVE PERCENT: 15 % (ref 24–44)
MCH RBC QN AUTO: 32.7 PG (ref 25.2–33.5)
MCHC RBC AUTO-ENTMCNC: 30.6 G/DL (ref 28.4–34.8)
MCV RBC AUTO: 106.8 FL (ref 82.6–102.9)
MONOCYTES NFR BLD: 0.45 K/UL (ref 0.2–0.8)
MONOCYTES NFR BLD: 10 % (ref 1–7)
NEUTROPHILS NFR BLD: 71 % (ref 36–66)
NEUTS SEG NFR BLD: 3.18 K/UL (ref 1.8–7.7)
NRBC BLD-RTO: 0 PER 100 WBC
PLATELET # BLD AUTO: 95 K/UL (ref 138–453)
PMV BLD AUTO: 12.1 FL (ref 8.1–13.5)
POTASSIUM SERPL-SCNC: 3.9 MMOL/L (ref 3.7–5.3)
RBC # BLD AUTO: 3.7 M/UL (ref 3.95–5.11)
SODIUM SERPL-SCNC: 147 MMOL/L (ref 136–145)
WBC OTHER # BLD: 4.5 K/UL (ref 3.5–11.3)

## 2024-11-20 PROCEDURE — 85025 COMPLETE CBC W/AUTO DIFF WBC: CPT

## 2024-11-20 PROCEDURE — 94640 AIRWAY INHALATION TREATMENT: CPT

## 2024-11-20 PROCEDURE — 6370000000 HC RX 637 (ALT 250 FOR IP): Performed by: FAMILY MEDICINE

## 2024-11-20 PROCEDURE — 97116 GAIT TRAINING THERAPY: CPT

## 2024-11-20 PROCEDURE — 2580000003 HC RX 258: Performed by: NURSE PRACTITIONER

## 2024-11-20 PROCEDURE — 36415 COLL VENOUS BLD VENIPUNCTURE: CPT

## 2024-11-20 PROCEDURE — 6360000002 HC RX W HCPCS: Performed by: NURSE PRACTITIONER

## 2024-11-20 PROCEDURE — 2700000000 HC OXYGEN THERAPY PER DAY

## 2024-11-20 PROCEDURE — 80048 BASIC METABOLIC PNL TOTAL CA: CPT

## 2024-11-20 PROCEDURE — 97535 SELF CARE MNGMENT TRAINING: CPT

## 2024-11-20 PROCEDURE — 94761 N-INVAS EAR/PLS OXIMETRY MLT: CPT

## 2024-11-20 PROCEDURE — 97112 NEUROMUSCULAR REEDUCATION: CPT

## 2024-11-20 PROCEDURE — 2060000000 HC ICU INTERMEDIATE R&B

## 2024-11-20 PROCEDURE — 6370000000 HC RX 637 (ALT 250 FOR IP): Performed by: STUDENT IN AN ORGANIZED HEALTH CARE EDUCATION/TRAINING PROGRAM

## 2024-11-20 RX ADMIN — PRAVASTATIN SODIUM 20 MG: 40 TABLET ORAL at 09:10

## 2024-11-20 RX ADMIN — BUDESONIDE AND FORMOTEROL FUMARATE DIHYDRATE 2 PUFF: 160; 4.5 AEROSOL RESPIRATORY (INHALATION) at 20:24

## 2024-11-20 RX ADMIN — FUROSEMIDE 20 MG: 10 INJECTION, SOLUTION INTRAMUSCULAR; INTRAVENOUS at 09:10

## 2024-11-20 RX ADMIN — HEPARIN SODIUM 5000 UNITS: 5000 INJECTION INTRAVENOUS; SUBCUTANEOUS at 22:26

## 2024-11-20 RX ADMIN — SODIUM CHLORIDE, PRESERVATIVE FREE 10 ML: 5 INJECTION INTRAVENOUS at 22:27

## 2024-11-20 RX ADMIN — ASPIRIN 81 MG 81 MG: 81 TABLET ORAL at 09:10

## 2024-11-20 RX ADMIN — HEPARIN SODIUM 5000 UNITS: 5000 INJECTION INTRAVENOUS; SUBCUTANEOUS at 09:10

## 2024-11-20 RX ADMIN — TAMSULOSIN HYDROCHLORIDE 0.4 MG: 0.4 CAPSULE ORAL at 09:10

## 2024-11-20 RX ADMIN — TIMOLOL MALEATE 1 DROP: 5 SOLUTION OPHTHALMIC at 22:26

## 2024-11-20 RX ADMIN — BUDESONIDE AND FORMOTEROL FUMARATE DIHYDRATE 2 PUFF: 160; 4.5 AEROSOL RESPIRATORY (INHALATION) at 07:29

## 2024-11-20 RX ADMIN — SODIUM CHLORIDE, PRESERVATIVE FREE 10 ML: 5 INJECTION INTRAVENOUS at 08:42

## 2024-11-20 RX ADMIN — Medication 12.5 MG: at 14:41

## 2024-11-20 RX ADMIN — TIMOLOL MALEATE 1 DROP: 5 SOLUTION OPHTHALMIC at 09:11

## 2024-11-20 NOTE — PLAN OF CARE
Problem: Discharge Planning  Goal: Discharge to home or other facility with appropriate resources  11/20/2024 1115 by Kandace Rudd RN  Outcome: Progressing  Flowsheets (Taken 11/20/2024 0800)  Discharge to home or other facility with appropriate resources:   Identify barriers to discharge with patient and caregiver   Arrange for needed discharge resources and transportation as appropriate   Identify discharge learning needs (meds, wound care, etc)   Refer to discharge planning if patient needs post-hospital services based on physician order or complex needs related to functional status, cognitive ability or social support system  11/20/2024 0329 by Bianca Funk RN  Outcome: Progressing  Flowsheets (Taken 11/19/2024 2000)  Discharge to home or other facility with appropriate resources:   Identify barriers to discharge with patient and caregiver   Arrange for needed discharge resources and transportation as appropriate   Identify discharge learning needs (meds, wound care, etc)   Refer to discharge planning if patient needs post-hospital services based on physician order or complex needs related to functional status, cognitive ability or social support system     Problem: Skin/Tissue Integrity  Goal: Absence of new skin breakdown  Description: 1.  Monitor for areas of redness and/or skin breakdown  2.  Assess vascular access sites hourly  3.  Every 4-6 hours minimum:  Change oxygen saturation probe site  4.  Every 4-6 hours:  If on nasal continuous positive airway pressure, respiratory therapy assess nares and determine need for appliance change or resting period.  11/20/2024 1115 by Kandace Rudd RN  Outcome: Progressing  11/20/2024 0329 by Bianca Funk RN  Outcome: Progressing     Problem: Safety - Adult  Goal: Free from fall injury  11/20/2024 1115 by Kandace Rudd RN  Outcome: Progressing  Flowsheets (Taken 11/20/2024 0800)  Free From Fall Injury: Instruct family/caregiver on patient

## 2024-11-20 NOTE — PROGRESS NOTES
Section of Cardiology  Progress Note      Date:  11/20/2024  Patient: Edyta Gruber  Admission:  11/18/2024  6:55 PM  Admit DX: Hypoxemia [R09.02]  Bilateral pleural effusion [J90]  Acute exacerbation of chronic heart failure (HCC) [I50.9]  Age:  89 y.o., 4/27/1924     LOS: 2 days     Reason for evaluation:   atrial fibrillation      SUBJECTIVE:     The patient was seen and examined. Notes and labs reviewed.  There were not complications over night.    Patient's cardiac review of systems: negative.  The patient is generally feeling unchanged.    OBJECTIVE:      EXAM:   Vitals:    VITALS:  /86   Pulse 70   Temp 97.2 °F (36.2 °C)   Resp 16   Ht 1.575 m (5' 2\")   Wt 51.1 kg (112 lb 11.2 oz)   SpO2 100%   BMI 20.61 kg/m²   24HR INTAKE/OUTPUT:    Intake/Output Summary (Last 24 hours) at 11/20/2024 1622  Last data filed at 11/20/2024 1441  Gross per 24 hour   Intake 1080 ml   Output 1310 ml   Net -230 ml     General awake and alert, hard of hearing  Neck supple  Heart regular rate irregular rhythm no appreciated murmurs  Lungs decreased breathing sounds at the bases   Abdomen soft non tender  Ext trace edema   Neuro moving all extremities       Current Inpatient Medications:   metoprolol tartrate  12.5 mg Oral BID    aspirin  81 mg Oral Daily    budesonide-formoterol  2 puff Inhalation BID RT    pravastatin  20 mg Oral Daily    tamsulosin  0.4 mg Oral Daily    timolol  1 drop Both Eyes BID    sodium chloride flush  5-40 mL IntraVENous 2 times per day    furosemide  20 mg IntraVENous Daily    heparin (porcine)  5,000 Units SubCUTAneous BID       IV Infusions (if any):   sodium chloride         Diagnostics:   Telemetry: reviewed   Labs:   CBC:  Recent Labs     11/19/24  0605 11/20/24  0402   WBC 3.8 4.5   HGB 12.2 12.1   HCT 39.4 39.5   * 95*     Magnesium:No results for input(s): \"MG\" in the last 72 hours.  BMP:  Recent Labs     11/19/24  0605 11/20/24  0402   * 147*   K 4.2 3.9    CALCIUM 9.4 9.3   CO2 32* 35*   BUN 29* 29*   CREATININE 1.0* 1.1*   LABGLOM 49* 47*   GLUCOSE 76 70*     BNP:  Recent Labs     11/18/24 1953   PROBNP 2,328*     PT/INR:  Recent Labs     11/19/24  0605   PROTIME 14.1   INR 1.1     APTT:No results for input(s): \"APTT\" in the last 72 hours.  CARDIAC ENZYMES:  Recent Labs     11/18/24 1953   TROPHS 39*     FASTING LIPID PANEL:  Lab Results   Component Value Date/Time    HDL 44 10/17/2024 06:35 AM    TRIG 103 10/17/2024 06:35 AM     LIVER PROFILE:No results for input(s): \"AST\", \"ALT\", \"LABALBU\", \"ALKPHOS\", \"BILITOT\", \"BILIDIR\", \"IBILI\", \"GLOB\", \"ALBUMIN\" in the last 72 hours.    Invalid input(s): \"PROT\"     ASSESSMENT:    Permanent atrial fibrillation   Acute diastolic heart failure   Bilateral pleural effusions   4. CKD III   5. Moderate aortic stenosis      Plan      Currently rate is around 100 .  Please continue gentle diuresis with Lasix 20 mg IV daily     Has permanent A-fib is around 100 .  Home Cardizem dose is on hold.  Please starting metoprolol 12.5 mg p.o. twice daily and hold for systolic blood pressure less than 90.     Probably has moderate aortic stenosis with low-flow low gradient.  Most likely due to diastolic heart dysfunction    Patient has not been on anticoagulation, to be discussed as outpatient with her Cardiologist     Discussed with  nursing and patient     Norman Ayers MD

## 2024-11-20 NOTE — PROGRESS NOTES
[facility]    Adult Hospitalist      Name: Edyta Gruber  MRN: [id number]     Acct: [id number]  Room: 2037/2037-01    Admit Date: 11/18/2024  6:55 PM  PCP: Florencia Talley MD    Primary Problem  Principal Problem:    Acute exacerbation of chronic heart failure (HCC)  Resolved Problems:    * No resolved hospital problems. *        Assesment/plan:     Acute diastolic congestive heart failure/bilateral pleural effusions  Lasix IV 20 mg  Daily weights  I's and O's    Permanent atrial fibrillation  Aspirin  Not on anticoagulation per her cardiologist  Diltiazem on hold  Metoprolol 12.5 twice daily with parameters    Chronic kidney disease stage IIIa    Moderate aortic stenosis    Moderate persistent asthma  Symbicort  Albuterol as needed  RT eval    Glaucoma  Timoptic     Mixed hyperlipidemia  Pravastatin    Urinary retention  Tamsulosin        CBC, BMP  Incentive spirometry  DVT and GI prophylaxis.        Chief Complaint:     Chief Complaint   Patient presents with    Shortness of Breath         History of Present Illness:        Patient seen and examined at bedside  Last 24-hour events reviewed with nursing  Patient says she feels better  Dyspnea improved  Complaints of fatigue  Persistent pedal edema  Denies chest pain, headache, fever, abdominal pain, dysuria      HPI  Edyta Gruber is a 89 y.o.  female who presents with Shortness of Breath    Patient admitted to the emergency room where she presented with progressive dyspnea and fatigue over the last 2 days.  Patient had complained of dyspnea on exertion and later at rest patient had also been coughing for the last few days.  Occasionally they noted yellow phlegm.  Patient has not had any rhinorrhea, facial pressure, wheezing or fever.  Patient has had pedal edema which has been persistent    Denies chest pain, headache, vision change, vomiting, abdominal pain, dysuria    I have personally reviewed the past medical history, past surgical  no focal findings or movement disorder noted, cranial nerves II through XII grossly intact  Extremities -bilateral pedal edema with anterior erythema  Skin - no gross lesions, rashes, or induration noted        Data:     Labs:    Hematology:  Recent Labs     11/18/24 1953 11/19/24 0605 11/20/24  0402   WBC 5.6 3.8 4.5   RBC 4.01 3.71* 3.70*   HGB 13.3 12.2 12.1   HCT 42.1 39.4 39.5   .0* 106.2* 106.8*   MCH 33.2 32.9 32.7   MCHC 31.6 31.0 30.6   RDW 15.1* 15.1* 15.1*   * 104* 95*   MPV 11.6 11.5 12.1   INR  --  1.1  --      Chemistry:  Recent Labs     11/18/24 1953 11/19/24 0605 11/20/24  0402    146* 147*   K 4.1 4.2 3.9    105 105   CO2 30 32* 35*   GLUCOSE 98 76 70*   BUN 32* 29* 29*   CREATININE 1.1* 1.0* 1.1*   ANIONGAP 11 9 7*   LABGLOM 43* 49* 47*   CALCIUM 9.9* 9.4 9.3   PROBNP 2,328*  --   --    TROPHS 39*  --   --      No results for input(s): \"LABALBU\", \"LABA1C\", \"C4OZZNK\", \"FT4\", \"TSH\", \"AST\", \"ALT\", \"LDH\", \"GGT\", \"ALKPHOS\", \"BILITOT\", \"BILIDIR\", \"AMMONIA\", \"AMYLASE\", \"LIPASE\", \"LACTATE\", \"CHOL\", \"HDL\", \"CHOLHDLRATIO\", \"TRIG\", \"VLDL\", \"MWO49RY\", \"PHENYTOIN\", \"PHENYF\", \"URICACID\", \"POCGLU\" in the last 72 hours.    Invalid input(s): \"PROT\", \"X7KBEDU\", \"LABGGT\", \"LDLCHOLESTEROL\"    Lab Results   Component Value Date    INR 1.1 11/19/2024    INR 1.6 10/10/2024    INR 1.4 11/09/2023    PROTIME 14.1 11/19/2024    PROTIME 19.2 (H) 10/10/2024    PROTIME 16.6 (H) 11/09/2023       Lab Results   Component Value Date/Time    SPECIAL EZY140 11/16/2023 01:39 PM     Lab Results   Component Value Date/Time    CULTURE ESCHERICHIA COLI >100,000 CFU/ML (A) 10/11/2024 08:18 PM       Lab Results   Component Value Date/Time    POCPH 7.332 11/09/2023 08:10 PM    POCPCO2 73.2 11/09/2023 08:10 PM    POCPO2 70.8 11/09/2023 08:10 PM    POCHCO3 38.8 11/09/2023 08:10 PM    PBEA 8.9 11/09/2023 08:10 PM    EMXP1HDT 91.7 11/09/2023 08:10 PM    FIO2 30.0 11/09/2023 08:10 PM       Radiology:    CT CHEST

## 2024-11-20 NOTE — PLAN OF CARE
Problem: Respiratory - Adult  Goal: Achieves optimal ventilation and oxygenation  11/19/2024 2048 by Ivy Melendez RCP  Outcome: Progressing

## 2024-11-20 NOTE — PROGRESS NOTES
Spoke with Dr. Ayers at nurses station he discussed the patient starting on Lopressor 12.5mg to help with her heart rate. Order was placed and awaiting pharmacy approval.

## 2024-11-20 NOTE — PROGRESS NOTES
Physical Therapy  Facility/Department: [facility]  Physical Therapy TREATMENT NOTE    Name: Edyta Gruber  : 1924  MRN: [id number]  Date of Service: 2024    Discharge Recommendations:  Patient would benefit from continued therapy after discharge, 24 hour supervision or assist, Therapy recommended at discharge          Patient Diagnosis(es): The primary encounter diagnosis was Hypoxemia. A diagnosis of Bilateral pleural effusion was also pertinent to this visit.  Past Medical History:  has a past medical history of Acute dermatitis, Arthritis, Asthma, Atrial fibrillation (HCC), Bursitis, CHF (congestive heart failure) (HCC), Hearing aid worn, Hyperlipidemia, Hypertension, Lumbar disc disease, Wears glasses, and Wound of right leg.  Past Surgical History:  has a past surgical history that includes back surgery; Wound debridement (Left, 2017); pr i&d deep absc bursa/hematoma thigh/knee region (Right, 3/9/2017); other surgical history (2017); other surgical history (2017); and pr i&d deep absc bursa/hematoma thigh/knee region (Right, 2017).    Assessment  Body Structures, Functions, Activity Limitations Requiring Skilled Therapeutic Intervention: Decreased functional mobility ;Decreased ADL status;Decreased strength;Decreased safe awareness;Decreased cognition;Decreased endurance;Decreased balance;Decreased posture  Assessment: Pt with MOD deficits transfers, ambulation, balance, posture, safety awareness and endurance this session, & still required 2 assist for safe functional mobility, transfers & gait. Pt deconditioned with INC risk for falls & requires continued PT to maximize independence with functional mobility, balance, safety awareness & activity tolerance to improve overall tolerance of ADL's. Pt currently functioning with AM-PAC mobility score of 12/24, and recommend DAILY inpatient skilled therapy at time of discharge to maximize long term outcomes, ENSURE SAFETY and

## 2024-11-20 NOTE — PLAN OF CARE
Problem: Respiratory - Adult  Goal: Achieves optimal ventilation and oxygenation  11/20/2024 0731 by Liseth Caal RCP  Outcome: Progressing

## 2024-11-20 NOTE — PROGRESS NOTES
End Of Shift Note  [facility]  Summary of shift: Pt had no complaints throughout shift. Slept well overnight. Pt SPO2 dropped to 88% overnight. Applied NC at 2 L and SPO2 up to 100%. VSS and pt had an otherwise uneventful shift.    Vitals:    Vitals:    11/20/24 0002 11/20/24 0203 11/20/24 0205 11/20/24 0358   BP: 115/83   126/86   Pulse: 84  83 81   Resp: 16   16   Temp: 97 °F (36.1 °C)   96.8 °F (36 °C)   TempSrc: Axillary   Axillary   SpO2: 91% (!) 88% 98% 100%   Weight:    51.1 kg (112 lb 11.2 oz)   Height:            I&O:   Intake/Output Summary (Last 24 hours) at 11/20/2024 0650  Last data filed at 11/20/2024 0600  Gross per 24 hour   Intake 360 ml   Output 1000 ml   Net -640 ml       Resp Status: RA, 2L NC while asleep    Ventilator Settings:     / / /     Critical Care IV infusions:   sodium chloride          LDA:   Peripheral IV 11/18/24 Distal;Left Forearm (Active)   Number of days: 1       External Urinary Catheter (Active)   Number of days: 1       Wound 11/30/23 Sacrum (Active)   Number of days: 355

## 2024-11-20 NOTE — PROGRESS NOTES
End Of Shift Note  [facility]  Summary of shift: pt had uneventful shift. Cardiology have no plans for procedures or testing. Attending doctor still to round and advise POC. Still diuresing.     Vitals:    Vitals:    11/19/24 0424 11/19/24 0800 11/19/24 1200 11/19/24 1601   BP: 107/83 105/72 109/82 98/76   Pulse: 83 75 87    Resp: 18 16 16 16   Temp: (!) 94.1 °F (34.5 °C) 97.5 °F (36.4 °C) 97 °F (36.1 °C) 97 °F (36.1 °C)   TempSrc: Axillary Oral Oral Oral   SpO2: 100% 99% 99% 96%   Weight: 48.9 kg (107 lb 12.8 oz)      Height:            I&O:   Intake/Output Summary (Last 24 hours) at 11/19/2024 1904  Last data filed at 11/19/2024 1845  Gross per 24 hour   Intake --   Output 1175 ml   Net -1175 ml       Resp Status: RA    Ventilator Settings:     / / /     Critical Care IV infusions:   sodium chloride          LDA:   Peripheral IV 11/18/24 Distal;Left Forearm (Active)   Number of days: 0       External Urinary Catheter (Active)   Number of days: 0       Wound 11/30/23 Sacrum (Active)   Number of days: 354

## 2024-11-20 NOTE — PROGRESS NOTES
End Of Shift Note  [facility]  Summary of shift: The patient had a good day. She was up to the chair most of the day and denied any pain or discomfort. She ate all of her meals. VSS. At the time of he note the [patient was in bed respirations even and unlabored and all needs have been met.     Vitals:    Vitals:    11/20/24 1145 11/20/24 1245 11/20/24 1441 11/20/24 1604   BP:   110/79 106/86   Pulse: 74 93 99 70   Resp:    16   Temp:    97.2 °F (36.2 °C)   TempSrc:    Temporal   SpO2:    100%   Weight:       Height:            I&O:   Intake/Output Summary (Last 24 hours) at 11/20/2024 1817  Last data filed at 11/20/2024 1441  Gross per 24 hour   Intake 1080 ml   Output 1310 ml   Net -230 ml       Resp Status: 2L NC    Ventilator Settings:     / / /     Critical Care IV infusions:   sodium chloride          LDA:   Peripheral IV 11/18/24 Distal;Left Forearm (Active)   Number of days: 1       External Urinary Catheter (Active)   Number of days: 1       Wound 11/30/23 Sacrum (Active)   Number of days: 355

## 2024-11-20 NOTE — CARE COORDINATION
Social work:  [facility] is able to accept, will have bed on Friday.   SW will submit for precert when medically ready.   HUBER started.

## 2024-11-20 NOTE — PLAN OF CARE
Latusek, Bianca, RN  Outcome: Progressing  Flowsheets (Taken 11/19/2024 2000)  Absence of Physical Injury: Implement safety measures based on patient assessment  11/19/2024 1841 by Juan A Lorenzo RN  Outcome: Progressing     Problem: Respiratory - Adult  Goal: Achieves optimal ventilation and oxygenation  11/20/2024 0329 by Bianca Funk RN  Outcome: Progressing  11/19/2024 2048 by Ivy Melendez RCP  Outcome: Progressing  Flowsheets (Taken 11/19/2024 2000 by Bianca Funk RN)  Achieves optimal ventilation and oxygenation:   Assess for changes in respiratory status   Assess for changes in mentation and behavior   Position to facilitate oxygenation and minimize respiratory effort   Respiratory therapy support as indicated   Assess and instruct to report shortness of breath or any respiratory difficulty  11/19/2024 1841 by Juan A Lorezno RN  Outcome: Progressing  Flowsheets (Taken 11/19/2024 0800)  Achieves optimal ventilation and oxygenation: Assess for changes in respiratory status     Problem: Cardiovascular - Adult  Goal: Maintains optimal cardiac output and hemodynamic stability  11/20/2024 0329 by Bianca Funk RN  Outcome: Progressing  Flowsheets (Taken 11/19/2024 2000)  Maintains optimal cardiac output and hemodynamic stability: Monitor blood pressure and heart rate  11/19/2024 1841 by Juan A Lorenzo RN  Outcome: Progressing  Flowsheets (Taken 11/19/2024 0800)  Maintains optimal cardiac output and hemodynamic stability: Monitor blood pressure and heart rate

## 2024-11-20 NOTE — PROGRESS NOTES
Occupational Therapy  Facility/Department: [facility] [address]  Daily Treatment Note  NAME: Edyta Gruber  : 1924  MRN: [id number]    ELMER SALEH reports patient is medically stable for therapy treatment this date.    Chart reviewed prior to treatment and patient is agreeable for therapy.  All lines intact and patient positioned comfortably at end of treatment.  All patient needs addressed prior to ending therapy session.       Pt currently functioning below baseline.  Recommend daily inpatient skilled therapy at time of discharge to maximize long term outcomes and prevent re-admission. Please refer to AM-PAC score for current level of function.       Date of Service: 2024    Discharge Recommendations:  Patient would benefit from continued therapy after discharge  OT Equipment Recommendations  Equipment Needed:  (CTA)      Patient Diagnosis(es): The primary encounter diagnosis was Hypoxemia. A diagnosis of Bilateral pleural effusion was also pertinent to this visit.     Assessment   Activity Tolerance: Patient limited by fatigue;Patient limited by endurance; limited bu high heart rate; stand to 2-3 mins with RW   Discharge Recommendations: Patient would benefit from continued therapy after discharge  Equipment Needed:  (CTA)  *Pt continues to require 2 staff assist for safety/balance support with RW when up due to posterior lean.  Heart rate fluctuated this session from 110-111 at rest to highest 120 with activity.  Pt at risk for skin issues on back/spine with some redness noted and waffle cushion placed behind pt while seated in recliner/barrier cream also applied and lead RN was informed.  Pt with poor safety awareness/cognitive deficits and places pt at HIGH fall risk.  Continue with OT POC as able to improve I/safety with daily living skills.        Plan  Occupational Therapy Plan  Times Per Week: 5x/week 1x/day as maximo  Current Treatment Recommendations: Strengthening;Balance training;Functional mobility  tech, and functional UE strength.     AM-PAC - ADL  AM-PAC Daily Activity - Inpatient   How much help is needed for putting on and taking off regular lower body clothing?: Total  How much help is needed for bathing (which includes washing, rinsing, drying)?: Total  How much help is needed for toileting (which includes using toilet, bedpan, or urinal)?: Total  How much help is needed for putting on and taking off regular upper body clothing?: A Lot  How much help is needed for taking care of personal grooming?: A Lot  How much help for eating meals?: A Little  AM-[facility] Inpatient Daily Activity Raw Score: 10  AM-PAC Inpatient ADL T-Scale Score : 27.31  ADL Inpatient CMS 0-100% Score: 74.7  ADL Inpatient CMS G-Code Modifier : CL10    Therapy Time   Individual Concurrent Group Co-treatment   Time In 1325         Time Out 1356         Minutes 31                 Shea Michelle, OT      Unknown if ever smoked

## 2024-11-20 NOTE — H&P
History & Physical  [facility].,    Adult Hospitalist      Name: Edyta Gruber  MRN: [id number]     Acct: [id number]  Room: 2037/2037-01    Admit Date: 11/18/2024  6:55 PM  PCP: Florencia Talley MD    Primary Problem  Principal Problem:    Acute exacerbation of chronic heart failure (HCC)  Resolved Problems:    * No resolved hospital problems. *        Assesment/plan:     Acute diastolic congestive heart failure/bilateral pleural effusions  Lasix IV  Daily weights  I's and O's    Permanent atrial fibrillation  Aspirin    Chronic kidney disease stage IIIa    Moderate aortic stenosis    Moderate persistent asthma  Symbicort  Albuterol as needed  RT eval    Glaucoma  Timoptic     Mixed hyperlipidemia  Pravastatin    Urinary retention  Tamsulosin        CBC, BMP  Incentive spirometry  DVT and GI prophylaxis.        Chief Complaint:     Chief Complaint   Patient presents with    Shortness of Breath         History of Present Illness:      Edyta Gruber is a 89 y.o.  female who presents with Shortness of Breath    Patient admitted to the emergency room where she presented with progressive dyspnea and fatigue over the last 2 days.  Patient had complained of dyspnea on exertion and later at rest patient had also been coughing for the last few days.  Occasionally they noted yellow phlegm.  Patient has not had any rhinorrhea, facial pressure, wheezing or fever.  Patient has had pedal edema which has been persistent    Denies chest pain, headache, vision change, vomiting, abdominal pain, dysuria    I have personally reviewed the past medical history, past surgical history, medications, social history, and family history, and summarized in the note.    Review of Systems:     All 10 point system is reviewed and negative otherwise mentioned in HPI.      Past Medical History:     Past Medical History:   Diagnosis Date    Acute dermatitis     Arthritis     Asthma     Atrial fibrillation (HCC)     Bursitis

## 2024-11-20 NOTE — PROGRESS NOTES
Spiritual Health History and Assessment/Progress Note  [facility]    (P) Spiritual/Emotional Needs,  ,  ,      Name: Edyta Gruber MRN: [id number]    Age: 89 y.o.     Sex: female   Language: English   Zoroastrian: Jainism   Acute exacerbation of chronic heart failure (HCC)     Date: 11/19/2024            Total Time Calculated: (P) 30 min              Spiritual Assessment began in [facility]        Referral/Consult From: (P) Rounding   Encounter Overview/Reason: (P) Spiritual/Emotional Needs  Service Provided For: (P) Patient    Cecilia, Belief, Meaning:   Patient is connected with a cecilia tradition or spiritual practice and has beliefs or practices that help with coping during difficult times  Family/Friends No family/friends present      Importance and Influence:  Patient has spiritual/personal beliefs that influence decisions regarding their health  Family/Friends No family/friends present    Community:  Patient is connected with a spiritual community and feels well-supported. 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 Pt shared that she has one son and a 27yo granddaughter.  prayed and pt expressed gratitude for the visit.    Patient Plan of Care: Spiritual Care available upon further referral  Family/Friends Plan of Care: No family/friends present    Electronically signed by Chaplain Sonja on 11/19/2024 at 9:04 PM

## 2024-11-21 LAB
ANION GAP SERPL CALCULATED.3IONS-SCNC: 6 MMOL/L (ref 9–16)
BASOPHILS # BLD: 0.05 K/UL (ref 0–0.2)
BASOPHILS NFR BLD: 1 % (ref 0–2)
BUN SERPL-MCNC: 25 MG/DL (ref 8–23)
CALCIUM SERPL-MCNC: 9 MG/DL (ref 8.2–9.6)
CHLORIDE SERPL-SCNC: 103 MMOL/L (ref 98–107)
CO2 SERPL-SCNC: 37 MMOL/L (ref 20–31)
CREAT SERPL-MCNC: 0.9 MG/DL (ref 0.5–0.9)
EKG ATRIAL RATE: 208 BPM
EKG Q-T INTERVAL: 354 MS
EKG QRS DURATION: 82 MS
EKG QTC CALCULATION (BAZETT): 440 MS
EKG R AXIS: 101 DEGREES
EKG T AXIS: 72 DEGREES
EKG VENTRICULAR RATE: 93 BPM
EOSINOPHIL # BLD: 0.13 K/UL (ref 0–0.44)
EOSINOPHILS RELATIVE PERCENT: 4 % (ref 1–4)
ERYTHROCYTE [DISTWIDTH] IN BLOOD BY AUTOMATED COUNT: 14.8 % (ref 11.8–14.4)
GFR, ESTIMATED: 57 ML/MIN/1.73M2
GLUCOSE SERPL-MCNC: 62 MG/DL (ref 75–121)
HCT VFR BLD AUTO: 39.9 % (ref 36.3–47.1)
HGB BLD-MCNC: 12 G/DL (ref 11.9–15.1)
IMM GRANULOCYTES # BLD AUTO: 0.01 K/UL (ref 0–0.3)
IMM GRANULOCYTES NFR BLD: 0 %
LYMPHOCYTES NFR BLD: 0.71 K/UL (ref 1.1–3.7)
LYMPHOCYTES RELATIVE PERCENT: 20 % (ref 24–43)
MCH RBC QN AUTO: 32.5 PG (ref 25.2–33.5)
MCHC RBC AUTO-ENTMCNC: 30.1 G/DL (ref 28.4–34.8)
MCV RBC AUTO: 108.1 FL (ref 82.6–102.9)
MONOCYTES NFR BLD: 0.34 K/UL (ref 0.1–1.2)
MONOCYTES NFR BLD: 10 % (ref 3–12)
NEUTROPHILS NFR BLD: 65 % (ref 36–65)
NEUTS SEG NFR BLD: 2.25 K/UL (ref 1.5–8.1)
NRBC BLD-RTO: 0 PER 100 WBC
PLATELET # BLD AUTO: 97 K/UL (ref 138–453)
PMV BLD AUTO: 11.5 FL (ref 8.1–13.5)
POTASSIUM SERPL-SCNC: 4.1 MMOL/L (ref 3.7–5.3)
RBC # BLD AUTO: 3.69 M/UL (ref 3.95–5.11)
RBC # BLD: ABNORMAL 10*6/UL
RBC # BLD: ABNORMAL 10*6/UL
SODIUM SERPL-SCNC: 147 MMOL/L (ref 136–145)
WBC OTHER # BLD: 3.5 K/UL (ref 3.5–11.3)

## 2024-11-21 PROCEDURE — 2060000000 HC ICU INTERMEDIATE R&B

## 2024-11-21 PROCEDURE — 94640 AIRWAY INHALATION TREATMENT: CPT

## 2024-11-21 PROCEDURE — 2700000000 HC OXYGEN THERAPY PER DAY

## 2024-11-21 PROCEDURE — 97535 SELF CARE MNGMENT TRAINING: CPT

## 2024-11-21 PROCEDURE — 36415 COLL VENOUS BLD VENIPUNCTURE: CPT

## 2024-11-21 PROCEDURE — 6360000002 HC RX W HCPCS: Performed by: NURSE PRACTITIONER

## 2024-11-21 PROCEDURE — 80048 BASIC METABOLIC PNL TOTAL CA: CPT

## 2024-11-21 PROCEDURE — 94761 N-INVAS EAR/PLS OXIMETRY MLT: CPT

## 2024-11-21 PROCEDURE — 97112 NEUROMUSCULAR REEDUCATION: CPT

## 2024-11-21 PROCEDURE — 2580000003 HC RX 258: Performed by: NURSE PRACTITIONER

## 2024-11-21 PROCEDURE — 6370000000 HC RX 637 (ALT 250 FOR IP): Performed by: FAMILY MEDICINE

## 2024-11-21 PROCEDURE — 97116 GAIT TRAINING THERAPY: CPT

## 2024-11-21 PROCEDURE — 85025 COMPLETE CBC W/AUTO DIFF WBC: CPT

## 2024-11-21 PROCEDURE — 97110 THERAPEUTIC EXERCISES: CPT

## 2024-11-21 RX ADMIN — BUDESONIDE AND FORMOTEROL FUMARATE DIHYDRATE 2 PUFF: 160; 4.5 AEROSOL RESPIRATORY (INHALATION) at 10:04

## 2024-11-21 RX ADMIN — SODIUM CHLORIDE, PRESERVATIVE FREE 10 ML: 5 INJECTION INTRAVENOUS at 08:48

## 2024-11-21 RX ADMIN — TIMOLOL MALEATE 1 DROP: 5 SOLUTION OPHTHALMIC at 08:46

## 2024-11-21 RX ADMIN — PRAVASTATIN SODIUM 20 MG: 40 TABLET ORAL at 08:48

## 2024-11-21 RX ADMIN — TAMSULOSIN HYDROCHLORIDE 0.4 MG: 0.4 CAPSULE ORAL at 08:47

## 2024-11-21 RX ADMIN — TIMOLOL MALEATE 1 DROP: 5 SOLUTION OPHTHALMIC at 22:34

## 2024-11-21 RX ADMIN — HEPARIN SODIUM 5000 UNITS: 5000 INJECTION INTRAVENOUS; SUBCUTANEOUS at 08:47

## 2024-11-21 RX ADMIN — BUDESONIDE AND FORMOTEROL FUMARATE DIHYDRATE 2 PUFF: 160; 4.5 AEROSOL RESPIRATORY (INHALATION) at 19:26

## 2024-11-21 RX ADMIN — HEPARIN SODIUM 5000 UNITS: 5000 INJECTION INTRAVENOUS; SUBCUTANEOUS at 21:22

## 2024-11-21 RX ADMIN — FUROSEMIDE 20 MG: 10 INJECTION, SOLUTION INTRAMUSCULAR; INTRAVENOUS at 08:46

## 2024-11-21 RX ADMIN — SODIUM CHLORIDE, PRESERVATIVE FREE 10 ML: 5 INJECTION INTRAVENOUS at 21:22

## 2024-11-21 RX ADMIN — ASPIRIN 81 MG 81 MG: 81 TABLET ORAL at 08:47

## 2024-11-21 NOTE — PROGRESS NOTES
[facility].,    Adult Hospitalist      Name: Edyta Gruber  MRN: [id number]     Acct: [id number]  Room: 2037/2037-01    Admit Date: 11/18/2024  6:55 PM  PCP: Florencia Talley MD    Primary Problem  Principal Problem:    Acute exacerbation of chronic heart failure (HCC)  Resolved Problems:    * No resolved hospital problems. *        Assesment/plan:     Acute diastolic congestive heart failure/bilateral pleural effusions  Lasix IV 20 mg  Daily weights  I's and O's  Discharge plan once okay with cardiology    Permanent atrial fibrillation, rate better controlled  Aspirin  Not on anticoagulation per her cardiologist  Diltiazem on hold  Metoprolol 12.5 twice daily with parameters    Chronic kidney disease stage IIIa    Moderate aortic stenosis    Moderate persistent asthma  Symbicort  Albuterol as needed  RT eval    Glaucoma  Timoptic     Mixed hyperlipidemia  Pravastatin    Hearing impairment    Urinary retention  Tamsulosin        CBC, BMP  Incentive spirometry  DVT and GI prophylaxis.        Chief Complaint:     Chief Complaint   Patient presents with    Shortness of Breath         History of Present Illness:        Patient seen and examined at bedside  Last 24-hour events reviewed with nursing  Patient says she feels better  Dyspnea improved  Complaints of fatigue  Pedal edema improved  Son at bedside  Has several questions, all of which were answered to their satisfaction  Denies chest pain, headache, fever, abdominal pain, dysuria      HPI  Edyta Gruber is a 89 y.o.  female who presents with Shortness of Breath    Patient admitted to the emergency room where she presented with progressive dyspnea and fatigue over the last 2 days.  Patient had complained of dyspnea on exertion and later at rest patient had also been coughing for the last few days.  Occasionally they noted yellow phlegm.  Patient has not had any rhinorrhea, facial pressure, wheezing or fever.  Patient has had pedal edema which        Lab Results   Component Value Date/Time    POCPH 7.332 11/09/2023 08:10 PM    POCPCO2 73.2 11/09/2023 08:10 PM    POCPO2 70.8 11/09/2023 08:10 PM    POCHCO3 38.8 11/09/2023 08:10 PM    PBEA 8.9 11/09/2023 08:10 PM    ZZZP2VJM 91.7 11/09/2023 08:10 PM    FIO2 30.0 11/09/2023 08:10 PM       Radiology:    CT CHEST PULMONARY EMBOLISM W CONTRAST    Result Date: 11/18/2024  1.  Acute T12 vertebral body and left scapular body fractures. 2.  No pulmonary embolism visualized. 3.  Bilateral pleural effusions. 4.  Bilateral upper lower lobe atelectasis. 5.  Dilated left atrium and ventricle.     XR CHEST (2 VW)    Result Date: 11/18/2024  Bilateral pleural effusions.         All radiological studies reviewed                Code Status:  Full Code    Electronically signed by Earnestine Ibanez MD on 11/21/2024 at 6:57 PM     Copy sent to Florencia Manuel MD    This note was created with the assistance of a speech-recognition program.  Although the intention is to generate a document that actually reflects the content of the visit, no guarantees can be provided that every mistake has been identified and corrected by editing.     Note was updated later by me after  physical examination and  completion of the assessment.

## 2024-11-21 NOTE — PROGRESS NOTES
Writer taking over patient care assignment. Report given from ELMER Zhang face to face/at bedside. All questions answered at this time.

## 2024-11-21 NOTE — PROGRESS NOTES
End Of Shift Note  [facility]  Summary of shift: Shift uneventful. VSS. Pt up to chair for a few hours. Still awaiting transport on Friday to [facility].     Vitals:    Vitals:    11/21/24 0800 11/21/24 1007 11/21/24 1159 11/21/24 1654   BP: 118/86  (!) 90/59 (!) 124/97   Pulse: 75  71 97   Resp: 18  18 18   Temp: 96.8 °F (36 °C)  96.8 °F (36 °C)    TempSrc: Temporal  Temporal    SpO2: 100% 100% 98% 100%   Weight:       Height:            I&O:   Intake/Output Summary (Last 24 hours) at 11/21/2024 1726  Last data filed at 11/21/2024 0848  Gross per 24 hour   Intake 10 ml   Output 225 ml   Net -215 ml       Resp Status: 2L NC    Ventilator Settings:     / / /     Critical Care IV infusions:   sodium chloride          LDA:   Peripheral IV 11/18/24 Distal;Left Forearm (Active)   Number of days: 2       External Urinary Catheter (Active)   Number of days: 2       Wound 11/30/23 Sacrum (Active)   Number of days: 356

## 2024-11-21 NOTE — PLAN OF CARE
Problem: Respiratory - Adult  Goal: Achieves optimal ventilation and oxygenation  11/21/2024 1008 by Edyta Handley RCP  Outcome: Progressing  11/20/2024 2302 by Leatha Alejandro RN  Outcome: Progressing  Flowsheets (Taken 11/20/2024 2000)  Achieves optimal ventilation and oxygenation:   Assess for changes in respiratory status   Assess for changes in mentation and behavior   Position to facilitate oxygenation and minimize respiratory effort   Oxygen supplementation based on oxygen saturation or arterial blood gases   Encourage broncho-pulmonary hygiene including cough, deep breathe, incentive spirometry   Assess the need for suctioning and aspirate as needed   Assess and instruct to report shortness of breath or any respiratory difficulty   Respiratory therapy support as indicated

## 2024-11-21 NOTE — PLAN OF CARE
Problem: Discharge Planning  Goal: Discharge to home or other facility with appropriate resources  11/21/2024 1026 by Cristian Freeman RN  Outcome: Progressing  11/20/2024 2302 by Leatha Alejandro RN  Outcome: Progressing  Flowsheets (Taken 11/20/2024 2000)  Discharge to home or other facility with appropriate resources:   Identify barriers to discharge with patient and caregiver   Arrange for needed discharge resources and transportation as appropriate   Identify discharge learning needs (meds, wound care, etc)   Refer to discharge planning if patient needs post-hospital services based on physician order or complex needs related to functional status, cognitive ability or social support system     Problem: Skin/Tissue Integrity  Goal: Absence of new skin breakdown  Description: 1.  Monitor for areas of redness and/or skin breakdown  2.  Assess vascular access sites hourly  3.  Every 4-6 hours minimum:  Change oxygen saturation probe site  4.  Every 4-6 hours:  If on nasal continuous positive airway pressure, respiratory therapy assess nares and determine need for appliance change or resting period.  11/21/2024 1026 by Cristian Freeman RN  Outcome: Progressing  11/20/2024 2302 by Leatha Alejandro RN  Outcome: Progressing     Problem: Safety - Adult  Goal: Free from fall injury  11/21/2024 1026 by Cristian Freeman RN  Outcome: Progressing  11/20/2024 2302 by Leatha Alejandro RN  Outcome: Progressing     Problem: ABCDS Injury Assessment  Goal: Absence of physical injury  11/21/2024 1026 by Cristian Freeman RN  Outcome: Progressing  11/20/2024 2302 by Leatha Alejandro RN  Outcome: Progressing     Problem: Respiratory - Adult  Goal: Achieves optimal ventilation and oxygenation  11/21/2024 1026 by Cristian Freeman RN  Outcome: Progressing  11/21/2024 1008 by Edyta Handley RCP  Outcome: Progressing  11/20/2024 2302 by Leatha Alejandro RN  Outcome: Progressing  Flowsheets (Taken 11/20/2024 2000)  Achieves optimal ventilation and

## 2024-11-21 NOTE — PROGRESS NOTES
End Of Shift Note  [facility]    Summary of shift: Patient slept well throughout night. VSS/remain unchanged. Afib rate controlled for most of shift, see flowsheet.  [facility] will have bed available Friday 11/22, upon medical clearance. Plan of care continues.    Vitals:    Vitals:    11/20/24 2024 11/20/24 2227 11/21/24 0000 11/21/24 0400   BP:  104/77 124/89 104/62   Pulse: 85 70 78 83   Resp:   16 18   Temp:   97.3 °F (36.3 °C) 96.9 °F (36.1 °C)   TempSrc:   Temporal Temporal   SpO2: 100%  100% 100%   Weight:    48.7 kg (107 lb 4.8 oz)   Height:            I&O:   Intake/Output Summary (Last 24 hours) at 11/21/2024 0438  Last data filed at 11/21/2024 0400  Gross per 24 hour   Intake 720 ml   Output 785 ml   Net -65 ml       Resp Status: 2L NC    Ventilator Settings:     / / /     Critical Care IV infusions:   sodium chloride          LDA:   Peripheral IV 11/18/24 Distal;Left Forearm (Active)   Number of days: 2       External Urinary Catheter (Active)   Number of days: 2       Wound 11/30/23 Sacrum (Active)   Number of days: 356

## 2024-11-21 NOTE — FLOWSHEET NOTE
11/20/24 6527   Treatment Team Notification   Reason for Communication Abnormal vitals   Name of Team Member Notified Dr. Guajardo   Treatment Team Role Consulting Provider   Method of Communication Secure Message   Response See orders     Writer reached out to Dr. Guajardo, Cardiology with concerns of patient's HR dropping into the 30s. Writer informed Dr. Guajardo that metoprolol did not have parameters and had not been given yet. Dr. Guajardo ordered \"hold [the] metoprolol indefinitely and Dr. Ayers can review telemetry tomorrow before any other doses are given.\" No further orders.

## 2024-11-21 NOTE — CARE COORDINATION
Social work:  Precert submitted via [employer] for admission to [facility], they will have a bed for patient Friday.   HUBER started.     Update 16:41- Checked [facility], authorization pending approval.

## 2024-11-21 NOTE — PROGRESS NOTES
turning from your back to your side while in a flat bed without using bedrails?: A Lot  How much help is needed moving from lying on your back to sitting on the side of a flat bed without using bedrails?: A Lot  How much help is needed moving to and from a bed to a chair?: A Lot  How much help is needed standing up from a chair using your arms?: A Lot  How much help is needed walking in hospital room?: A Lot  How much help is needed climbing 3-5 steps with a railing?: A Lot  AM-PAC Inpatient Mobility Raw Score : 12  AM-PAC Inpatient T-Scale Score : 35.33  Mobility Inpatient CMS 0-100% Score: 68.66  Mobility Inpatient CMS G-Code Modifier : CL         Tinneti Score       Goals  Short Term Goals  Time Frame for Short Term Goals: 12 visits  Short Term Goal 1: Inc bed-mobility & transfers to independent to enable pt to safely get in/OOB & chair to return to PLOF & decrease risk for falls  Short Term Goal 2: Inc gait to amb 100ft or > indep w/ RW to enable pt to return to previous level of independence & able to demonstrate indep/ safe use of AD in functional activities including approaching surfaces and turning to sit.  Short Term Goal 3: Inc strength to WFL & standing balance to good with device to facilitate pt independence for performance of ADL's & functional mobility, & reduce fall risk  Short Term Goal 4: Pt able to tolerate 30 min of activity to include 15-20 reps of ex, balance and endurance training, & functional mobility with device to facilitate activity tolerance to WFL  Short Term Goal 5: Ed pt on home ex's, safety, balance & endurance training, proper pacing and breathing techniques, pressure relief with Pt able to demonstrate effective pressure relief techniques to reduce risk of skin breakdown, fall prevention, & issue written Pt Ed  Patient Goals   Patient Goals : return home, regain independence       Education  Patient Education  Education Given To: Patient  Education Provided: Role of Therapy;Plan of

## 2024-11-21 NOTE — PROGRESS NOTES
Section of Cardiology  Progress Note      Date:  11/21/2024  Patient: Edyta Gruber  Admission:  11/18/2024  6:55 PM  Admit DX: Hypoxemia [R09.02]  Bilateral pleural effusion [J90]  Acute exacerbation of chronic heart failure (HCC) [I50.9]  Age:  89 y.o., 4/27/1924     LOS: 3 days     Reason for evaluation:   atrial fibrillation      SUBJECTIVE:     The patient was seen and examined. Notes and labs reviewed.  There were not complications over night.    Patient's cardiac review of systems: negative.  The patient is generally feeling unchanged.  Patient is stable, she feels well     OBJECTIVE:      EXAM:   Vitals:    VITALS:  BP (!) 90/59   Pulse 71   Temp 96.8 °F (36 °C) (Temporal)   Resp 18   Ht 1.575 m (5' 2\")   Wt 48.7 kg (107 lb 4.8 oz)   SpO2 98%   BMI 19.63 kg/m²   24HR INTAKE/OUTPUT:    Intake/Output Summary (Last 24 hours) at 11/21/2024 1657  Last data filed at 11/21/2024 0848  Gross per 24 hour   Intake 10 ml   Output 225 ml   Net -215 ml     General awake and alert, hard of hearing  Neck supple  Heart regular rate irregular rhythm no appreciated murmurs  Lungs decreased breathing sounds at the bases   Abdomen soft non tender  Ext trace edema   Neuro moving all extremities       Current Inpatient Medications:   [Held by provider] metoprolol tartrate  12.5 mg Oral BID    aspirin  81 mg Oral Daily    budesonide-formoterol  2 puff Inhalation BID RT    pravastatin  20 mg Oral Daily    tamsulosin  0.4 mg Oral Daily    timolol  1 drop Both Eyes BID    sodium chloride flush  5-40 mL IntraVENous 2 times per day    furosemide  20 mg IntraVENous Daily    heparin (porcine)  5,000 Units SubCUTAneous BID       IV Infusions (if any):   sodium chloride         Diagnostics:   Telemetry: reviewed   Labs:   CBC:  Recent Labs     11/20/24  0402 11/21/24  0352   WBC 4.5 3.5   HGB 12.1 12.0   HCT 39.5 39.9   PLT 95* 97*     Magnesium:No results for input(s): \"MG\" in the last 72 hours.  BMP:  Recent Labs      11/20/24  0402 11/21/24  0352   * 147*   K 3.9 4.1   CALCIUM 9.3 9.0   CO2 35* 37*   BUN 29* 25*   CREATININE 1.1* 0.9   LABGLOM 47* 57*   GLUCOSE 70* 62*     BNP:  Recent Labs     11/18/24 1953   PROBNP 2,328*     PT/INR:  Recent Labs     11/19/24  0605   PROTIME 14.1   INR 1.1     APTT:No results for input(s): \"APTT\" in the last 72 hours.  CARDIAC ENZYMES:  Recent Labs     11/18/24 1953   TROPHS 39*     FASTING LIPID PANEL:  Lab Results   Component Value Date/Time    HDL 44 10/17/2024 06:35 AM    TRIG 103 10/17/2024 06:35 AM     LIVER PROFILE:No results for input(s): \"AST\", \"ALT\", \"LABALBU\", \"ALKPHOS\", \"BILITOT\", \"BILIDIR\", \"IBILI\", \"GLOB\", \"ALBUMIN\" in the last 72 hours.    Invalid input(s): \"PROT\"     ASSESSMENT:    Permanent atrial fibrillation   Acute diastolic heart failure   Bilateral pleural effusions   4. CKD III   5. Moderate aortic stenosis      Plan      Currently rate is better controlled .  Please continue gentle diuresis with Lasix 20 mg IV daily     Has permanent A-fib  .  Home Cardizem dose is on hold.  Continue metoprolol 12.5 mg p.o. twice daily and hold for systolic blood pressure less than 90.     Probably has moderate aortic stenosis with low-flow low gradient.  Most likely due to diastolic heart dysfunction    Patient has not been on anticoagulation, to be discussed as outpatient with her Cardiologist     Discussed with  nursing and patient     Norman Ayers MD

## 2024-11-21 NOTE — PROGRESS NOTES
Occupational Therapy  Facility/Department: [facility]  Daily Treatment Note  NAME: Edyta Gruber  : 1924  MRN: [id number]      ELMER SHUKLA reports patient is medically stable for therapy treatment this date.    Chart reviewed prior to treatment and patient is agreeable for therapy.  All lines intact and patient positioned comfortably at end of treatment.  All patient needs addressed prior to ending therapy session.         Pt currently functioning below baseline.  Recommend daily inpatient skilled therapy at time of discharge to maximize long term outcomes and prevent re-admission. Please refer to AM-PAC score for current level of function.       Date of Service: 2024    Discharge Recommendations:  Patient would benefit from continued therapy after discharge         Patient Diagnosis(es): The primary encounter diagnosis was Hypoxemia. A diagnosis of Bilateral pleural effusion was also pertinent to this visit.     Assessment   Assessment: Bed mob tasks completed with 1 assist vs 2 staff this date and with increased time needed.  Self care tasks were completed with writer in supine position and sitting EOB this date with mult rest breaks given/increased time to complete and edu/demo on pursed lip breathing tech.  Pt completed UB ADL with set up/mod-min assist and LB ADL with DEP in supine vs standing.  Pt's HR fluctuated throughout session and ranged from 80's to 110 at highest.  Pt with flat affect and minimal verbalizations however did smile x2 with writer giving pt praise/positive reinforcement.  Pt continues to require 2 staff assist though for any standing, ADL transfers and mobility with RW for safety/balance support and pt with posterior lean issues.  Continue with OT to increase balance/act maximo to reduce risk for falls/injuries.  Activity Tolerance: Patient limited by fatigue;Patient limited by endurance;Treatment limited secondary to decreased cognition  Discharge Recommendations: Patient would  time  Interventions: Demonstration;Safety awareness training;Tactile cues;Verbal cues (MAX cues/tactile assist for bending BLE's up in bed to assist with rolling, pacing, hand placement on bed rail to assist, use of BUE's to assist with upright position full scoot to EOB for safe sitting; awareness/assist with lines)  Rolling: Minimum assistance;Moderate assistance;Additional time (rolling side to side in bed to change brief/ext cath)  Supine to Sit: Moderate assistance;Assist X1;Additional time  Sit to Supine:  (N/T and pt requested to sit up in recliner)  Scooting: Minimum assistance;Assist X1;Additional time      Balance  Sitting:  (MIN-CGA to SBA; pt maximo dangle > 20 mins; some posterior lean at times)  Standing:  (MOD x2 with RW and pt with posterior lean)  Transfer Training  Transfer Training: Yes  Overall Level of Assistance: Moderate assistance;Assist X2;Additional time (with RW)  Interventions: Demonstration;Safety awareness training;Tactile cues (MAX cues/tactile assist and demo for B hand placement, nose over toes tech, pursed lip breathing tech, upright posture/weight shifting and forward flexion, pacing, squaring self/AD up to surface, controlled stand to sits and awareness/assist with lines to increase overall safety.   Sit to Stand: Moderate assistance;Assist X2  Stand to Sit: Moderate assistance;Assist X2  Bed to Chair: Moderate assistance;Assist X2  Toilet Transfer:  (N/T and pt has ext cath)     ADL  Grooming: Setup;Minimal assistance  Grooming Skilled Clinical Factors: for hair care seated EOB; pt was able to wash her face following set up/SBA  UE Bathing: Setup;Minimal assistance  UE Bathing Skilled Clinical Factors: for sponge bath seated EOB-nursing informed  LE Bathing: Setup;Dependent/Total  LE Bathing Skilled Clinical Factors: supine in bed and pt was able to wash front dandy area following set up/SBA and DEP for posterior dandy area/BLE's  UE dressing: Moderate assist to don clean hosp

## 2024-11-21 NOTE — PLAN OF CARE
Problem: Respiratory - Adult  Goal: Achieves optimal ventilation and oxygenation  11/20/2024 1959 by Ivy Melendez RCP  Outcome: Progressing

## 2024-11-21 NOTE — PLAN OF CARE
Problem: Discharge Planning  Goal: Discharge to home or other facility with appropriate resources  11/20/2024 2302 by Leatha Alejandro, RN  Outcome: Progressing  Flowsheets (Taken 11/20/2024 2000)  Discharge to home or other facility with appropriate resources:   Identify barriers to discharge with patient and caregiver   Arrange for needed discharge resources and transportation as appropriate   Identify discharge learning needs (meds, wound care, etc)   Refer to discharge planning if patient needs post-hospital services based on physician order or complex needs related to functional status, cognitive ability or social support system  11/20/2024 1115 by Kandace Rudd, RN  Outcome: Progressing  Flowsheets (Taken 11/20/2024 0800)  Discharge to home or other facility with appropriate resources:   Identify barriers to discharge with patient and caregiver   Arrange for needed discharge resources and transportation as appropriate   Identify discharge learning needs (meds, wound care, etc)   Refer to discharge planning if patient needs post-hospital services based on physician order or complex needs related to functional status, cognitive ability or social support system     Problem: Skin/Tissue Integrity  Goal: Absence of new skin breakdown  Description: 1.  Monitor for areas of redness and/or skin breakdown  2.  Assess vascular access sites hourly  3.  Every 4-6 hours minimum:  Change oxygen saturation probe site  4.  Every 4-6 hours:  If on nasal continuous positive airway pressure, respiratory therapy assess nares and determine need for appliance change or resting period.  11/20/2024 2302 by Leatha Alejandro, RN  Outcome: Progressing  11/20/2024 1115 by Kandace Rudd RN  Outcome: Progressing     Problem: Safety - Adult  Goal: Free from fall injury  11/20/2024 2302 by Leatha Alejandro, RN  Outcome: Progressing  11/20/2024 1115 by Kandace Rudd RN  Outcome: Progressing  Flowsheets (Taken 11/20/2024 0800)  Free

## 2024-11-22 LAB
ANION GAP SERPL CALCULATED.3IONS-SCNC: 6 MMOL/L (ref 9–16)
BASOPHILS # BLD: 0 K/UL (ref 0–0.2)
BASOPHILS NFR BLD: 0 %
BUN SERPL-MCNC: 24 MG/DL (ref 8–23)
CALCIUM SERPL-MCNC: 9 MG/DL (ref 8.2–9.6)
CHLORIDE SERPL-SCNC: 102 MMOL/L (ref 98–107)
CO2 SERPL-SCNC: 35 MMOL/L (ref 20–31)
CREAT SERPL-MCNC: 0.9 MG/DL (ref 0.5–0.9)
EOSINOPHIL # BLD: 0.04 K/UL (ref 0–0.4)
EOSINOPHILS RELATIVE PERCENT: 1 % (ref 1–4)
ERYTHROCYTE [DISTWIDTH] IN BLOOD BY AUTOMATED COUNT: 14.8 % (ref 11.8–14.4)
GFR, ESTIMATED: 54 ML/MIN/1.73M2
GLUCOSE SERPL-MCNC: 84 MG/DL (ref 75–121)
HCT VFR BLD AUTO: 41.1 % (ref 36.3–47.1)
HGB BLD-MCNC: 12.4 G/DL (ref 11.9–15.1)
IMM GRANULOCYTES # BLD AUTO: 0 K/UL (ref 0–0.3)
IMM GRANULOCYTES NFR BLD: 0 %
LYMPHOCYTES NFR BLD: 0.97 K/UL (ref 1–4.8)
LYMPHOCYTES RELATIVE PERCENT: 23 % (ref 24–44)
MCH RBC QN AUTO: 32.5 PG (ref 25.2–33.5)
MCHC RBC AUTO-ENTMCNC: 30.2 G/DL (ref 28.4–34.8)
MCV RBC AUTO: 107.6 FL (ref 82.6–102.9)
MONOCYTES NFR BLD: 0.42 K/UL (ref 0.2–0.8)
MONOCYTES NFR BLD: 10 % (ref 1–7)
NEUTROPHILS NFR BLD: 66 % (ref 36–66)
NEUTS SEG NFR BLD: 2.77 K/UL (ref 1.8–7.7)
NRBC BLD-RTO: 0 PER 100 WBC
PLATELET # BLD AUTO: 99 K/UL (ref 138–453)
PMV BLD AUTO: 11.7 FL (ref 8.1–13.5)
POTASSIUM SERPL-SCNC: 4.4 MMOL/L (ref 3.7–5.3)
RBC # BLD AUTO: 3.82 M/UL (ref 3.95–5.11)
SODIUM SERPL-SCNC: 143 MMOL/L (ref 136–145)
WBC OTHER # BLD: 4.2 K/UL (ref 3.5–11.3)

## 2024-11-22 PROCEDURE — 94761 N-INVAS EAR/PLS OXIMETRY MLT: CPT

## 2024-11-22 PROCEDURE — 6370000000 HC RX 637 (ALT 250 FOR IP): Performed by: FAMILY MEDICINE

## 2024-11-22 PROCEDURE — 97530 THERAPEUTIC ACTIVITIES: CPT

## 2024-11-22 PROCEDURE — 80048 BASIC METABOLIC PNL TOTAL CA: CPT

## 2024-11-22 PROCEDURE — 2700000000 HC OXYGEN THERAPY PER DAY

## 2024-11-22 PROCEDURE — 97535 SELF CARE MNGMENT TRAINING: CPT

## 2024-11-22 PROCEDURE — 85025 COMPLETE CBC W/AUTO DIFF WBC: CPT

## 2024-11-22 PROCEDURE — 6360000002 HC RX W HCPCS: Performed by: NURSE PRACTITIONER

## 2024-11-22 PROCEDURE — 97116 GAIT TRAINING THERAPY: CPT

## 2024-11-22 PROCEDURE — 36415 COLL VENOUS BLD VENIPUNCTURE: CPT

## 2024-11-22 PROCEDURE — 2580000003 HC RX 258: Performed by: NURSE PRACTITIONER

## 2024-11-22 PROCEDURE — 2060000000 HC ICU INTERMEDIATE R&B

## 2024-11-22 PROCEDURE — 94640 AIRWAY INHALATION TREATMENT: CPT

## 2024-11-22 RX ORDER — METOPROLOL TARTRATE 25 MG/1
12.5 TABLET, FILM COATED ORAL 2 TIMES DAILY
Qty: 60 TABLET | Refills: 1 | Status: ON HOLD | OUTPATIENT
Start: 2024-11-22

## 2024-11-22 RX ADMIN — HEPARIN SODIUM 5000 UNITS: 5000 INJECTION INTRAVENOUS; SUBCUTANEOUS at 22:25

## 2024-11-22 RX ADMIN — TIMOLOL MALEATE 1 DROP: 5 SOLUTION OPHTHALMIC at 09:52

## 2024-11-22 RX ADMIN — HEPARIN SODIUM 5000 UNITS: 5000 INJECTION INTRAVENOUS; SUBCUTANEOUS at 09:52

## 2024-11-22 RX ADMIN — SODIUM CHLORIDE, PRESERVATIVE FREE 10 ML: 5 INJECTION INTRAVENOUS at 09:54

## 2024-11-22 RX ADMIN — ASPIRIN 81 MG 81 MG: 81 TABLET ORAL at 09:52

## 2024-11-22 RX ADMIN — BUDESONIDE AND FORMOTEROL FUMARATE DIHYDRATE 2 PUFF: 160; 4.5 AEROSOL RESPIRATORY (INHALATION) at 08:17

## 2024-11-22 RX ADMIN — PRAVASTATIN SODIUM 20 MG: 40 TABLET ORAL at 09:52

## 2024-11-22 RX ADMIN — SODIUM CHLORIDE, PRESERVATIVE FREE 10 ML: 5 INJECTION INTRAVENOUS at 22:25

## 2024-11-22 RX ADMIN — FUROSEMIDE 20 MG: 10 INJECTION, SOLUTION INTRAMUSCULAR; INTRAVENOUS at 09:53

## 2024-11-22 RX ADMIN — TAMSULOSIN HYDROCHLORIDE 0.4 MG: 0.4 CAPSULE ORAL at 09:52

## 2024-11-22 RX ADMIN — BUDESONIDE AND FORMOTEROL FUMARATE DIHYDRATE 2 PUFF: 160; 4.5 AEROSOL RESPIRATORY (INHALATION) at 19:18

## 2024-11-22 RX ADMIN — TIMOLOL MALEATE 1 DROP: 5 SOLUTION OPHTHALMIC at 22:25

## 2024-11-22 NOTE — PLAN OF CARE
Problem: Respiratory - Adult  Goal: Achieves optimal ventilation and oxygenation  11/21/2024 1929 by Jalyn Arriaga, RCGUY  Outcome: Progressing

## 2024-11-22 NOTE — CARE COORDINATION
Social Work-Received auth. [facility] can admit today. [facility] will transport. Orders faxed. Nurs to call report to [phone]. HENS completed. Pt and son are agreeable with dc plans. Dwayne

## 2024-11-22 NOTE — PROGRESS NOTES
Physical Therapy  Facility/Department: [facility]  Rehabilitation Physical Therapy Treatment Note    NAME: Edyta Gruber  : 1924 (89 y.o.)  MRN: [id number]  CODE STATUS: Full Code    Date of Service: 24     Pt currently functioning below baseline.  Recommend daily inpatient skilled therapy at time of discharge to maximize long term outcomes and prevent re-admission. Please refer to AM-PAC score for current level of function.     Restrictions:  Restrictions/Precautions: General Precautions, Fall Risk  Position Activity Restriction  Other position/activity restrictions: Up with assist, Heels off bed at all times, telemetry, 2L NCO2, Ext urinary catheter, LUE IV, ALARMS     SUBJECTIVE  Subjective  Subjective: Patient up spine in bed upon therapists arrival.  Patient agreeable to PT treatment. RN states patient appropriate for PT treatment. HR at 118 bpm upon arrival.    OBJECTIVE  Cognition  Overall Cognitive Status: Exceptions  Arousal/Alertness: Delayed responses to stimuli  Following Commands: Follows one step commands with repetition;Follows one step commands with increased time  Attention Span: Attends with cues to redirect  Memory: Decreased short term memory  Safety Judgement: Decreased awareness of need for safety;Decreased awareness of need for assistance  Problem Solving: Assistance required to generate solutions;Assistance required to identify errors made;Assistance required to implement solutions;Assistance required to correct errors made;Decreased awareness of errors  Insights: Not aware of deficits  Initiation: Requires cues for all  Sequencing: Requires cues for all  Orientation  Overall Orientation Status: Impaired  Orientation Level: Oriented to place;Oriented to situation;Oriented to person;Disoriented to time    Functional Mobility  Bed Mobility  Overall Assistance Level: Minimal Assistance  Additional Factors: Set-up;Verbal cues;Increased time to complete;Head of bed raised;With

## 2024-11-22 NOTE — CARE COORDINATION
Social Work-Auth is pending for [employer] [facility]. Dwayne

## 2024-11-22 NOTE — PLAN OF CARE
Problem: Discharge Planning  Goal: Discharge to home or other facility with appropriate resources  11/21/2024 2306 by Vidhya Price RN  Outcome: Progressing  Flowsheets (Taken 11/21/2024 2000)  Discharge to home or other facility with appropriate resources:   Identify barriers to discharge with patient and caregiver   Arrange for needed discharge resources and transportation as appropriate   Identify discharge learning needs (meds, wound care, etc)   Arrange for interpreters to assist at discharge as needed   Refer to discharge planning if patient needs post-hospital services based on physician order or complex needs related to functional status, cognitive ability or social support system     Problem: Skin/Tissue Integrity  Goal: Absence of new skin breakdown  Description: 1.  Monitor for areas of redness and/or skin breakdown  2.  Assess vascular access sites hourly  3.  Every 4-6 hours minimum:  Change oxygen saturation probe site  4.  Every 4-6 hours:  If on nasal continuous positive airway pressure, respiratory therapy assess nares and determine need for appliance change or resting period.  11/21/2024 2306 by Vidhya Price RN  Outcome: Progressing     Problem: Safety - Adult  Goal: Free from fall injury  11/21/2024 2306 by Vidhya Price, RN  Outcome: Progressing  Flowsheets (Taken 11/21/2024 2000)  Free From Fall Injury:   Instruct family/caregiver on patient safety   Based on caregiver fall risk screen, instruct family/caregiver to ask for assistance with transferring infant if caregiver noted to have fall risk factors     Problem: ABCDS Injury Assessment  Goal: Absence of physical injury  11/21/2024 2306 by Vidhya Price, RN  Outcome: Progressing     Problem: Respiratory - Adult  Goal: Achieves optimal ventilation and oxygenation  11/21/2024 2306 by Vidhya Price RN  Outcome: Progressing  Flowsheets (Taken 11/21/2024 2000)  Achieves optimal ventilation and oxygenation:   Assess for changes in

## 2024-11-22 NOTE — FLOWSHEET NOTE
11/21/24 2019   Treatment Team Notification   Reason for Communication Review case   Name of Team Member Notified Verona Luna   Treatment Team Role Advanced Practice Nurse   Method of Communication Secure Message   Response No new orders   Notification Time 2023         Writer read in note from cardiology rounds today 11/21 for orders to \"Continue metoprolol 12.5 mg p.o. twice daily and hold for systolic blood pressure less than 90.\" Metoprolol is still held in the MAR. Just wanted to clarify order/if you want me to unhold and add parameters to metop order?\"      Per Verona Luna, no new orders at this time, continue to hold metoprolol HR <100. Cardiology will reevaluate tomorrow.

## 2024-11-22 NOTE — PROGRESS NOTES
time      Balance  Sitting:  (MOD-MIN to even SBA and fluctuatues due to posterior lean with dangle for self care)  Standing:  (MOD-MIN x2 with RW and pt with posterior lean)  Transfer Training  Transfer Training: Yes  Overall Level of Assistance: Moderate assistance;Assist X2;Additional time;Minimum assistance (with RW)  Interventions: Demonstration;Safety awareness training;Tactile cues (MAX cues/tactile assist and demo for B hand placement pushing from surface seated on vs RW, nose over toes tech, pursed lip breathing, upright posture/weight shifting and forward flexion, pacing, squaring self/AD up to surface prior to sitting/controlled stand to sits and awareness/assist with mult lines to increase safety   Sit to Stand: Moderate assistance;Assist X2;Minimum assistance  Stand to Sit: Moderate assistance;Assist X2;Minimum assistance  Bed to Chair: Moderate assistance;Assist X2;Minimum assistance  Toilet Transfer:  (N/T and pt has ext cath)     ADL  Feeding: Setup;Minimal assistance  Feeding Skilled Clinical Factors: to add straw to coffee cup per pt's request  Grooming: Setup;Minimal assistance  Grooming Skilled Clinical Factors: for oral care seated in recliner; set up/SBA for hair care as well in seated position  UE Bathing: Setup;Minimal assistance  UE Bathing Skilled Clinical Factors: for sponge bath seated EOB and RN and PCT were informed; cues needed for sequencing  LE Bathing: Setup;Dependent/Total  LE Bathing Skilled Clinical Factors: for dandy area supine in bed  UE Dressing: Setup;Maximum assistance  UE Dressing Skilled Clinical Factors: don button up pj top  LE Dressing: Dependent/Total  LE Dressing Skilled Clinical Factors: for B socks and brief supine in bed; DEP to thread BLE's in pj bottom and x2 staff to stand with RW for clothing mgt up  Toileting: Dependent/Total (ext cath)  Toileting Skilled Clinical Factors: to doff/cindy brief and ext cath  Functional Mobility: Minimal assistance;Moderate

## 2024-11-22 NOTE — PROGRESS NOTES
Pt will stay another night and may discharge in the AM. Will need portable O2 for transportation.

## 2024-11-22 NOTE — PROGRESS NOTES
hours.  BMP:  Recent Labs     11/21/24  0352 11/22/24  0330   * 143   K 4.1 4.4   CALCIUM 9.0 9.0   CO2 37* 35*   BUN 25* 24*   CREATININE 0.9 0.9   LABGLOM 57* 54*   GLUCOSE 62* 84     BNP:  No results for input(s): \"BNP\", \"PROBNP\" in the last 72 hours.    PT/INR:  No results for input(s): \"PROTIME\", \"INR\" in the last 72 hours.    APTT:No results for input(s): \"APTT\" in the last 72 hours.  CARDIAC ENZYMES:  No results for input(s): \"MYOGLOBIN\", \"CKTOTAL\", \"CKMB\", \"CKMBINDEX\", \"TROPHS\", \"TROPONINT\" in the last 72 hours.    FASTING LIPID PANEL:  Lab Results   Component Value Date/Time    HDL 44 10/17/2024 06:35 AM    TRIG 103 10/17/2024 06:35 AM     LIVER PROFILE:No results for input(s): \"AST\", \"ALT\", \"LABALBU\", \"ALKPHOS\", \"BILITOT\", \"BILIDIR\", \"IBILI\", \"GLOB\", \"ALBUMIN\" in the last 72 hours.    Invalid input(s): \"PROT\"     ASSESSMENT:    Permanent atrial fibrillation   Acute diastolic heart failure   Bilateral pleural effusions   4. CKD III   5. Moderate aortic stenosis      Plan      Currently rate is better controlled .  Please continue gentle diuresis with Lasix 20 mg IV daily     Has permanent A-fib  .  Home Cardizem dose is on hold.  Continue metoprolol 12.5 mg p.o. twice daily and hold for systolic blood pressure less than 90.     Probably has moderate aortic stenosis with low-flow low gradient.  Most likely due to diastolic heart dysfunction    Patient has not been on anticoagulation, to be discussed as outpatient with her Cardiologist     Discussed with  nursing and patient     Norman Ayers MD

## 2024-11-22 NOTE — DISCHARGE INSTR - COC
Continuity of Care Form    Patient Name: Edyta Gruber   :  1924  MRN:  [id number]    Admit date:  2024  Discharge date:  24    Code Status Order: Full Code   Advance Directives:   Advance Care Flowsheet Documentation             Admitting Physician:  Earnestine Ibanez MD  PCP: Florencia Talley MD    Discharging Nurse: Cristian PAYNE  Discharging Hospital Unit/Room#:   Discharging Unit Phone Number: [phone]    Emergency Contact:   Extended Emergency Contact Information  Primary Emergency Contact: Ritchie Gruber   [facility]  Home Phone: [phone]  Work Phone: [phone]  Relation: Child  Secondary Emergency Contact: Adriana Sifuentes   [facility]  Home Phone: [phone]  Relation: Niece/Nephew    Past Surgical History:  Past Surgical History:   Procedure Laterality Date    BACK SURGERY      DEBRIDEMENT Left 2017    rt. leg    OTHER SURGICAL HISTORY  2017    Right leg Angio    OTHER SURGICAL HISTORY  2017    Right leg angio    MA I&D DEEP ABSC BURSA/HEMATOMA THIGH/KNEE REGION Right 3/9/2017    DEBRIDEMENT CALF WOUND  performed by Arthur Delos Reyes, MD at [facility]    MA I&D DEEP ABSC BURSA/HEMATOMA THIGH/KNEE REGION Right 2017    RIGHT LOWER EXTREMITY DEBRIDEMENT, INTEGRA APPLICATION, PREVENA VAC APPLICATION LOWR RIGHT LEG performed by Arthur Delos Reyes, MD at [facility] OR       Immunization History:   Immunization History   Administered Date(s) Administered    COVID-19, PFIZER GRAY top, DO NOT Dilute, (age 12 y+), IM, 30 mcg/0.3 mL 2022    COVID-19, PFIZER PURPLE top, DILUTE for use, (age 12 y+), 30mcg/0.3mL 2021, 2021, 10/28/2021    Influenza, FLUZONE High Dose (age 65 y+), IM, Quadv, 0.7mL 2020, 2023    TDaP, ADACEL (age 10y-64y), BOOSTRIX (age 10y+), IM, 0.5mL 2020       Active Problems:  Patient Active Problem List   Diagnosis Code    Non-pressure ulcer of left lower extremity, limited to breakdown

## 2024-11-22 NOTE — PROGRESS NOTES
End Of Shift Note  [facility]    Summary of shift: Uneventful shift. VSS/remain unchanged. Patient slept well throughout the night. Plan of care continues; potential d/c today to [facility].    Vitals:    Vitals:    11/21/24 1942 11/21/24 2000 11/21/24 2321 11/22/24 0342   BP: 106/78 (!) 127/90 126/89 117/73   Pulse: 95 86 (!) 103 96   Resp: 14 16 16 16   Temp: 97.9 °F (36.6 °C) 97.6 °F (36.4 °C) 97.9 °F (36.6 °C) 97.9 °F (36.6 °C)   TempSrc: Temporal Oral Temporal Temporal   SpO2: 99% 100% 100% 100%   Weight:       Height:            I&O:   Intake/Output Summary (Last 24 hours) at 11/22/2024 0509  Last data filed at 11/22/2024 0000  Gross per 24 hour   Intake 250 ml   Output 525 ml   Net -275 ml       Resp Status: 2L NC    Ventilator Settings:     / / /     Critical Care IV infusions:   sodium chloride          LDA:   Peripheral IV 11/18/24 Distal;Left Forearm (Active)   Number of days: 3       External Urinary Catheter (Active)   Number of days: 3       Wound 11/30/23 Sacrum (Active)   Number of days: 357

## 2024-11-22 NOTE — PLAN OF CARE
Problem: Respiratory - Adult  Goal: Achieves optimal ventilation and oxygenation  11/22/2024 0821 by Ava Otto, RCP  Outcome: Progressing  11/21/2024 2306 by Vidhya Price RN  Outcome: Progressing  Flowsheets (Taken 11/21/2024 2000)  Achieves optimal ventilation and oxygenation:   Assess for changes in respiratory status   Assess for changes in mentation and behavior   Position to facilitate oxygenation and minimize respiratory effort   Oxygen supplementation based on oxygen saturation or arterial blood gases   Initiate smoking cessation protocol as indicated   Encourage broncho-pulmonary hygiene including cough, deep breathe, incentive spirometry   Assess the need for suctioning and aspirate as needed   Assess and instruct to report shortness of breath or any respiratory difficulty   Respiratory therapy support as indicated  11/21/2024 1929 by Jalyn Arriaga, RCP  Outcome: Progressing

## 2024-11-22 NOTE — CARE COORDINATION
DC Planning    Spoke with CHRISTIE anderson pending for [facility]. FORD NEEDS SIGNED/NEED MED REC/DC ORDER.     Pt is on 2l.

## 2024-11-22 NOTE — PLAN OF CARE
Problem: Discharge Planning  Goal: Discharge to home or other facility with appropriate resources  Outcome: Adequate for Discharge     Problem: Skin/Tissue Integrity  Goal: Absence of new skin breakdown  Description: 1.  Monitor for areas of redness and/or skin breakdown  2.  Assess vascular access sites hourly  3.  Every 4-6 hours minimum:  Change oxygen saturation probe site  4.  Every 4-6 hours:  If on nasal continuous positive airway pressure, respiratory therapy assess nares and determine need for appliance change or resting period.  Outcome: Adequate for Discharge     Problem: Safety - Adult  Goal: Free from fall injury  Outcome: Adequate for Discharge     Problem: ABCDS Injury Assessment  Goal: Absence of physical injury  Outcome: Adequate for Discharge     Problem: Respiratory - Adult  Goal: Achieves optimal ventilation and oxygenation  11/22/2024 1616 by Cristian Freeman RN  Outcome: Adequate for Discharge  11/22/2024 0821 by Ava Otto, P  Outcome: Progressing     Problem: Cardiovascular - Adult  Goal: Maintains optimal cardiac output and hemodynamic stability  Outcome: Adequate for Discharge

## 2024-11-23 VITALS
TEMPERATURE: 96.8 F | SYSTOLIC BLOOD PRESSURE: 100 MMHG | RESPIRATION RATE: 16 BRPM | HEIGHT: 62 IN | DIASTOLIC BLOOD PRESSURE: 65 MMHG | HEART RATE: 92 BPM | WEIGHT: 106.2 LBS | BODY MASS INDEX: 19.54 KG/M2 | OXYGEN SATURATION: 100 %

## 2024-11-23 LAB
ANION GAP SERPL CALCULATED.3IONS-SCNC: 5 MMOL/L (ref 9–16)
BASOPHILS # BLD: 0.03 K/UL (ref 0–0.2)
BASOPHILS NFR BLD: 1 % (ref 0–2)
BUN SERPL-MCNC: 22 MG/DL (ref 8–23)
CALCIUM SERPL-MCNC: 9 MG/DL (ref 8.2–9.6)
CHLORIDE SERPL-SCNC: 102 MMOL/L (ref 98–107)
CO2 SERPL-SCNC: 36 MMOL/L (ref 20–31)
CREAT SERPL-MCNC: 0.8 MG/DL (ref 0.5–0.9)
EOSINOPHIL # BLD: 0.13 K/UL (ref 0–0.44)
EOSINOPHILS RELATIVE PERCENT: 4 % (ref 1–4)
ERYTHROCYTE [DISTWIDTH] IN BLOOD BY AUTOMATED COUNT: 14.6 % (ref 11.8–14.4)
GFR, ESTIMATED: 68 ML/MIN/1.73M2
GLUCOSE SERPL-MCNC: 62 MG/DL (ref 75–121)
HCT VFR BLD AUTO: 37.7 % (ref 36.3–47.1)
HGB BLD-MCNC: 11.5 G/DL (ref 11.9–15.1)
IMM GRANULOCYTES # BLD AUTO: 0 K/UL (ref 0–0.3)
IMM GRANULOCYTES NFR BLD: 0 %
LYMPHOCYTES NFR BLD: 0.61 K/UL (ref 1.1–3.7)
LYMPHOCYTES RELATIVE PERCENT: 19 % (ref 24–43)
MCH RBC QN AUTO: 32.4 PG (ref 25.2–33.5)
MCHC RBC AUTO-ENTMCNC: 30.5 G/DL (ref 28.4–34.8)
MCV RBC AUTO: 106.2 FL (ref 82.6–102.9)
MONOCYTES NFR BLD: 0.29 K/UL (ref 0.1–1.2)
MONOCYTES NFR BLD: 9 % (ref 3–12)
NEUTROPHILS NFR BLD: 67 % (ref 36–65)
NEUTS SEG NFR BLD: 2.14 K/UL (ref 1.5–8.1)
NRBC BLD-RTO: 0 PER 100 WBC
PLATELET # BLD AUTO: 96 K/UL (ref 138–453)
PMV BLD AUTO: 11.6 FL (ref 8.1–13.5)
POTASSIUM SERPL-SCNC: 4 MMOL/L (ref 3.7–5.3)
RBC # BLD AUTO: 3.55 M/UL (ref 3.95–5.11)
SODIUM SERPL-SCNC: 143 MMOL/L (ref 136–145)
WBC OTHER # BLD: 3.2 K/UL (ref 3.5–11.3)

## 2024-11-23 PROCEDURE — 2700000000 HC OXYGEN THERAPY PER DAY

## 2024-11-23 PROCEDURE — 85025 COMPLETE CBC W/AUTO DIFF WBC: CPT

## 2024-11-23 PROCEDURE — 97530 THERAPEUTIC ACTIVITIES: CPT

## 2024-11-23 PROCEDURE — 36415 COLL VENOUS BLD VENIPUNCTURE: CPT

## 2024-11-23 PROCEDURE — 97110 THERAPEUTIC EXERCISES: CPT

## 2024-11-23 PROCEDURE — 94761 N-INVAS EAR/PLS OXIMETRY MLT: CPT

## 2024-11-23 PROCEDURE — 80048 BASIC METABOLIC PNL TOTAL CA: CPT

## 2024-11-23 PROCEDURE — 97116 GAIT TRAINING THERAPY: CPT

## 2024-11-23 PROCEDURE — 6360000002 HC RX W HCPCS: Performed by: NURSE PRACTITIONER

## 2024-11-23 PROCEDURE — 6370000000 HC RX 637 (ALT 250 FOR IP): Performed by: FAMILY MEDICINE

## 2024-11-23 PROCEDURE — 94640 AIRWAY INHALATION TREATMENT: CPT

## 2024-11-23 RX ADMIN — PRAVASTATIN SODIUM 20 MG: 40 TABLET ORAL at 08:51

## 2024-11-23 RX ADMIN — ASPIRIN 81 MG 81 MG: 81 TABLET ORAL at 08:51

## 2024-11-23 RX ADMIN — BUDESONIDE AND FORMOTEROL FUMARATE DIHYDRATE 2 PUFF: 160; 4.5 AEROSOL RESPIRATORY (INHALATION) at 07:15

## 2024-11-23 RX ADMIN — TIMOLOL MALEATE 1 DROP: 5 SOLUTION OPHTHALMIC at 08:50

## 2024-11-23 RX ADMIN — TAMSULOSIN HYDROCHLORIDE 0.4 MG: 0.4 CAPSULE ORAL at 08:50

## 2024-11-23 RX ADMIN — HEPARIN SODIUM 5000 UNITS: 5000 INJECTION INTRAVENOUS; SUBCUTANEOUS at 08:50

## 2024-11-23 RX ADMIN — FUROSEMIDE 20 MG: 10 INJECTION, SOLUTION INTRAMUSCULAR; INTRAVENOUS at 08:50

## 2024-11-23 NOTE — PROGRESS NOTES
End Of Shift Note  [facility]  Summary of shift: Night was uneventful. Pt appeared to sleep well.Denies discomfort. Remains atrial fib. Awaiting D/C to [facility]    Vitals:    Vitals:    11/22/24 2000 11/23/24 0000 11/23/24 0320 11/23/24 0400   BP: 115/76 120/84  109/71   Pulse: 94 89 91 100   Resp: 18      Temp: 97.3 °F (36.3 °C) 96.9 °F (36.1 °C)  96.9 °F (36.1 °C)   TempSrc: Temporal Temporal  Temporal   SpO2: 100% 100%     Weight:    48.2 kg (106 lb 3.2 oz)   Height:            I&O:   Intake/Output Summary (Last 24 hours) at 11/23/2024 0614  Last data filed at 11/22/2024 2225  Gross per 24 hour   Intake 10 ml   Output --   Net 10 ml       Resp Status: O2 @ 2 L    Ventilator Settings:     / / /     Critical Care IV infusions:   sodium chloride          LDA:   Peripheral IV 11/18/24 Distal;Left Forearm (Active)   Number of days: 4       External Urinary Catheter (Active)   Number of days: 4       Wound 11/30/23 Sacrum (Active)   Number of days: 358

## 2024-11-23 NOTE — PROGRESS NOTES
[facility]    Adult Hospitalist      Name: Edyta Gruber  MRN: [id number]     Acct: [id number]  Room: 2037/2037-01    Admit Date: 11/18/2024  6:55 PM  PCP: Florencia Talley MD    Primary Problem  Principal Problem:    Acute exacerbation of chronic heart failure (HCC)  Resolved Problems:    * No resolved hospital problems. *        Assesment/plan:     Acute diastolic congestive heart failure/bilateral pleural effusions  Lasix IV 20 mg  Daily weights  I's and O's  Discharge plan once okay with cardiology  Plan switch Lasix IV to p.o.    Permanent atrial fibrillation, rate better controlled  Aspirin  Not on anticoagulation per her cardiologist  Diltiazem on hold  Metoprolol 12.5 twice daily with parameters  Patient had episodes of bradycardia but denied any weakness or dizziness.  Heart rate continues to fluctuate    Chronic kidney disease stage IIIa  Monitor BMP    Moderate aortic stenosis    Moderate persistent asthma  Symbicort  Albuterol as needed  RT eval    Glaucoma  Timoptic     Mixed hyperlipidemia  Pravastatin    Hearing impairment    Urinary retention  Tamsulosin      DC plan later today  CBC, BMP  Incentive spirometry  DVT and GI prophylaxis.        Chief Complaint:     Chief Complaint   Patient presents with    Shortness of Breath         History of Present Illness:        Patient seen and examined at bedside  Last 24-hour events reviewed with nursing  Patient has limited activity  She is mostly restricted to her chair or bed  Dyspnea has improved  Pedal edema has improved  Cardiology okay with discharge  Son planned to transport patient to ECF  Patient has required 2 L oxygen via nasal cannula continuously  However no O2 available for transportation  Discharge was held.  Arrangements made for transportation via ambulance by social work  Denies chest pain, headache, fever, abdominal pain, dysuria      HPI  Edyta Gruber is a 89 y.o.  female who presents with Shortness of

## 2024-11-23 NOTE — PLAN OF CARE
Problem: Discharge Planning  Goal: Discharge to home or other facility with appropriate resources  11/23/2024 0952 by Amarilys Stout RN  Outcome: Progressing  11/23/2024 0032 by Yuridia Colon RN  Outcome: Progressing  Flowsheets (Taken 11/22/2024 2000)  Discharge to home or other facility with appropriate resources:   Identify barriers to discharge with patient and caregiver   Arrange for needed discharge resources and transportation as appropriate   Refer to discharge planning if patient needs post-hospital services based on physician order or complex needs related to functional status, cognitive ability or social support system     Problem: Skin/Tissue Integrity  Goal: Absence of new skin breakdown  Description: 1.  Monitor for areas of redness and/or skin breakdown  2.  Assess vascular access sites hourly  3.  Every 4-6 hours minimum:  Change oxygen saturation probe site  4.  Every 4-6 hours:  If on nasal continuous positive airway pressure, respiratory therapy assess nares and determine need for appliance change or resting period.  11/23/2024 0952 by Amarilys Stout RN  Outcome: Progressing  11/23/2024 0032 by Yuridia Colon RN  Outcome: Progressing     Problem: Safety - Adult  Goal: Free from fall injury  11/23/2024 0952 by Amarilys Stout RN  Outcome: Progressing  11/23/2024 0032 by Yuridia Colon RN  Outcome: Progressing  Flowsheets (Taken 11/22/2024 2000)  Free From Fall Injury:   Instruct family/caregiver on patient safety   Based on caregiver fall risk screen, instruct family/caregiver to ask for assistance with transferring infant if caregiver noted to have fall risk factors     Problem: ABCDS Injury Assessment  Goal: Absence of physical injury  11/23/2024 0952 by Amarilys Stout RN  Outcome: Progressing  11/23/2024 0032 by Yuridia Colon RN  Outcome: Progressing  Flowsheets (Taken 11/22/2024 2000)  Absence of Physical Injury: Implement safety measures based on patient  assessment     Problem: Respiratory - Adult  Goal: Achieves optimal ventilation and oxygenation  11/23/2024 0952 by Amarilys Stout RN  Outcome: Progressing  11/23/2024 0032 by Yuridia Colon RN  Outcome: Progressing  Flowsheets (Taken 11/22/2024 2000)  Achieves optimal ventilation and oxygenation:   Assess for changes in respiratory status   Assess for changes in mentation and behavior   Respiratory therapy support as indicated     Problem: Cardiovascular - Adult  Goal: Maintains optimal cardiac output and hemodynamic stability  11/23/2024 0952 by Amarilys Stout RN  Outcome: Progressing  11/23/2024 0032 by Yuridia Colon RN  Outcome: Progressing  Flowsheets (Taken 11/22/2024 2000)  Maintains optimal cardiac output and hemodynamic stability:   Monitor blood pressure and heart rate   Monitor urine output and notify Licensed Independent Practitioner for values outside of normal range

## 2024-11-23 NOTE — PLAN OF CARE
Problem: Discharge Planning  Goal: Discharge to home or other facility with appropriate resources  11/23/2024 0032 by Yuridia Colon RN  Outcome: Progressing  Flowsheets (Taken 11/22/2024 2000)  Discharge to home or other facility with appropriate resources:   Identify barriers to discharge with patient and caregiver   Arrange for needed discharge resources and transportation as appropriate   Refer to discharge planning if patient needs post-hospital services based on physician order or complex needs related to functional status, cognitive ability or social support system  11/22/2024 1616 by Cristian Freeman RN  Outcome: Adequate for Discharge     Problem: Skin/Tissue Integrity  Goal: Absence of new skin breakdown  Description: 1.  Monitor for areas of redness and/or skin breakdown  2.  Assess vascular access sites hourly  3.  Every 4-6 hours minimum:  Change oxygen saturation probe site  4.  Every 4-6 hours:  If on nasal continuous positive airway pressure, respiratory therapy assess nares and determine need for appliance change or resting period.  11/23/2024 0032 by Yuridia Colon RN  Outcome: Progressing  11/22/2024 1616 by Cristian Freeman RN  Outcome: Adequate for Discharge     Problem: Safety - Adult  Goal: Free from fall injury  11/23/2024 0032 by Yuridia Colon RN  Outcome: Progressing  Flowsheets (Taken 11/22/2024 2000)  Free From Fall Injury:   Instruct family/caregiver on patient safety   Based on caregiver fall risk screen, instruct family/caregiver to ask for assistance with transferring infant if caregiver noted to have fall risk factors  11/22/2024 1616 by Cristian Freeman RN  Outcome: Adequate for Discharge     Problem: ABCDS Injury Assessment  Goal: Absence of physical injury  11/23/2024 0032 by Yuridia Colon RN  Outcome: Progressing  Flowsheets (Taken 11/22/2024 2000)  Absence of Physical Injury: Implement safety measures based on patient assessment  11/22/2024 1616 by Pete

## 2024-11-23 NOTE — CARE COORDINATION
Social work spoke to ben of [facility] [place] ok [facility] today [facility] Will need eber and Rx at discharge.   Phone [phone] for report  Fax [phone]  Radha marie    Social work updated  faxing eber now.  Radha marie

## 2024-11-23 NOTE — CARE COORDINATION
Social work: spoke to dereck Dugan [phone] to advise of 5:30 pm transfer via [facility] to [facility]. Hens completed and fax. Zarina faxed.   Phone: [phone]  Fax [phone]  Radha marie    Social work: updated dereck Dugan snf wants pt before that time due to pharmacy concerns. So buddy is going to come at 2:30 pm and take pt to snf. Snf aware of time change. Floor aware of time change.. Radha marie

## 2024-11-23 NOTE — PROGRESS NOTES
[facility]    Adult Hospitalist      Name: Edyta Gruber  MRN: [id number]     Acct: [id number]  Room: 2037/2037-01    Admit Date: 11/18/2024  6:55 PM  PCP: Florencia Talley MD    Primary Problem  Principal Problem:    Acute exacerbation of chronic heart failure (HCC)  Resolved Problems:    * No resolved hospital problems. *        Assesment/plan:     Acute diastolic congestive heart failure/bilateral pleural effusions  Lasix IV 20 mg  Daily weights  I's and O's  Discharge plan once okay with cardiology    Permanent atrial fibrillation, rate better controlled  Aspirin  Not on anticoagulation per her cardiologist  Diltiazem on hold  Metoprolol 12.5 twice daily with parameters    Chronic kidney disease stage IIIa    Moderate aortic stenosis    Moderate persistent asthma  Symbicort  Albuterol as needed  RT eval    Glaucoma  Timoptic     Mixed hyperlipidemia  Pravastatin    Hearing impairment    Urinary retention  Tamsulosin      DC plan later today  CBC, BMP  Incentive spirometry  DVT and GI prophylaxis.        Chief Complaint:     Chief Complaint   Patient presents with    Shortness of Breath         History of Present Illness:        Patient seen   Last 24-hour events reviewed with nursing  Patient says she feels better  Dyspnea improved  Pedal edema improved  Denies chest pain, headache, fever, abdominal pain, dysuria      HPI  Edyta Gruber is a 89 y.o.  female who presents with Shortness of Breath    Patient admitted to the emergency room where she presented with progressive dyspnea and fatigue over the last 2 days.  Patient had complained of dyspnea on exertion and later at rest patient had also been coughing for the last few days.  Occasionally they noted yellow phlegm.  Patient has not had any rhinorrhea, facial pressure, wheezing or fever.  Patient has had pedal edema which has been persistent    Denies chest pain, headache, vision change, vomiting, abdominal pain, dysuria    I have  disorder noted, cranial nerves II through XII grossly intact  Extremities -bilateral pedal edema with anterior erythema-improved  Skin - no gross lesions, rashes, or induration noted        Data:     Labs:    Hematology:  Recent Labs     11/21/24 0352 11/22/24 0330 11/23/24  0533   WBC 3.5 4.2 3.2*   RBC 3.69* 3.82* 3.55*   HGB 12.0 12.4 11.5*   HCT 39.9 41.1 37.7   .1* 107.6* 106.2*   MCH 32.5 32.5 32.4   MCHC 30.1 30.2 30.5   RDW 14.8* 14.8* 14.6*   PLT 97* 99* 96*   MPV 11.5 11.7 11.6     Chemistry:  Recent Labs     11/21/24 0352 11/22/24 0330 11/23/24  0533   * 143 143   K 4.1 4.4 4.0    102 102   CO2 37* 35* 36*   GLUCOSE 62* 84 62*   BUN 25* 24* 22   CREATININE 0.9 0.9 0.8   ANIONGAP 6* 6* 5*   LABGLOM 57* 54* 68   CALCIUM 9.0 9.0 9.0     No results for input(s): \"LABALBU\", \"LABA1C\", \"X3EUWSG\", \"FT4\", \"TSH\", \"AST\", \"ALT\", \"LDH\", \"GGT\", \"ALKPHOS\", \"BILITOT\", \"BILIDIR\", \"AMMONIA\", \"AMYLASE\", \"LIPASE\", \"LACTATE\", \"CHOL\", \"HDL\", \"CHOLHDLRATIO\", \"TRIG\", \"VLDL\", \"DDF17LO\", \"PHENYTOIN\", \"PHENYF\", \"URICACID\", \"POCGLU\" in the last 72 hours.    Invalid input(s): \"PROT\", \"I3JVBQI\", \"LABGGT\", \"LDLCHOLESTEROL\"    Lab Results   Component Value Date    INR 1.1 11/19/2024    INR 1.6 10/10/2024    INR 1.4 11/09/2023    PROTIME 14.1 11/19/2024    PROTIME 19.2 (H) 10/10/2024    PROTIME 16.6 (H) 11/09/2023       Lab Results   Component Value Date/Time    SPECIAL OYM110 11/16/2023 01:39 PM     Lab Results   Component Value Date/Time    CULTURE ESCHERICHIA COLI >100,000 CFU/ML (A) 10/11/2024 08:18 PM       Lab Results   Component Value Date/Time    POCPH 7.332 11/09/2023 08:10 PM    POCPCO2 73.2 11/09/2023 08:10 PM    POCPO2 70.8 11/09/2023 08:10 PM    POCHCO3 38.8 11/09/2023 08:10 PM    PBEA 8.9 11/09/2023 08:10 PM    XFJN2WTZ 91.7 11/09/2023 08:10 PM    FIO2 30.0 11/09/2023 08:10 PM       Radiology:    CT CHEST PULMONARY EMBOLISM W CONTRAST    Result Date: 11/18/2024  1.  Acute T12 vertebral body and left

## 2024-11-23 NOTE — CARE COORDINATION
AFTER VISIT SUMMARY  Edyta Gruber MRN: [id number]     Acute exacerbation of chronic heart failure (HCC)   11/18/2024 - 11/23/2024   [facility]   [phone]   Your Next Steps   Do       these medications from [facility] [address] - P [phone] - F [phone]  metoprolol tartrate    Call Dr. Florencia Talley MD in 1 week  [address] [facility] [address] [phone]    Call Dr. Cristel Enriquez MD in 1 week  [facility]  [phone]   Go    JAN 23 2025 Follow Up Appointment 3:30 PM  Dr. Lianet Dempsey MD  [facility] Card[facility]  [address]  [facility] [address] [phone]     i[employer] Sign-Up  Send messages to your doctor, view your test results, renew your prescriptions, schedule appointments, and more.  Go to [email or web address] click \"Sign Up Now\", and enter your personal activation code: [email or web address]. Activation code expires 11/29/2024.  Instructions    Your medications have changed   CHANGE how you take:  metoprolol tartrate (LOPRESSOR)    STOP taking:  dilTIAZem 120 MG extended release capsule (CARDIZEM CD)   Review your updated medication list below.        Your Visit     Here you will find information about your visit, including the reason for your visit.  Please take this sheet with you when you visit your doctor or other health care provider in the future.  It will help determine the best possible medical care for you at that time.  If you have any questions once you leave the hospital, please call the department phone number listed below. In the event of an emergency, call 911 or go to the nearest Emergency Department.  Medical and Psychiatric Advance Directives  Flowsheet Row Most Recent Value   Healthcare Advance Directive Yes, on file     Preventive Care   Date Due   Depression screening Never done   Shingles Vaccine (1 of 2) Never done  [facility] [place] [address]   Phone: [phone]        FORD Instructions  Continuity of Care Form     Patient Name: Edyta Gruber   :  1924                    MRN:  [id number]     Admit date:  2024                   Discharge date:  24     Code Status Order: Full Code   Advance Directives:   Advance Care Flowsheet Documentation                 Admitting Physician:  Earnestine Ibanez MD  PCP: Florencia Talley MD     Discharging Nurse: Cristian PAYNE  Discharging Hospital Unit/Room#:   Discharging Unit Phone Number: [phone]     Emergency Contact:   Extended Emergency Contact Information  Primary Emergency Contact: Ritchie Gruber   [facility]  Home Phone: [phone]  Work Phone: [phone]  Relation: Child  Secondary Emergency Contact: Adriana Sifuentes   [facility]  Home Phone: [phone]  Relation: Niece/Nephew     Past Surgical History:        Past Surgical History:   Procedure Laterality Date    BACK SURGERY        DEBRIDEMENT Left 2017     rt. leg    OTHER SURGICAL HISTORY   2017     Right leg Angio    OTHER SURGICAL HISTORY   2017     Right leg angio    W[place] I&D DEEP ABSC BURSA/HEMATOMA THIGH/KNEE REGION Right 3/9/2017     DEBRIDEMENT CALF WOUND  performed by Arthur Delos Reyes, MD at [facility] CVOR    WY I&D DEEP ABSC BURSA/HEMATOMA THIGH/KNEE REGION Right 2017     RIGHT LOWER EXTREMITY DEBRIDEMENT, INTEGRA APPLICATION, PREVENA VAC APPLICATION LOWR RIGHT LEG performed by Arthur Delos Reyes, MD at [facility] OR         Immunization History:        Immunization History   Administered Date(s) Administered    COVID-19, PFIZER GRAY top, DO NOT Dilute, (age 12 y+), IM, 30 mcg/0.3 mL 2022    COVID-19, PFIZER PURPLE top, DILUTE for use, (age 12 y+), 30mcg/0.3mL 2021, 2021, 10/28/2021    Influenza, FLUZONE High Dose (age 65 y+), IM, Quadv, 0.7mL 2020, 2023    TDaP, ADACEL (age 10y-64y), BOOSTRIX (age 10y+), IM, 0.5mL 2020

## 2024-11-23 NOTE — PLAN OF CARE
Problem: Respiratory - Adult  Goal: Achieves optimal ventilation and oxygenation  11/22/2024 1921 by Kj Campbell RCP  Outcome: Progressing

## 2024-11-23 NOTE — PROGRESS NOTES
Physical Therapy  Facility/Department: [facility] CVICU  Daily Treatment Note  NAME: Edyta Gruber  : 1924  MRN: [id number]    Date of Service: 2024    Discharge Recommendations:  Patient would benefit from continued therapy after discharge, 24 hour supervision or assist, Therapy recommended at discharge        Patient Diagnosis(es): The primary encounter diagnosis was Hypoxemia. A diagnosis of Bilateral pleural effusion was also pertinent to this visit.    Assessment  Assessment: Patient progressing toward STGs with overall decreased aerobic capacity, posterior lean during seated and standing mobilit/activity requiring MOD x 1 A to correct. Patient requried extended rest breaks during increased activity this session with HR WNL. Patient is below her baseline and would benefit from continued skilled PT services.  Activity Tolerance: Patient limited by fatigue;Patient limited by endurance;Treatment limited secondary to decreased cognition    Plan  Physical Therapy Plan  General Plan: 5-7 times per week  Current Treatment Recommendations: Strengthening;Balance training;Functional mobility training;Transfer training;ADL/Self-care training;Endurance training;Gait training;Home exercise program;Safety education & training;Patient/Caregiver education & training;Positioning;Therapeutic activities    Restrictions  Restrictions/Precautions  Restrictions/Precautions: General Precautions, Fall Risk  Position Activity Restriction  Other position/activity restrictions: Up with assist, Heels off bed at all times, telemetry, 2L NCO2, Ext urinary catheter, LUE IV, ALARMS     Subjective   Subjective  Subjective: Patient up spine in bed upon therapists arrival.  Patient agreeable to PT treatment. ELMER Rawls stated patient appropriate for PT treatment.  Orientation  Overall Orientation Status: Impaired  Orientation Level: Oriented to place;Oriented to situation;Oriented to person;Oriented to time  Cognition  Overall Cognitive

## 2024-11-23 NOTE — PROGRESS NOTES
Section of Cardiology  Progress Note      Date:  11/23/2024  Patient: Edyta Gruber  Admission:  11/18/2024  6:55 PM  Admit DX: Hypoxemia [R09.02]  Bilateral pleural effusion [J90]  Acute exacerbation of chronic heart failure (HCC) [I50.9]  Age:  89 y.o., 4/27/1924     LOS: 5 days     Reason for evaluation:   atrial fibrillation      SUBJECTIVE:     The patient was seen and examined. Notes and labs reviewed.  There were not complications over night.    Patient's cardiac review of systems: negative.  The patient is generally feeling unchanged.  Continues to be stable, she denies any pain     OBJECTIVE:      EXAM:   Vitals:    VITALS:  /65   Pulse 92   Temp 96.8 °F (36 °C) (Infrared)   Resp 16   Ht 1.575 m (5' 2\")   Wt 48.2 kg (106 lb 3.2 oz)   SpO2 100%   BMI 19.42 kg/m²   24HR INTAKE/OUTPUT:    Intake/Output Summary (Last 24 hours) at 11/23/2024 1232  Last data filed at 11/22/2024 2225  Gross per 24 hour   Intake 10 ml   Output --   Net 10 ml     General awake and alert, hard of hearing  Neck supple  Heart regular rate irregular rhythm no appreciated murmurs  Lungs decreased breathing sounds at the bases   Abdomen soft non tender  Ext trace edema   Neuro moving all extremities       Current Inpatient Medications:   [Held by provider] metoprolol tartrate  12.5 mg Oral BID    aspirin  81 mg Oral Daily    budesonide-formoterol  2 puff Inhalation BID RT    pravastatin  20 mg Oral Daily    tamsulosin  0.4 mg Oral Daily    timolol  1 drop Both Eyes BID    sodium chloride flush  5-40 mL IntraVENous 2 times per day    furosemide  20 mg IntraVENous Daily    heparin (porcine)  5,000 Units SubCUTAneous BID       IV Infusions (if any):   sodium chloride         Diagnostics:   Telemetry: reviewed   Labs:   CBC:  Recent Labs     11/22/24  0330 11/23/24  0533   WBC 4.2 3.2*   HGB 12.4 11.5*   HCT 41.1 37.7   PLT 99* 96*     Magnesium:No results for input(s): \"MG\" in the last 72 hours.  BMP:  Recent Labs

## 2024-11-23 NOTE — PROGRESS NOTES
RN called [facility] for report. RN gave nurse Sabine report. RN answered all of nurses questions.     RN removed IV and external cath, new brief was put on. Patient wanted to remain in [facility]. All of patients belongings are in patient discharge bags.     Patient left via wheelchair  With transport with 2l NC.

## 2024-11-23 NOTE — CARE COORDINATION
Discharge planning    Patient was to be discharged to [facility] last night but in need of 02 for transport.     Will need to arrange for [facility] due to need for  02      Attempted to call CVICU nurse to discuss but in report. Asked for call back to PCU to speak with writer when available.     0900  Spoke with RN and ss regarding need for transfer per buddy due to 02 needs. Social work will work on timing.

## 2024-11-24 ENCOUNTER — HOSPITAL ENCOUNTER (INPATIENT)
Age: 88
LOS: 8 days | Discharge: NURSING, REHAB OR LONG-TERM CARE | DRG: 291 | End: 2024-12-02
Attending: EMERGENCY MEDICINE | Admitting: INTERNAL MEDICINE
Payer: MEDICARE

## 2024-11-24 ENCOUNTER — APPOINTMENT (OUTPATIENT)
Dept: CT IMAGING | Age: 88
DRG: 291 | End: 2024-11-24
Payer: MEDICARE

## 2024-11-24 DIAGNOSIS — J90 BILATERAL PLEURAL EFFUSION: Primary | ICD-10-CM

## 2024-11-24 DIAGNOSIS — R06.02 SHORTNESS OF BREATH: ICD-10-CM

## 2024-11-24 DIAGNOSIS — M79.89 LEFT LEG SWELLING: ICD-10-CM

## 2024-11-24 LAB
ALBUMIN SERPL-MCNC: 3.6 G/DL (ref 3.5–5.2)
ALP SERPL-CCNC: 169 U/L (ref 35–104)
ALT SERPL-CCNC: 22 U/L (ref 10–35)
ANION GAP SERPL CALCULATED.3IONS-SCNC: 5 MMOL/L (ref 9–16)
AST SERPL-CCNC: 35 U/L (ref 10–35)
BASOPHILS # BLD: 0 K/UL (ref 0–0.2)
BASOPHILS NFR BLD: 1 % (ref 0–2)
BILIRUB SERPL-MCNC: 0.6 MG/DL (ref 0–1.2)
BNP SERPL-MCNC: 638 PG/ML (ref 0–300)
BUN SERPL-MCNC: 18 MG/DL (ref 8–23)
CALCIUM SERPL-MCNC: 9.8 MG/DL (ref 8.6–10.4)
CHLORIDE SERPL-SCNC: 98 MMOL/L (ref 98–107)
CO2 SERPL-SCNC: 38 MMOL/L (ref 20–31)
CREAT SERPL-MCNC: 0.8 MG/DL (ref 0.7–1.2)
EOSINOPHIL # BLD: 0.1 K/UL (ref 0–0.4)
EOSINOPHILS RELATIVE PERCENT: 3 % (ref 0–4)
ERYTHROCYTE [DISTWIDTH] IN BLOOD BY AUTOMATED COUNT: 15.9 % (ref 11.5–14.9)
GFR, ESTIMATED: 66 ML/MIN/1.73M2
GLUCOSE SERPL-MCNC: 104 MG/DL (ref 74–99)
HCT VFR BLD AUTO: 42 % (ref 36–46)
HGB BLD-MCNC: 13.6 G/DL (ref 12–16)
LYMPHOCYTES NFR BLD: 0.6 K/UL (ref 1–4.8)
LYMPHOCYTES RELATIVE PERCENT: 14 % (ref 24–44)
MAGNESIUM SERPL-MCNC: 2.1 MG/DL (ref 1.7–2.3)
MCH RBC QN AUTO: 32.6 PG (ref 26–34)
MCHC RBC AUTO-ENTMCNC: 32.2 G/DL (ref 31–37)
MCV RBC AUTO: 101 FL (ref 80–100)
MONOCYTES NFR BLD: 0.3 K/UL (ref 0.1–1.3)
MONOCYTES NFR BLD: 8 % (ref 1–7)
NEUTROPHILS NFR BLD: 74 % (ref 36–66)
NEUTS SEG NFR BLD: 3 K/UL (ref 1.3–9.1)
PLATELET # BLD AUTO: 100 K/UL (ref 150–450)
PMV BLD AUTO: 9.6 FL (ref 6–12)
POTASSIUM SERPL-SCNC: 5.2 MMOL/L (ref 3.7–5.3)
PROT SERPL-MCNC: 6.8 G/DL (ref 6.6–8.7)
RBC # BLD AUTO: 4.16 M/UL (ref 4–5.2)
SODIUM SERPL-SCNC: 141 MMOL/L (ref 136–145)
TROPONIN I SERPL HS-MCNC: 36 NG/L (ref 0–14)
WBC OTHER # BLD: 4.1 K/UL (ref 3.5–11)

## 2024-11-24 PROCEDURE — 80053 COMPREHEN METABOLIC PANEL: CPT

## 2024-11-24 PROCEDURE — 85025 COMPLETE CBC W/AUTO DIFF WBC: CPT

## 2024-11-24 PROCEDURE — 84484 ASSAY OF TROPONIN QUANT: CPT

## 2024-11-24 PROCEDURE — 36415 COLL VENOUS BLD VENIPUNCTURE: CPT

## 2024-11-24 PROCEDURE — 97116 GAIT TRAINING THERAPY: CPT

## 2024-11-24 PROCEDURE — 2700000000 HC OXYGEN THERAPY PER DAY

## 2024-11-24 PROCEDURE — 71250 CT THORAX DX C-: CPT

## 2024-11-24 PROCEDURE — 93005 ELECTROCARDIOGRAM TRACING: CPT | Performed by: EMERGENCY MEDICINE

## 2024-11-24 PROCEDURE — 94640 AIRWAY INHALATION TREATMENT: CPT

## 2024-11-24 PROCEDURE — 2060000000 HC ICU INTERMEDIATE R&B

## 2024-11-24 PROCEDURE — 97162 PT EVAL MOD COMPLEX 30 MIN: CPT

## 2024-11-24 PROCEDURE — 6370000000 HC RX 637 (ALT 250 FOR IP): Performed by: NURSE PRACTITIONER

## 2024-11-24 PROCEDURE — 6360000002 HC RX W HCPCS: Performed by: EMERGENCY MEDICINE

## 2024-11-24 PROCEDURE — 99222 1ST HOSP IP/OBS MODERATE 55: CPT | Performed by: INTERNAL MEDICINE

## 2024-11-24 PROCEDURE — 99285 EMERGENCY DEPT VISIT HI MDM: CPT

## 2024-11-24 PROCEDURE — 97530 THERAPEUTIC ACTIVITIES: CPT

## 2024-11-24 PROCEDURE — 2580000003 HC RX 258: Performed by: NURSE PRACTITIONER

## 2024-11-24 PROCEDURE — 83880 ASSAY OF NATRIURETIC PEPTIDE: CPT

## 2024-11-24 PROCEDURE — 99223 1ST HOSP IP/OBS HIGH 75: CPT | Performed by: INTERNAL MEDICINE

## 2024-11-24 PROCEDURE — 94761 N-INVAS EAR/PLS OXIMETRY MLT: CPT

## 2024-11-24 PROCEDURE — 97166 OT EVAL MOD COMPLEX 45 MIN: CPT

## 2024-11-24 PROCEDURE — 83735 ASSAY OF MAGNESIUM: CPT

## 2024-11-24 RX ORDER — POLYETHYLENE GLYCOL 3350 17 G/17G
17 POWDER, FOR SOLUTION ORAL DAILY PRN
Status: DISCONTINUED | OUTPATIENT
Start: 2024-11-24 | End: 2024-12-02 | Stop reason: HOSPADM

## 2024-11-24 RX ORDER — BISACODYL 10 MG
10 SUPPOSITORY, RECTAL RECTAL DAILY PRN
Status: DISCONTINUED | OUTPATIENT
Start: 2024-11-24 | End: 2024-12-02 | Stop reason: HOSPADM

## 2024-11-24 RX ORDER — METOPROLOL TARTRATE 25 MG/1
12.5 TABLET, FILM COATED ORAL 2 TIMES DAILY
Status: DISCONTINUED | OUTPATIENT
Start: 2024-11-24 | End: 2024-12-02 | Stop reason: HOSPADM

## 2024-11-24 RX ORDER — PRAVASTATIN SODIUM 40 MG
20 TABLET ORAL DAILY
Status: DISCONTINUED | OUTPATIENT
Start: 2024-11-24 | End: 2024-11-25

## 2024-11-24 RX ORDER — FUROSEMIDE 10 MG/ML
20 INJECTION INTRAMUSCULAR; INTRAVENOUS DAILY
Status: DISCONTINUED | OUTPATIENT
Start: 2024-11-25 | End: 2024-11-25

## 2024-11-24 RX ORDER — SODIUM CHLORIDE 9 MG/ML
INJECTION, SOLUTION INTRAVENOUS PRN
Status: DISCONTINUED | OUTPATIENT
Start: 2024-11-24 | End: 2024-12-02 | Stop reason: HOSPADM

## 2024-11-24 RX ORDER — ONDANSETRON 4 MG/1
4 TABLET, ORALLY DISINTEGRATING ORAL EVERY 8 HOURS PRN
Status: DISCONTINUED | OUTPATIENT
Start: 2024-11-24 | End: 2024-12-02 | Stop reason: HOSPADM

## 2024-11-24 RX ORDER — ACETAMINOPHEN 325 MG/1
650 TABLET ORAL EVERY 6 HOURS PRN
Status: DISCONTINUED | OUTPATIENT
Start: 2024-11-24 | End: 2024-12-02 | Stop reason: HOSPADM

## 2024-11-24 RX ORDER — TAMSULOSIN HYDROCHLORIDE 0.4 MG/1
0.4 CAPSULE ORAL DAILY
Status: DISCONTINUED | OUTPATIENT
Start: 2024-11-24 | End: 2024-12-02 | Stop reason: HOSPADM

## 2024-11-24 RX ORDER — ONDANSETRON 2 MG/ML
4 INJECTION INTRAMUSCULAR; INTRAVENOUS EVERY 6 HOURS PRN
Status: DISCONTINUED | OUTPATIENT
Start: 2024-11-24 | End: 2024-12-02 | Stop reason: HOSPADM

## 2024-11-24 RX ORDER — ASPIRIN 81 MG/1
81 TABLET, CHEWABLE ORAL DAILY
Status: DISCONTINUED | OUTPATIENT
Start: 2024-11-24 | End: 2024-12-02 | Stop reason: HOSPADM

## 2024-11-24 RX ORDER — ACETAMINOPHEN 650 MG/1
650 SUPPOSITORY RECTAL EVERY 6 HOURS PRN
Status: DISCONTINUED | OUTPATIENT
Start: 2024-11-24 | End: 2024-12-02 | Stop reason: HOSPADM

## 2024-11-24 RX ORDER — SODIUM CHLORIDE 0.9 % (FLUSH) 0.9 %
10 SYRINGE (ML) INJECTION PRN
Status: DISCONTINUED | OUTPATIENT
Start: 2024-11-24 | End: 2024-12-02 | Stop reason: HOSPADM

## 2024-11-24 RX ORDER — FUROSEMIDE 10 MG/ML
20 INJECTION INTRAMUSCULAR; INTRAVENOUS ONCE
Status: COMPLETED | OUTPATIENT
Start: 2024-11-24 | End: 2024-11-24

## 2024-11-24 RX ORDER — BUDESONIDE AND FORMOTEROL FUMARATE DIHYDRATE 160; 4.5 UG/1; UG/1
2 AEROSOL RESPIRATORY (INHALATION)
Status: DISCONTINUED | OUTPATIENT
Start: 2024-11-24 | End: 2024-11-24

## 2024-11-24 RX ORDER — SODIUM CHLORIDE 0.9 % (FLUSH) 0.9 %
5-40 SYRINGE (ML) INJECTION EVERY 12 HOURS SCHEDULED
Status: DISCONTINUED | OUTPATIENT
Start: 2024-11-24 | End: 2024-12-02 | Stop reason: HOSPADM

## 2024-11-24 RX ORDER — LEVALBUTEROL INHALATION SOLUTION 0.63 MG/3ML
0.63 SOLUTION RESPIRATORY (INHALATION) EVERY 4 HOURS PRN
Status: DISCONTINUED | OUTPATIENT
Start: 2024-11-24 | End: 2024-12-02 | Stop reason: HOSPADM

## 2024-11-24 RX ORDER — CETIRIZINE HYDROCHLORIDE 10 MG/1
5 TABLET ORAL DAILY
Status: DISCONTINUED | OUTPATIENT
Start: 2024-11-24 | End: 2024-12-02 | Stop reason: HOSPADM

## 2024-11-24 RX ORDER — TIMOLOL MALEATE 5 MG/ML
1 SOLUTION/ DROPS OPHTHALMIC 2 TIMES DAILY
Status: DISCONTINUED | OUTPATIENT
Start: 2024-11-24 | End: 2024-12-02 | Stop reason: HOSPADM

## 2024-11-24 RX ADMIN — SODIUM CHLORIDE, PRESERVATIVE FREE 10 ML: 5 INJECTION INTRAVENOUS at 08:33

## 2024-11-24 RX ADMIN — TIMOLOL MALEATE 1 DROP: 5 SOLUTION OPHTHALMIC at 08:33

## 2024-11-24 RX ADMIN — FUROSEMIDE 20 MG: 10 INJECTION, SOLUTION INTRAMUSCULAR; INTRAVENOUS at 03:40

## 2024-11-24 RX ADMIN — PRAVASTATIN SODIUM 20 MG: 40 TABLET ORAL at 08:28

## 2024-11-24 RX ADMIN — BUDESONIDE AND FORMOTEROL FUMARATE DIHYDRATE 2 PUFF: 160; 4.5 AEROSOL RESPIRATORY (INHALATION) at 07:12

## 2024-11-24 RX ADMIN — SODIUM CHLORIDE, PRESERVATIVE FREE 10 ML: 5 INJECTION INTRAVENOUS at 21:34

## 2024-11-24 RX ADMIN — TIMOLOL MALEATE 1 DROP: 5 SOLUTION OPHTHALMIC at 21:34

## 2024-11-24 RX ADMIN — CETIRIZINE HYDROCHLORIDE 5 MG: 10 TABLET, FILM COATED ORAL at 08:28

## 2024-11-24 RX ADMIN — ASPIRIN 81 MG 81 MG: 81 TABLET ORAL at 08:28

## 2024-11-24 RX ADMIN — TAMSULOSIN HYDROCHLORIDE 0.4 MG: 0.4 CAPSULE ORAL at 08:29

## 2024-11-24 RX ADMIN — METOPROLOL TARTRATE 12.5 MG: 25 TABLET, FILM COATED ORAL at 08:29

## 2024-11-24 ASSESSMENT — PAIN - FUNCTIONAL ASSESSMENT: PAIN_FUNCTIONAL_ASSESSMENT: NONE - DENIES PAIN

## 2024-11-24 NOTE — ED PROVIDER NOTES
EMERGENCY DEPARTMENT ENCOUNTER    Pt Name: Edyta Gruber  MRN: [id number]  Birthdate 4/27/1924  Date of evaluation: 11/24/24  CHIEF COMPLAINT       Chief Complaint   Patient presents with    Shortness of Breath     HISTORY OF PRESENT ILLNESS   This is a 89-year-old female who was recently admitted for acute congestive heart failure and diuresed with Lasix who was discharged earlier in the day is returning from her nursing home for shortness of breath.  Patient said that she is also feeling chest tightness.  She was discharged on 2 L nasal cannula.  Currently patient is on 4 L nasal cannula.    The history is provided by the patient.           REVIEW OF SYSTEMS     Review of Systems   Constitutional:  Negative for chills and fever.   HENT:  Negative for congestion.    Eyes:  Negative for visual disturbance.   Respiratory:  Positive for chest tightness and shortness of breath. Negative for cough.    Gastrointestinal:  Negative for abdominal pain, nausea and vomiting.     PASTMEDICAL HISTORY     Past Medical History:   Diagnosis Date    Acute dermatitis     Arthritis     Asthma     Atrial fibrillation ([facility])     Bursitis     CHF (congestive heart failure) ([facility])     Hearing aid worn     Hyperlipidemia     Hypertension     Lumbar disc disease     Wears glasses     Wound of right leg      Past Problem List  Patient Active Problem List   Diagnosis Code    Non-pressure ulcer of left lower extremity, limited to breakdown of skin ([facility]) L97.921    Persistent atrial fibrillation ([facility]) I48.19    Venous ulcer of left leg ([facility]) I83.029, L97.929    Essential hypertension I10    Acute respiratory failure with hypoxia and hypercapnia J96.01, J96.02    Heart failure with preserved left ventricular function (HFpEF) ([facility]) I50.30    Fall at home, initial encounter W19.XXXA, Y92.009    Chronic anticoagulation Z79.01    Acute on chronic congestive heart failure ([facility]) I50.9    Mild protein-calorie malnutrition ([facility]) E44.1    Pneumonia J18.9

## 2024-11-24 NOTE — H&P
Mckenzie Ellis APRN - NP           Impressions :     1. Principal Problem:    Pleural effusion  Resolved Problems:    * No resolved hospital problems. *        2.  has a past medical history of Acute dermatitis, Arthritis, Asthma, Atrial fibrillation (HCC), Bursitis, CHF (congestive heart failure) (HCC), Hearing aid worn, Hyperlipidemia, Hypertension, Lumbar disc disease, Wears glasses, and Wound of right leg.     Plans:     89-year-old female history of HFpEF, A-fib, HLD CKD 3 asthma urinary retention glaucoma history of thoracentesis for pleural effusion previously, recent admission for pleural effusion hypoxia treated with IV Lasix discharged to nursing facility brought back with increased oxygen requirement needing 4 L nasal cannula oxygen  Bilateral effusions on CT chest  IV Lasix resumed pulmonology and cardiology consulted  Palliative care consulted    CHF exacerbation-EF 60 to 65% on recent echo continue gentle diuresis, appreciate cardiology recommendations  Acute hypoxic respiratory failure secondary to CHF exacerbation-improving with diuresis  A-fib-metoprolol  Glaucoma-continue home eyedrops  Chronic thrombocytopenia-platelets at baseline  Left leg redness noted-chronic changes-stasis dermatitis with  CODE STATUS currently full code appears to has been CCA in the past-palliative care consulted        DVT pplx      Kathy Humphries MD  11/24/2024  10:52 AM

## 2024-11-24 NOTE — ED NOTES
Report given to ELMER Haynes from PCU  Report method by phone   The following was reviewed with receiving RN:   Current vital signs:  BP 92/66   Pulse 85   Temp 97.7 °F (36.5 °C) (Oral)   Resp 13   Ht 1.575 m (5' 2\")   Wt 48.1 kg (106 lb)   SpO2 98%   BMI 19.39 kg/m²                      Any medication or safety alerts were reviewed. Any pending diagnostics and notifications were also reviewed, as well as any safety concerns or issues, abnormal labs, abnormal imaging, and abnormal assessment findings. Questions were answered.

## 2024-11-24 NOTE — CARE COORDINATION
Case Management Assessment  Initial Evaluation    Date/Time of Evaluation: 11/24/2024 12:57 PM  Assessment Completed by: Lexis Bueno RN    If patient is discharged prior to next notation, then this note serves as note for discharge by case management.    Patient Name: Edtya Gruber                   YOB: 1924  Diagnosis: Shortness of breath [R06.02]  Pleural effusion [J90]  Bilateral pleural effusion [J90]                   Date / Time: 11/24/2024  1:18 AM    Patient Admission Status: Inpatient   Readmission Risk (Low < 19, Mod (19-27), High > 27): Readmission Risk Score: 18.7    Current PCP: Florencia Talley MD  PCP verified by CM? Yes    Chart Reviewed: Yes      History Provided by: Child/Family  Patient Orientation: Alert and Oriented    Patient Cognition: Alert    Hospitalization in the last 30 days (Readmission):  No    If yes, Readmission Assessment in  Navigator will be completed.    Advance Directives:      Code Status: DNR-CCA   Patient's Primary Decision Maker is: Legal Next of Kin    Primary Decision Maker: Ritchie Gruber - Child - [phone]    Discharge Planning:    Patient lives with: Alone Type of Home: Skilled Nursing Facility  Primary Care Giver: Family  Patient Support Systems include: Family Members   Current Financial resources: None  Current community resources: None  Current services prior to admission: Durable Medical Equipment            Current DME: Walker, Shower Chair, Wheelchair            Type of Home Care services:  None    ADLS  Prior functional level: Assistance with the following:, Cooking, Housework, Shopping, Mobility  Current functional level: Assistance with the following:, Cooking, Housework, Shopping, Mobility    PT AM-PAC:   /24  OT AM-PAC:   /24    Family can provide assistance at DC: Yes  Would you like Case Management to discuss the discharge plan with any other family members/significant others, and if so, who? Yes  Plans to Return to Present

## 2024-11-24 NOTE — ED NOTES
Mode of arrival (squad #, walk in, police, etc) : Medic 42        Chief complaint(s): SOB        Arrival Note (brief scenario, treatment PTA, etc).: pt was brought in c/o SOB. Pt was discharged from [facility] less than 12 hours ago. Pt was sent to [facility]. Pt reports prior to her hospital admission, she was not on home O2. Pt is very hard of hearing. A&0X4         C= \"Have you ever felt that you should Cut down on your drinking?\"  No  A= \"Have people Annoyed you by criticizing your drinking?\"  No  G= \"Have you ever felt bad or Guilty about your drinking?\"  No  E= \"Have you ever had a drink as an Eye-opener first thing in the morning to steady your nerves or to help a hangover?\"  No      Deferred []      Reason for deferring: N/A    *If yes to two or more: probable alcohol abuse.*

## 2024-11-25 ENCOUNTER — APPOINTMENT (OUTPATIENT)
Dept: VASCULAR LAB | Age: 88
DRG: 291 | End: 2024-11-25
Attending: INTERNAL MEDICINE
Payer: MEDICARE

## 2024-11-25 PROBLEM — Z51.5 ENCOUNTER FOR PALLIATIVE CARE: Status: ACTIVE | Noted: 2024-11-25

## 2024-11-25 PROBLEM — R06.02 SHORTNESS OF BREATH: Status: ACTIVE | Noted: 2024-11-25

## 2024-11-25 LAB
ANION GAP SERPL CALCULATED.3IONS-SCNC: 4 MMOL/L (ref 9–16)
BASOPHILS # BLD: 0 K/UL (ref 0–0.2)
BASOPHILS NFR BLD: 1 % (ref 0–2)
BUN SERPL-MCNC: 19 MG/DL (ref 8–23)
CALCIUM SERPL-MCNC: 9.6 MG/DL (ref 8.6–10.4)
CHLORIDE SERPL-SCNC: 101 MMOL/L (ref 98–107)
CO2 SERPL-SCNC: 38 MMOL/L (ref 20–31)
CREAT SERPL-MCNC: 0.7 MG/DL (ref 0.7–1.2)
EKG ATRIAL RATE: 101 BPM
EKG P AXIS: 70 DEGREES
EKG P-R INTERVAL: 348 MS
EKG Q-T INTERVAL: 338 MS
EKG QRS DURATION: 78 MS
EKG QTC CALCULATION (BAZETT): 438 MS
EKG R AXIS: 79 DEGREES
EKG T AXIS: -120 DEGREES
EKG VENTRICULAR RATE: 101 BPM
EOSINOPHIL # BLD: 0.1 K/UL (ref 0–0.4)
EOSINOPHILS RELATIVE PERCENT: 4 % (ref 0–4)
ERYTHROCYTE [DISTWIDTH] IN BLOOD BY AUTOMATED COUNT: 15.4 % (ref 11.5–14.9)
GFR, ESTIMATED: 77 ML/MIN/1.73M2
GLUCOSE SERPL-MCNC: 85 MG/DL (ref 74–99)
HCT VFR BLD AUTO: 37.2 % (ref 36–46)
HGB BLD-MCNC: 12.3 G/DL (ref 12–16)
LYMPHOCYTES NFR BLD: 0.6 K/UL (ref 1–4.8)
LYMPHOCYTES RELATIVE PERCENT: 18 % (ref 24–44)
MCH RBC QN AUTO: 33.1 PG (ref 26–34)
MCHC RBC AUTO-ENTMCNC: 33.2 G/DL (ref 31–37)
MCV RBC AUTO: 99.8 FL (ref 80–100)
MONOCYTES NFR BLD: 0.4 K/UL (ref 0.1–1.3)
MONOCYTES NFR BLD: 11 % (ref 1–7)
NEUTROPHILS NFR BLD: 66 % (ref 36–66)
NEUTS SEG NFR BLD: 2.4 K/UL (ref 1.3–9.1)
PLATELET # BLD AUTO: 91 K/UL (ref 150–450)
PMV BLD AUTO: 9.4 FL (ref 6–12)
POTASSIUM SERPL-SCNC: 4.4 MMOL/L (ref 3.7–5.3)
RBC # BLD AUTO: 3.73 M/UL (ref 4–5.2)
SODIUM SERPL-SCNC: 143 MMOL/L (ref 136–145)
WBC OTHER # BLD: 3.6 K/UL (ref 3.5–11)

## 2024-11-25 PROCEDURE — 99222 1ST HOSP IP/OBS MODERATE 55: CPT

## 2024-11-25 PROCEDURE — 2580000003 HC RX 258: Performed by: INTERNAL MEDICINE

## 2024-11-25 PROCEDURE — 2580000003 HC RX 258: Performed by: NURSE PRACTITIONER

## 2024-11-25 PROCEDURE — 93971 EXTREMITY STUDY: CPT

## 2024-11-25 PROCEDURE — 85025 COMPLETE CBC W/AUTO DIFF WBC: CPT

## 2024-11-25 PROCEDURE — 2060000000 HC ICU INTERMEDIATE R&B

## 2024-11-25 PROCEDURE — 80048 BASIC METABOLIC PNL TOTAL CA: CPT

## 2024-11-25 PROCEDURE — 99233 SBSQ HOSP IP/OBS HIGH 50: CPT | Performed by: INTERNAL MEDICINE

## 2024-11-25 PROCEDURE — 93010 ELECTROCARDIOGRAM REPORT: CPT | Performed by: INTERNAL MEDICINE

## 2024-11-25 PROCEDURE — 99232 SBSQ HOSP IP/OBS MODERATE 35: CPT | Performed by: INTERNAL MEDICINE

## 2024-11-25 PROCEDURE — 36415 COLL VENOUS BLD VENIPUNCTURE: CPT

## 2024-11-25 PROCEDURE — 6360000002 HC RX W HCPCS: Performed by: INTERNAL MEDICINE

## 2024-11-25 PROCEDURE — 6370000000 HC RX 637 (ALT 250 FOR IP): Performed by: NURSE PRACTITIONER

## 2024-11-25 RX ORDER — PRAVASTATIN SODIUM 20 MG
20 TABLET ORAL NIGHTLY
Status: DISCONTINUED | OUTPATIENT
Start: 2024-11-26 | End: 2024-12-02 | Stop reason: HOSPADM

## 2024-11-25 RX ORDER — FUROSEMIDE 10 MG/ML
20 INJECTION INTRAMUSCULAR; INTRAVENOUS 2 TIMES DAILY
Status: DISCONTINUED | OUTPATIENT
Start: 2024-11-25 | End: 2024-11-26

## 2024-11-25 RX ADMIN — PRAVASTATIN SODIUM 20 MG: 40 TABLET ORAL at 08:52

## 2024-11-25 RX ADMIN — METOPROLOL TARTRATE 12.5 MG: 25 TABLET, FILM COATED ORAL at 21:35

## 2024-11-25 RX ADMIN — SODIUM CHLORIDE, PRESERVATIVE FREE 10 ML: 5 INJECTION INTRAVENOUS at 08:52

## 2024-11-25 RX ADMIN — FUROSEMIDE 20 MG: 10 INJECTION, SOLUTION INTRAMUSCULAR; INTRAVENOUS at 16:44

## 2024-11-25 RX ADMIN — FUROSEMIDE 20 MG: 10 INJECTION, SOLUTION INTRAMUSCULAR; INTRAVENOUS at 08:52

## 2024-11-25 RX ADMIN — TIMOLOL MALEATE 1 DROP: 5 SOLUTION OPHTHALMIC at 08:56

## 2024-11-25 RX ADMIN — ASPIRIN 81 MG 81 MG: 81 TABLET ORAL at 08:52

## 2024-11-25 RX ADMIN — TAMSULOSIN HYDROCHLORIDE 0.4 MG: 0.4 CAPSULE ORAL at 08:52

## 2024-11-25 RX ADMIN — SODIUM CHLORIDE, PRESERVATIVE FREE 10 ML: 5 INJECTION INTRAVENOUS at 21:35

## 2024-11-25 RX ADMIN — CETIRIZINE HYDROCHLORIDE 5 MG: 10 TABLET, FILM COATED ORAL at 08:52

## 2024-11-25 RX ADMIN — CEFTRIAXONE SODIUM 1000 MG: 1 INJECTION, POWDER, FOR SOLUTION INTRAMUSCULAR; INTRAVENOUS at 13:23

## 2024-11-25 RX ADMIN — TIMOLOL MALEATE 1 DROP: 5 SOLUTION OPHTHALMIC at 21:35

## 2024-11-25 NOTE — CARE COORDINATION
DISCHARGE PLANNING NOTE:    Writer is following for potential discharge to [facility]. Writer placed call to Hortensia with [employer] to verify if pt needs authorization to return.    If pt admitted today, could admit under current authorization. H[employer]ia will verify if new authorization will have to be submitted, or updates only.    Writer received message with [place] with [facility], pt is current with their VNS.     Writer met with pt for update. No further questions at this time.  Electronically signed by CHRISTIE Chau on 11/25/2024 at 12:42 PM

## 2024-11-25 NOTE — CARE COORDINATION
Writer submitted authorization through [employer].  Auth ID: [id number]   Electronically signed by CHRISTIE Chau on 11/25/2024 at 4:03 PM

## 2024-11-25 NOTE — CONSULTS
[facility]   CONSULT NOTE:      DATE OF CONSULT 11/24/2024    REASON FOR CONSULTATION:  Pulmonary management      PCP Florencia Talley MD     CHIEF COMPLAINT: Dyspnea    HISTORY OF PRESENT ILLNESS:     Mrs. Gruber is a very pleasant 89-year-old female who was recently admitted and discharged at [facility] for dyspnea.  She was found to be in congestive heart failure due to diastolic dysfunction.  She was diuresed and then subsequently discharged yesterday.  That same day, she was short of breath and sent to [facility] for evaluation.  She normally is on 2 L nasal cannula.  When she came into the emergency room, she was on 10 L nonrebreather and it was decreased to 4 L.  The patient did noticed that she was more short of breath.  A CT of the chest was done in the ER which showed worsening bilateral pleural effusions compared to her CAT scan that was done just last week.    She is extremely hard of hearing but understands and is conversant.  She actually remembered that her 's physician was my father.  She also knew that I went to [employer] and apparently one of her children was in my class (I am not sure if that was correct but there were other [employer]'s in the 1970s).    She denies any fevers, chills, or chest pain.  She says that she has nonproductive cough.    We had seen her in December 2023.  At that time Dr. Randolph had performed a thoracentesis removing 725 mL on the left on December 2 and a right thoracentesis on December 1 removal of 1350 mL of fluid.  Both were transudative in nature.    She has multiple thoracentesis done over the last several years    She is a DNR CCA no intubation and that has been reconfirmed.  On her last admission at [facility] apparently no one addressed this issue.    She had a history of COVID-pneumonia in November 2023 she was hospitalized at [facility] at that time.      It should be noted, that she is 
Examination:    BP (!) 128/90   Pulse 97   Temp 97.7 °F (36.5 °C) (Oral)   Resp 18   Ht 1.575 m (5' 2\")   Wt 48.1 kg (106 lb)   SpO2 99%   BMI 19.39 kg/m²      Constitutional and General Appearance: alert, cooperative, in no distress on 2 L nasal cannula oxygen reduced  HEENT: PERRL, no cervical lymphadenopathy. Normal oral mucosa  Respiratory:  Normal excursion and expansion without use of accessory muscles  Resp Auscultation: Good respiratory effort. No for increased work of breathing. On auscultation: Reduced air entry at the bases  Cardiovascular:  The apical impulse is not displaced  Heart tones are irregularly irregular   Murmurs: None   Jugular venous pulsation Normal  Thre is no carotid bruit   Peripheral pulses are symmetrical and full   Abdomen:  No masses or tenderness  Bowel sounds present  Extremities:   No Cyanosis or Clubbing   Lower extremity edema: trace edema with erythema    Skin: Warm and dry  Neurological:  Not done     DATA:    Diagnostics:      EKG:  atrial flutter with RVR       TTE 10/14/24    Normal left ventricular systolic function with estimated EF 60 - 65%.    Right ventricle size is normal. Normal systolic function.    Left atrium is severely dilated. Right atrium is severely dilated.    Moderately calcified aortic valve cusps. Mild aortic stenosis. AV mean gradient is 11 mmHg. AV area by continuity VTI is 0.6 cm2.    Mild mitral regurgitation.    Moderate tricuspid regurgitation. The estimated RVSP is 43 mmHg.    Large bilateral pleural effusion.      Labs:     CBC:   Recent Labs     11/23/24  0533 11/24/24  0145   WBC 3.2* 4.1   HGB 11.5* 13.6   HCT 37.7 42.0   PLT 96* 100*     BMP:   Recent Labs     11/23/24  0533 11/24/24  0145    141   K 4.0 5.2   CO2 36* 38*   BUN 22 18   CREATININE 0.8 0.8   LABGLOM 68 66   GLUCOSE 62* 104*     BNP: No results for input(s): \"BNP\" in the last 72 hours.  PT/INR: No results for input(s): \"PROTIME\", \"INR\" in the last 72 hours.  APTT:No 
on 10L NRB. CT chest showing bilateral pleural effusions with left effusion increasing in size. Patient admitted for further management. Palliative has been consulted for goals of care.     Active Hospital Problems    Diagnosis Date Noted    Shortness of breath [R06.02] 11/25/2024     Priority: High    Pleural effusion [J90] 11/24/2024    Acute on chronic heart failure with preserved ejection fraction (HCC) [I50.33]        PAST MEDICAL HISTORY      Diagnosis Date    Acute dermatitis     Arthritis     Asthma     Atrial fibrillation (HCC)     Bursitis     CHF (congestive heart failure) (HCC)     Hearing aid worn     Hyperlipidemia     Hypertension     Lumbar disc disease     Wears glasses     Wound of right leg        PAST SURGICAL HISTORY  Past Surgical History:   Procedure Laterality Date    BACK SURGERY      DEBRIDEMENT Left 03/09/2017    rt. leg    OTHER SURGICAL HISTORY  03/30/2017    Right leg Angio    OTHER SURGICAL HISTORY  03/30/2017    Right leg angio    AK I&D DEEP ABSC BURSA/HEMATOMA THIGH/KNEE REGION Right 3/9/2017    DEBRIDEMENT CALF WOUND  performed by Arthur Delos Reyes, MD at [facility] CVOR    AK I&D DEEP ABSC BURSA/HEMATOMA THIGH/KNEE REGION Right 4/28/2017    RIGHT LOWER EXTREMITY DEBRIDEMENT, INTEGRA APPLICATION, PREVENA VAC APPLICATION LOWR RIGHT LEG performed by Arthur Delos Reyes, MD at [facility] OR       SOCIAL HISTORY  Social History     Tobacco Use    Smoking status: Never    Smokeless tobacco: Never   Substance Use Topics    Alcohol use: Not Currently    Drug use: No       FAMILY HISTORY  ..  Family History   Problem Relation Age of Onset    Heart Disease Mother     Heart Disease Father     Diabetes Maternal Aunt     Obesity Maternal Aunt     Stroke Paternal Grandmother         ALLERGIES  Allergies   Allergen Reactions    Shellfish-Derived Products Swelling    Tudorza Pressair [Aclidinium Bromide]      LISTED AS ALLERGY, UNKNOWN REACTION  LISTED AS ALLERGY, UNKNOWN REACTION

## 2024-11-25 NOTE — ACP (ADVANCE CARE PLANNING)
Advance Care Planning      Palliative Medicine Provider (MD/NP)  Advance Care Planning (ACP) Conversation      Date of Conversation: 11/25/24  The patient and/or authorized decision maker consented to a voluntary Advance Care Planning conversation.   Individuals present for the conversation:   Patient with decision making capacity    Legal Healthcare Agent(s):    Primary Decision Maker: Ritchie Gruber - [facility] - [phone]    ACP documents available in EMR prior to discussion:  -Portable DNR    Primary Palliative Diagnosis(es):  Recurrent pleural effusions     Conversation Summary: Briefly spoke with patient. Spoke with patients son Ismael. He is patients legal next of kin and decision maker. Patient remains DNR-CCA no intubation at this time.       Resuscitation Status:    Code Status: DNR-CCA    Outcomes / Completed Documentation:  An explanation of advance directives and their importance was provided and the following forms completed:    -No new documents completed.    If new document completed, original was provided to patient and/or family member.    Copy was placed for scanning into the [facility] EMR.      I spent 10 minutes providing separately identifiable ACP services with the patient and/or surrogate decision maker in a voluntary, in-person conversation discussing the patient's wishes and goals as detailed in the above note.       Ellyn Ospina, APRN - CNP

## 2024-11-26 ENCOUNTER — APPOINTMENT (OUTPATIENT)
Dept: GENERAL RADIOLOGY | Age: 88
DRG: 291 | End: 2024-11-26
Payer: MEDICARE

## 2024-11-26 LAB
ANION GAP SERPL CALCULATED.3IONS-SCNC: 4 MMOL/L (ref 9–16)
BASOPHILS # BLD: 0.04 K/UL (ref 0–0.2)
BASOPHILS NFR BLD: 1 % (ref 0–2)
BUN SERPL-MCNC: 17 MG/DL (ref 8–23)
CALCIUM SERPL-MCNC: 9.5 MG/DL (ref 8.6–10.4)
CHLORIDE SERPL-SCNC: 99 MMOL/L (ref 98–107)
CO2 SERPL-SCNC: 40 MMOL/L (ref 20–31)
CREAT SERPL-MCNC: 0.9 MG/DL (ref 0.7–1.2)
ECHO BSA: 1.45 M2
EOSINOPHIL # BLD: 0.14 K/UL (ref 0–0.4)
EOSINOPHILS RELATIVE PERCENT: 4 % (ref 0–4)
ERYTHROCYTE [DISTWIDTH] IN BLOOD BY AUTOMATED COUNT: 15.5 % (ref 11.5–14.9)
GFR, ESTIMATED: 57 ML/MIN/1.73M2
GLUCOSE SERPL-MCNC: 76 MG/DL (ref 74–99)
HCT VFR BLD AUTO: 36.6 % (ref 36–46)
HGB BLD-MCNC: 12 G/DL (ref 12–16)
LYMPHOCYTES NFR BLD: 0.61 K/UL (ref 1–4.8)
LYMPHOCYTES RELATIVE PERCENT: 17 % (ref 24–44)
MCH RBC QN AUTO: 32.6 PG (ref 26–34)
MCHC RBC AUTO-ENTMCNC: 32.6 G/DL (ref 31–37)
MCV RBC AUTO: 100 FL (ref 80–100)
MONOCYTES NFR BLD: 0.36 K/UL (ref 0.1–1.3)
MONOCYTES NFR BLD: 10 % (ref 1–7)
MORPHOLOGY: ABNORMAL
NEUTROPHILS NFR BLD: 68 % (ref 36–66)
NEUTS SEG NFR BLD: 2.45 K/UL (ref 1.3–9.1)
PLATELET # BLD AUTO: 96 K/UL (ref 150–450)
PMV BLD AUTO: 9.3 FL (ref 6–12)
POTASSIUM SERPL-SCNC: 4.2 MMOL/L (ref 3.7–5.3)
RBC # BLD AUTO: 3.66 M/UL (ref 4–5.2)
SODIUM SERPL-SCNC: 143 MMOL/L (ref 136–145)
WBC OTHER # BLD: 3.6 K/UL (ref 3.5–11)

## 2024-11-26 PROCEDURE — 85025 COMPLETE CBC W/AUTO DIFF WBC: CPT

## 2024-11-26 PROCEDURE — 6370000000 HC RX 637 (ALT 250 FOR IP): Performed by: NURSE PRACTITIONER

## 2024-11-26 PROCEDURE — 80048 BASIC METABOLIC PNL TOTAL CA: CPT

## 2024-11-26 PROCEDURE — 2580000003 HC RX 258: Performed by: INTERNAL MEDICINE

## 2024-11-26 PROCEDURE — 2060000000 HC ICU INTERMEDIATE R&B

## 2024-11-26 PROCEDURE — 99232 SBSQ HOSP IP/OBS MODERATE 35: CPT | Performed by: INTERNAL MEDICINE

## 2024-11-26 PROCEDURE — 71045 X-RAY EXAM CHEST 1 VIEW: CPT

## 2024-11-26 PROCEDURE — 97116 GAIT TRAINING THERAPY: CPT

## 2024-11-26 PROCEDURE — 6360000002 HC RX W HCPCS: Performed by: INTERNAL MEDICINE

## 2024-11-26 PROCEDURE — 97535 SELF CARE MNGMENT TRAINING: CPT

## 2024-11-26 PROCEDURE — 97530 THERAPEUTIC ACTIVITIES: CPT

## 2024-11-26 PROCEDURE — 99233 SBSQ HOSP IP/OBS HIGH 50: CPT | Performed by: INTERNAL MEDICINE

## 2024-11-26 PROCEDURE — 99231 SBSQ HOSP IP/OBS SF/LOW 25: CPT

## 2024-11-26 PROCEDURE — 2580000003 HC RX 258: Performed by: NURSE PRACTITIONER

## 2024-11-26 PROCEDURE — 93971 EXTREMITY STUDY: CPT | Performed by: STUDENT IN AN ORGANIZED HEALTH CARE EDUCATION/TRAINING PROGRAM

## 2024-11-26 PROCEDURE — 36415 COLL VENOUS BLD VENIPUNCTURE: CPT

## 2024-11-26 RX ORDER — FUROSEMIDE 20 MG/1
20 TABLET ORAL DAILY
Status: DISCONTINUED | OUTPATIENT
Start: 2024-11-27 | End: 2024-11-26

## 2024-11-26 RX ORDER — FUROSEMIDE 10 MG/ML
20 INJECTION INTRAMUSCULAR; INTRAVENOUS DAILY
Status: DISCONTINUED | OUTPATIENT
Start: 2024-11-27 | End: 2024-11-27

## 2024-11-26 RX ADMIN — METOPROLOL TARTRATE 12.5 MG: 25 TABLET, FILM COATED ORAL at 21:08

## 2024-11-26 RX ADMIN — CETIRIZINE HYDROCHLORIDE 5 MG: 10 TABLET, FILM COATED ORAL at 07:36

## 2024-11-26 RX ADMIN — TAMSULOSIN HYDROCHLORIDE 0.4 MG: 0.4 CAPSULE ORAL at 07:36

## 2024-11-26 RX ADMIN — SODIUM CHLORIDE, PRESERVATIVE FREE 10 ML: 5 INJECTION INTRAVENOUS at 07:35

## 2024-11-26 RX ADMIN — CEFTRIAXONE SODIUM 1000 MG: 1 INJECTION, POWDER, FOR SOLUTION INTRAMUSCULAR; INTRAVENOUS at 12:45

## 2024-11-26 RX ADMIN — ASPIRIN 81 MG 81 MG: 81 TABLET ORAL at 07:36

## 2024-11-26 RX ADMIN — TIMOLOL MALEATE 1 DROP: 5 SOLUTION OPHTHALMIC at 21:08

## 2024-11-26 RX ADMIN — FUROSEMIDE 20 MG: 10 INJECTION, SOLUTION INTRAMUSCULAR; INTRAVENOUS at 07:36

## 2024-11-26 RX ADMIN — PRAVASTATIN SODIUM 20 MG: 20 TABLET ORAL at 21:08

## 2024-11-26 RX ADMIN — TIMOLOL MALEATE 1 DROP: 5 SOLUTION OPHTHALMIC at 07:54

## 2024-11-26 NOTE — CARE COORDINATION
DISCHARGE PLANNING NOTE:    Writer is following for potential discharge to [facility]. Writer checked authorization in [employer] portal, authorization has been approved 11/26-12/2.     Writer met with pt for update, requests writer place call to son, Ritchie, for update. Writer placed call to Ritchie, all questions answered at this time.    Writer placed message to Hortensia with [facility] to confirm authorization approval. [employer] message placed to bedside ELMER Santamaria for update on approval.  Electronically signed by CHRISTIE Chau on 11/26/2024 at 11:34 AM

## 2024-11-26 NOTE — FLOWSHEET NOTE
11/25/24 2057   Vital Signs   Temp (!) 95.7 °F (35.4 °C)   Temp Source Rectal     RN Notified NP, Bonnie Stout of pts rectal temp of 95.7.     NP came to bedside to assess pt. Warming blanket ordered at this time.

## 2024-11-27 ENCOUNTER — APPOINTMENT (OUTPATIENT)
Dept: CT IMAGING | Age: 88
DRG: 291 | End: 2024-11-27
Payer: MEDICARE

## 2024-11-27 LAB
ANION GAP SERPL CALCULATED.3IONS-SCNC: 9 MMOL/L (ref 9–16)
ARTERIAL PATENCY WRIST A: ABNORMAL
BASOPHILS # BLD: 0 K/UL (ref 0–0.2)
BASOPHILS NFR BLD: 1 % (ref 0–2)
BDY SITE: ABNORMAL
BODY TEMPERATURE: 37
BUN SERPL-MCNC: 23 MG/DL (ref 8–23)
CALCIUM SERPL-MCNC: 9.5 MG/DL (ref 8.6–10.4)
CHLORIDE SERPL-SCNC: 98 MMOL/L (ref 98–107)
CO2 SERPL-SCNC: 35 MMOL/L (ref 20–31)
COHGB MFR BLD: 1.9 % (ref 0–5)
CREAT SERPL-MCNC: 1.1 MG/DL (ref 0.7–1.2)
EOSINOPHIL # BLD: 0.2 K/UL (ref 0–0.4)
EOSINOPHILS RELATIVE PERCENT: 3 % (ref 0–4)
ERYTHROCYTE [DISTWIDTH] IN BLOOD BY AUTOMATED COUNT: 16.5 % (ref 11.5–14.9)
GAS FLOW.O2 O2 DELIVERY SYS: ABNORMAL L/MIN
GFR, ESTIMATED: 45 ML/MIN/1.73M2
GLUCOSE SERPL-MCNC: 94 MG/DL (ref 74–99)
HCO3 ARTERIAL: 37 MMOL/L (ref 22–26)
HCT VFR BLD AUTO: 40.1 % (ref 36–46)
HGB BLD-MCNC: 12.7 G/DL (ref 12–16)
LYMPHOCYTES NFR BLD: 0.8 K/UL (ref 1–4.8)
LYMPHOCYTES RELATIVE PERCENT: 18 % (ref 24–44)
MCH RBC QN AUTO: 32.6 PG (ref 26–34)
MCHC RBC AUTO-ENTMCNC: 31.7 G/DL (ref 31–37)
MCV RBC AUTO: 103 FL (ref 80–100)
METHEMOGLOBIN: 0.1 % (ref 0–1.9)
MONOCYTES NFR BLD: 0.5 K/UL (ref 0.1–1.3)
MONOCYTES NFR BLD: 11 % (ref 1–7)
NEUTROPHILS NFR BLD: 67 % (ref 36–66)
NEUTS SEG NFR BLD: 3.1 K/UL (ref 1.3–9.1)
O2 SAT, ARTERIAL: 97 % (ref 95–98)
PCO2 ARTERIAL: 61.5 MMHG (ref 35–45)
PH ARTERIAL: 7.4 (ref 7.35–7.45)
PLATELET # BLD AUTO: 107 K/UL (ref 150–450)
PMV BLD AUTO: 9.6 FL (ref 6–12)
PO2 ARTERIAL: 86.7 MMHG (ref 80–100)
POSITIVE BASE EXCESS, ART: 10 MMOL/L (ref 0–2)
POTASSIUM SERPL-SCNC: 4.2 MMOL/L (ref 3.7–5.3)
PT. POSITION: ABNORMAL
RBC # BLD AUTO: 3.9 M/UL (ref 4–5.2)
RESPIRATORY RATE: 16
SODIUM SERPL-SCNC: 142 MMOL/L (ref 136–145)
TEXT FOR RESPIRATORY: ABNORMAL
WBC OTHER # BLD: 4.6 K/UL (ref 3.5–11)

## 2024-11-27 PROCEDURE — 6370000000 HC RX 637 (ALT 250 FOR IP): Performed by: NURSE PRACTITIONER

## 2024-11-27 PROCEDURE — 36415 COLL VENOUS BLD VENIPUNCTURE: CPT

## 2024-11-27 PROCEDURE — 97530 THERAPEUTIC ACTIVITIES: CPT

## 2024-11-27 PROCEDURE — 6360000002 HC RX W HCPCS: Performed by: INTERNAL MEDICINE

## 2024-11-27 PROCEDURE — 99231 SBSQ HOSP IP/OBS SF/LOW 25: CPT

## 2024-11-27 PROCEDURE — 70498 CT ANGIOGRAPHY NECK: CPT

## 2024-11-27 PROCEDURE — 82805 BLOOD GASES W/O2 SATURATION: CPT

## 2024-11-27 PROCEDURE — 80048 BASIC METABOLIC PNL TOTAL CA: CPT

## 2024-11-27 PROCEDURE — 70450 CT HEAD/BRAIN W/O DYE: CPT

## 2024-11-27 PROCEDURE — 4A03X5D MEASUREMENT OF ARTERIAL FLOW, INTRACRANIAL, EXTERNAL APPROACH: ICD-10-PCS | Performed by: INTERNAL MEDICINE

## 2024-11-27 PROCEDURE — 99232 SBSQ HOSP IP/OBS MODERATE 35: CPT | Performed by: INTERNAL MEDICINE

## 2024-11-27 PROCEDURE — 2060000000 HC ICU INTERMEDIATE R&B

## 2024-11-27 PROCEDURE — 97116 GAIT TRAINING THERAPY: CPT

## 2024-11-27 PROCEDURE — 6370000000 HC RX 637 (ALT 250 FOR IP): Performed by: INTERNAL MEDICINE

## 2024-11-27 PROCEDURE — 85025 COMPLETE CBC W/AUTO DIFF WBC: CPT

## 2024-11-27 PROCEDURE — 2580000003 HC RX 258: Performed by: NURSE PRACTITIONER

## 2024-11-27 PROCEDURE — 6360000004 HC RX CONTRAST MEDICATION: Performed by: INTERNAL MEDICINE

## 2024-11-27 PROCEDURE — 2580000003 HC RX 258: Performed by: INTERNAL MEDICINE

## 2024-11-27 PROCEDURE — 97535 SELF CARE MNGMENT TRAINING: CPT

## 2024-11-27 RX ORDER — CEPHALEXIN 500 MG/1
500 CAPSULE ORAL EVERY 12 HOURS SCHEDULED
Status: COMPLETED | OUTPATIENT
Start: 2024-11-27 | End: 2024-12-01

## 2024-11-27 RX ORDER — IOPAMIDOL 755 MG/ML
75 INJECTION, SOLUTION INTRAVASCULAR
Status: COMPLETED | OUTPATIENT
Start: 2024-11-27 | End: 2024-11-27

## 2024-11-27 RX ORDER — FUROSEMIDE 10 MG/ML
20 INJECTION INTRAMUSCULAR; INTRAVENOUS 2 TIMES DAILY
Status: DISCONTINUED | OUTPATIENT
Start: 2024-11-27 | End: 2024-12-02 | Stop reason: HOSPADM

## 2024-11-27 RX ORDER — 0.9 % SODIUM CHLORIDE 0.9 %
100 INTRAVENOUS SOLUTION INTRAVENOUS ONCE
Status: COMPLETED | OUTPATIENT
Start: 2024-11-27 | End: 2024-11-27

## 2024-11-27 RX ORDER — SODIUM CHLORIDE 0.9 % (FLUSH) 0.9 %
10 SYRINGE (ML) INJECTION PRN
Status: DISCONTINUED | OUTPATIENT
Start: 2024-11-27 | End: 2024-12-01 | Stop reason: SDUPTHER

## 2024-11-27 RX ADMIN — FUROSEMIDE 20 MG: 10 INJECTION, SOLUTION INTRAMUSCULAR; INTRAVENOUS at 07:22

## 2024-11-27 RX ADMIN — IOPAMIDOL 75 ML: 755 INJECTION, SOLUTION INTRAVENOUS at 17:17

## 2024-11-27 RX ADMIN — PRAVASTATIN SODIUM 20 MG: 20 TABLET ORAL at 20:15

## 2024-11-27 RX ADMIN — CETIRIZINE HYDROCHLORIDE 5 MG: 10 TABLET, FILM COATED ORAL at 07:22

## 2024-11-27 RX ADMIN — SODIUM CHLORIDE, PRESERVATIVE FREE 10 ML: 5 INJECTION INTRAVENOUS at 20:16

## 2024-11-27 RX ADMIN — METOPROLOL TARTRATE 12.5 MG: 25 TABLET, FILM COATED ORAL at 20:15

## 2024-11-27 RX ADMIN — ASPIRIN 81 MG 81 MG: 81 TABLET ORAL at 07:22

## 2024-11-27 RX ADMIN — CEPHALEXIN 500 MG: 500 CAPSULE ORAL at 20:15

## 2024-11-27 RX ADMIN — SODIUM CHLORIDE, PRESERVATIVE FREE 10 ML: 5 INJECTION INTRAVENOUS at 07:22

## 2024-11-27 RX ADMIN — TIMOLOL MALEATE 1 DROP: 5 SOLUTION OPHTHALMIC at 20:16

## 2024-11-27 RX ADMIN — METOPROLOL TARTRATE 12.5 MG: 25 TABLET, FILM COATED ORAL at 07:22

## 2024-11-27 RX ADMIN — FUROSEMIDE 20 MG: 10 INJECTION, SOLUTION INTRAMUSCULAR; INTRAVENOUS at 19:29

## 2024-11-27 RX ADMIN — TIMOLOL MALEATE 1 DROP: 5 SOLUTION OPHTHALMIC at 07:28

## 2024-11-27 RX ADMIN — TAMSULOSIN HYDROCHLORIDE 0.4 MG: 0.4 CAPSULE ORAL at 07:22

## 2024-11-27 RX ADMIN — SODIUM CHLORIDE 100 ML: 9 INJECTION, SOLUTION INTRAVENOUS at 17:17

## 2024-11-27 RX ADMIN — SODIUM CHLORIDE, PRESERVATIVE FREE 10 ML: 5 INJECTION INTRAVENOUS at 17:17

## 2024-11-27 RX ADMIN — CEPHALEXIN 500 MG: 500 CAPSULE ORAL at 11:30

## 2024-11-27 NOTE — DISCHARGE INSTR - COC
Continuity of Care Form    Patient Name: Edyta Gruber   :  1924  MRN:  [id number]    Admit date:  2024  Discharge date:  2024  /  /Code Status Order: DNR-CCA   Advance Directives:   Advance Care Flowsheet Documentation             Admitting Physician:  Kathy Humphries MD  PCP: Florencia Talley MD    Discharging Nurse: [facility] Unit/Room#: 2105/2105-01  Discharging Unit Phone Number: [phone]    Emergency Contact:   Extended Emergency Contact Information  Primary Emergency Contact: Ritchie Gruber   [facility]  Home Phone: [phone]  Work Phone: [phone]  Relation: Child  Secondary Emergency Contact: Adriana Sifuentes   [facility]  Home Phone: [phone]  Relation: Niece/Nephew    Past Surgical History:  Past Surgical History:   Procedure Laterality Date    BACK SURGERY      DEBRIDEMENT Left 2017    rt. leg    OTHER SURGICAL HISTORY  2017    Right leg Angio    OTHER SURGICAL HISTORY  2017    Right leg angio    LA I&D DEEP ABSC BURSA/HEMATOMA THIGH/KNEE REGION Right 3/9/2017    DEBRIDEMENT CALF WOUND  performed by Arthur Delos Reyes, MD at [facility] CVOR    LA I&D DEEP ABSC BURSA/HEMATOMA THIGH/KNEE REGION Right 2017    RIGHT LOWER EXTREMITY DEBRIDEMENT, INTEGRA APPLICATION, PREVENA VAC APPLICATION LOWR RIGHT LEG performed by Arthur Delos Reyes, MD at [facility] OR       Immunization History:   Immunization History   Administered Date(s) Administered    COVID-19, PFIZER GRAY top, DO NOT Dilute, (age 12 y+), IM, 30 mcg/0.3 mL 2022    COVID-19, PFIZER PURPLE top, DILUTE for use, (age 12 y+), 30mcg/0.3mL 2021, 2021, 10/28/2021    Influenza, FLUZONE High Dose (age 65 y+), IM, Quadv, 0.7mL 2020, 2023    TDaP, ADACEL (age 10y-64y), BOOSTRIX (age 10y+), IM, 0.5mL 2020       Active Problems:  Patient Active Problem List   Diagnosis Code    Non-pressure ulcer of left lower extremity, limited to breakdown of

## 2024-11-27 NOTE — CARE COORDINATION
DISCHARGE PLANNING NOTE:    Writer is following for potential discharge to [facility]. Authorization has been approved 11/26-12/2. Pt can admit to facility when medically ready.   Electronically signed by CHRISTIE Chau on 11/27/2024 at 1:40 PM

## 2024-11-28 ENCOUNTER — APPOINTMENT (OUTPATIENT)
Dept: GENERAL RADIOLOGY | Age: 88
DRG: 291 | End: 2024-11-28
Payer: MEDICARE

## 2024-11-28 PROCEDURE — 2060000000 HC ICU INTERMEDIATE R&B

## 2024-11-28 PROCEDURE — 6370000000 HC RX 637 (ALT 250 FOR IP): Performed by: NURSE PRACTITIONER

## 2024-11-28 PROCEDURE — 74018 RADEX ABDOMEN 1 VIEW: CPT

## 2024-11-28 PROCEDURE — 6360000002 HC RX W HCPCS: Performed by: INTERNAL MEDICINE

## 2024-11-28 PROCEDURE — 71045 X-RAY EXAM CHEST 1 VIEW: CPT

## 2024-11-28 PROCEDURE — 6370000000 HC RX 637 (ALT 250 FOR IP): Performed by: INTERNAL MEDICINE

## 2024-11-28 PROCEDURE — 99221 1ST HOSP IP/OBS SF/LOW 40: CPT | Performed by: PSYCHIATRY & NEUROLOGY

## 2024-11-28 PROCEDURE — 2580000003 HC RX 258: Performed by: NURSE PRACTITIONER

## 2024-11-28 PROCEDURE — 99233 SBSQ HOSP IP/OBS HIGH 50: CPT | Performed by: INTERNAL MEDICINE

## 2024-11-28 RX ORDER — ACETAZOLAMIDE 250 MG/1
250 TABLET ORAL DAILY
Status: DISCONTINUED | OUTPATIENT
Start: 2024-11-28 | End: 2024-11-28

## 2024-11-28 RX ORDER — ACETAZOLAMIDE 250 MG/1
125 TABLET ORAL DAILY
Status: COMPLETED | OUTPATIENT
Start: 2024-11-28 | End: 2024-11-29

## 2024-11-28 RX ADMIN — TIMOLOL MALEATE 1 DROP: 5 SOLUTION OPHTHALMIC at 10:35

## 2024-11-28 RX ADMIN — TIMOLOL MALEATE 1 DROP: 5 SOLUTION OPHTHALMIC at 21:04

## 2024-11-28 RX ADMIN — METOPROLOL TARTRATE 12.5 MG: 25 TABLET, FILM COATED ORAL at 10:27

## 2024-11-28 RX ADMIN — CETIRIZINE HYDROCHLORIDE 5 MG: 10 TABLET, FILM COATED ORAL at 10:27

## 2024-11-28 RX ADMIN — POLYETHYLENE GLYCOL 3350 17 G: 17 POWDER, FOR SOLUTION ORAL at 10:33

## 2024-11-28 RX ADMIN — SODIUM CHLORIDE, PRESERVATIVE FREE 10 ML: 5 INJECTION INTRAVENOUS at 21:03

## 2024-11-28 RX ADMIN — PRAVASTATIN SODIUM 20 MG: 20 TABLET ORAL at 21:02

## 2024-11-28 RX ADMIN — FUROSEMIDE 20 MG: 10 INJECTION, SOLUTION INTRAMUSCULAR; INTRAVENOUS at 17:04

## 2024-11-28 RX ADMIN — TAMSULOSIN HYDROCHLORIDE 0.4 MG: 0.4 CAPSULE ORAL at 10:27

## 2024-11-28 RX ADMIN — SODIUM CHLORIDE, PRESERVATIVE FREE 10 ML: 5 INJECTION INTRAVENOUS at 10:31

## 2024-11-28 RX ADMIN — ASPIRIN 81 MG 81 MG: 81 TABLET ORAL at 10:27

## 2024-11-28 RX ADMIN — CEPHALEXIN 500 MG: 500 CAPSULE ORAL at 10:27

## 2024-11-28 RX ADMIN — ACETAZOLAMIDE 125 MG: 250 TABLET ORAL at 14:52

## 2024-11-28 RX ADMIN — CEPHALEXIN 500 MG: 500 CAPSULE ORAL at 21:02

## 2024-11-28 RX ADMIN — FUROSEMIDE 20 MG: 10 INJECTION, SOLUTION INTRAMUSCULAR; INTRAVENOUS at 10:26

## 2024-11-28 NOTE — CARE COORDINATION
ONGOING DISCHARGE PLAN:    Writer reviewed chart Notes.     Plan remains for Pt. To Return to [facility] the [facility]. Per [facility] notes, Auth approved, 11/26-12/2.     Sating 96% on 2LNC, Pulm on board.     Was Stroke Alert yesterday.    KUB/CXR.    Will continue to follow for additional discharge needs.    If patient is discharged prior to next notation, then this note serves as note for discharge by case management.    Electronically signed by Claudine Alexander RN on 11/28/2024 at 2:47 PM

## 2024-11-28 NOTE — VIRTUAL HEALTH
[facility] Stroke and Telestroke Consult for  [facility] Stroke Alert through [facility] @ 0420 pm  11/28/2024 2:19 PM    Pt Name: Edyta Gruber  MRN: [id number]  YOB: 1924  Date of evaluation: 11/28/2024  Primary Care Physician: Florencia Talley MD  Reason for Evaluation: Stroke Evaluation with Discussion with Ed or primary team with Telemedicine and stroke evaluation with Review of imaging and labs    Edyta Gruber is a 89 y.o. female with history of atrial fibrillation off anticoagulation since September was admitted for acute on chronic hypoxic respiratory failure.  Yesterday around 4. 45 PM stroke code was activated since patient was unresponsive to stimuli.  Patient was not opening eyes pulling up on things around.  ABG revealed hypercapnia.    On my assessment patient had her eyes closed but was following simple commands, spontaneously moving upper extremities, withdrawing lower extremities on stimuli      LKW: 9  NIH:  21    Allergies  is allergic to shellfish-derived products and tudorza pressair [aclidinium bromide].  Medications  Prior to Admission medications    Medication Sig Start Date End Date Taking? Authorizing Provider   metoprolol tartrate (LOPRESSOR) 25 MG tablet Take 0.5 tablets by mouth 2 times daily 11/22/24  Yes Earnestine Ibanez MD   budesonide-formoterol (SYMBICORT) 160-4.5 MCG/ACT AERO Inhale 2 puffs into the lungs in the morning and 2 puffs in the evening. 10/15/24  Yes Chavo Sanders MD   aspirin 81 MG chewable tablet Take 1 tablet by mouth daily 10/16/24  Yes Chavo Sanders MD   tamsulosin (FLOMAX) 0.4 MG capsule Take 1 capsule by mouth daily 10/16/24  Yes Chavo Sanders MD   cetirizine (ZYRTEC) 10 MG tablet Take 1 tablet by mouth daily   Yes ProviderJaclyn MD   timolol (TIMOPTIC) 0.5 % ophthalmic solution Place 1 drop into both eyes 2 times daily   Yes Jaclyn Hall MD   pravastatin (PRAVACHOL) 20 MG tablet Take 1 tablet by

## 2024-11-29 PROCEDURE — 6370000000 HC RX 637 (ALT 250 FOR IP): Performed by: INTERNAL MEDICINE

## 2024-11-29 PROCEDURE — 6370000000 HC RX 637 (ALT 250 FOR IP): Performed by: NURSE PRACTITIONER

## 2024-11-29 PROCEDURE — 97530 THERAPEUTIC ACTIVITIES: CPT

## 2024-11-29 PROCEDURE — 2060000000 HC ICU INTERMEDIATE R&B

## 2024-11-29 PROCEDURE — 6360000002 HC RX W HCPCS: Performed by: INTERNAL MEDICINE

## 2024-11-29 PROCEDURE — 99233 SBSQ HOSP IP/OBS HIGH 50: CPT | Performed by: INTERNAL MEDICINE

## 2024-11-29 PROCEDURE — 2580000003 HC RX 258: Performed by: NURSE PRACTITIONER

## 2024-11-29 RX ORDER — MIDODRINE HYDROCHLORIDE 5 MG/1
5 TABLET ORAL
Status: DISCONTINUED | OUTPATIENT
Start: 2024-11-29 | End: 2024-12-02 | Stop reason: HOSPADM

## 2024-11-29 RX ADMIN — TAMSULOSIN HYDROCHLORIDE 0.4 MG: 0.4 CAPSULE ORAL at 10:21

## 2024-11-29 RX ADMIN — TIMOLOL MALEATE 1 DROP: 5 SOLUTION OPHTHALMIC at 10:21

## 2024-11-29 RX ADMIN — CEPHALEXIN 500 MG: 500 CAPSULE ORAL at 21:01

## 2024-11-29 RX ADMIN — FUROSEMIDE 20 MG: 10 INJECTION, SOLUTION INTRAMUSCULAR; INTRAVENOUS at 17:57

## 2024-11-29 RX ADMIN — MIDODRINE HYDROCHLORIDE 5 MG: 5 TABLET ORAL at 17:57

## 2024-11-29 RX ADMIN — ASPIRIN 81 MG 81 MG: 81 TABLET ORAL at 10:21

## 2024-11-29 RX ADMIN — PRAVASTATIN SODIUM 20 MG: 20 TABLET ORAL at 21:01

## 2024-11-29 RX ADMIN — SODIUM CHLORIDE, PRESERVATIVE FREE 10 ML: 5 INJECTION INTRAVENOUS at 10:25

## 2024-11-29 RX ADMIN — CEPHALEXIN 500 MG: 500 CAPSULE ORAL at 10:21

## 2024-11-29 RX ADMIN — SODIUM CHLORIDE, PRESERVATIVE FREE 10 ML: 5 INJECTION INTRAVENOUS at 19:32

## 2024-11-29 RX ADMIN — ACETAZOLAMIDE 125 MG: 250 TABLET ORAL at 10:22

## 2024-11-29 RX ADMIN — MIDODRINE HYDROCHLORIDE 5 MG: 5 TABLET ORAL at 15:17

## 2024-11-29 RX ADMIN — CETIRIZINE HYDROCHLORIDE 5 MG: 10 TABLET, FILM COATED ORAL at 10:24

## 2024-11-29 RX ADMIN — TIMOLOL MALEATE 1 DROP: 5 SOLUTION OPHTHALMIC at 21:01

## 2024-11-29 NOTE — CARE COORDINATION
ONGOING DISCHARGE PLAN:    Patient is alert and oriented x1.    Chart Review    Plan to return to [facility].    Authorization has been approved through 12/2/24.    PO Keflex.  Stroke Alert 11/27  NIH 0. CT/CTA done. No MRI per Neuro.    Will continue to follow for additional discharge needs.    If patient is discharged prior to next notation, then this note serves as note for discharge by case management.    Electronically signed by Mila Lea RN on 11/29/2024 at 3:29 PM

## 2024-11-30 ENCOUNTER — APPOINTMENT (OUTPATIENT)
Dept: GENERAL RADIOLOGY | Age: 88
DRG: 291 | End: 2024-11-30
Payer: MEDICARE

## 2024-11-30 LAB
ANION GAP SERPL CALCULATED.3IONS-SCNC: 7 MMOL/L (ref 9–16)
BASOPHILS # BLD: 0.1 K/UL (ref 0–0.2)
BASOPHILS NFR BLD: 2 % (ref 0–2)
BUN SERPL-MCNC: 28 MG/DL (ref 8–23)
CALCIUM SERPL-MCNC: 9 MG/DL (ref 8.6–10.4)
CHLORIDE SERPL-SCNC: 102 MMOL/L (ref 98–107)
CO2 SERPL-SCNC: 34 MMOL/L (ref 20–31)
CREAT SERPL-MCNC: 1.1 MG/DL (ref 0.7–1.2)
EOSINOPHIL # BLD: 0.2 K/UL (ref 0–0.4)
EOSINOPHILS RELATIVE PERCENT: 6 % (ref 0–4)
ERYTHROCYTE [DISTWIDTH] IN BLOOD BY AUTOMATED COUNT: 15.5 % (ref 11.5–14.9)
GFR, ESTIMATED: 45 ML/MIN/1.73M2
GLUCOSE SERPL-MCNC: 128 MG/DL (ref 74–99)
HCT VFR BLD AUTO: 35.7 % (ref 36–46)
HGB BLD-MCNC: 11.8 G/DL (ref 12–16)
LYMPHOCYTES NFR BLD: 0.6 K/UL (ref 1–4.8)
LYMPHOCYTES RELATIVE PERCENT: 17 % (ref 24–44)
MCH RBC QN AUTO: 32.9 PG (ref 26–34)
MCHC RBC AUTO-ENTMCNC: 32.9 G/DL (ref 31–37)
MCV RBC AUTO: 100.1 FL (ref 80–100)
MONOCYTES NFR BLD: 0.5 K/UL (ref 0.1–1.3)
MONOCYTES NFR BLD: 12 % (ref 1–7)
NEUTROPHILS NFR BLD: 63 % (ref 36–66)
NEUTS SEG NFR BLD: 2.4 K/UL (ref 1.3–9.1)
PLATELET # BLD AUTO: 140 K/UL (ref 150–450)
PMV BLD AUTO: 10.3 FL (ref 6–12)
POTASSIUM SERPL-SCNC: 3.2 MMOL/L (ref 3.7–5.3)
RBC # BLD AUTO: 3.57 M/UL (ref 4–5.2)
SODIUM SERPL-SCNC: 143 MMOL/L (ref 136–145)
WBC OTHER # BLD: 3.7 K/UL (ref 3.5–11)

## 2024-11-30 PROCEDURE — 99233 SBSQ HOSP IP/OBS HIGH 50: CPT | Performed by: INTERNAL MEDICINE

## 2024-11-30 PROCEDURE — 2580000003 HC RX 258: Performed by: NURSE PRACTITIONER

## 2024-11-30 PROCEDURE — 6370000000 HC RX 637 (ALT 250 FOR IP): Performed by: INTERNAL MEDICINE

## 2024-11-30 PROCEDURE — 85025 COMPLETE CBC W/AUTO DIFF WBC: CPT

## 2024-11-30 PROCEDURE — 2060000000 HC ICU INTERMEDIATE R&B

## 2024-11-30 PROCEDURE — 71045 X-RAY EXAM CHEST 1 VIEW: CPT

## 2024-11-30 PROCEDURE — 6360000002 HC RX W HCPCS: Performed by: INTERNAL MEDICINE

## 2024-11-30 PROCEDURE — 6370000000 HC RX 637 (ALT 250 FOR IP): Performed by: NURSE PRACTITIONER

## 2024-11-30 PROCEDURE — 80048 BASIC METABOLIC PNL TOTAL CA: CPT

## 2024-11-30 PROCEDURE — 36415 COLL VENOUS BLD VENIPUNCTURE: CPT

## 2024-11-30 RX ADMIN — CETIRIZINE HYDROCHLORIDE 5 MG: 10 TABLET, FILM COATED ORAL at 10:30

## 2024-11-30 RX ADMIN — SODIUM CHLORIDE, PRESERVATIVE FREE 10 ML: 5 INJECTION INTRAVENOUS at 20:36

## 2024-11-30 RX ADMIN — CEPHALEXIN 500 MG: 500 CAPSULE ORAL at 20:32

## 2024-11-30 RX ADMIN — TIMOLOL MALEATE 1 DROP: 5 SOLUTION OPHTHALMIC at 20:32

## 2024-11-30 RX ADMIN — PRAVASTATIN SODIUM 20 MG: 20 TABLET ORAL at 20:32

## 2024-11-30 RX ADMIN — FUROSEMIDE 20 MG: 10 INJECTION, SOLUTION INTRAMUSCULAR; INTRAVENOUS at 10:31

## 2024-11-30 RX ADMIN — CEPHALEXIN 500 MG: 500 CAPSULE ORAL at 10:30

## 2024-11-30 RX ADMIN — MIDODRINE HYDROCHLORIDE 5 MG: 5 TABLET ORAL at 16:43

## 2024-11-30 RX ADMIN — ASPIRIN 81 MG 81 MG: 81 TABLET ORAL at 10:30

## 2024-11-30 RX ADMIN — TIMOLOL MALEATE 1 DROP: 5 SOLUTION OPHTHALMIC at 12:57

## 2024-11-30 RX ADMIN — POTASSIUM BICARBONATE 25 MEQ: 977.5 TABLET, EFFERVESCENT ORAL at 18:14

## 2024-11-30 RX ADMIN — SODIUM CHLORIDE, PRESERVATIVE FREE 10 ML: 5 INJECTION INTRAVENOUS at 12:57

## 2024-11-30 RX ADMIN — MIDODRINE HYDROCHLORIDE 5 MG: 5 TABLET ORAL at 12:57

## 2024-11-30 RX ADMIN — MIDODRINE HYDROCHLORIDE 5 MG: 5 TABLET ORAL at 10:30

## 2024-11-30 RX ADMIN — TAMSULOSIN HYDROCHLORIDE 0.4 MG: 0.4 CAPSULE ORAL at 10:30

## 2024-12-01 LAB
ANION GAP SERPL CALCULATED.3IONS-SCNC: 7 MMOL/L (ref 9–16)
BUN SERPL-MCNC: 26 MG/DL (ref 8–23)
CALCIUM SERPL-MCNC: 9 MG/DL (ref 8.6–10.4)
CHLORIDE SERPL-SCNC: 103 MMOL/L (ref 98–107)
CO2 SERPL-SCNC: 31 MMOL/L (ref 20–31)
CREAT SERPL-MCNC: 1 MG/DL (ref 0.7–1.2)
GFR, ESTIMATED: 50 ML/MIN/1.73M2
GLUCOSE SERPL-MCNC: 84 MG/DL (ref 74–99)
POTASSIUM SERPL-SCNC: 3.6 MMOL/L (ref 3.7–5.3)
SODIUM SERPL-SCNC: 141 MMOL/L (ref 136–145)

## 2024-12-01 PROCEDURE — 6360000002 HC RX W HCPCS: Performed by: INTERNAL MEDICINE

## 2024-12-01 PROCEDURE — 6370000000 HC RX 637 (ALT 250 FOR IP): Performed by: INTERNAL MEDICINE

## 2024-12-01 PROCEDURE — 36415 COLL VENOUS BLD VENIPUNCTURE: CPT

## 2024-12-01 PROCEDURE — 2580000003 HC RX 258: Performed by: NURSE PRACTITIONER

## 2024-12-01 PROCEDURE — 6370000000 HC RX 637 (ALT 250 FOR IP): Performed by: NURSE PRACTITIONER

## 2024-12-01 PROCEDURE — 2060000000 HC ICU INTERMEDIATE R&B

## 2024-12-01 PROCEDURE — 80048 BASIC METABOLIC PNL TOTAL CA: CPT

## 2024-12-01 PROCEDURE — 99233 SBSQ HOSP IP/OBS HIGH 50: CPT | Performed by: INTERNAL MEDICINE

## 2024-12-01 RX ADMIN — PRAVASTATIN SODIUM 20 MG: 20 TABLET ORAL at 21:15

## 2024-12-01 RX ADMIN — SODIUM CHLORIDE, PRESERVATIVE FREE 10 ML: 5 INJECTION INTRAVENOUS at 21:15

## 2024-12-01 RX ADMIN — FUROSEMIDE 20 MG: 10 INJECTION, SOLUTION INTRAMUSCULAR; INTRAVENOUS at 13:36

## 2024-12-01 RX ADMIN — POTASSIUM BICARBONATE 25 MEQ: 977.5 TABLET, EFFERVESCENT ORAL at 09:25

## 2024-12-01 RX ADMIN — TAMSULOSIN HYDROCHLORIDE 0.4 MG: 0.4 CAPSULE ORAL at 09:26

## 2024-12-01 RX ADMIN — CETIRIZINE HYDROCHLORIDE 5 MG: 10 TABLET, FILM COATED ORAL at 09:26

## 2024-12-01 RX ADMIN — TIMOLOL MALEATE 1 DROP: 5 SOLUTION OPHTHALMIC at 11:50

## 2024-12-01 RX ADMIN — ASPIRIN 81 MG 81 MG: 81 TABLET ORAL at 09:26

## 2024-12-01 RX ADMIN — EMPAGLIFLOZIN 10 MG: 10 TABLET, FILM COATED ORAL at 13:38

## 2024-12-01 RX ADMIN — SODIUM CHLORIDE, PRESERVATIVE FREE 10 ML: 5 INJECTION INTRAVENOUS at 09:26

## 2024-12-01 RX ADMIN — TIMOLOL MALEATE 1 DROP: 5 SOLUTION OPHTHALMIC at 21:23

## 2024-12-01 RX ADMIN — FUROSEMIDE 20 MG: 10 INJECTION, SOLUTION INTRAMUSCULAR; INTRAVENOUS at 21:15

## 2024-12-01 RX ADMIN — MIDODRINE HYDROCHLORIDE 5 MG: 5 TABLET ORAL at 12:07

## 2024-12-01 RX ADMIN — MIDODRINE HYDROCHLORIDE 5 MG: 5 TABLET ORAL at 09:26

## 2024-12-01 RX ADMIN — CEPHALEXIN 500 MG: 500 CAPSULE ORAL at 09:26

## 2024-12-01 RX ADMIN — CEPHALEXIN 500 MG: 500 CAPSULE ORAL at 21:15

## 2024-12-01 RX ADMIN — MIDODRINE HYDROCHLORIDE 5 MG: 5 TABLET ORAL at 18:03

## 2024-12-01 NOTE — CARE COORDINATION
DISCHARGE PLANNING NOTE:    Called Hortensia with [facility] to see if patient can return to facility today. No answer, message left to call me back as soon as she can. Awaiting response.    Electronically signed by Mila Lea RN on 12/1/2024 at 12:26 PM    [facility] called back and stated they would not have a bed till tomorrow. Authorization good through 12/2. Physicians also decided not to discharge today. Hortensia informed possible discharge tomorrow.    Electronically signed by Mila Lea RN on 12/1/2024 at 1:00 PM

## 2024-12-02 ENCOUNTER — APPOINTMENT (OUTPATIENT)
Dept: GENERAL RADIOLOGY | Age: 88
DRG: 291 | End: 2024-12-02
Payer: MEDICARE

## 2024-12-02 ENCOUNTER — APPOINTMENT (OUTPATIENT)
Dept: ULTRASOUND IMAGING | Age: 88
DRG: 291 | End: 2024-12-02
Payer: MEDICARE

## 2024-12-02 VITALS
HEIGHT: 62 IN | TEMPERATURE: 97.6 F | BODY MASS INDEX: 20.24 KG/M2 | RESPIRATION RATE: 18 BRPM | HEART RATE: 61 BPM | DIASTOLIC BLOOD PRESSURE: 65 MMHG | SYSTOLIC BLOOD PRESSURE: 99 MMHG | OXYGEN SATURATION: 97 % | WEIGHT: 110.01 LBS

## 2024-12-02 LAB
APPEARANCE FLD: NORMAL
BLASTS NFR FLD: ABNORMAL %
BODY FLD TYPE: NORMAL
CASE NUMBER:: NORMAL
COLOR FLD: YELLOW
EOSINOPHIL NFR FLD: ABNORMAL %
LYMPHOCYTES NFR FLD: 56 %
MONOCYTES NFR FLD: 41 %
NEUTROPHILS NFR FLD: 3 %
PH FLUID: 8
RBC # FLD: 782 CELLS/UL
SPECIMEN DESCRIPTION: NORMAL
SPECIMEN TYPE: NORMAL
UNIDENT CELLS NFR FLD: ABNORMAL %
WBC # FLD: 88 CELLS/UL

## 2024-12-02 PROCEDURE — 0W993ZZ DRAINAGE OF RIGHT PLEURAL CAVITY, PERCUTANEOUS APPROACH: ICD-10-PCS | Performed by: INTERNAL MEDICINE

## 2024-12-02 PROCEDURE — 84157 ASSAY OF PROTEIN OTHER: CPT

## 2024-12-02 PROCEDURE — 83615 LACTATE (LD) (LDH) ENZYME: CPT

## 2024-12-02 PROCEDURE — 71045 X-RAY EXAM CHEST 1 VIEW: CPT

## 2024-12-02 PROCEDURE — 88305 TISSUE EXAM BY PATHOLOGIST: CPT

## 2024-12-02 PROCEDURE — 99232 SBSQ HOSP IP/OBS MODERATE 35: CPT | Performed by: INTERNAL MEDICINE

## 2024-12-02 PROCEDURE — 88112 CYTOPATH CELL ENHANCE TECH: CPT

## 2024-12-02 PROCEDURE — 87205 SMEAR GRAM STAIN: CPT

## 2024-12-02 PROCEDURE — 32555 ASPIRATE PLEURA W/ IMAGING: CPT

## 2024-12-02 PROCEDURE — 6370000000 HC RX 637 (ALT 250 FOR IP): Performed by: INTERNAL MEDICINE

## 2024-12-02 PROCEDURE — 97116 GAIT TRAINING THERAPY: CPT

## 2024-12-02 PROCEDURE — 87070 CULTURE OTHR SPECIMN AEROBIC: CPT

## 2024-12-02 PROCEDURE — 87075 CULTR BACTERIA EXCEPT BLOOD: CPT

## 2024-12-02 PROCEDURE — 83986 ASSAY PH BODY FLUID NOS: CPT

## 2024-12-02 PROCEDURE — 6360000002 HC RX W HCPCS: Performed by: INTERNAL MEDICINE

## 2024-12-02 PROCEDURE — 6370000000 HC RX 637 (ALT 250 FOR IP): Performed by: NURSE PRACTITIONER

## 2024-12-02 PROCEDURE — 2580000003 HC RX 258: Performed by: NURSE PRACTITIONER

## 2024-12-02 PROCEDURE — 97110 THERAPEUTIC EXERCISES: CPT

## 2024-12-02 PROCEDURE — 97535 SELF CARE MNGMENT TRAINING: CPT

## 2024-12-02 RX ORDER — LEVALBUTEROL INHALATION SOLUTION 0.63 MG/3ML
0.63 SOLUTION RESPIRATORY (INHALATION) EVERY 4 HOURS PRN
Qty: 120 EACH | Refills: 3 | Status: SHIPPED | OUTPATIENT
Start: 2024-12-02

## 2024-12-02 RX ADMIN — SODIUM CHLORIDE, PRESERVATIVE FREE 10 ML: 5 INJECTION INTRAVENOUS at 07:53

## 2024-12-02 RX ADMIN — POTASSIUM BICARBONATE 25 MEQ: 977.5 TABLET, EFFERVESCENT ORAL at 07:54

## 2024-12-02 RX ADMIN — TIMOLOL MALEATE 1 DROP: 5 SOLUTION OPHTHALMIC at 07:54

## 2024-12-02 RX ADMIN — METOPROLOL TARTRATE 12.5 MG: 25 TABLET, FILM COATED ORAL at 07:44

## 2024-12-02 RX ADMIN — CETIRIZINE HYDROCHLORIDE 5 MG: 10 TABLET, FILM COATED ORAL at 07:44

## 2024-12-02 RX ADMIN — MIDODRINE HYDROCHLORIDE 5 MG: 5 TABLET ORAL at 07:44

## 2024-12-02 RX ADMIN — MIDODRINE HYDROCHLORIDE 5 MG: 5 TABLET ORAL at 15:32

## 2024-12-02 RX ADMIN — EMPAGLIFLOZIN 10 MG: 10 TABLET, FILM COATED ORAL at 07:44

## 2024-12-02 RX ADMIN — FUROSEMIDE 20 MG: 10 INJECTION, SOLUTION INTRAMUSCULAR; INTRAVENOUS at 07:49

## 2024-12-02 RX ADMIN — MIDODRINE HYDROCHLORIDE 5 MG: 5 TABLET ORAL at 11:54

## 2024-12-02 RX ADMIN — ASPIRIN 81 MG 81 MG: 81 TABLET ORAL at 07:44

## 2024-12-02 RX ADMIN — TAMSULOSIN HYDROCHLORIDE 0.4 MG: 0.4 CAPSULE ORAL at 07:44

## 2024-12-02 NOTE — PLAN OF CARE
Problem: Chronic Conditions and Co-morbidities  Goal: Patient's chronic conditions and co-morbidity symptoms are monitored and maintained or improved  11/25/2024 1603 by Mckenzie Coronel RN  Outcome: Progressing     Problem: Discharge Planning  Goal: Discharge to home or other facility with appropriate resources  11/25/2024 1603 by Mckenzie Coronel RN  Outcome: Progressing     Problem: Safety - Adult  Goal: Free from fall injury  11/25/2024 1603 by Mckenzie Coronel RN  Outcome: Progressing     Problem: Skin/Tissue Integrity  Goal: Absence of new skin breakdown  Description: 1.  Monitor for areas of redness and/or skin breakdown  2.  Assess vascular access sites hourly  3.  Every 4-6 hours minimum:  Change oxygen saturation probe site  4.  Every 4-6 hours:  If on nasal continuous positive airway pressure, respiratory therapy assess nares and determine need for appliance change or resting period.  11/25/2024 1603 by Mckenzie Coronel RN  Outcome: Progressing     
  Problem: Chronic Conditions and Co-morbidities  Goal: Patient's chronic conditions and co-morbidity symptoms are monitored and maintained or improved  11/26/2024 0408 by Asmita Donovan RN  Outcome: Progressing     Problem: Discharge Planning  Goal: Discharge to home or other facility with appropriate resources  11/26/2024 0408 by Asmita Donovan RN  Outcome: Progressing     Problem: Safety - Adult  Goal: Free from fall injury  11/26/2024 0408 by Asmita Donovan RN  Outcome: Progressing     Problem: Skin/Tissue Integrity  Goal: Absence of new skin breakdown  11/26/2024 0408 by Asmita Donovan RN  Outcome: Progressing     Problem: ABCDS Injury Assessment  Goal: Absence of physical injury  Outcome: Progressing     
  Problem: Chronic Conditions and Co-morbidities  Goal: Patient's chronic conditions and co-morbidity symptoms are monitored and maintained or improved  11/29/2024 1611 by Kizzy Garber RN  Outcome: Progressing     Problem: Discharge Planning  Goal: Discharge to home or other facility with appropriate resources  11/29/2024 1611 by Kizzy Garber RN  Outcome: Progressing     Problem: Safety - Adult  Goal: Free from fall injury  11/29/2024 1611 by Kizzy Garber RN  Outcome: Progressing     Problem: Skin/Tissue Integrity  Goal: Absence of new skin breakdown  Description: 1.  Monitor for areas of redness and/or skin breakdown  2.  Assess vascular access sites hourly  3.  Every 4-6 hours minimum:  Change oxygen saturation probe site  4.  Every 4-6 hours:  If on nasal continuous positive airway pressure, respiratory therapy assess nares and determine need for appliance change or resting period.  11/29/2024 1611 by Kizzy Garber RN  Outcome: Progressing     Problem: ABCDS Injury Assessment  Goal: Absence of physical injury  11/29/2024 1611 by Kizzy Garber RN  Outcome: Progressing     
  Problem: Chronic Conditions and Co-morbidities  Goal: Patient's chronic conditions and co-morbidity symptoms are monitored and maintained or improved  12/1/2024 0220 by Olga Ovalle, RN  Outcome: Progressing  Flowsheets (Taken 12/1/2024 0220)  Care Plan - Patient's Chronic Conditions and Co-Morbidity Symptoms are Monitored and Maintained or Improved:   Monitor and assess patient's chronic conditions and comorbid symptoms for stability, deterioration, or improvement   Collaborate with multidisciplinary team to address chronic and comorbid conditions and prevent exacerbation or deterioration     Problem: Discharge Planning  Goal: Discharge to home or other facility with appropriate resources  12/1/2024 0220 by Olga Ovalle, RN  Outcome: Progressing  Flowsheets (Taken 12/1/2024 0220)  Discharge to home or other facility with appropriate resources:   Identify barriers to discharge with patient and caregiver   Arrange for needed discharge resources and transportation as appropriate   Identify discharge learning needs (meds, wound care, etc)   Refer to discharge planning if patient needs post-hospital services based on physician order or complex needs related to functional status, cognitive ability or social support system  Note: Patient plans to [facility] upon discharge      Problem: Safety - Adult  Goal: Free from fall injury  12/1/2024 0220 by Olga Ovalle, RN  Outcome: Progressing  Flowsheets (Taken 12/1/2024 0220)  Free From Fall Injury: Instruct family/caregiver on patient safety  Note: Patient utilized fall precautions for high PA fall risk. Patient rounded on hourly for needs      Problem: Skin/Tissue Integrity  Goal: Absence of new skin breakdown  Description: 1.  Monitor for areas of redness and/or skin breakdown  2.  Assess vascular access sites hourly  3.  Every 4-6 hours minimum:  Change oxygen saturation probe site  4.  Every 4-6 hours:  If on nasal continuous 
  Problem: Chronic Conditions and Co-morbidities  Goal: Patient's chronic conditions and co-morbidity symptoms are monitored and maintained or improved  12/1/2024 1501 by Kizzy Garber RN  Outcome: Progressing     Problem: Discharge Planning  Goal: Discharge to home or other facility with appropriate resources  12/1/2024 1501 by Kizzy Garber RN  Outcome: Progressing     Problem: Safety - Adult  Goal: Free from fall injury  12/1/2024 1501 by Kizzy Garber RN  Outcome: Progressing     Problem: Skin/Tissue Integrity  Goal: Absence of new skin breakdown  Description: 1.  Monitor for areas of redness and/or skin breakdown  2.  Assess vascular access sites hourly  3.  Every 4-6 hours minimum:  Change oxygen saturation probe site  4.  Every 4-6 hours:  If on nasal continuous positive airway pressure, respiratory therapy assess nares and determine need for appliance change or resting period.  12/1/2024 1501 by Kizzy Garber RN  Outcome: Progressing     Problem: ABCDS Injury Assessment  Goal: Absence of physical injury  12/1/2024 1501 by Kizzy Garber RN  Outcome: Progressing     
  Problem: Chronic Conditions and Co-morbidities  Goal: Patient's chronic conditions and co-morbidity symptoms are monitored and maintained or improved  12/2/2024 0435 by Olga Ovalle RN  Outcome: Progressing  Flowsheets (Taken 12/2/2024 0435)  Care Plan - Patient's Chronic Conditions and Co-Morbidity Symptoms are Monitored and Maintained or Improved:   Monitor and assess patient's chronic conditions and comorbid symptoms for stability, deterioration, or improvement   Collaborate with multidisciplinary team to address chronic and comorbid conditions and prevent exacerbation or deterioration     Problem: Discharge Planning  Goal: Discharge to home or other facility with appropriate resources  12/2/2024 0435 by Olga Ovalle, RN  Outcome: Progressing  Flowsheets (Taken 12/2/2024 0435)  Discharge to home or other facility with appropriate resources:   Identify barriers to discharge with patient and caregiver   Arrange for needed discharge resources and transportation as appropriate   Identify discharge learning needs (meds, wound care, etc)   Refer to discharge planning if patient needs post-hospital services based on physician order or complex needs related to functional status, cognitive ability or social support system     Problem: Safety - Adult  Goal: Free from fall injury  12/2/2024 0435 by Olga Ovalle RN  Outcome: Progressing  Flowsheets (Taken 12/2/2024 0435)  Free From Fall Injury: Instruct family/caregiver on patient safety  Note: Patient utilized fall precautions for high PA fall risk      Problem: Skin/Tissue Integrity  Goal: Absence of new skin breakdown  Description: 1.  Monitor for areas of redness and/or skin breakdown  2.  Assess vascular access sites hourly  3.  Every 4-6 hours minimum:  Change oxygen saturation probe site  4.  Every 4-6 hours:  If on nasal continuous positive airway pressure, respiratory therapy assess nares and determine need for appliance change or resting 
  Problem: Chronic Conditions and Co-morbidities  Goal: Patient's chronic conditions and co-morbidity symptoms are monitored and maintained or improved  Outcome: Progressing     Problem: Discharge Planning  Goal: Discharge to home or other facility with appropriate resources  11/25/2024 0643 by Abran Shoemaker RN  Outcome: Progressing  11/24/2024 1704 by Ayala Arias RN  Outcome: Progressing     Problem: Safety - Adult  Goal: Free from fall injury  11/25/2024 0643 by Abran Shoemaker RN  Outcome: Progressing  11/24/2024 1704 by Ayala Arias RN  Outcome: Progressing     Problem: Skin/Tissue Integrity  Goal: Absence of new skin breakdown  Description: 1.  Monitor for areas of redness and/or skin breakdown  2.  Assess vascular access sites hourly  3.  Every 4-6 hours minimum:  Change oxygen saturation probe site  4.  Every 4-6 hours:  If on nasal continuous positive airway pressure, respiratory therapy assess nares and determine need for appliance change or resting period.  Outcome: Progressing     Problem: ABCDS Injury Assessment  Goal: Absence of physical injury  Outcome: Progressing     
  Problem: Chronic Conditions and Co-morbidities  Goal: Patient's chronic conditions and co-morbidity symptoms are monitored and maintained or improved  Outcome: Progressing     Problem: Discharge Planning  Goal: Discharge to home or other facility with appropriate resources  11/30/2024 1451 by Kizzy Garber RN  Outcome: Progressing     Problem: Safety - Adult  Goal: Free from fall injury  11/30/2024 1451 by Kizzy Garber RN  Outcome: Progressing     Problem: Skin/Tissue Integrity  Goal: Absence of new skin breakdown  Description: 1.  Monitor for areas of redness and/or skin breakdown  2.  Assess vascular access sites hourly  3.  Every 4-6 hours minimum:  Change oxygen saturation probe site  4.  Every 4-6 hours:  If on nasal continuous positive airway pressure, respiratory therapy assess nares and determine need for appliance change or resting period.  11/30/2024 1451 by Kizzy Garber RN  Outcome: Progressing     Problem: ABCDS Injury Assessment  Goal: Absence of physical injury  Outcome: Progressing     
  Problem: Discharge Planning  Goal: Discharge to home or other facility with appropriate resources  11/26/2024 1515 by Ayala Arias, RN  Outcome: Progressing  D/C barriers: IV antibiotics. Pulm recommending continuing IV diuresis.      Problem: Safety - Adult  Goal: Free from fall injury  11/26/2024 1515 by Ayala Arias, RN  Outcome: Progressing  Pt assessed as a fall risk this shift. Remains free from falls and accidental injury at this time. Fall precautions in place. Floor free from obstacles, and bed is locked and in lowest position. Adequate lighting provided. Pt encouraged to call before getting OOB for any need. Bed alarm activated.     Problem: Skin/Tissue Integrity  Goal: Absence of new skin breakdown  Description: 1.  Monitor for areas of redness and/or skin breakdown  2.  Assess vascular access sites hourly  3.  Every 4-6 hours minimum:  Change oxygen saturation probe site  4.  Every 4-6 hours:  If on nasal continuous positive airway pressure, respiratory therapy assess nares and determine need for appliance change or resting period.  11/26/2024 1515 by Ayala Arias, RN  Outcome: Progressing     
  Problem: Discharge Planning  Goal: Discharge to home or other facility with appropriate resources  11/30/2024 0347 by Yuliana Melendez RN  Outcome: Progressing   DC barrier: SOB IV lasix    Problem: Safety - Adult  Goal: Free from fall injury  11/30/2024 0347 by Yuliana Melendez RN  Outcome: Progressing   Pt assessed as a fall risk this shift. Remains free from falls and accidental injury at this time. Fall precautions in place, including falling star sign and fall risk band on pt. Floor free from obstacles, and bed is locked and in lowest position. Adequate lighting provided.  Pt encouraged to call before getting OOB for any need.  Bed alarm activated.    Problem: Skin/Tissue Integrity  Goal: Absence of new skin breakdown  Description: 1.  Monitor for areas of redness and/or skin breakdown  2.  Assess vascular access sites hourly  3.  Every 4-6 hours minimum:  Change oxygen saturation probe site  4.  Every 4-6 hours:  If on nasal continuous positive airway pressure, respiratory therapy assess nares and determine need for appliance change or resting period.  11/30/2024 0347 by Yuliana Melendez RN  Outcome: Progressing  Skin assessment complete. Alternating pressure relief activated. Coccyx reddened. Sensicare applied PRN. Turned and repositioned every two hours.  Area kept free from moisture. Proper nourishment and fluids encouraged, as appropriate.     Problem: Respiratory - Adult  Goal: Achieves optimal ventilation and oxygenation  11/30/2024 0347 by Yuliana Melendez RN  Outcome: Progressing   Pt oxygen sat at 95% on 2L NC. Breathing unlabored, chest expansion symmetrical, no respiratory distress noted at this time.   
  Problem: Discharge Planning  Goal: Discharge to home or other facility with appropriate resources  Outcome: Progressing  D/C barriers: IV diuresis     Problem: Safety - Adult  Goal: Free from fall injury  Outcome: Progressing  Pt assessed as a fall risk this shift. Remains free from falls and accidental injury at this time. Fall precautions in place. Floor free from obstacles, and bed is locked and in lowest position. Adequate lighting provided. Pt encouraged to call before getting OOB for any need. Bed alarm activated.      Problem: Skin/Tissue Integrity  Goal: Absence of new skin breakdown  Description: 1.  Monitor for areas of redness and/or skin breakdown  2.  Assess vascular access sites hourly  3.  Every 4-6 hours minimum:  Change oxygen saturation probe site  4.  Every 4-6 hours:  If on nasal continuous positive airway pressure, respiratory therapy assess nares and determine need for appliance change or resting period.  Outcome: Progressing     
  Problem: Discharge Planning  Goal: Discharge to home or other facility with appropriate resources  Outcome: Progressing  D/C barriers: IV lasix     Problem: Safety - Adult  Goal: Free from fall injury  Outcome: Progressing   Pt assessed as a fall risk this shift. Remains free from falls and accidental injury at this time. Fall precautions in place. Floor free from obstacles, and bed is locked and in lowest position. Adequate lighting provided. Pt encouraged to call before getting OOB for any need. Bed alarm activated.   
BRONCHOSPASM/BRONCHOCONSTRICTION   [x]  IMPROVE AERATION/BREATH SOUNDS  [x]   ADMINISTER BRONCHODILATOR THERAPY AS APPROPRIATE  [x]   ASSESS BREATH SOUNDS  []   IMPLEMENT AEROSOL/MDI PROTOCOL  [x]   PATIENT EDUCATION AS NEEDED    
Stroke alert called at 1655 today  I went in immediately patient not opening eyes to command, seems to be pulling at her clothing  This is a change from earlier today  Blood sugar normal, saturating 97% on 2 L, heart rate ~95, blood pressure 140/90  Stroke alert called  Patient signed out to night NP Ava Humphries MD    
function maintained:   Monitor labs and assess for signs and symptoms of volume excess or deficit   Monitor intake, output and patient weight   Monitor urine specific gravity, serum osmolarity and serum sodium as indicated or ordered   Monitor response to interventions for patient's volume status, including labs, urine output, blood pressure (other measures as available)   Encourage oral intake as appropriate   Instruct patient on fluid and nutrition restrictions as appropriate  Goal: Glucose maintained within prescribed range  Outcome: Progressing  Flowsheets (Taken 11/28/2024 2000)  Glucose maintained within prescribed range:   Monitor blood glucose as ordered   Assess for signs and symptoms of hyperglycemia and hypoglycemia   Administer ordered medications to maintain glucose within target range   Assess barriers to adequate nutritional intake and initiate nutrition consult as needed   Instruct patient on self management of diabetes and initiate consult as needed     Problem: Neurosensory - Adult  Goal: Achieves stable or improved neurological status  Outcome: Progressing

## 2024-12-02 NOTE — DISCHARGE INSTR - DIET

## 2024-12-02 NOTE — PROCEDURES
PROCEDURE NOTE  Date: 12/2/2024   Name: Edyta Gruber  YOB: 1924    Procedures    Ultrasound-guided right thoracentesis     Indication prediagnosis: Bilateral pleural effusions.  Right side greater than left.  Evaluate for diagnostic and therapeutic removal of pleural effusion on the right      Post diagnosis:    Same    1 L of fluid removed..  Clear yellow on the right side      Estimated blood loss 0    Patient did have a drop in the blood pressure to 67 mmHg resulting in discontinuation of the procedure.  She never had any altered mental status.  We have placed her on the supine position in bed post procedure and repeat systolic blood pressure was 99 mmHg    Chest x-ray postprocedure revealed no pneumothorax.  Patient is asymptomatic resting quietly smiling.  Pleural fluid analysis placed by me.  Bedside nurse has the orders    I did update patient's son, Ritchie via telephone [phone]      Shon Randolph MD

## 2024-12-02 NOTE — CARE COORDINATION
DISCHARGE PLANNING NOTE:    LSW following for potential discharge SNF. Patient has been accepted at [facility]. Authorization approved through today 12/2. Ok to admit to facility per Virgilio in admissions      Transportation arranged for patient to go to [facility] at [facility] via [facility]. Facility informed. Bedside nurse informed.  left for dereck Dugan to update.     IMM letter provided to patient.  Patient offered four hours to make informed decision regarding appeal process; patient agreeable to discharge.     HENS completed. Document ID : [id number]       Number for Report: [phone] ask for [facility]

## 2024-12-02 NOTE — PROGRESS NOTES
Cardiology Progress Note                     Date:   11/26/2024  Patient name: Edyta Gruber  Date of admission:  11/24/2024  1:18 AM  MRN:   [id number]  YOB: 1924  PCP: Florencia Talley MD    Reason for Admission:  atrial fibrillation, HFpEF    Subjective:       Clinically improved, remains on 2 L NC O2, BP and HR stable, BUN mildly elevated today, CR 0.9 and K 4.2 this am  Repeat CXR shows persistent bilateral pleural effusions - moderate to large     Scheduled Meds:   furosemide  20 mg IntraVENous BID    pravastatin  20 mg Oral Nightly    cefTRIAXone (ROCEPHIN) IV  1,000 mg IntraVENous Q24H    aspirin  81 mg Oral Daily    cetirizine  5 mg Oral Daily    metoprolol tartrate  12.5 mg Oral BID    tamsulosin  0.4 mg Oral Daily    timolol  1 drop Both Eyes BID    sodium chloride flush  5-40 mL IntraVENous 2 times per day       Continuous Infusions:   sodium chloride         Labs:     CBC:   Recent Labs     11/24/24  0145 11/25/24  0520 11/26/24  0516   WBC 4.1 3.6 3.6   HGB 13.6 12.3 12.0   * 91* 96*     BMP:    Recent Labs     11/24/24  0145 11/25/24  0520 11/26/24  0516    143 143   K 5.2 4.4 4.2   CL 98 101 99   CO2 38* 38* 40*   BUN 18 19 17   CREATININE 0.8 0.7 0.9   GLUCOSE 104* 85 76     Hepatic:   Recent Labs     11/24/24  0145   AST 35   ALT 22   BILITOT 0.6   ALKPHOS 169*     Troponin: No results for input(s): \"TROPONINI\" in the last 72 hours.  BNP: No results for input(s): \"BNP\" in the last 72 hours.  Lipids: No results for input(s): \"CHOL\", \"HDL\" in the last 72 hours.    Invalid input(s): \"LDLCALCU\"  INR: No results for input(s): \"INR\" in the last 72 hours.      Objective:     Vitals: /83   Pulse 100   Temp 99 °F (37.2 °C) (Oral)   Resp 16   Ht 1.575 m (5' 2\")   Wt 50.5 kg (111 lb 5.3 oz)   SpO2 94%   BMI 20.36 kg/m²     General appearance: awake, sitting up in the chair,, in no apparent respiratory distress on 2 L NC O2.   HEENT: Head: 
    [facility] PULMONARY,CRITICAL CARE & SLEEP   Shon Randolph MD/Moises PARKER AGABERNARDOP-BC, NP-C      Kassandra PARKER NP-C    Troy PARKER NP-C                                         Pulmonary Progress Note    Patient - Edyta Gruber   Age - 89 y.o.   - 1924  MRN - [id number]  [id number]  Date of Admission - 2024  1:18 AM    Consulting Service/Physician:       Primary Care Physician: Florencia Talley MD    SUBJECTIVE:     Chief Complaint:   Chief Complaint   Patient presents with    Shortness of Breath     Subjective:    She states her breathing is okay, I did see her when she was up in the chair.  She has been weaned back down to 2 L.  She has adequate saturations.  She denies any significant cough.  She denies any chest pain.  She has not had a bowel movement in a few days, KUB has been ordered.    VITALS  /70   Pulse 72   Temp 98 °F (36.7 °C) (Oral)   Resp 16   Ht 1.575 m (5' 2\")   Wt 47.7 kg (105 lb 2.6 oz)   SpO2 96%   BMI 19.23 kg/m²   Wt Readings from Last 3 Encounters:   24 47.7 kg (105 lb 2.6 oz)   24 48.2 kg (106 lb 3.2 oz)   10/14/24 46.7 kg (103 lb)     I/O (24 Hours)    Intake/Output Summary (Last 24 hours) at 2024 1230  Last data filed at 2024 0623  Gross per 24 hour   Intake 500 ml   Output 1050 ml   Net -550 ml     Ventilator:      Exam:   Physical Exam   Constitutional:  Oriented to person, place, and time.  Elderly female, not in distress up in chair  HENT: Unremarkable, hard of hearing, nasal cannula in place  Head: Normocephalic and atraumatic.   Eyes: EOM are normal. Pupils are equal, round, and reactive to light.   Neck: Neck supple.   Cardiovascular:  Regular rate and rhythm.  Normal heart tones.  No JVD.    Pulmonary/Chest: Lung sounds continue be very diminished in the bases, few bibasilar rales present, no rhonchi or wheezing, respirations easy at rest on 2 L, pulse ox in the 
    IN-PATIENT SERVICE  [facility] Internal Medicine  [facility] Internal Medicine   Temo Zacarias MD; Noel Pike MD; Reynaldo Osorio MD; Cy Sanon MD,   Kathy Humphries MD; Eileen Merchant MD; Celeste Christina MD; Gail Dempsey MD; Marissa Ortiz MD      HISTORY AND PHYSICAL EXAMINATION            Date:   11/26/2024  Patient name:  Edyta Gruber  MRN:   [id number]  Account:  [id number]  YOB: 1924  PCP:    Florencia Talley MD  Code Status:    DNR-CCA    Chief Complaint:     Chief Complaint   Patient presents with    Shortness of Breath         History Obtained From:     Patient, EMR, nursing staff    HPI     This patient is a 89 y.o. Non- / non  femalewho presents with  Edyta Gruber is a 89 y.o. Non- / non  female who presents with Shortness of Breath   and is admitted to the hospital for the management of Pleural effusion. Medical history significant for diastolic heart failure, atrial fibrillation, HLD, CKD 3a, moderate persistent asthma, urinary retention and glaucoma. Patient was admitted to [facility] 11/19 for pleural effusion with hypoxia, she was treated with IV lasix and was discharged to [facility] with new new need for oxygen at 2L less than 12 hours ago. She is very hard of hearing, states that once at the facility shortness of breath increased and developed chest tightness, was not on home O2 prior to hospitalization at [facility], has had chronic reoccurring pleural effusions over the past several years but usually able to be discharge home without oxygen after having thoracentesis. Records show that she did have thoracentesis completed earlier this year in January and July. Arrived to ED on 10L NRB, able to wean oxygen to 4L. CT chest shows bilateral pleural effusions with left effusion increasing in size. Treated with 20 mg lasix IV.    which is improved from 2300 on 
    Palliative Care Progress Note    NAME:  Edyta Gruber  MEDICAL RECORD NUMBER:  [id number]  AGE: 89 y.o.   GENDER: female  : 1924  TODAY'S DATE:  2024    Reason for Consult:  goals of care      Plan        Palliative Interaction: Patient seen on rounds. She is up in chair, sleeping. Awakens easily with verbal stimuli.     She notes feeling somewhat better, states her breathing feels better, although her oxygen requirements have slightly increased, on 2.5 L nasal cannula.     Chest xray this morning showing increased size of large bilateral pleural effusions and increased consolidation. Awaiting pulmonology recommendations, possible thoracentesis.     Palliative to follow and monitor progress.       Principle Problem/Diagnosis:  Pleural effusion    Additional Assessments:    1- Symptom management/ pain control     Pain Assessment:  The patient is not having any pain.               Anxiety:  none                          Dyspnea:  acute dyspnea                          Fatigue:   generalized         We feel the patient symptoms are being controlled. her current regimen is reviewed by myself and discussed with the staff.       2- Goals of care evaluation   The patient goals of care are improve or maintain function/quality of life   Goals of care discussed with:    [x] Patient independently    [] Patient and Family    [] Family or Healthcare DPOA independently    [] Unable to discuss with patient, family/DPOA not present    3- Code Status  DNR-CCA    4- Other recommendations  - We will continue to provide comfort and support to the patient and the family      History of Present Illness     The patient is a 89 y.o.  Non- / non  female who presents with Shortness of Breath      Referred to Palliative Care by  [x] Physician   [] Nursing  [] Family Request   [] Other:       Patient is a 89 year old female, past medical history of diastolic heart failure, atrial fibrillation, HLD, CKD, 
    [facility] PULMONARY,CRITICAL CARE & SLEEP   Shon Randolph MD/Moises PARKER AGABERNARDOP-BC, NP-C      Kassandra PARKER NP-C     Troy PARKER NP-C                                          Pulmonary Progress Note    Patient - Edyta Gruber   Age - 89 y.o.   - 1924  MRN - [id number]  Acct # - [id number]  Date of Admission - 2024  1:18 AM    Consulting Service/Physician:       Primary Care Physician: Florencia Talley MD    SUBJECTIVE:     Chief Complaint:   Chief Complaint   Patient presents with    Shortness of Breath     Subjective:    Edyta is seen sitting up in chair on 2 L.  She states her breathing feels may be slightly better.  She is net -5.9 L.  She continues on IV Lasix.  She continues on oral Keflex.    VITALS  BP (!) 89/47   Pulse 83   Temp 97.3 °F (36.3 °C) (Oral)   Resp 18   Ht 1.575 m (5' 2\")   Wt 49.9 kg (110 lb 0.2 oz)   SpO2 93%   BMI 20.12 kg/m²   Wt Readings from Last 3 Encounters:   24 49.9 kg (110 lb 0.2 oz)   24 48.2 kg (106 lb 3.2 oz)   10/14/24 46.7 kg (103 lb)     I/O (24 Hours)    Intake/Output Summary (Last 24 hours) at 2024 0936  Last data filed at 2024 0424  Gross per 24 hour   Intake --   Output 1250 ml   Net -1250 ml     Ventilator:      Exam:   Physical Exam   Constitutional: Frail elderly female sitting up in chair on 2 L in no distress  HENT: Unremarkable  Head: Normocephalic and atraumatic.   Eyes: EOM are normal. Pupils are equal, round, and reactive to light.   Neck: Neck supple.   Cardiovascular:  Regular rate and rhythm.  Normal heart tones.  No JVD.    Pulmonary/Chest: Respirations even and nonlabored, lungs diminished at the bases, on 2 L with pulse ox 9%.   Abdominal: Soft. Bowel sounds are normal.   Musculoskeletal: Normal range of motion.   Neurological: Patient is alert and oriented to person, place, and time.   Skin: Skin is warm and dry. No rash noted.   Extremities: Trace 
    [facility] PULMONARY,CRITICAL CARE & SLEEP   Shon Randolph MD/Moises Yeung MD/Diomedes Mason APRN AGACNP-BC, NP-C      Kassandra PARKER NP-C    Troy PARKER NP-C                                         Pulmonary Progress Note    Patient - Edyta Gruber   Age - 89 y.o.   - 1924  MRN - [id number]  [id number]  Date of Admission - 2024  1:18 AM    Consulting Service/Physician:       Primary Care Physician: Florencia Talley MD    SUBJECTIVE:     Chief Complaint:   Chief Complaint   Patient presents with    Shortness of Breath     Subjective:    Weights have been variable, currently down 2.8 kg compared to 2 days ago  No urine output recorded yesterday  She is net -3.6 L, on the  she had -2600 out  I believe she is responding to IV diuretic    VITALS  /77   Pulse 89   Temp 97.6 °F (36.4 °C) (Oral)   Resp 16   Ht 1.575 m (5' 2\")   Wt 50.5 kg (111 lb 5.3 oz)   SpO2 94%   BMI 20.36 kg/m²   Wt Readings from Last 3 Encounters:   24 50.5 kg (111 lb 5.3 oz)   24 48.2 kg (106 lb 3.2 oz)   10/14/24 46.7 kg (103 lb)     I/O (24 Hours)  No intake or output data in the 24 hours ending 24 0850    Ventilator:      Exam:   Physical Exam   Constitutional: Frail, alert, no acute distress, 2 L nasal  HENT: Unremarkable  Head: Normocephalic and atraumatic.   Eyes: EOM are normal. Pupils are equal, round, and reactive to light.   Neck: Neck supple.   Cardiovascular:  Regular rate and rhythm.  Normal heart tones.  No JVD.    Pulmonary/Chest: Unlabored, diminished bilaterally at bases and I do hear air exchange in the upper zones  Abdominal: Soft, nondistended, not  Musculoskeletal: Normal range of motion.  Neurological:patient is alert and oriented to person, place, and time.   Skin: Skin is warm and dry. No rash noted.   Extremities: No edema or discoloration  Infusions:      sodium chloride       Meds:     Current 
   11/25/24 1603   Encounter Summary   Encounter Overview/Reason Volunteer Encounter   Service Provided For Patient   Last Encounter  11/25/24   Rituals, Rites and Sacraments   Type Zoroastrianism Communion       
   11/25/24 1611   Encounter Summary   Encounter Overview/Reason  Encounter   Service Provided For Patient   Last Encounter  11/25/24   Rituals, Rites and Sacraments   Type Sacrament of Sick       
   11/26/24 3482   Encounter Summary   Encounter Overview/Reason Spiritual/Emotional Needs   Service Provided For Patient   Last Encounter  11/26/24   Complexity of Encounter Low   Assessment/Intervention/Outcome   Assessment Unable to assess  (sleeping)   Intervention Prayer (assurance of)/[place]       
   11/27/24 1439   Encounter Summary   Encounter Overview/Reason Volunteer Encounter   Service Provided For Patient   Referral/Consult From Rounding   Last Encounter  11/27/24   Complexity of Encounter Low   Spiritual/Emotional needs   Type Spiritual Support   Rituals, Rites and Sacraments   Type Faith Communion   Assessment/Intervention/Outcome   Intervention Prayer (assurance of)/[place]       
   11/29/24 1522   Encounter Summary   Encounter Overview/Reason Volunteer Encounter   Last Encounter  11/29/24   Rituals, Rites and Sacraments   Type Zoroastrian Communion       
   11/29/24 1802   Encounter Summary   Encounter Overview/Reason Spiritual/Emotional Needs   Service Provided For Patient   Referral/Consult From Palliative Care   Last Encounter  11/29/24   Complexity of Encounter Low   Spiritual/Emotional needs   Type Spiritual Support   Assessment/Intervention/Outcome   Assessment Calm   Intervention Active listening;Prayer (assurance of)/[place];Sustaining Presence/Ministry of presence   Outcome Engaged in conversation;Receptive       
   11/30/24 1400   Encounter Summary   Encounter Overview/Reason Spiritual/Emotional Needs   Service Provided For Patient and family together  (Adriel Dugan)   Referral/Consult From Palliative Care   Support System Children   Last Encounter  11/30/24   Complexity of Encounter Low   Spiritual/Emotional needs   Type Spiritual Support   Assessment/Intervention/Outcome   Assessment Calm   Intervention Active listening;Prayer (assurance of)/[place];Sustaining Presence/Ministry of presence   Outcome Comfort;Engaged in conversation;Expressed Gratitude;Receptive       
   12/02/24 1532   Encounter Summary   Encounter Overview/Reason Spiritual/Emotional Needs   Service Provided For Patient   Referral/Consult From Palliative Care   Last Encounter  12/02/24   Complexity of Encounter Low   Spiritual/Emotional needs   Type Spiritual Support   Assessment/Intervention/Outcome   Assessment   (Lethargic)   Intervention Active listening;Prayer (assurance of)/[place];Sustaining Presence/Ministry of presence   Outcome Comfort       
Bear hugger/ warming blanket placed on patient for temp of 97.3 F   
Date:   11/29/2024  Patient name: Edyta Gruber  Date of admission:  11/24/2024  1:18 AM  MRN:   [id number]  YOB: 1924  PCP: Florencia Talley MD    Reason for Admission: Shortness of breath [R06.02]  Pleural effusion [J90]  Bilateral pleural effusion [J90]    Cardiology follow-up: A-fib, congestive heart failure       Referring physician: Dr Kathy Humphries        Impression     Chronic atrial fibrillation  Severely dilated left and the right atrium  Congestive heart failure diastolic ejection fraction 60 to 65%  Bilateral pleural effusion multiple thoracentesis  Aortic stenosis mean gradient 11 and peak gradient 26 mmHg  Chronic kidney disease stage III  Hypertension  Hyperlipidemia  Asthma  Osteoarthritis  History of COVID-19 infection     Past surgical history include back surgery,     History of present illness    Edyta Gruber is a 89 y.o. Non- / non  female who presents with Shortness of Breath   and is admitted to the hospital for the management of Pleural effusion. Medical history significant for diastolic heart failure, atrial fibrillation, HLD, CKD 3a, moderate persistent asthma, urinary retention and glaucoma. Patient was admitted to [facility] 11/19 for pleural effusion with hypoxia, she was treated with IV lasix and was discharged to [facility] with new new need for oxygen at 2L less than 12 hours ago. She is very hard of hearing, states that once at the facility shortness of breath increased and developed chest tightness, was not on home O2 prior to hospitalization at [facility], has had chronic reoccurring pleural effusions over the past several years but usually able to be discharge home without oxygen after having thoracentesis. Records show that she did have thoracentesis completed earlier this year in January and July. Arrived to ED on 10L NRB, able to wean oxygen to 4L. CT chest shows bilateral pleural effusions with left 
Date:   11/30/2024  Patient name: Edyta Gruber  Date of admission:  11/24/2024  1:18 AM  MRN:   [id number]  YOB: 1924  PCP: Florencia Talley MD    Reason for Admission: Shortness of breath [R06.02]  Pleural effusion [J90]  Bilateral pleural effusion [J90]    Cardiology follow-up: A-fib, congestive heart failure        Referring physician: Dr Kathy Humphries        Impression     Chronic atrial fibrillation  Severely dilated left and the right atrium  Congestive heart failure diastolic ejection fraction 60 to 65%  Bilateral pleural effusion multiple thoracentesis  Aortic stenosis mean gradient 11 and peak gradient 26 mmHg  Chronic kidney disease stage III  Hypertension  Hyperlipidemia  Asthma  Osteoarthritis  History of COVID-19 infection     Past surgical history include back surgery,     History of present illness     Edyta Gruber is a 89 y.o. Non- / non  female who presents with Shortness of Breath   and is admitted to the hospital for the management of Pleural effusion. Medical history significant for diastolic heart failure, atrial fibrillation, HLD, CKD 3a, moderate persistent asthma, urinary retention and glaucoma. Patient was admitted to [facility] 11/19 for pleural effusion with hypoxia, she was treated with IV lasix and was discharged to [facility] with new new need for oxygen at 2L less than 12 hours ago. She is very hard of hearing, states that once at the facility shortness of breath increased and developed chest tightness, was not on home O2 prior to hospitalization at [facility], has had chronic reoccurring pleural effusions over the past several years but usually able to be discharge home without oxygen after having thoracentesis. Records show that she did have thoracentesis completed earlier this year in January and July. Arrived to ED on 10L NRB, able to wean oxygen to 4L. CT chest shows bilateral pleural effusions with left 
Date:   12/2/2024  Patient name: Edyta Gruber  Date of admission:  11/24/2024  1:18 AM  MRN:   [id number]  YOB: 1924  PCP: Florencia Talley MD    Reason for Admission: Shortness of breath [R06.02]  Pleural effusion [J90]  Bilateral pleural effusion [J90]    Cardiology follow-up: A-fib, congestive heart failure        Referring physician: Dr Kathy Humphries        Impression     Chronic atrial fibrillation  Severely dilated left and the right atrium  Congestive heart failure diastolic ejection fraction 60 to 65%  Pulmonary hypertension RVSP 46 mmHg  Bilateral pleural effusion multiple thoracentesis, thoracentesis 12/2/2024 1000 mL fluid removal right pleural cavity clear yellowish fluid postprocedure chest x-ray no pneumothorax  Aortic stenosis mean gradient 11 and peak gradient 26 mmHg  Chronic kidney disease stage III  Hypertension  Hyperlipidemia  Asthma  Osteoarthritis  History of COVID-19 infection     Past surgical history include back surgery,    History of present illness     Edyta Gruber is a 89 y.o. female who presents with Shortness of Breath   and is admitted to the hospital for the management of Pleural effusion. Medical history significant for diastolic heart failure, atrial fibrillation, HLD, CKD 3a, moderate persistent asthma, urinary retention and glaucoma. Patient was admitted to [facility] 11/19 for pleural effusion with hypoxia, she was treated with IV lasix and was discharged to [facility] with new new need for oxygen at 2L less than 12 hours ago. She is very hard of hearing, states that once at the facility shortness of breath increased and developed chest tightness, was not on home O2 prior to hospitalization at [facility], has had chronic reoccurring pleural effusions over the past several years but usually able to be discharge home without oxygen after having thoracentesis. Records show that she did have thoracentesis completed 
Di (Pulmonology) notified of consult.  
Dr. Dempsey (Cardiology) notified of consult.  
Dr. Drew chavez. Assessed pt and BP taken. Noted SBP in the low 80's, Midodrine ordered.  
[facility]   INPATIENT OCCUPATIONAL THERAPY  PROGRESS NOTE  Date: 2024  Patient Name: Edyta Gruber       Room:   MRN: [id number]    : 1924  (89 y.o.)  Gender: female      Diagnosis: Shortness of breath      Discharge Recommendations:  Further Occupational Therapy is recommended upon facility discharge.    OT Equipment Recommendations  Other: TBD    Restrictions/Precautions  Restrictions/Precautions  Restrictions/Precautions: Fall Risk  Required Braces or Orthoses?: No  Implants present? :  (Pt denies)  Position Activity Restriction  Other position/activity restrictions: up w/ assist, use lift equipment, [place]    SpO2: 97 %  O2 Device: Nasal cannula (2L02)    Subjective  Subjective  Subjective: \"I am feeling weak today.\" Pt agreeable to OT/PT treatment  Subjective  Pain: denies pain  Comments: Ok per ELMER Santamaria for OT/PT session    Objective  Orientation  Overall Orientation Status: Within Functional Limits  Orientation Level: Oriented to person;Oriented to time;Oriented to place;Disoriented to situation  Cognition  Overall Cognitive Status: Exceptions  Arousal/Alertness: Appropriate responses to stimuli  Following Commands: Follows one step commands with repetition;Follows one step commands with increased time  Attention Span: Appears intact  Safety Judgement: Decreased awareness of need for assistance;Decreased awareness of need for safety  Problem Solving: Assistance required to generate solutions;Assistance required to implement solutions;Decreased awareness of errors  Insights: Decreased awareness of deficits  Initiation: Requires cues for some  Sequencing: Requires cues for some  Cognition Comment: Slow/delay in processing and performing action    Activities of Daily Living  Feeding: Setup  Feeding Skilled Clinical Factors: Pt able to feed self and apply/ remove built up handle for silverware. Pt requires set up to cut food into smaller bite sized 
[facility]   INPATIENT OCCUPATIONAL THERAPY  PROGRESS NOTE  Date: 2024  Patient Name: Edyta Gruber       Room:   MRN: [id number]    : 1924  (89 y.o.)  Gender: female      Diagnosis: Shortness of breath    Discharge Recommendations:  Further Occupational Therapy is recommended upon facility discharge.    OT Equipment Recommendations  Other: TBD    Restrictions/Precautions  Restrictions/Precautions  Restrictions/Precautions: Fall Risk  Required Braces or Orthoses?: No  Implants present? :  (denies)  Position Activity Restriction  Other position/activity restrictions: up w/ assist, use lift equipment, [place]    Pulse: 87  SpO2: 90 %  O2 Device: Nasal cannula  Comment: 2L O2    Subjective  Subjective  Subjective: \"I need the toilet\"  Subjective  Pain: denies pain  Comments: RN thomas'd session, pt agreeable, co-tx C[place]o PTA for safety    Objective  Orientation  Overall Orientation Status: Within Functional Limits  Cognition  Overall Cognitive Status: Exceptions  Arousal/Alertness: Appropriate responses to stimuli  Following Commands: Follows one step commands with repetition;Follows one step commands with increased time  Attention Span: Appears intact  Safety Judgement: Decreased awareness of need for assistance;Decreased awareness of need for safety  Problem Solving: Assistance required to generate solutions;Assistance required to implement solutions;Decreased awareness of errors  Insights: Decreased awareness of deficits  Initiation: Requires cues for some  Sequencing: Requires cues for some  Cognition Comment: delayed processing, pt very [place]  Patient affect:: Normal    Activities of Daily Living  Grooming: Contact guard assistance  Grooming Skilled Clinical Factors: standing to wash hands at sink with RW  Toileting: Maximum assistance  Toileting Skilled Clinical Factors: pt attempt hygiene post BM, req A for thoroughness/completion and brief mgmt over 
[facility]   OCCUPATIONAL THERAPY MISSED TREATMENT NOTE   INPATIENT   Date: 24  Patient Name: Edyta Gruber       Room:   MRN: [id number]   Account #: [id number]    : 1924  (89 y.o.)  Gender: female   Diagnosis: Shortness of breath             REASON FOR MISSED TREATMENT:  Patient declined   -    sleeping upon arrival, pt difficult to rouse, pt requesting to sleep, will continue to follow as needed.             Electronically signed by Keysha LEE on 24 at 3:49 PM EST  
Occupational Therapy  [facility]   INPATIENT OCCUPATIONAL THERAPY  PROGRESS NOTE  Date: 2024  Patient Name: Edyta Gruber       Room:   MRN: [id number]    : 1924  (89 y.o.)  Gender: female      Diagnosis: Shortness of breath      Discharge Recommendations:  Further Occupational Therapy is recommended upon facility discharge.    OT Equipment Recommendations  Other: TBD    Restrictions/Precautions  Restrictions/Precautions  Restrictions/Precautions: Fall Risk  Required Braces or Orthoses?: No  Implants present? :  (Pt denies)  Position Activity Restriction  Other position/activity restrictions: up w/ assist, use lift equipment, [place]    Pulse: 87  SpO2: 90 %  O2 Device: Nasal cannula (2L)    Subjective  Subjective  Subjective: \"I am feeling tired today.\" Pt agreeable to limited tx.  Subjective  Pain: denies pain  Comments: Margaret PAYNE oks tx, co-tx suly Reeder PTA for safety and optimal performance from pt    Objective  Orientation  Overall Orientation Status: Within Functional Limits  Orientation Level: Oriented to person;Oriented to time;Oriented to place;Disoriented to situation  Cognition  Overall Cognitive Status: Exceptions  Arousal/Alertness: Appropriate responses to stimuli  Following Commands: Follows one step commands with repetition;Follows one step commands with increased time  Attention Span: Appears intact  Safety Judgement: Decreased awareness of need for assistance;Decreased awareness of need for safety  Problem Solving: Assistance required to generate solutions;Assistance required to implement solutions;Decreased awareness of errors  Insights: Decreased awareness of deficits  Initiation: Requires cues for some  Sequencing: Requires cues for some  Cognition Comment: Slow/delay in processing and performing action    Activities of Daily Living  Additional Comments: Pt currently limited due to decreased strength, activity tolerance, impaired balance, and 
Palliative care notified of consult.  
Physical Therapy        Physical Therapy Cancel Note      DATE: 2024    NAME: Edyta Gruber  MRN: [id number]   : 1924      Patient not seen this date for Physical Therapy due to:    Patient Declined: Attempted to see pt at 1352.  Pt was asleep on arrival, difficult to awaken. Pt reports not feeling well and requests to rest at this time. Will continue to follow for PT needs.      Electronically signed by Ghazal Simon PTA on 2024 at 4:45 PM      
Physical Therapy  Facility/Department: [facility]      Name: Edyta Gruber  : 1924  MRN: [id number]  Date of Service: 2024    Discharge Recommendations:  Patient would benefit from continued therapy after discharge, Continue to assess pending progress, Therapy recommended at discharge          Patient Diagnosis(es): The primary encounter diagnosis was Bilateral pleural effusion. Diagnoses of Shortness of breath and Left leg swelling were also pertinent to this visit.  Past Medical History:  has a past medical history of Acute dermatitis, Arthritis, Asthma, Atrial fibrillation (HCC), Bursitis, CHF (congestive heart failure) (HCC), Hearing aid worn, Hyperlipidemia, Hypertension, Lumbar disc disease, Wears glasses, and Wound of right leg.  Past Surgical History:  has a past surgical history that includes back surgery; Wound debridement (Left, 2017); pr i&d deep absc bursa/hematoma thigh/knee region (Right, 3/9/2017); other surgical history (2017); other surgical history (2017); and pr i&d deep absc bursa/hematoma thigh/knee region (Right, 2017).    Assessment  Assessment: continue per POC to maxmize potential for safe D/C  Treatment Diagnosis: impaired mobility due to weakness and limited enduramce due to SOB  Therapy Prognosis: Fair  Decision Making: Medium Complexity  Barriers to Learning: very [place]  Requires PT Follow-Up: Yes  Activity Tolerance  Activity Tolerance: Patient limited by fatigue;Patient limited by endurance  Activity Tolerance Comments: Pt fatigued after completing functional mobility, deferred further activity.    Plan  Physical Therapy Plan  General Plan:  (5-7 treatments/ week)  Specific Instructions for Next Treatment: instruct in exercise program and advance gait distance as tolerated using wheeled walker and O2  Current Treatment Recommendations: Strengthening, Balance training, Functional mobility training, Transfer training, Gait training, 
Physical Therapy  [facility]   Physical Therapy Treatment  Date: 24  Patient Name: Edyta Gruber       Room: 5/2105-01  MRN: [id number]  Account: [id number]   : 1924  (89 y.o.) Gender: female     Discharge Recommendations:  Further Physical Therapy is recommended upon facility discharge    PT Equipment Recommendations  Equipment Needed:  (TBD)    General  Chart Reviewed: Yes  Patient assessed for rehabilitation services?: Yes  Additional Pertinent Hx: Per ER note:This is a 89-year-old female who was recently admitted for acute congestive heart failure and diuresed with Lasix who was discharged earlier in the day is returning from her nursing home for shortness of breath.  Patient said that she is also feeling chest tightness.  She was discharged on 2 L nasal cannula.  Currently patient is on 4 L nasal cannula.  Response To Previous Treatment: Patient with no complaints from previous session.  Family / Caregiver Present: No  Referring Practitioner: EVAN NASH NP  Referral Date : 24  Diagnosis: SOB, bilateral pleural effusion  Follows Commands: Impaired (very [place])  Other (Comment): OK per nurse Hina to proceed w/ PT evaluation    Past Medical History:  has a past medical history of Acute dermatitis, Arthritis, Asthma, Atrial fibrillation (HCC), Bursitis, CHF (congestive heart failure) (HCC), Hearing aid worn, Hyperlipidemia, Hypertension, Lumbar disc disease, Wears glasses, and Wound of right leg.  Past Surgical History:   has a past surgical history that includes back surgery; Wound debridement (Left, 2017); pr i&d deep absc bursa/hematoma thigh/knee region (Right, 3/9/2017); other surgical history (2017); other surgical history (2017); and pr i&d deep absc bursa/hematoma thigh/knee region (Right, 2017).    Restrictions  Restrictions/Precautions  Restrictions/Precautions: Fall Risk  Required Braces or Orthoses?: No  Implants present? :  
Physical Therapy  [facility]   Physical Therapy Treatment  Date: 24  Patient Name: Edyta Gruber       Room: -  MRN: [id number]  Account: [id number]   : 1924  (89 y.o.) Gender: female     Discharge Recommendations:  Further Physical Therapy is recommended upon facility discharge    PT Equipment Recommendations  Equipment Needed:  (TBD)    General  Patient assessed for rehabilitation services?: Yes  Additional Pertinent Hx: Per ER note:This is a 89-year-old female who was recently admitted for acute congestive heart failure and diuresed with Lasix who was discharged earlier in the day is returning from her nursing home for shortness of breath.  Patient said that she is also feeling chest tightness.  She was discharged on 2 L nasal cannula.  Currently patient is on 4 L nasal cannula.  Response To Previous Treatment: Not applicable  Family / Caregiver Present: No  Referring Practitioner: EVAN NASH NP  Referral Date : 24  Diagnosis: SOB, bilateral pleural effusion  Follows Commands: Impaired (very [place])  Other (Comment): OK per nurse Hina to proceed w/ PT evaluation    Past Medical History:  has a past medical history of Acute dermatitis, Arthritis, Asthma, Atrial fibrillation (HCC), Bursitis, CHF (congestive heart failure) (HCC), Hearing aid worn, Hyperlipidemia, Hypertension, Lumbar disc disease, Wears glasses, and Wound of right leg.  Past Surgical History:   has a past surgical history that includes back surgery; Wound debridement (Left, 2017); pr i&d deep absc bursa/hematoma thigh/knee region (Right, 3/9/2017); other surgical history (2017); other surgical history (2017); and pr i&d deep absc bursa/hematoma thigh/knee region (Right, 2017).    Restrictions  Restrictions/Precautions  Restrictions/Precautions: Fall Risk (O2 at 3L, IV right antecubital, external urinary catheter, troponins 39 on 2024)  Required Braces or 
[facility]   Physical Therapy Treatment  Date: 24  Patient Name: Edyta Gruber       Room: 5-01  MRN: [id number]  Account: [id number]   : 1924  (89 y.o.) Gender: female     Discharge Recommendations:  Further Physical Therapy is recommended upon facility discharge    PT Equipment Recommendations  Equipment Needed:  (TBD)    General  Patient assessed for rehabilitation services?: Yes  Additional Pertinent Hx: Per ER note:This is a 89-year-old female who was recently admitted for acute congestive heart failure and diuresed with Lasix who was discharged earlier in the day is returning from her nursing home for shortness of breath.  Patient said that she is also feeling chest tightness.  She was discharged on 2 L nasal cannula.  Currently patient is on 4 L nasal cannula.  Response To Previous Treatment: Not applicable  Family / Caregiver Present: No  Referring Practitioner: EVAN NASH NP  Referral Date : 24  Diagnosis: SOB, bilateral pleural effusion  Follows Commands: Impaired (very [place])  Other (Comment): OK per nurse Hina to proceed w/ PT evaluation    Past Medical History:  has a past medical history of Acute dermatitis, Arthritis, Asthma, Atrial fibrillation (HCC), Bursitis, CHF (congestive heart failure) (HCC), Hearing aid worn, Hyperlipidemia, Hypertension, Lumbar disc disease, Wears glasses, and Wound of right leg.  Past Surgical History:   has a past surgical history that includes back surgery; Wound debridement (Left, 2017); pr i&d deep absc bursa/hematoma thigh/knee region (Right, 3/9/2017); other surgical history (2017); other surgical history (2017); and pr i&d deep absc bursa/hematoma thigh/knee region (Right, 2017).    Restrictions  Restrictions/Precautions  Restrictions/Precautions: Fall Risk (O2 at 3L, IV right antecubital, external urinary catheter, troponins 39 on 2024)  Required Braces or Orthoses?: No  Implants 
Pt arrived to floor via stretcher from ED and was transfered to bed. Vitals taken, telemetry applied. Admission and assessment complete. No distress noted. See doc flowsheet and admission navigator for details. POC and education initiated and reviewed with patient. Call light within reach, and pt educated on its use. Bed in lowest position, and locked. Side rails up x 2.   
Pt discharged to [facility]. IV and telemetry removed. Pt discharged with all belongings.   
Pt is very [place] so conversation is difficult. She did welcome prayer.    11/28/24 1155   Encounter Summary   Encounter Overview/Reason Spiritual/Emotional Needs   Service Provided For Patient   Referral/Consult From Palliative Care   Support System Children   Last Encounter  11/28/24   Complexity of Encounter Low   Spiritual/Emotional needs   Type Spiritual Support   Palliative Care   Type Palliative Care, Follow-up   Assessment/Intervention/Outcome   Assessment Calm   Intervention Active listening;Prayer (assurance of)/[place];Sustaining Presence/Ministry of presence   Outcome Comfort;Engaged in conversation;Expressed Gratitude       
Pts bilateral hearing aids at [facility].   
Pulmonary Progress Note  [facility]      Patient - Edyta Gruber,  Age - 89 y.o.    - 1924      Room Number - 2105/2105-01   N -  [id number]   Merged with [facility] # - [id number]  Date of Admission -  2024  1:18 AM        Consulting Service/Physician   Consulting - Kathy Humphries MD  Primary Care Physician - Florencia Talley MD     SUBJECTIVE   Patient appears to be very stable.  Alert and lucid.  Able to understand things.    OBJECTIVE   VITALS    height is 1.575 m (5' 2\") and weight is 49.9 kg (110 lb 0.2 oz). Her oral temperature is 98.1 °F (36.7 °C). Her blood pressure is 99/68 and her pulse is 98. Her respiration is 18 and oxygen saturation is 99%.     Body mass index is 20.12 kg/m².  Temperature Range: Temp: 98.1 °F (36.7 °C) Temp  Av.2 °F (36.8 °C)  Min: 96.2 °F (35.7 °C)  Max: 99.8 °F (37.7 °C)  BP Range:  Systolic (24hrs), Av , Min:90 , Max:115     Diastolic (24hrs), Av, Min:57, Max:98    Pulse Range: Pulse  Av  Min: 74  Max: 98  Respiration Range: Resp  Av.6  Min: 16  Max: 20  Current Pulse Ox::  SpO2: 99 %  24HR Pulse Ox Range:  SpO2  Av.1 %  Min: 93 %  Max: 99 %  Oxygen Amount and Delivery: O2 Flow Rate (L/min): 2 L/min    Wt Readings from Last 3 Encounters:   24 49.9 kg (110 lb 0.2 oz)   24 48.2 kg (106 lb 3.2 oz)   10/14/24 46.7 kg (103 lb)       I/O (24 Hours)    Intake/Output Summary (Last 24 hours) at 2024 1046  Last data filed at 2024 0618  Gross per 24 hour   Intake 360 ml   Output 1900 ml   Net -1540 ml       EXAM     General Appearance  Awake, alert, oriented, in no acute distress  HEENT - normocephalic, atraumatic.  Neck - Supple,  trachea midline   Lungs -decreased breath sounds at the bases posteriorly 1 4 from base to apex but  Heart Exam:PMI normal. No lifts, heaves, or thrills. RRR. No murmurs, clicks, gallops, or rubs  Abdomen Exam: Abdomen soft, non-tender. BS normal. No masses,  No 
RN called and updated pts son, Ritchie, on plan of care. All questions and concerns addressed at this time.   
RN called report to [facility]. All questions and concerns addressed at this time.   
RN entered room to place new IV. Upon assessment pt was unresponsive to stimuli. RN called charge RN and Dr. Humphries into room. Verbal orders for STAT ABG's. Stroke alert called.  
RN notified Dr. Dempsey that pulmonology is recommending to continue with IV diuresis.     Dr. Dempsey to change order.   
RN sent ABG results to Ava Ojeda NP  
RN sent Chest Xray results to Dr. Yeung.   
Rectal temp taken and is 95.2 F. Bear hugger/warming blanket in use.   
Stoke alert called. Patient not responding to verbal commands and minimal response to painful stimulus. Change from last assessment. Dr. Humphries to bedside. Patient taken down on monitor for CT head and CTA head and neck. Last known well 15:36. BS 85 NIH 21.  
Warming blanket initiated for rectal temp of 96.2 F   
Writer in to evaluate patient. At time of exam, patient is laying quietly in bed.  She opens her eyes and answers simple questions without difficulty.  When questioned about her current location, patient states that we are in [place].  She is aware that the year is 2024 and was able to identify her son at the bedside.  When asked if she knew what holiday was coming, she reported, \"[place] [employer] football,\" and stated that she hopes the game ends in a tie.    Spoke with Dr. Cardenas before and after telestroke evaluation regarding patient's stroke alert and test results.  Dr. Cardenas states that he feels patient's symptoms are more likely related to delirium then CVA.  RN reports that patient was recently started on O2 at 2 L a minute per nasal cannula, but that she had been increased to 4 L a minute per nasal cannula for a while earlier today.  ABG showed arterial pCO2 61.5, arterial pH 7.397.  Dr. Cardenas asks about patient's home dose of Eliquis due to her history of persistent atrial fibrillation.  Review of outside information reveals that last refill for Eliquis was obtained 9/3/2024.  Patient's son believes that the medication was discontinued while at the [facility] after having a few falls.  Patient continues on ASA 81 mg daily.  Dr. Cardenas does not feel an MRI is indicated at this time, but will leave the decision up to the medical team.        
Writer responded to Stroke Alert; Chaplain Love present with writer; patient in CT; visit with patient's son Ritchie who is tearful, in shock and overwhelmed; son provided medical update; listening presence and support; got medical update from staff; son loving and attentive to patient when patient returned to room from medical testing; welcomed prayer;        11/27/24 1726   Encounter Summary   Encounter Overview/Reason Crisis   Service Provided For Patient and family together   Referral/Consult From Multi-disciplinary team   Support System Children   Last Encounter  11/27/24   Complexity of Encounter Moderate   Begin Time 1700   End Time  1726   Total Time Calculated 26 min   Crisis   Type Code Stroke   Assessment/Intervention/Outcome   Assessment Anxious;Coping;Hopeful;Fearful;Powerlessness;Shock;Sad;Tearful   Intervention Active listening;Discussed illness injury and it’s impact;Explored/Affirmed feelings, thoughts, concerns;Prayer (assurance of)/[place];Sustaining Presence/Ministry of presence   Outcome Comfort;Coping;Engaged in conversation;Expressed feelings, needs, and concerns;Expressed Gratitude;Receptive       
    Meds:     Current Facility-Administered Medications:     furosemide (LASIX) injection 20 mg, 20 mg, IntraVENous, BID, Lianet Dempsey MD, 20 mg at 11/25/24 0852    [START ON 11/26/2024] pravastatin (PRAVACHOL) tablet 20 mg, 20 mg, Oral, Nightly, Mckenzie Blake APRN - NP    cefTRIAXone (ROCEPHIN) 1,000 mg in sterile water 10 mL IV syringe, 1,000 mg, IntraVENous, Q24H, Kathy Humphries MD    aspirin chewable tablet 81 mg, 81 mg, Oral, Daily, Mckenzie Blake APRN - NP, 81 mg at 11/25/24 0852    cetirizine (ZYRTEC) tablet 5 mg, 5 mg, Oral, Daily, Mckenzie Blake APRN - NP, 5 mg at 11/25/24 0852    metoprolol tartrate (LOPRESSOR) tablet 12.5 mg, 12.5 mg, Oral, BID, Mckenzie Blake APRN - NP, 12.5 mg at 11/24/24 0829    tamsulosin (FLOMAX) capsule 0.4 mg, 0.4 mg, Oral, Daily, Mckenzie Blake APRN - NP, 0.4 mg at 11/25/24 0852    timolol (TIMOPTIC) 0.5 % ophthalmic solution 1 drop, 1 drop, Both Eyes, BID, Mckenzie Blake APRN - NP, 1 drop at 11/25/24 0856    sodium chloride flush 0.9 % injection 5-40 mL, 5-40 mL, IntraVENous, 2 times per day, Mckenzie Blaek APRN - NP, 10 mL at 11/25/24 0852    sodium chloride flush 0.9 % injection 10 mL, 10 mL, IntraVENous, PRN, Mckenzie Blake APRN - NP    0.9 % sodium chloride infusion, , IntraVENous, PRN, Mckenzie Blake APRN - NP    ondansetron (ZOFRAN-ODT) disintegrating tablet 4 mg, 4 mg, Oral, Q8H PRN **OR** ondansetron (ZOFRAN) injection 4 mg, 4 mg, IntraVENous, Q6H PRN, Mckenzie Blake APRN - NP    melatonin tablet 3 mg, 3 mg, Oral, Nightly PRN, Mckenzie Blake APRN - NP    polyethylene glycol (GLYCOLAX) packet 17 g, 17 g, Oral, Daily PRN, Mckenzie Blake APRN - NP    bisacodyl (DULCOLAX) suppository 10 mg, 10 mg, Rectal, Daily PRN, Mckenzie Blake APRN - NP    acetaminophen (TYLENOL) tablet 650 mg, 650 mg, Oral, Q6H PRN **OR** acetaminophen (TYLENOL) suppository 650 mg, 650 mg, Rectal, Q6H PRN, Mckenzie Blake APRN - NP    levalbuterol (XOPENEX) 
  Abdomen: soft, non-tender; bowel sounds normal  Extremities: No LE edema. Erythema noted bilateral legs.   Neurologic: Mental status: Alert, oriented. Motor and sensory not done.       Cardiac testing:   EKG:  atrial flutter with RVR         TTE 10/14/24    Normal left ventricular systolic function with estimated EF 60 - 65%.    Right ventricle size is normal. Normal systolic function.    Left atrium is severely dilated. Right atrium is severely dilated.    Moderately calcified aortic valve cusps. Mild aortic stenosis. AV mean gradient is 11 mmHg. AV area by continuity VTI is 0.6 cm2.    Mild mitral regurgitation.    Moderate tricuspid regurgitation. The estimated RVSP is 43 mmHg.    Large bilateral pleural effusion.         Impression:   Acute on chronic heart failure with preserved ejection fraction  Acute on chronic hypoxic respiratory failure  Bilateral pleural effusion with recent thoracentesis  Permanent atrial fibrillation/flutter, now rate controlled  Hypertension  Hyperlipidemia  Asthma    Patient Active Problem List:     Non-pressure ulcer of left lower extremity, limited to breakdown of skin (HCC)     Persistent atrial fibrillation (HCC)     Venous ulcer of left leg (HCC)     Essential hypertension     Acute respiratory failure with hypoxia and hypercapnia     Heart failure with preserved left ventricular function (HFpEF) (HCC)     Fall at home, initial encounter     Chronic anticoagulation     Acute on chronic heart failure with preserved ejection fraction (HCC)     Mild protein-calorie malnutrition (HCC)     Pneumonia     Carotid atherosclerosis     Chronic skin ulcer (HCC)     Hyperlipidemia     Hypernatremia     Hypoxia     COVID-19     Goals of care, counseling/discussion     DNR (do not resuscitate) discussion     ACP (advance care planning)     Palliative care encounter     Bilateral pleural effusion     Permanent atrial fibrillation (HCC)     Bradycardia     General weakness     Moderate 
  Extremities: No edema or discoloration  Infusions:      sodium chloride       Meds:     Current Facility-Administered Medications:     furosemide (LASIX) injection 20 mg, 20 mg, IntraVENous, BID, Lianet Dempsey MD, 20 mg at 11/26/24 0736    pravastatin (PRAVACHOL) tablet 20 mg, 20 mg, Oral, Nightly, Mckenzie Blake APRN - NP    cefTRIAXone (ROCEPHIN) 1,000 mg in sterile water 10 mL IV syringe, 1,000 mg, IntraVENous, Q24H, Kathy Humphries MD, 1,000 mg at 11/25/24 1323    aspirin chewable tablet 81 mg, 81 mg, Oral, Daily, Mckenzie Blake APRN - NP, 81 mg at 11/26/24 0736    cetirizine (ZYRTEC) tablet 5 mg, 5 mg, Oral, Daily, Mckenzie Blake APRN - NP, 5 mg at 11/26/24 0736    metoprolol tartrate (LOPRESSOR) tablet 12.5 mg, 12.5 mg, Oral, BID, Mckenzie Blake APRN - NP, 12.5 mg at 11/25/24 2135    tamsulosin (FLOMAX) capsule 0.4 mg, 0.4 mg, Oral, Daily, Mckenzie Blake APRN - NP, 0.4 mg at 11/26/24 0736    timolol (TIMOPTIC) 0.5 % ophthalmic solution 1 drop, 1 drop, Both Eyes, BID, Mckenzie Blake APRN - NP, 1 drop at 11/26/24 0754    sodium chloride flush 0.9 % injection 5-40 mL, 5-40 mL, IntraVENous, 2 times per day, Mckenzie Blake APRN - NP, 10 mL at 11/26/24 0735    sodium chloride flush 0.9 % injection 10 mL, 10 mL, IntraVENous, PRN, Mckenzie Blake APRN - NP    0.9 % sodium chloride infusion, , IntraVENous, PRN, Mckenzie Blake APRN - NP    ondansetron (ZOFRAN-ODT) disintegrating tablet 4 mg, 4 mg, Oral, Q8H PRN **OR** ondansetron (ZOFRAN) injection 4 mg, 4 mg, IntraVENous, Q6H PRN, Mckenzie Blake APRN - NP    melatonin tablet 3 mg, 3 mg, Oral, Nightly PRN, Mckenzie Blake APRN - NP    polyethylene glycol (GLYCOLAX) packet 17 g, 17 g, Oral, Daily PRN, Mckenzie Blake APRN - NP    bisacodyl (DULCOLAX) suppository 10 mg, 10 mg, Rectal, Daily PRN, Mckenzie Blake APRN - NP    acetaminophen (TYLENOL) tablet 650 mg, 650 mg, Oral, Q6H PRN **OR** acetaminophen (TYLENOL) suppository 650 mg, 650 
11/18. Troponin 36, improved from 39. EKG sinus tachycardia rate 101 with 1st degree AV block,      Admit to PCU for pleural effusion  -pulmonology consult, continue lasix 20 mg IV daily  -palliative care consult, patient has dnrcc-a paperwork present in chart from 2023 but last 2 hospital encounters patient listed as full code.       Review of Systems:     Positive for shortness of breath, mild cough  Denies chest pain or palpitations  Denies abdominal pain, diarrhea vomiting  Denies any new numbness tremors or weakness.    A 10 point review of systems was performed and and negative except as mentioned in HPI  Positive and Negative as described in HPI.      Past Medical History:     Past Medical History:   Diagnosis Date    Acute dermatitis     Arthritis     Asthma     Atrial fibrillation (HCC)     Bursitis     CHF (congestive heart failure) (HCC)     Hearing aid worn     Hyperlipidemia     Hypertension     Lumbar disc disease     Wears glasses     Wound of right leg         Past Surgical History:     Past Surgical History:   Procedure Laterality Date    BACK SURGERY      DEBRIDEMENT Left 03/09/2017    rt. leg    OTHER SURGICAL HISTORY  03/30/2017    Right leg Angio    OTHER SURGICAL HISTORY  03/30/2017    Right leg angio    [place] I&D DEEP ABSC BURSA/HEMATOMA THIGH/KNEE REGION Right 3/9/2017    DEBRIDEMENT CALF WOUND  performed by Arthur Delos Reyes, MD at [facility] CVOR    [place] I&D DEEP ABSC BURSA/HEMATOMA THIGH/KNEE REGION Right 4/28/2017    RIGHT LOWER EXTREMITY DEBRIDEMENT, INTEGRA APPLICATION, PREVENA VAC APPLICATION LOWR RIGHT LEG performed by Arthur Delos Reyes, MD at [facility] OR        Medications Prior to Admission:     Prior to Admission medications    Medication Sig Start Date End Date Taking? Authorizing Provider   metoprolol tartrate (LOPRESSOR) 25 MG tablet Take 0.5 tablets by mouth 2 times daily 11/22/24  Yes Earnestine Ibanez MD   budesonide-formoterol (SYMBICORT) 160-4.5 MCG/ACT AERO Inhale 2 puffs into the lungs 
Mobility Device: Rolling Walker  Activity: To/from bathroom  Assist Level: Minimal assistance (min A x2)  Functional Mobility Comments: patient completing functional mobility with RW from bed to toilet and to return to chair after toileting task, patient requiring increased time and verbal cueing for safety. Forward flexed posture noted with OT assisting with 02 tubing management and intermittent assistance with RW management.       Patient Education  Patient Education  Education Given To: Patient  Education Provided: Role of Therapy, Plan of Care, Transfer Training, Mobility Training, ADL Adaptive Strategies  Education Method: Verbal  Barriers to Learning: Hearing  Education Outcome: Verbalized understanding, Continued education needed    Goals  Short Term Goals  Time Frame for Short Term Goals: By discharge  Short Term Goal 1: Pt will perform UB bathing/dressing set-up with Good safety while maintaining SpO2 WFL  Short Term Goal 2: Pt will perform LB bathing/dressing Min A with Good safety and use of AE/modified techniques as needed while maintaining SpO2 WFL  Short Term Goal 3: Pt will perform functional transfers/mobility CGA during self-care tasks with Good safety and use of least restrictive device while maintaining SpO2 WFL  Short Term Goal 4: Pt will tolerate standing 5+ minutes during functional activity of choice CGA to improve balance/tolerance for self-care tasks while maintaining SpO2 WFL  Short Term Goal 5: Pt will actively participate in 15+ minutes of therapeutic exercise/functional activity to promote safety and independence with self-care tasks  Occupational Therapy Plan  Times Per Week: 4-6  Current Treatment Recommendations: Strengthening, ROM, Balance training, Functional mobility training, Endurance training, Pain management, Patient/Caregiver education & training, Safety education & training, Equipment evaluation, education, & procurement, Self-Care / ADL, Coordination training, 
metoprolol tartrate  12.5 mg Oral BID    tamsulosin  0.4 mg Oral Daily    timolol  1 drop Both Eyes BID    sodium chloride flush  5-40 mL IntraVENous 2 times per day     Continuous Infusions:   sodium chloride       CBC:   Recent Labs     11/26/24  0516 11/27/24  0524   WBC 3.6 4.6   HGB 12.0 12.7   PLT 96* 107*     BMP:    Recent Labs     11/26/24  0516 11/27/24  0524    142   K 4.2 4.2   CL 99 98   CO2 40* 35*   BUN 17 23   CREATININE 0.9 1.1   GLUCOSE 76 94     Hepatic: No results for input(s): \"AST\", \"ALT\", \"BILITOT\", \"ALKPHOS\" in the last 72 hours.    Invalid input(s): \"ALB\"  Troponin: No results for input(s): \"TROPONINI\" in the last 72 hours.  BNP: No results for input(s): \"BNP\" in the last 72 hours.  Lipids: No results for input(s): \"CHOL\", \"HDL\" in the last 72 hours.    Invalid input(s): \"LDLCALCU\"  INR: No results for input(s): \"INR\" in the last 72 hours.    Objective:   Vitals: /70   Pulse 72   Temp 98 °F (36.7 °C) (Oral)   Resp 16   Ht 1.575 m (5' 2\")   Wt 47.7 kg (105 lb 2.6 oz)   SpO2 96%   BMI 19.23 kg/m²   General appearance: Very frail looking elderly female alert and cooperative with exam  HEENT: Head: Normal, normocephalic, atraumatic.  Neck: supple, symmetrical, trachea midline  Lungs:  Very poor chest expansion diminished breath sounds basal dullness both side  Heart: irregularly irregular rhythm  Abdomen:  Difficult to assess in sitting position no tenderness  Extremities:  Mild edema bruising  Neurologic: Mental status: Follow verbal command    EKG: A-fib a flutter.  ECHO: not obtained.   Ejection fraction: 60% severely dilated LA and RA RVSP 46 mild aortic stenosis mean gradient 11  Stress Test: not obtained.  Cardiac Angiography: not obtained.        Assessment / Acute Cardiac Problems:   Congestive heart failure diastolic acute on chronic bilateral pleural effusion  A-fib a flutter heart rate under control      Patient Active Problem List:     Non-pressure ulcer of left 
alkalosis  COVID-pneumonia hospitalized November 2023  Debilitation  Very hard of hearing  DNR CCA no intubation  PLAN:   Diuresis and tolerated, try to keep on the dry side, no plans for thoracentesis would aggressively treat heart failure as able  Bicarb improved after dose of Diamox, she will get 1 more dose today  Repeat chest x-ray in a.m.  O2 to keep sats greater than 88%  Eventual discharge back to extended-care facility  Discussed with charge RN      Electronically signed by ELENA Bacon - CNP on 11/29/24     This progress note was completed using a voice transcription system. Every effort was made to ensure accuracy. However, inadvertent computerized transcription errors may be present.    JUMANA PARKER AGACNP-BC, NP-C  [facility] Pulmonary, Critical Care & Sleep  
completed using a voice transcription system. Every effort was made to ensure accuracy. However, inadvertent computerized transcription errors may be present.    KAMALA JaimesC, MSN  [facility]  
heart failure preserved ejection fraction , diastolic acute on chronic bilateral pleural effusion  Ejection fraction 60%  Severely dilated left and right atrium  History of multiple thoracentesis for pleural effusion  Pulmonary hypertension right ventricle systolic pressure 46 mm william  Mild aortic stenosis mean gradient 11 mmHg  A-fib a flutter heart rate under control  Hypotension started on midodrine      Patient Active Problem List:     Non-pressure ulcer of left lower extremity, limited to breakdown of skin (HCC)     Persistent atrial fibrillation (HCC)     Venous ulcer of left leg (HCC)     Essential hypertension     Acute respiratory failure with hypoxia and hypercapnia     Heart failure with preserved left ventricular function (HFpEF) (HCC)     Fall at home, initial encounter     Chronic anticoagulation     Acute on chronic heart failure with preserved ejection fraction (HCC)     Mild protein-calorie malnutrition (HCC)     Pneumonia     Carotid atherosclerosis     Chronic skin ulcer (HCC)     Hyperlipidemia     Hypernatremia     Hypoxia     COVID-19     Goals of care, counseling/discussion     DNR (do not resuscitate) discussion     ACP (advance care planning)     Palliative care encounter     Bilateral pleural effusion     Permanent atrial fibrillation (HCC)     Bradycardia     General weakness     Moderate malnutrition (HCC)     Stroke-like symptoms     NSTEMI (non-ST elevated myocardial infarction) (HCC)     Hypertensive emergency     Acute left-sided weakness     Acute exacerbation of chronic heart failure (HCC)     Pleural effusion     Shortness of breath     Encounter for palliative care      Plan of Treatment:   Medications reviewed  1: Diastolic CHF acute on chronic, bilateral pleural effusion  Continue IV Lasix 20 mg twice daily, add Jardiance 10 mg daily  Patient need right pleural fluid aspiration, case discussed with Dr. Randolph thoracjazmine tomorrow  2: A-fib/A-flutter heart rate under control 
supple, no carotid bruits, thyroid not palpable  Lungs: Increased effort of breathing, bilaterally reduced air entry, worse on the right  Cardiovascular: normal rate, regular rhythm, no murmur, gallop, rub.  Abdomen: Soft, nontender, nondistended, normal bowel sounds, no hepatomegaly or splenomegaly  Neurologic: There are no new focal motor or sensory deficits, normal muscle tone and bulk, no abnormal sensation, normal speech, cranial nerves II through XII grossly intact  Skin: No gross lesions, rashes, bruising or bleeding on exposed skin area  Extremities:  peripheral pulses palpable, no pedal edema or calf pain with palpation  Psych: normal affect     Investigations:      Laboratory Testing:  No results found for this or any previous visit (from the past 24 hour(s)).      Recent Labs     11/27/24  0524 11/25/24  0520 11/24/24  0145 11/20/24  0402 11/19/24  0605   HGB 12.7   < > 13.6   < > 12.2   HCT 40.1   < > 42.0   < > 39.4   WBC 4.6   < > 4.1   < > 3.8   .0*   < > 101.0*   < > 106.2*      < > 141   < > 146*   K 4.2   < > 5.2   < > 4.2   CL 98   < > 98   < > 105   CO2 35*   < > 38*   < > 32*   BUN 23   < > 18   < > 29*   CREATININE 1.1   < > 0.8   < > 1.0*   GLUCOSE 94   < > 104*   < > 76   INR  --   --   --   --  1.1   PROTIME  --   --   --   --  14.1   AST  --   --  35  --   --    ALT  --   --  22  --   --     < > = values in this interval not displayed.       Hematology:  Recent Labs     11/27/24 0524   WBC 4.6   RBC 3.90*   HGB 12.7   HCT 40.1   .0*   MCH 32.6   MCHC 31.7   RDW 16.5*   *   MPV 9.6     Chemistry:  Recent Labs     11/27/24 0524      K 4.2   CL 98   CO2 35*   GLUCOSE 94   BUN 23   CREATININE 1.1   ANIONGAP 9   LABGLOM 45*   CALCIUM 9.5     No results for input(s): \"LABALBU\", \"LABA1C\", \"R9FFGRO\", \"FT4\", \"TSH\", \"AST\", \"ALT\", \"LDH\", \"GGT\", \"ALKPHOS\", \"BILITOT\", \"BILIDIR\", \"AMMONIA\", \"AMYLASE\", \"LIPASE\", \"LACTATE\", \"CHOL\", \"HDL\", \"CHOLHDLRATIO\", \"TRIG\", \"VLDL\", 
times  Hearing  Hearing: Exceptions to WFL (very [place])  Hearing Exceptions: Hard of hearing/hearing concerns;Bilateral hearing aid    Cognition   Orientation  Orientation Level: Oriented to person;Oriented to time;Oriented to place;Disoriented to situation    Objective  O2 Device: Nasal cannula at 3 L     Observation/Palpation  Observation: O2 at 3 L, IV right antecubital, external urinary catheter  Gross Assessment  Sensation: Impaired (C/O numbness in fingertips)    AROM RLE (degrees)  RLE AROM: WFL  AROM LLE (degrees)  LLE AROM : WFL  AROM RUE (degrees)  RUE General AROM: see OT for UE assessment- right hand dominant  AROM LUE (degrees)  LUE General AROM: see OT for UE assessment  Strength RLE  Comment: grossly 4-/5  Strength LLE  Comment: grossly 4-/5  Strength RUE  Comment: see OT for UE assessment- right hand dominant  Strength LUE  Comment: see OT for UE assessment           Bed mobility  Rolling to Left: Stand by assistance  Supine to Sit: Stand by assistance  Scooting: Stand by assistance  Bed Mobility Comments: pt dangled at the EOB w/ SBA x 1  Transfers  Sit to Stand: Minimal Assistance  Stand to Sit: Moderate Assistance (therapist assisted lowering pt down into bedside chair)  Stand Pivot Transfers: Moderate Assistance (using wheeled walker)  Comment: verbal cues for hand placement and assist to reach back for chair handles w/ left hand.  Becomes off balanced when backing up to the chair using the wheeled walker.  Pt remained on O2 at 3 L during the treatment.  Ambulation  Comments: deferred distance due to fatigue and limited lines  Stairs/Curb  Stairs?: No     Balance  Sitting - Static: Good;-  Sitting - Dynamic: Good;-  Standing - Static: Fair (using wheeled walker)  Standing - Dynamic: Fair;- (using wheeled walker)                                                            AM-PAC - Mobility    AM-PAC Basic Mobility - Inpatient   How much help is needed turning from your back to your side while in a 
Learning: Hearing  Education Outcome: Verbalized understanding, Continued education needed    Functional Outcome Measures  AM-PAC Daily Activity - Inpatient   How much help is needed for putting on and taking off regular lower body clothing?: A Lot  How much help is needed for bathing (which includes washing, rinsing, drying)?: A Lot  How much help is needed for toileting (which includes using toilet, bedpan, or urinal)?: Total  How much help is needed for putting on and taking off regular upper body clothing?: A Little  How much help is needed for taking care of personal grooming?: A Little  How much help for eating meals?: A Little  [facility]: 14  AM-PAC Inpatient ADL T-Scale Score : 33.39  ADL Inpatient CMS 0-100% Score: 59.67  ADL Inpatient CMS G-Code Modifier : CK       Goals     Short Term Goals  Time Frame for Short Term Goals: By discharge  Short Term Goal 1: Pt will perform UB bathing/dressing set-up with Good safety while maintaining SpO2 WFL  Short Term Goal 2: Pt will perform LB bathing/dressing Min A with Good safety and use of AE/modified techniques as needed while maintaining SpO2 WFL  Short Term Goal 3: Pt will perform functional transfers/mobility CGA during self-care tasks with Good safety and use of least restrictive device while maintaining SpO2 WFL  Short Term Goal 4: Pt will tolerate standing 5+ minutes during functional activity of choice CGA to improve balance/tolerance for self-care tasks while maintaining SpO2 WFL  Short Term Goal 5: Pt will actively participate in 15+ minutes of therapeutic exercise/functional activity to promote safety and independence with self-care tasks    Plan  Occupational Therapy Plan  Times Per Week: 4-6  Current Treatment Recommendations: Strengthening, ROM, Balance training, Functional mobility training, Endurance training, Pain management, Patient/Caregiver education & training, Safety education & training, Equipment evaluation, 
transcription, some errors in transcription may have occurred.    [facility]  [address] [place] [address].   Phone [phone]           
effusion.    Signed by: Marilu Snow MD on 10/14/2024  2:58 PM       Encounter Date: 11/24/24   EKG 12 Lead   Result Value    Ventricular Rate 101    Atrial Rate 101    P-R Interval 348    QRS Duration 78    Q-T Interval 338    QTc Calculation (Bazett) 438    P Axis 70    R Axis 79    T Axis -120    Narrative    Atrial flutter wtih 3:1 AV block  Low voltage QRS  Cannot rule out Anterior infarct (cited on or before 29-NOV-2023)  ST & T wave abnormality, consider inferior ischemia  Abnormal ECG  When compared with ECG of 24-NOV-2024 01:54, (unconfirmed)  No significant change was found          Assessment        REVIEW OF SYSTEMS    []   UNABLE TO OBTAIN:     Constitutional:  []   Chills   []  Fatigue   []  Fevers   []  Malaise   []  Weight loss   [] Other:     Respiratory:   []  Cough    []  Shortness of breath    []  Chest pain    [] Other:     Cardiovascular:   []  Chest pain  []  Dyspnea    []  Exertional chest pressure/discomfort     [] Fatigue      []  Palpitations    []  Syncope   [] Other:     Gastrointestinal:   []  Abdominal pain   []  Constipation    []  Diarrhea    []   Dysphagia   []  Reflux             []  Vomiting   [] Other:     Genitourinary:  []  Dysuria     []  Frequency   []  Hematuria   [] Nocturia   []  Urinary incontinence   [] Other:     Musculoskeletal:   [] Back pain    []  Muscle weakness   []  Myalgias    []  Neck pain   []  Stiff joints   []  Other:     Behavioral/Psych:   [] Anxiety    []  Depression     []  Mood swings   [] Other:     PHYSICAL ASSESSMENT:     General: [x]  Oriented x3      [] well appearing      [] Intubated      [] ill appearing      [] Other:    Mental Status: [x] normal mental status exam      [] drowsy      [] Confused      [] Other:     Cardiovascular: [x]  Regular rate/rhythm      [] Arrhythmia      [] Other:     Chest: [x] Effort normal      [] lungs clear      [] respiratory distress      [] Tachypnea      []  Other:    Abdomen: [x] Soft/non-tender      []  
BID Mckenzie Blake APRN - NP   1 drop at 11/25/24 0856    sodium chloride flush 0.9 % injection 5-40 mL  5-40 mL IntraVENous 2 times per day Mckenzie Blake APRN - NP   10 mL at 11/25/24 0852    sodium chloride flush 0.9 % injection 10 mL  10 mL IntraVENous PRN Mckenzie Blake APRN - NP        0.9 % sodium chloride infusion   IntraVENous PRN Mckenzie Blake APRN - NP        ondansetron (ZOFRAN-ODT) disintegrating tablet 4 mg  4 mg Oral Q8H PRN Mckenzie Blake APRN - NP        Or    ondansetron (ZOFRAN) injection 4 mg  4 mg IntraVENous Q6H PRN Mckenzie Blake APRN - NP        melatonin tablet 3 mg  3 mg Oral Nightly PRN Mckenzei Blake APRN - NP        polyethylene glycol (GLYCOLAX) packet 17 g  17 g Oral Daily PRN Mckenzie Blake APRN - NP        bisacodyl (DULCOLAX) suppository 10 mg  10 mg Rectal Daily PRN Mckenzie Blake APRN - NP        acetaminophen (TYLENOL) tablet 650 mg  650 mg Oral Q6H PRN Mckenzie Blake APRN - NP        Or    acetaminophen (TYLENOL) suppository 650 mg  650 mg Rectal Q6H PRN Mckenzie Blake APRN - NP        levalbuterol (XOPENEX) nebulization 0.63 mg  0.63 mg Nebulization Q4H PRN Moises Arredondo MD           Impressions :     1. Principal Problem:    Pleural effusion  Active Problems:    Shortness of breath    Acute on chronic heart failure with preserved ejection fraction (HCC)    Encounter for palliative care  Resolved Problems:    * No resolved hospital problems. *        2.  has a past medical history of Acute dermatitis, Arthritis, Asthma, Atrial fibrillation ([facility]), Bursitis, CHF (congestive heart failure) ([facility]), Hearing aid worn, Hyperlipidemia, Hypertension, Lumbar disc disease, Wears glasses, and Wound of right leg.     Plans:     89-year-old female history of HFpEF, A-fib, HLD CKD 3 asthma urinary retention glaucoma history of thoracentesis for pleural effusion previously, recent admission for pleural effusion hypoxia treated with IV Lasix discharged to nursing 
currently full code appears to has been CCA in the past-palliative care consulted      DVT pplx    11/25  Oxygen requirement improving-down to 2 L nasal cannula today.  Lung clear on auscultation no significant leg swelling.  Changes of stasis dermatitis on both legs, left lower calf appears red and swollen.  Suggestive of cellulitis.  Will start Rocephin, check leg vein Dopplers rule out DVT.   negative 1000 mL since admission continuing with gentle diuresis appreciate cardiology recommendations  Creatinine normal  No fever no leukocytosis hemoglobin stable  Probable discharge tomorrow  Palliative care recommendations awaited    11/26  Oxygen requirements have worsened somewhat-currently on 2.5 L nasal cannula oxygen.  Leg redness is somewhat better second dose of Rocephin today.  Leg and Doppler results pending  Appreciate pulmonology recommendations-chest x-ray done this morning-results pending pulm considering thoracentesis if any significant effusion present.  Patient is diuresing well-now -3 L since admission..  Creatinine electrolytes normal  Patient herself reports feeling somewhat better.  No wheeze or crackles heard on lung exam abdomen soft nontender no leg swelling    11/27  Patient appears somewhat better today-back on 2 L nasal cannula oxygen  Leg redness improving switching antibiotics to p.o.  No DVT on lower extremity Dopplers  Chest x-ray did show large bilateral pleural effusion, pulm recommending continuation of IV diuretics no thoracentesis for now.  Heart rate blood pressure controlled, creatinine normal, patient is -2.8 L since admission.  Patient reports no new symptoms she is alert conversational working with therap during my exam.    11/28  Stroke alert was called yesterday evening due to change in mental status patient was not responding  CT head CT A did not show any new changes  Neurology did not recommend MRI  Today patient is back to baseline mentation answering questions 
suppository 10 mg  10 mg Rectal Daily PRN Mckenzie Blake APRN - NP        acetaminophen (TYLENOL) tablet 650 mg  650 mg Oral Q6H PRN Mckenzie Blake APRN - NP        Or    acetaminophen (TYLENOL) suppository 650 mg  650 mg Rectal Q6H PRN Mckenzie Blake APRN - NP        levalbuterol (XOPENEX) nebulization 0.63 mg  0.63 mg Nebulization Q4H PRN Moises Arredondo MD           Impressions :     1. Principal Problem:    Pleural effusion  Active Problems:    Shortness of breath    Acute on chronic heart failure with preserved ejection fraction (HCC)    Encounter for palliative care  Resolved Problems:    * No resolved hospital problems. *        2.  has a past medical history of Acute dermatitis, Arthritis, Asthma, Atrial fibrillation ([facility]), Bursitis, CHF (congestive heart failure) ([facility]), Hearing aid worn, Hyperlipidemia, Hypertension, Lumbar disc disease, Wears glasses, and Wound of right leg.     Plans:     89-year-old female history of HFpEF, A-fib, HLD CKD 3 asthma urinary retention glaucoma history of thoracentesis for pleural effusion previously, recent admission for pleural effusion hypoxia treated with IV Lasix discharged to nursing facility brought back with increased oxygen requirement needing 4 L nasal cannula oxygen  Bilateral effusions on CT chest  IV Lasix resumed pulmonology and cardiology consulted  Palliative care consulted    CHF exacerbation-EF 60 to 65% on recent echo continue gentle diuresis, appreciate cardiology recommendations  Acute hypoxic respiratory failure secondary to CHF exacerbation-improving with diuresis  A-fib-metoprolol  Glaucoma-continue home eyedrops  Chronic thrombocytopenia-platelets at baseline  Left leg redness noted-appears to be stasis dermatitis  CODE STATUS currently full code appears to has been CCA in the past-palliative care consulted      DVT pplx    11/25  Oxygen requirement improving-down to 2 L nasal cannula today.  Lung clear on auscultation no significant leg 
is back to baseline mentation answering questions appropriately  Abdomen is distended, will check abdominal x-ray, appears to be constipated-will give as needed laxatives.  Urine output has been good  Chest x-ray from this morning  in process  Continuing with diuresis-patient is -4 L since admission.      11/30  89-year-old female history of HFpEF, A-fib, HLD CKD 3 asthma urinary retention glaucoma history of thoracentesis for pleural effusion previously, recent admission for pleural effusion hypoxia treated with IV Lasix discharged to nursing facility brought back with increased oxygen requirement needing 4 L nasal cannula oxygen  Bilateral effusions on CT chest  IV Lasix resumed pulmonology and cardiology on board   Stroke alert was called yesterday evening due to change in mental status patient was not responding  CT head CT A did not show any new changes  Neurology did not recommend MRI  KUB , stool burden  CXR still coongested   Discussed with cardio  Her temperature is running low,  Ordered some labs,  Martine Merchant MD  11/30/2024  12:15 PM    
morning-results pending pulm considering thoracentesis if any significant effusion present.  Patient is diuresing well-now -3 L since admission..  Creatinine electrolytes normal  Patient herself reports feeling somewhat better.  No wheeze or crackles heard on lung exam abdomen soft nontender no leg swelling    11/27  Patient appears somewhat better today-back on 2 L nasal cannula oxygen  Leg redness improving switching antibiotics to p.o.  No DVT on lower extremity Dopplers  Chest x-ray did show large bilateral pleural effusion, pulm recommending continuation of IV diuretics no thoracentesis for now.  Heart rate blood pressure controlled, creatinine normal, patient is -2.8 L since admission.  Patient reports no new symptoms she is alert conversational working with therap during my exam.    Kathy Humphries MD  11/27/2024  9:55 AM

## 2024-12-03 LAB — SURGICAL PATHOLOGY REPORT: NORMAL

## 2024-12-04 LAB
LDH FLD L TO P-CCNC: 61 U/L
PROT FLD-MCNC: 2.3 G/DL
SPECIMEN TYPE: NORMAL
SPECIMEN TYPE: NORMAL

## 2024-12-08 LAB
MICROORGANISM SPEC CULT: NORMAL
MICROORGANISM/AGENT SPEC: NORMAL
SERVICE CMNT-IMP: NORMAL
SPECIMEN DESCRIPTION: NORMAL

## 2024-12-16 ENCOUNTER — HOSPITAL ENCOUNTER (OUTPATIENT)
Age: 88
Setting detail: SPECIMEN
Discharge: HOME | End: 2024-12-16

## 2024-12-16 LAB — BNP SERPL-MCNC: 961 PG/ML (ref 0–450)

## 2024-12-16 PROCEDURE — 36415 COLL VENOUS BLD VENIPUNCTURE: CPT

## 2024-12-16 PROCEDURE — P9603 ONE-WAY ALLOW PRORATED MILES: HCPCS

## 2024-12-16 PROCEDURE — 83880 ASSAY OF NATRIURETIC PEPTIDE: CPT
